# Patient Record
Sex: FEMALE | Race: WHITE | NOT HISPANIC OR LATINO | Employment: OTHER | URBAN - METROPOLITAN AREA
[De-identification: names, ages, dates, MRNs, and addresses within clinical notes are randomized per-mention and may not be internally consistent; named-entity substitution may affect disease eponyms.]

---

## 2017-02-17 ENCOUNTER — OFFICE VISIT (OUTPATIENT)
Dept: CARDIOLOGY | Facility: CLINIC | Age: 72
End: 2017-02-17

## 2017-02-17 VITALS
HEART RATE: 69 BPM | HEIGHT: 63 IN | BODY MASS INDEX: 31.29 KG/M2 | DIASTOLIC BLOOD PRESSURE: 66 MMHG | WEIGHT: 176.6 LBS | SYSTOLIC BLOOD PRESSURE: 124 MMHG

## 2017-02-17 DIAGNOSIS — E78.5 DYSLIPIDEMIA: ICD-10-CM

## 2017-02-17 DIAGNOSIS — E11.8 TYPE 2 DIABETES MELLITUS WITH COMPLICATION, WITHOUT LONG-TERM CURRENT USE OF INSULIN (HCC): ICD-10-CM

## 2017-02-17 DIAGNOSIS — I10 ESSENTIAL HYPERTENSION: Primary | ICD-10-CM

## 2017-02-17 DIAGNOSIS — I11.9 HYPERTENSIVE HEART DISEASE WITHOUT HEART FAILURE: ICD-10-CM

## 2017-02-17 DIAGNOSIS — E66.9 OBESITY (BMI 30.0-34.9): ICD-10-CM

## 2017-02-17 PROCEDURE — 99213 OFFICE O/P EST LOW 20 MIN: CPT | Performed by: INTERNAL MEDICINE

## 2017-02-17 RX ORDER — LEVOTHYROXINE SODIUM 0.03 MG/1
25 TABLET ORAL DAILY
Qty: 90 TABLET | Refills: 1 | Status: SHIPPED | OUTPATIENT
Start: 2017-02-17 | End: 2018-04-27 | Stop reason: SDUPTHER

## 2017-02-17 RX ORDER — HYDROCHLOROTHIAZIDE 12.5 MG/1
25 TABLET ORAL DAILY
Qty: 180 TABLET | Refills: 1 | Status: SHIPPED | OUTPATIENT
Start: 2017-02-17 | End: 2017-08-28 | Stop reason: SDUPTHER

## 2017-02-17 RX ORDER — AMLODIPINE BESYLATE 5 MG/1
5 TABLET ORAL DAILY
Qty: 90 TABLET | Refills: 1 | Status: SHIPPED | OUTPATIENT
Start: 2017-02-17 | End: 2017-11-30

## 2017-02-17 RX ORDER — RANOLAZINE 500 MG/1
500 TABLET, EXTENDED RELEASE ORAL DAILY
Qty: 30 TABLET | Refills: 1 | Status: SHIPPED | OUTPATIENT
Start: 2017-02-17 | End: 2017-04-26 | Stop reason: SDUPTHER

## 2017-02-17 RX ORDER — FENOFIBRATE 145 MG/1
145 TABLET, COATED ORAL DAILY
Qty: 90 TABLET | Refills: 1 | Status: SHIPPED | OUTPATIENT
Start: 2017-02-17 | End: 2017-12-08 | Stop reason: SDUPTHER

## 2017-02-17 RX ORDER — FLUTICASONE PROPIONATE 50 MCG
2 SPRAY, SUSPENSION (ML) NASAL DAILY
COMMUNITY

## 2017-02-17 RX ORDER — ROSUVASTATIN CALCIUM 10 MG/1
10 TABLET, COATED ORAL DAILY
Qty: 90 TABLET | Refills: 1 | Status: SHIPPED | OUTPATIENT
Start: 2017-02-17 | End: 2018-04-27 | Stop reason: SDUPTHER

## 2017-02-17 NOTE — PROGRESS NOTES
Subjective:     Encounter Date:02/17/2017      Patient ID: Coco Crockett is a 71 y.o.  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    PHYSICIAN/INTERNIST: Lobo Archer MD  ORTHOPEDIST: Luisito Cueva MD    Chief Complaint:   Chief Complaint   Patient presents with   • Follow-up   • Hypertension     Problem List:  1. Combined hypertensive and ischemic cardiovascular disease:  a. Acceptable GXT with abnormal baseline ST, September 1995.   b. Acceptable exercise stress test and normal quantitative SPECT Cardiolite study with low probability for significant obstructive coronary disease with estimated LVEF (0.60), June 1997.  c. NYHA class I chest pain/abnormal EKG/intermittent palpitations with GXT echo stress test suggestive of low probability for significant focal obstructive coronary disease, LVEF (0.80), 12/07/2009.   d. Recurrent atypical CCS class II chest pain syndrome with NYHA class I exertional dyspnea with fatigue with diagnostic coronary angiography demonstrating nonobstructive single-vessel LAD disease with associated 75% to 80% stenosis first diagonal branch LAD not felt to be amenable to catheter based intervention with mild plaque in the nondominant left circumflex coronary artery with normal right dominant coronary artery with preserved LVEF (0.70) with associated diastolic LV dysfunction, December 2013.   e. Residual class I angina pectoris/NYHA class I to II exertional dyspnea and fatigue.  f. Stress test, 06/30/2016:  Normal LVEF (0.63); findings consistent with a normal ECG stress test; myocardial perfusion imaging indicates a small-sized infarct located in the lateral wall with no significant ischemia noted.  g. Residual Class I symptoms  2. Chronic hypertension, probably essential.   3. Dyslipidemia.   4. Mild obesity (BMI 31).   5. Chronic  hypothyroidism/replacement therapy.   6. Osteopenia with fracture of lumbar vertebra, 2004.   7. TMJ.   8. GERD.   9. Iron deficiency anemia.   10. Depression.   11. Eczema.   12. Remote operations:   a. Tonsillectomy and adenoidectomy, 1950.   b. Appendectomy with wedge resection of both ovaries, 1970.  c. MARIZA/BSO secondary to severe endometriosis, 1985.   d. Fall on ice with subsequent ORIF of right ankle/tibia, 1995.   e. Cataracts, OU with bifocal implant, 2008.  13. Intermittent marked dizziness/vertigo with Epley maneuver.  14. Intermittent progressive headache syndrome/possible migraine equivalent.  15. Type 2 diabetes mellitus, Hemoglobin A1c 5.9%, December 2014.     Allergies   Allergen Reactions   • Bactrim [Sulfamethoxazole-Trimethoprim]    • Imdur [Isosorbide Dinitrate] Other (See Comments)     Severe headache/fatigue       • Keflex [Cephalexin] Itching and Other (See Comments)     Angioedema     • Levaquin [Levofloxacin]    • Codeine Sulfate Itching and Rash   • Demerol [Meperidine] Itching and Rash   • Morphine Sulfate Itching and Rash   • Penicillin V Potassium Itching and Rash   • Sulfa Antibiotics Rash and Other (See Comments)     severe rash/swelling         Current Outpatient Prescriptions:   •  amLODIPine (NORVASC) 5 MG tablet, Take 1 tablet by mouth Daily., Disp: 90 tablet, Rfl: 3  •  aspirin 325 MG tablet, Take 325 mg by mouth daily., Disp: , Rfl:   •  betamethasone dipropionate (DIPROLENE) 0.05 % cream, Apply  topically daily., Disp: , Rfl:   •  Cholecalciferol (VITAMIN D3 PO), Take 10,000 Units by mouth Daily. 5 days out of the week , Disp: , Rfl:   •  DULoxetine (CYMBALTA) 60 MG capsule, Take 60 mg by mouth daily., Disp: , Rfl:   •  estradiol (ESTRACE) 0.5 MG tablet, Take 0.5 mg by mouth daily., Disp: , Rfl:   •  fenofibrate (TRICOR) 145 MG tablet, Take 145 mg by mouth daily., Disp: , Rfl:   •  fluticasone (FLONASE) 50 MCG/ACT nasal spray, 2 sprays into each nostril Daily., Disp: , Rfl:   •   hydrochlorothiazide (HYDRODIURIL) 12.5 MG tablet, Take 25 mg by mouth Daily., Disp: , Rfl:   •  levothyroxine (SYNTHROID, LEVOTHROID) 25 MCG tablet, Take 25 mcg by mouth., Disp: , Rfl:   •  meloxicam (MOBIC) 7.5 MG tablet, TAKE 1 TABLET BY MOUTH EVERY DAY AS NEEDED FOR SHOULDER PAIN, Disp: 30 tablet, Rfl: 3  •  metFORMIN (GLUCOPHAGE) 500 MG tablet, Take 500 mg by mouth daily., Disp: , Rfl:   •  Multiple Vitamin (MULTI VITAMIN DAILY PO), Take  by mouth daily., Disp: , Rfl:   •  nebivolol (BYSTOLIC) 2.5 MG tablet, Take 5 mg by mouth 2 (Two) Times a Day., Disp: , Rfl:   •  nitroglycerin (NITROLINGUAL) 0.4 MG/SPRAY spray, Place 1 spray under the tongue every 5 (five) minutes as needed for chest pain., Disp: , Rfl:   •  Omega-3 Fatty Acids (FISH OIL) 1200 MG capsule capsule, Take 1,200 mg by mouth 2 (two) times a day. 2 tablets in AM 2 tablets in PM, Disp: , Rfl:   •  omeprazole (PriLOSEC) 20 MG capsule, Take 20 mg by mouth daily., Disp: , Rfl:   •  ranolazine (RANEXA) 500 MG 12 hr tablet, Take 500 mg by mouth daily., Disp: , Rfl:   •  rosuvastatin (CRESTOR) 10 MG tablet, Take 10 mg by mouth daily., Disp: , Rfl:   •  vitamin B-12 (CYANOCOBALAMIN) 100 MCG tablet, Take 50 mcg by mouth daily., Disp: , Rfl:     History of Present Illness Patient returns for scheduled 6-month followup. She says that she is not on Qsymia because she never got the prescription for it; she did see the dietician.  She is still interested in being on the Qsymia.  She reports that she does not do much traveling anymore because her grandchildren have moved back here from [place]; they now live in [place].  Her son opened a boat business, and her daughter-in-law is opening her own business as well.  She is babysitting her 3 grandchildren (5 mos., 2 year old and 5 year old) twice a week; one day she drives to [place], and the other day her daughter-in-law brings the children to her. She tells a story of the police banging on her door at 1:30  "this morning; apparently, someone had called 911, and the ping showed that it had come from her address.  They were checking to see if she was okay. She says that her dog slept right through it. Patient otherwise denies chest pain, shortness of breath, PND, edema, palpitations, syncope or presyncope at this time.  She is active on a daily basis and is asymptomatic from a cardiopulmonary perspective.  She notes that she is currently being treated for bronchitis and an upper respiratory infection; she is on her last day of antibiotics.  She did have influenza immunization in the fall.      ROS   Obtained and otherwise negative except as outlined in problem list and HPI.    Procedures       Objective:       Vitals:    02/17/17 1014 02/17/17 1016   BP: 132/81 124/66   BP Location: Right arm Right arm   Patient Position: Sitting Standing   Pulse: 68 69   Weight: 176 lb 9.6 oz (80.1 kg)    Height: 63\" (160 cm)      Body mass index is 31.28 kg/(m^2).   Last weight:  179 lbs.    Physical Exam   Constitutional: She appears well-developed and well-nourished.   HENT:   Head: Normocephalic and atraumatic.   Mouth/Throat: Oropharynx is clear and moist.   Neck: Neck supple. No JVD present. Carotid bruit is not present. No thyromegaly present.   Cardiovascular: Normal rate and regular rhythm.  Exam reveals no gallop, no S3 and no friction rub.    No murmur heard.  Pulses:       Dorsalis pedis pulses are 2+ on the right side, and 2+ on the left side.        Posterior tibial pulses are 2+ on the right side, and 2+ on the left side.   Pulmonary/Chest: Effort normal and breath sounds normal.   Abdominal: Soft. She exhibits no mass. There is no hepatosplenomegaly. There is no tenderness.   Lymphadenopathy:     She has no cervical adenopathy.   Neurological: She is alert. She has normal strength. No cranial nerve deficit or sensory deficit.   Skin: Skin is warm, dry and intact.       Lab Review: No recent laboratory studies to " review.        Assessment:   Overall continued acceptable course with no interim cardiopulmonary complaints with good functional status. We will defer additional diagnostic or therapeutic intervention from a cardiac perspective at this time.  We will call in Qsymia on Monday, 02/20/2017, and she will need to let us know what her weight is in May 2017.       Diagnosis Plan   1. Essential hypertension  amLODIPine (NORVASC) 5 MG tablet    fenofibrate (TRICOR) 145 MG tablet    hydrochlorothiazide (HYDRODIURIL) 12.5 MG tablet    levothyroxine (SYNTHROID, LEVOTHROID) 25 MCG tablet    ranolazine (RANEXA) 500 MG 12 hr tablet    rosuvastatin (CRESTOR) 10 MG tablet   2. Dyslipidemia  amLODIPine (NORVASC) 5 MG tablet    fenofibrate (TRICOR) 145 MG tablet    hydrochlorothiazide (HYDRODIURIL) 12.5 MG tablet    levothyroxine (SYNTHROID, LEVOTHROID) 25 MCG tablet    ranolazine (RANEXA) 500 MG 12 hr tablet    rosuvastatin (CRESTOR) 10 MG tablet   3. Hypertensive heart disease without heart failure  amLODIPine (NORVASC) 5 MG tablet    fenofibrate (TRICOR) 145 MG tablet    hydrochlorothiazide (HYDRODIURIL) 12.5 MG tablet    levothyroxine (SYNTHROID, LEVOTHROID) 25 MCG tablet    ranolazine (RANEXA) 500 MG 12 hr tablet    rosuvastatin (CRESTOR) 10 MG tablet   4. Obesity (BMI 30.0-34.9)     5. Type 2 diabetes mellitus with complication, without long-term current use of insulin            Plan:         1. Patient to continue current medications and close follow up with the above providers.  2. She will need to update us on her weight in 2 months, and if her weight is not reduced by at least 5%, we will discontinue the trial of Qsymia.  3. Tentative cardiology follow up in August 2017, or patient may return sooner PRN.       Transcribed by Hailey Wilson for Dr. Abel Barillas at 10:34 AM on 02/17/2017    Abel HERNANDEZ MD, [facility], personally performed the services described in this documentation as scribed by the above named  individual in my presence, and it is both accurate and complete. At 10:34 AM on 02/17/2017

## 2017-02-20 RX ORDER — MELOXICAM 7.5 MG/1
TABLET ORAL
Qty: 30 TABLET | Refills: 3 | Status: SHIPPED | OUTPATIENT
Start: 2017-02-20 | End: 2019-04-19

## 2017-04-26 DIAGNOSIS — E78.5 DYSLIPIDEMIA: ICD-10-CM

## 2017-04-26 DIAGNOSIS — I10 ESSENTIAL HYPERTENSION: ICD-10-CM

## 2017-04-26 DIAGNOSIS — I11.9 HYPERTENSIVE HEART DISEASE WITHOUT HEART FAILURE: ICD-10-CM

## 2017-04-28 RX ORDER — RANOLAZINE 500 MG/1
TABLET, FILM COATED, EXTENDED RELEASE ORAL
Qty: 30 TABLET | Refills: 1 | Status: SHIPPED | OUTPATIENT
Start: 2017-04-28 | End: 2019-04-19 | Stop reason: SDUPTHER

## 2017-05-22 RX ORDER — BENAZEPRIL HYDROCHLORIDE 10 MG/1
TABLET ORAL
Qty: 90 TABLET | Refills: 1 | OUTPATIENT
Start: 2017-05-22

## 2017-06-20 RX ORDER — MELOXICAM 7.5 MG/1
TABLET ORAL
Qty: 30 TABLET | Refills: 3 | OUTPATIENT
Start: 2017-06-20

## 2017-07-05 ENCOUNTER — TELEPHONE (OUTPATIENT)
Dept: CARDIOLOGY | Facility: CLINIC | Age: 72
End: 2017-07-05

## 2017-07-05 NOTE — TELEPHONE ENCOUNTER
"Pt called and left message stating she has had increase in anginal symptoms during a \"stressful situation.\" She has increased Ranexa back to BID dose. She has also used NTG SL spray once.   Daughter in ICU at [place]. She feels sleep deprived and worn down. Daughter is home now. Pain on Sunday, relieved with SL NTG. She gets tingling, cold sensation in her chest that radiates to both arms with numbness. The last episode she states pain radiated to left shoulder. This pain was mostly exertional in nature, walking to and from hospital while visiting her daughter.  Current heart meds confirmed with patient  Bystolic 5 mg daily  Amlodipine 5 mg daily  HCTZ 12.5 q AM, 25 q PM  Ranexa 500 mg BID  Aspirin 325 mg daily  SL NTG spray prn  Levothyroxine 25 mcg daily    Stress or LHC, workin clinic?  [facility]    Per [facility] 7/5/17  Increase Ranexa to 1000 mg BID  Workin in office 1-2 weeks  If symptoms worsen proceed to [facility] ER  No recent labs.  Pt aware. Samples of 1000 mg Ranexa placed at . [facility]  "

## 2017-07-12 ENCOUNTER — OFFICE VISIT (OUTPATIENT)
Dept: CARDIOLOGY | Facility: CLINIC | Age: 72
End: 2017-07-12

## 2017-07-12 VITALS
HEIGHT: 63 IN | WEIGHT: 178.8 LBS | SYSTOLIC BLOOD PRESSURE: 119 MMHG | HEART RATE: 69 BPM | BODY MASS INDEX: 31.68 KG/M2 | DIASTOLIC BLOOD PRESSURE: 65 MMHG

## 2017-07-12 DIAGNOSIS — I11.9 HYPERTENSIVE HEART DISEASE WITHOUT HEART FAILURE: Primary | ICD-10-CM

## 2017-07-12 DIAGNOSIS — E11.8 TYPE 2 DIABETES MELLITUS WITH COMPLICATION, WITHOUT LONG-TERM CURRENT USE OF INSULIN (HCC): ICD-10-CM

## 2017-07-12 DIAGNOSIS — I10 ESSENTIAL HYPERTENSION: ICD-10-CM

## 2017-07-12 DIAGNOSIS — R06.09 DYSPNEA ON EXERTION: ICD-10-CM

## 2017-07-12 DIAGNOSIS — E78.5 DYSLIPIDEMIA: ICD-10-CM

## 2017-07-12 PROCEDURE — 93000 ELECTROCARDIOGRAM COMPLETE: CPT | Performed by: INTERNAL MEDICINE

## 2017-07-12 PROCEDURE — 99213 OFFICE O/P EST LOW 20 MIN: CPT | Performed by: INTERNAL MEDICINE

## 2017-07-12 NOTE — PROGRESS NOTES
"    Subjective:     Encounter Date:07/12/2017      Patient ID: Coco Crockett is a 72 y.o.  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     PHYSICIAN: Lobo Archer MD  ORTHOPEDIST: Luisito Cueva MD    Chief Complaint:   Chief Complaint   Patient presents with   • Essential hypertensive   • Numbness     Pt.states that she \"had an episode 7/3/2017 of tingling across chest and it radiated down both arms.     • Chest Pain   • Fatigue     Problem List:  1. Combined hypertensive and ischemic cardiovascular disease:  a. Acceptable GXT with abnormal baseline ST, September 1995.   b. Acceptable exercise stress test and normal quantitative SPECT Cardiolite study with low probability for significant obstructive coronary disease with estimated LVEF (0.60), June 1997.  c. CCS class I chest pain/abnormal EKG/intermittent palpitations with GXT echo stress test suggestive of low probability for significant focal obstructive coronary disease, LVEF (0.80), 12/07/2009.   d. Recurrent atypical CCS class II chest pain syndrome with NYHA class I exertional dyspnea with fatigue with diagnostic coronary angiography demonstrating nonobstructive single-vessel LAD disease with associated 75% to 80% stenosis first diagonal branch LAD not felt to be amenable to catheter based intervention with mild plaque in the nondominant left circumflex coronary artery with normal right dominant coronary artery with preserved LVEF (0.70) with associated diastolic LV dysfunction, December 2013.   e. Residual class I angina pectoris/NYHA class I to II exertional dyspnea and fatigue.  f. Stress test, 06/30/2016:  Normal LVEF (0.63); findings consistent with a normal ECG stress test; myocardial perfusion imaging indicates a small-sized infarct located in the lateral wall with no significant ischemia noted.  g. CCS " class I chest discomfort/NYHA class II dyspnea with exertion/fatigue  2. Chronic hypertension, probably essential.   3. Dyslipidemia.   4. Mild obesity (BMI 31).   5. Chronic hypothyroidism/replacement therapy.   6. Osteopenia with fracture of lumbar vertebra, 2004.   7. TMJ.   8. GERD.   9. Iron deficiency anemia.   10. Depression.   11. Eczema.   12. Remote operations:   a. Tonsillectomy and adenoidectomy, 1950.   b. Appendectomy with wedge resection of both ovaries, 1970.  c. MARIZA/BSO secondary to severe endometriosis, 1985.   d. Fall on ice with subsequent ORIF of right ankle/tibia, 1995.   e. Cataracts, OU with bifocal implant, 2008.  13. Intermittent marked dizziness/vertigo with Epley maneuver.  14. Intermittent progressive headache syndrome/possible migraine equivalent.  15. Type 2 diabetes mellitus, Hemoglobin A1c 5.9%, December 2014, 5.8% July 2017.      Allergies   Allergen Reactions   • Bactrim [Sulfamethoxazole-Trimethoprim]    • Imdur [Isosorbide Dinitrate] Other (See Comments)     Severe headache/fatigue       • Keflex [Cephalexin] Itching and Other (See Comments)     Angioedema     • Levaquin [Levofloxacin]    • Codeine Sulfate Itching and Rash   • Demerol [Meperidine] Itching and Rash   • Morphine Sulfate Itching and Rash   • Penicillin V Potassium Itching and Rash   • Sulfa Antibiotics Rash and Other (See Comments)     severe rash/swelling         Current Outpatient Prescriptions:   •  amLODIPine (NORVASC) 5 MG tablet, Take 1 tablet by mouth Daily., Disp: 90 tablet, Rfl: 1  •  aspirin 325 MG tablet, Take 325 mg by mouth daily., Disp: , Rfl:   •  betamethasone dipropionate (DIPROLENE) 0.05 % cream, Apply  topically daily., Disp: , Rfl:   •  DULoxetine (CYMBALTA) 60 MG capsule, Take 60 mg by mouth daily., Disp: , Rfl:   •  estradiol (ESTRACE) 0.5 MG tablet, Take 0.5 mg by mouth daily., Disp: , Rfl:   •  fenofibrate (TRICOR) 145 MG tablet, Take 1 tablet by mouth Daily., Disp: 90 tablet, Rfl: 1  •   "fluticasone (FLONASE) 50 MCG/ACT nasal spray, 2 sprays into each nostril Daily., Disp: , Rfl:   •  hydrochlorothiazide (HYDRODIURIL) 12.5 MG tablet, Take 2 tablets by mouth Daily., Disp: 180 tablet, Rfl: 1  •  levothyroxine (SYNTHROID, LEVOTHROID) 25 MCG tablet, Take 1 tablet by mouth Daily., Disp: 90 tablet, Rfl: 1  •  meloxicam (MOBIC) 7.5 MG tablet, TAKE 1 TABLET BY MOUTH EVERY DAY AS NEEDED FOR SHOULDER PAIN, Disp: 30 tablet, Rfl: 3  •  metFORMIN (GLUCOPHAGE) 500 MG tablet, Take 500 mg by mouth daily., Disp: , Rfl:   •  Multiple Vitamin (MULTI VITAMIN DAILY PO), Take  by mouth daily., Disp: , Rfl:   •  nebivolol (BYSTOLIC) 2.5 MG tablet, Take 5 mg by mouth Daily., Disp: , Rfl:   •  nitroglycerin (NITROLINGUAL) 0.4 MG/SPRAY spray, Place 1 spray under the tongue every 5 (five) minutes as needed for chest pain., Disp: , Rfl:   •  omeprazole (PriLOSEC) 20 MG capsule, Take 20 mg by mouth daily., Disp: , Rfl:   •  RANEXA 500 MG 12 hr tablet, TAKE 1 TABLET BY MOUTH EVERY DAY (Patient taking differently: TAKE 1 TABLET BY MOUTH BID.), Disp: 30 tablet, Rfl: 1  •  rosuvastatin (CRESTOR) 10 MG tablet, Take 1 tablet by mouth Daily., Disp: 90 tablet, Rfl: 1    History of Present Illness Patient returns early for followup after a 4-1/2 month hiatus.  Patient states that she had an episode earlier in the month when her daughter was having surgery to have a stimulator put in her stomach for gastroparesis, and due to her daughter's dystonia, she had collapsed vocal cords and was placed in the intensive care unit for one week at [facility].  During this time, the patient experienced marked emotional stress and experienced some \"prickling\" in her chest and to her elbows.  She had one episode of sharp pain in her left shoulder blade and took nitroglycerin spray  once with resolution of symptoms.  She has developed shortness of breath and fatigue.  She felt that at first it might have been from the stress of having " "her daughter in the intensive care unit.  She was hoping that her fatigue would improve now that it has been a couple of weeks since this incident.  However, she is now taking a few naps during the day.  She states that she is not having difficulties with sleeping at night.  Also, she saw her primary care physician for possible TMJ/right ear pain.  Since the hospitalization, she has only had one episode of this \"prickling\" that was only in her shoulders to her elbows.  She had some hypertension while she was with her daughter in the hospital, and the highest her blood pressure got was 157/110.  She called our office, and was suggested to increase her Ranexa to 1000 mg twice a day.  She states that she cannot tolerate this, and her energy is completely gone with this dosage.  Since this time, she has reduced it back to 500 mg twice a day.  Before her daughter was in the hospital, she was only taking Ranexa 500 mg once a day.  Patient otherwise denies chest pain, shortness of breath, PND, edema, palpitations, syncope or presyncope at this time on limited activity.  She hand carries her laboratory studies with her, which are reviewed with her in the office today.      Review of Systems   Constitution: Positive for night sweats and weight gain.   HENT: Positive for ear pain (right), headaches and hearing loss.    Eyes: Positive for blurred vision ( occasionally).   Cardiovascular: Positive for chest pain and dyspnea on exertion.   Respiratory: Positive for shortness of breath.    Musculoskeletal: Positive for arthritis, back pain, muscle cramps, muscle weakness, myalgias, neck pain and stiffness.   Neurological: Positive for excessive daytime sleepiness.      Obtained and otherwise negative except as outlined in problem list and HPI.      ECG 12 Lead  Date/Time: 7/12/2017 2:26 PM  Performed by: APRYL ARRIAGA  Authorized by: APRYL ARRIAGA   Rhythm comments: Normal sinus rhythm, low voltage QRS, nonspecific ST and T " "wave abnormality, abnormal ECG, 66 bpm                 Objective:       Vitals:    07/12/17 1330 07/12/17 1332   BP: 124/71 119/65   BP Location: Left arm Left arm   Patient Position: Sitting Standing   Pulse: 67 69   Weight: 178 lb 12.8 oz (81.1 kg)    Height: 63\" (160 cm)      Body mass index is 31.67 kg/(m^2).   Last weight:  176 lbs.    Physical Exam   Constitutional: She is oriented to person, place, and time. She appears well-developed and well-nourished.   Neck: No JVD present. Carotid bruit is not present. No thyromegaly present.   Cardiovascular: Regular rhythm, S1 normal, S2 normal and normal heart sounds.  Exam reveals no gallop, no S3 and no friction rub.    No murmur heard.  Pulses:       Dorsalis pedis pulses are 2+ on the right side, and 2+ on the left side.        Posterior tibial pulses are 2+ on the right side, and 2+ on the left side.   Pulmonary/Chest: Effort normal and breath sounds normal. She has no wheezes. She has no rhonchi. She has no rales.   Abdominal: Soft. She exhibits no mass. There is no hepatosplenomegaly. There is no tenderness. There is no guarding.   Lymphadenopathy:     She has no cervical adenopathy.   Neurological: She is alert and oriented to person, place, and time.   Skin: Skin is warm, dry and intact. No rash noted.   Vitals reviewed.      Lab Review: 07/06/2017  · Hemoglobin A1c - 5.8%  · BMP - glucose 99, BUN 24, creatinine 1.29, GFR 43, sodium 139, potassium 4.2, calcium 10.2  · CBC - WBC 5.7, RBC 3.99, hemoglobin 12.1, hematocrit 36.5, platelets 184  · Troponin - <0.010  · Vitamin D - 35.6        Assessment:   Patient has had some situational stress from being in the hospital with her daughter in the intensive care unit.  Since that time several weeks ago, she has experienced fatigue and shortness of breath.  Also, she had one episode of atypical chest pain.Type 2 diabetes mellitus and hypertension are controlled.  We will order an echocardiogram, BNP, d-dimer to " assess her symptoms of fatigue/shortness of breath, and to evaluate for any cardiomyopathy, and signs of heart failure and/or clot formation.  We will obtain an EKG today to look for any changes; it was acceptable.        Diagnosis Plan   1. Hypertensive heart disease without heart failure  BNP    D-dimer, Quantitative   2. Essential hypertension     3. Type 2 diabetes mellitus with complication, without long-term current use of insulin     4. Dyslipidemia     5. Dyspnea on exertion  Adult Transthoracic Echo Complete          Plan:         1. Patient to continue current medications and close follow up with the above providers.  2. Tentative cardiology follow up in 4 months or patient may return sooner PRN.  3. EKG today; acceptable  4. Echocardiogram  5. BNP, d-dimer      Scribed for Abel Barillas MD by Lidia Malave, APRN. 7/12/2017  2:26 PM    IAbel MD, [facility], personally performed the services described in this documentation as scribed by the above named individual in my presence, and it is both accurate and complete. At 4:47 PM on 07/12/2017

## 2017-07-17 ENCOUNTER — RESULTS ENCOUNTER (OUTPATIENT)
Dept: CARDIOLOGY | Facility: CLINIC | Age: 72
End: 2017-07-17

## 2017-07-17 DIAGNOSIS — I11.9 HYPERTENSIVE HEART DISEASE WITHOUT HEART FAILURE: ICD-10-CM

## 2017-07-24 ENCOUNTER — LAB (OUTPATIENT)
Dept: LAB | Facility: HOSPITAL | Age: 72
End: 2017-07-24

## 2017-07-24 ENCOUNTER — HOSPITAL ENCOUNTER (OUTPATIENT)
Dept: CARDIOLOGY | Facility: HOSPITAL | Age: 72
Discharge: HOME | End: 2017-07-24
Admitting: NURSE PRACTITIONER

## 2017-07-24 DIAGNOSIS — I11.9 HYPERTENSIVE HEART DISEASE WITHOUT HEART FAILURE: ICD-10-CM

## 2017-07-24 LAB
BH CV ECHO MEAS - BSA(HAYCOCK): 1.9 M^2
BH CV ECHO MEAS - BSA: 1.8 M^2
BH CV ECHO MEAS - BZI_BMI: 31.5 KILOGRAMS/M^2
BH CV ECHO MEAS - BZI_METRIC_HEIGHT: 160 CM
BH CV ECHO MEAS - BZI_METRIC_WEIGHT: 80.7 KG
BH CV ECHO MEAS - CONTRAST EF (2CH): 68.8 ML/M^2
BH CV ECHO MEAS - CONTRAST EF 4CH: 60.8 ML/M^2
BH CV ECHO MEAS - EDV(CUBED): 55.3 ML
BH CV ECHO MEAS - EDV(MOD-SP2): 77 ML
BH CV ECHO MEAS - EDV(MOD-SP4): 79 ML
BH CV ECHO MEAS - EDV(TEICH): 62.3 ML
BH CV ECHO MEAS - EF(CUBED): 78.3 %
BH CV ECHO MEAS - EF(MOD-SP2): 68.8 %
BH CV ECHO MEAS - EF(MOD-SP4): 60.8 %
BH CV ECHO MEAS - EF(TEICH): 71.2 %
BH CV ECHO MEAS - ESV(CUBED): 12 ML
BH CV ECHO MEAS - ESV(MOD-SP2): 24 ML
BH CV ECHO MEAS - ESV(MOD-SP4): 31 ML
BH CV ECHO MEAS - ESV(TEICH): 17.9 ML
BH CV ECHO MEAS - FS: 39.9 %
BH CV ECHO MEAS - IVS/LVPW: 1
BH CV ECHO MEAS - IVSD: 1.3 CM
BH CV ECHO MEAS - LA DIMENSION: 3.7 CM
BH CV ECHO MEAS - LAT PEAK E' VEL: 4 CM/SEC
BH CV ECHO MEAS - LV DIASTOLIC VOL/BSA (35-75): 42.9 ML/M^2
BH CV ECHO MEAS - LV MASS(C)D: 167.6 GRAMS
BH CV ECHO MEAS - LV MASS(C)DI: 91.1 GRAMS/M^2
BH CV ECHO MEAS - LV MAX PG: 2.9 MMHG
BH CV ECHO MEAS - LV MEAN PG: 1 MMHG
BH CV ECHO MEAS - LV SYSTOLIC VOL/BSA (12-30): 16.8 ML/M^2
BH CV ECHO MEAS - LV V1 MAX: 85 CM/SEC
BH CV ECHO MEAS - LV V1 MEAN: 54.5 CM/SEC
BH CV ECHO MEAS - LV V1 VTI: 21.5 CM
BH CV ECHO MEAS - LVIDD: 3.8 CM
BH CV ECHO MEAS - LVIDS: 2.3 CM
BH CV ECHO MEAS - LVLD AP2: 6.3 CM
BH CV ECHO MEAS - LVLD AP4: 7.2 CM
BH CV ECHO MEAS - LVLS AP2: 5.5 CM
BH CV ECHO MEAS - LVLS AP4: 6.7 CM
BH CV ECHO MEAS - LVOT AREA (M): 3.1 CM^2
BH CV ECHO MEAS - LVOT AREA: 3.1 CM^2
BH CV ECHO MEAS - LVOT DIAM: 2 CM
BH CV ECHO MEAS - LVPWD: 1.3 CM
BH CV ECHO MEAS - MED PEAK E' VEL: 9 CM/SEC
BH CV ECHO MEAS - MV A MAX VEL: 105 CM/SEC
BH CV ECHO MEAS - MV DEC TIME: 0.24 SEC
BH CV ECHO MEAS - MV E MAX VEL: 75.5 CM/SEC
BH CV ECHO MEAS - MV E/A: 0.72
BH CV ECHO MEAS - PA ACC SLOPE: 591 CM/SEC^2
BH CV ECHO MEAS - PA ACC TIME: 0.11 SEC
BH CV ECHO MEAS - PA PR(ACCEL): 27.7 MMHG
BH CV ECHO MEAS - SI(CUBED): 23.5 ML/M^2
BH CV ECHO MEAS - SI(LVOT): 36.7 ML/M^2
BH CV ECHO MEAS - SI(MOD-SP2): 28.8 ML/M^2
BH CV ECHO MEAS - SI(MOD-SP4): 26.1 ML/M^2
BH CV ECHO MEAS - SI(TEICH): 24.1 ML/M^2
BH CV ECHO MEAS - SV(CUBED): 43.3 ML
BH CV ECHO MEAS - SV(LVOT): 67.5 ML
BH CV ECHO MEAS - SV(MOD-SP2): 53 ML
BH CV ECHO MEAS - SV(MOD-SP4): 48 ML
BH CV ECHO MEAS - SV(TEICH): 44.4 ML
BH CV ECHO MEAS - TAPSE (>1.6): 2.4 CM2
BH CV VAS BP LEFT ARM: NORMAL MMHG
BH CV XLRA - RV BASE: 3.6 CM
BH CV XLRA - RV LENGTH: 5.7 CM
BH CV XLRA - RV MID: 3.1 CM
BH CV XLRA - TDI S': 12 CM/SEC
BNP SERPL-MCNC: 27 PG/ML (ref 0–100)
D DIMER PPP FEU-MCNC: 0.25 MG/L (FEU) (ref 0–0.5)
LEFT ATRIUM VOLUME INDEX: 23 ML/M2
LV EF 2D ECHO EST: 70 %

## 2017-07-24 PROCEDURE — 83880 ASSAY OF NATRIURETIC PEPTIDE: CPT | Performed by: NURSE PRACTITIONER

## 2017-07-24 PROCEDURE — 93306 TTE W/DOPPLER COMPLETE: CPT

## 2017-07-24 PROCEDURE — 93306 TTE W/DOPPLER COMPLETE: CPT | Performed by: INTERNAL MEDICINE

## 2017-07-24 PROCEDURE — 85379 FIBRIN DEGRADATION QUANT: CPT | Performed by: NURSE PRACTITIONER

## 2017-07-24 PROCEDURE — 36415 COLL VENOUS BLD VENIPUNCTURE: CPT

## 2017-08-28 DIAGNOSIS — E78.5 DYSLIPIDEMIA: ICD-10-CM

## 2017-08-28 DIAGNOSIS — I11.9 HYPERTENSIVE HEART DISEASE WITHOUT HEART FAILURE: ICD-10-CM

## 2017-08-28 DIAGNOSIS — I10 ESSENTIAL HYPERTENSION: ICD-10-CM

## 2017-08-28 RX ORDER — HYDROCHLOROTHIAZIDE 12.5 MG/1
CAPSULE, GELATIN COATED ORAL
Qty: 180 CAPSULE | Refills: 1 | Status: SHIPPED | OUTPATIENT
Start: 2017-08-28 | End: 2018-09-20 | Stop reason: SDUPTHER

## 2017-11-30 ENCOUNTER — OFFICE VISIT (OUTPATIENT)
Dept: CARDIOLOGY | Facility: CLINIC | Age: 72
End: 2017-11-30

## 2017-11-30 VITALS
HEART RATE: 67 BPM | WEIGHT: 176 LBS | SYSTOLIC BLOOD PRESSURE: 150 MMHG | DIASTOLIC BLOOD PRESSURE: 78 MMHG | HEIGHT: 63 IN | BODY MASS INDEX: 31.18 KG/M2

## 2017-11-30 DIAGNOSIS — I11.9 HYPERTENSIVE HEART DISEASE WITHOUT HEART FAILURE: Primary | ICD-10-CM

## 2017-11-30 DIAGNOSIS — I10 ESSENTIAL HYPERTENSION: ICD-10-CM

## 2017-11-30 DIAGNOSIS — E11.8 TYPE 2 DIABETES MELLITUS WITH COMPLICATION, WITHOUT LONG-TERM CURRENT USE OF INSULIN (HCC): ICD-10-CM

## 2017-11-30 DIAGNOSIS — E78.5 DYSLIPIDEMIA: ICD-10-CM

## 2017-11-30 PROCEDURE — 99214 OFFICE O/P EST MOD 30 MIN: CPT | Performed by: INTERNAL MEDICINE

## 2017-11-30 RX ORDER — CHLORAL HYDRATE 500 MG
1000 CAPSULE ORAL
COMMUNITY
End: 2020-04-13

## 2017-11-30 RX ORDER — NEBIVOLOL 5 MG/1
5 TABLET ORAL DAILY
Qty: 30 TABLET | Refills: 11 | Status: SHIPPED | OUTPATIENT
Start: 2017-11-30 | End: 2018-06-25 | Stop reason: SDUPTHER

## 2017-11-30 RX ORDER — ACETAMINOPHEN 500 MG
500 TABLET ORAL 2 TIMES DAILY PRN
COMMUNITY

## 2017-11-30 NOTE — PROGRESS NOTES
Subjective:     Encounter Date:11/30/2017    Patient ID: Coco Crockett is a 72 y.o.  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      PHYSICIAN: Lobo Archer MD  ORTHOPEDIST: Luisito Cueva MD    Chief Complaint:   Chief Complaint   Patient presents with   • Hypertension     Problem List:  1. Combined hypertensive and ischemic cardiovascular disease:  a. Acceptable GXT with abnormal baseline ST, September 1995.   b. Acceptable exercise stress test and normal quantitative SPECT Cardiolite study with low probability for significant obstructive coronary disease with estimated LVEF (0.60), June 1997.  c. CCS class I chest pain/abnormal EKG/intermittent palpitations with GXT echo stress test suggestive of low probability for significant focal obstructive coronary disease, LVEF (0.80), 12/07/2009.   d. Recurrent atypical CCS class II chest pain syndrome with NYHA class I exertional dyspnea with fatigue with diagnostic coronary angiography demonstrating nonobstructive single-vessel LAD disease with associated 75% to 80% stenosis first diagonal branch LAD not felt to be amenable to catheter based intervention with mild plaque in the nondominant left circumflex coronary artery with normal right dominant coronary artery with preserved LVEF (0.70) with associated diastolic LV dysfunction, December 2013.   e. Residual class I angina pectoris/NYHA class I to II exertional dyspnea and fatigue.  f. Stress test, 06/30/2016:  Normal LVEF (0.63); findings consistent with a normal ECG stress test; myocardial perfusion imaging indicates a small-sized infarct located in the lateral wall with no significant ischemia noted.  g. CCS class I chest discomfort/NYHA class II dyspnea with exertion/fatigue with acceptable EKG and acceptable echocardiogram demonstrating normal LVEF (0.70)  without PE/pulmonary arterial hypertension and mild aortic valve sclerosis with mild LVH, July 2017.  2. Chronic hypertension, probably essential.   3. Dyslipidemia.   4. Mild obesity (BMI 31.2).   5. Chronic hypothyroidism/replacement therapy.   6. Osteopenia with fracture of lumbar vertebra, 2004.   7. TMJ.   8. GERD.   9. Iron deficiency anemia.   10. Depression.   11. Eczema.   12. Positional vertigo - data deficit.  13. Remote operations:   a. Tonsillectomy and adenoidectomy, 1950.   b. Appendectomy with wedge resection of both ovaries, 1970.  c. MARIZA/BSO secondary to severe endometriosis, 1985.   d. Fall on ice with subsequent ORIF of right ankle/tibia, 1995.   e. Cataracts, OU with bifocal implant, 2008.  14. Intermittent marked dizziness/vertigo with Epley maneuver.  15. Intermittent progressive headache syndrome/possible migraine equivalent.  16. Type 2 diabetes mellitus, Hemoglobin A1c 5.9%, December 2014, 5.8% July 2017.      Allergies   Allergen Reactions   • Bactrim [Sulfamethoxazole-Trimethoprim]    • Imdur [Isosorbide Dinitrate] Other (See Comments)     Severe headache/fatigue       • Keflex [Cephalexin] Itching and Other (See Comments)     Angioedema     • Levaquin [Levofloxacin]    • Codeine Sulfate Itching and Rash   • Demerol [Meperidine] Itching and Rash   • Morphine Sulfate Itching and Rash   • Penicillin V Potassium Itching and Rash   • Sulfa Antibiotics Rash and Other (See Comments)     severe rash/swelling         Current Outpatient Prescriptions:   •  acetaminophen (TYLENOL) 500 MG tablet, Take 500 mg by mouth 2 (Two) Times a Day As Needed for Mild Pain ., Disp: , Rfl:   •  aspirin 325 MG tablet, Take 325 mg by mouth Every Night., Disp: , Rfl:   •  betamethasone dipropionate (DIPROLENE) 0.05 % cream, Apply  topically daily., Disp: , Rfl:   •  Cyanocobalamin (VITAMIN B 12 PO), Take  by mouth Daily., Disp: , Rfl:   •  DULoxetine (CYMBALTA) 60 MG capsule, Take 60 mg by mouth daily., Disp: , Rfl:  "  •  estrogens, conjugated, (PREMARIN) 0.3 MG tablet, Take 0.3 mg by mouth Daily. Take daily for 21 days then do not take for 7 days., Disp: , Rfl:   •  fenofibrate (TRICOR) 145 MG tablet, Take 1 tablet by mouth Daily., Disp: 90 tablet, Rfl: 1  •  fluticasone (FLONASE) 50 MCG/ACT nasal spray, 2 sprays into each nostril Daily., Disp: , Rfl:   •  hydrochlorothiazide (MICROZIDE) 12.5 MG capsule, TAKE 2 CAPSULES BY MOUTH EVERY DAY (Patient taking differently: TAKE 1 CAPSULES BY MOUTH EVERY DAY), Disp: 180 capsule, Rfl: 1  •  levothyroxine (SYNTHROID, LEVOTHROID) 25 MCG tablet, Take 1 tablet by mouth Daily., Disp: 90 tablet, Rfl: 1  •  meloxicam (MOBIC) 7.5 MG tablet, TAKE 1 TABLET BY MOUTH EVERY DAY AS NEEDED FOR SHOULDER PAIN, Disp: 30 tablet, Rfl: 3  •  metFORMIN (GLUCOPHAGE) 500 MG tablet, Take 500 mg by mouth daily., Disp: , Rfl:   •  Multiple Vitamin (MULTI VITAMIN DAILY PO), Take  by mouth daily., Disp: , Rfl:   •  nebivolol (BYSTOLIC) 2.5 MG tablet, Take 2.5 mg by mouth Daily., Disp: , Rfl:   •  nitroglycerin (NITROLINGUAL) 0.4 MG/SPRAY spray, Place 1 spray under the tongue every 5 (five) minutes as needed for chest pain., Disp: , Rfl:   •  Omega-3 Fatty Acids (FISH OIL) 1000 MG capsule capsule, Take 1,000 mg by mouth Daily With Breakfast., Disp: , Rfl:   •  omeprazole (PriLOSEC) 20 MG capsule, Take 20 mg by mouth daily., Disp: , Rfl:   •  RANEXA 500 MG 12 hr tablet, TAKE 1 TABLET BY MOUTH EVERY DAY (Patient taking differently: TAKE 1 TABLET BY MOUTH daily), Disp: 30 tablet, Rfl: 1  •  rosuvastatin (CRESTOR) 10 MG tablet, Take 1 tablet by mouth Daily., Disp: 90 tablet, Rfl: 1    HISTORY OF PRESENT ILLNESS: Patient returns for scheduled 4-month followup. She states that she is doing better and has started back working with her  to get back in shape.  She is working in the pool right now because \"I'm so out of shape.\"  She is also doing water aerobics 3 times a week and has set up her treadmill but has not " "started back to using it.  She has been monitoring her blood pressure at home on occasion, and it is usually around 126/76.  She has been taking both her Bystolic and her Microzide at night; she is afraid that she will not make it to the bathroom if she takes the Microzide in the morning.  She is encouraged to continue taking the Bystolic at night but to take her diuretic in the morning so that she does not have to get up during the night to urinate.  She has had her influenza immunization and her pneumonia shot but has not gotten her shingles shot; she is encouraged to wait until after the first of the year to get it since the new shingles vaccination will be coming out.  She is otherwise able to be active on a daily basis without any symptoms from a cardiopulmonary perspective.  She notes that she spent Thanksgiving with her son and daughter-in-law in [place], and they ate with their neighbors who are from [place]; she enjoyed the different foods very much.  Patient otherwise denies chest pain, shortness of breath, PND, edema, palpitations, syncope or presyncope at this time.          Review of Systems   Constitution: Positive for malaise/fatigue.   HENT: Positive for hearing loss.         Headaches (sinus)   Eyes: Positive for blurred vision (at times).   Respiratory: Positive for cough.    Endocrine: Positive for heat intolerance.   Hematologic/Lymphatic: Bruises/bleeds easily.   Skin: Positive for dry skin.   Musculoskeletal: Positive for arthritis, back pain and myalgias (at times).   Allergic/Immunologic:        Food allergies      Obtained and otherwise negative except as outlined in problem list and HPI.    Procedures       Objective:       Vitals:    11/30/17 1034 11/30/17 1036 11/30/17 1044   BP: 151/68 157/76 150/78   BP Location: Right arm Right arm Right arm   Patient Position: Standing Sitting Sitting   Pulse: 70 67    Weight: 176 lb (79.8 kg)     Height: 63\" (160 cm)       Body mass index is 31.18 " kg/(m^2).   Last weight:  178 lbs.    Physical Exam   Constitutional: She is oriented to person, place, and time. She appears well-developed and well-nourished.   Neck: No JVD present. Carotid bruit is not present. No thyromegaly present.   Cardiovascular: Regular rhythm, S1 normal and S2 normal.  Exam reveals no gallop, no S3 and no friction rub.    Murmur heard.   Medium-pitched harsh early systolic murmur is present with a grade of 2/6  at the upper right sternal border  Pulses:       Dorsalis pedis pulses are 2+ on the right side, and 2+ on the left side.        Posterior tibial pulses are 2+ on the right side, and 2+ on the left side.   Pulmonary/Chest: Effort normal and breath sounds normal. She has no wheezes. She has no rhonchi. She has no rales.   Abdominal: Soft. She exhibits no mass. There is no hepatosplenomegaly. There is no tenderness. There is no guarding.   Bowel sounds audible x4   Musculoskeletal: Normal range of motion. She exhibits no edema.   Lymphadenopathy:     She has no cervical adenopathy.   Neurological: She is alert and oriented to person, place, and time.   Skin: Skin is warm, dry and intact. No rash noted.   Vitals reviewed.        Lab Review: 07/24/2017  · D-dimer - 0.25      Lab Results   Component Value Date    BNP 27.0 07/24/2017           Assessment:   Overall continued acceptable course with no interim cardiopulmonary complaints with acceptable functional status. We will defer additional diagnostic or therapeutic intervention from a cardiac perspective at this time other than to adjust her antihypertensive medication.  I am pleased that she has initiated rehabilitation and is using a  to mobilize and become more comfortable with her activity.  Would defer MPS/LHC at this time.  Hopefully, her laboratory studies obtained with Dr. Archer can be shared with us.       Diagnosis Plan   1. Hypertensive heart disease without heart failure  No recurrent angina pectoris or CHF.      2. Essential hypertension  Will increase Bystolic to 5 mg daily; she is to monitor her blood pressure and may need to increase HCTZ to 25 mg daily   3. Type 2 diabetes mellitus with complication, without long-term current use of insulin  No data to review   4. Dyslipidemia  No data to review          Plan:         1. Patient to continue current medications and close follow up with the above providers other than to increase her Bystolic to 5 mg daily.  2. Tentative cardiology follow up in May 2018, or patient may return sooner PRN.       Transcribed by Hailey Wilson for Dr. Abel Barillas at 10:38 AM on 11/30/2017    I, Abel Barillas MD, [facility], personally performed the services described in this documentation as scribed by the above named individual in my presence, and it is both accurate and complete. At 12:00 PM on 11/30/2017

## 2017-12-08 DIAGNOSIS — I10 ESSENTIAL HYPERTENSION: ICD-10-CM

## 2017-12-08 DIAGNOSIS — I11.9 HYPERTENSIVE HEART DISEASE WITHOUT HEART FAILURE: ICD-10-CM

## 2017-12-08 DIAGNOSIS — E78.5 DYSLIPIDEMIA: ICD-10-CM

## 2017-12-08 RX ORDER — FENOFIBRATE 145 MG/1
TABLET, COATED ORAL
Qty: 90 TABLET | Refills: 1 | Status: SHIPPED | OUTPATIENT
Start: 2017-12-08 | End: 2018-06-20 | Stop reason: SDUPTHER

## 2018-04-27 DIAGNOSIS — I11.9 HYPERTENSIVE HEART DISEASE WITHOUT HEART FAILURE: ICD-10-CM

## 2018-04-27 DIAGNOSIS — I10 ESSENTIAL HYPERTENSION: ICD-10-CM

## 2018-04-27 DIAGNOSIS — E78.5 DYSLIPIDEMIA: ICD-10-CM

## 2018-04-27 RX ORDER — LEVOTHYROXINE SODIUM 0.03 MG/1
25 TABLET ORAL DAILY
Qty: 90 TABLET | Refills: 1 | Status: SHIPPED | OUTPATIENT
Start: 2018-04-27 | End: 2018-10-25 | Stop reason: SDUPTHER

## 2018-04-27 RX ORDER — ROSUVASTATIN CALCIUM 10 MG/1
10 TABLET, COATED ORAL DAILY
Qty: 90 TABLET | Refills: 1 | Status: SHIPPED | OUTPATIENT
Start: 2018-04-27 | End: 2018-10-25 | Stop reason: SDUPTHER

## 2018-06-01 ENCOUNTER — LAB (OUTPATIENT)
Dept: LAB | Facility: HOSPITAL | Age: 73
End: 2018-06-01

## 2018-06-01 ENCOUNTER — OFFICE VISIT (OUTPATIENT)
Dept: CARDIOLOGY | Facility: CLINIC | Age: 73
End: 2018-06-01

## 2018-06-01 VITALS
WEIGHT: 179 LBS | BODY MASS INDEX: 31.71 KG/M2 | HEIGHT: 63 IN | HEART RATE: 71 BPM | DIASTOLIC BLOOD PRESSURE: 80 MMHG | SYSTOLIC BLOOD PRESSURE: 150 MMHG

## 2018-06-01 DIAGNOSIS — I11.9 HYPERTENSIVE HEART DISEASE WITHOUT HEART FAILURE: ICD-10-CM

## 2018-06-01 DIAGNOSIS — R06.09 DYSPNEA ON EXERTION: ICD-10-CM

## 2018-06-01 DIAGNOSIS — E78.5 DYSLIPIDEMIA: ICD-10-CM

## 2018-06-01 DIAGNOSIS — R53.83 FATIGUE, UNSPECIFIED TYPE: ICD-10-CM

## 2018-06-01 DIAGNOSIS — I10 ESSENTIAL HYPERTENSION: ICD-10-CM

## 2018-06-01 DIAGNOSIS — M79.605 LEG PAIN, BILATERAL: ICD-10-CM

## 2018-06-01 DIAGNOSIS — E11.8 TYPE 2 DIABETES MELLITUS WITH COMPLICATION, WITHOUT LONG-TERM CURRENT USE OF INSULIN (HCC): ICD-10-CM

## 2018-06-01 DIAGNOSIS — M79.604 LEG PAIN, BILATERAL: ICD-10-CM

## 2018-06-01 DIAGNOSIS — I11.9 HYPERTENSIVE HEART DISEASE WITHOUT HEART FAILURE: Primary | ICD-10-CM

## 2018-06-01 LAB
25(OH)D3 SERPL-MCNC: 16.2 NG/ML
BNP SERPL-MCNC: 26 PG/ML (ref 0–100)
CK SERPL-CCNC: 40 U/L (ref 26–174)
D DIMER PPP FEU-MCNC: 0.23 MG/L (FEU) (ref 0–0.5)
ERYTHROCYTE [SEDIMENTATION RATE] IN BLOOD: 7 MM/HR (ref 0–30)
FERRITIN SERPL-MCNC: 17 NG/ML (ref 10–291)
FOLATE SERPL-MCNC: >24 NG/ML (ref 3.2–20)
IRON 24H UR-MRATE: 108 MCG/DL (ref 50–175)
IRON SATN MFR SERPL: 26 % (ref 15–50)
TIBC SERPL-MCNC: 419 MCG/DL (ref 250–450)
VIT B12 BLD-MCNC: 573 PG/ML (ref 211–911)

## 2018-06-01 PROCEDURE — 82550 ASSAY OF CK (CPK): CPT

## 2018-06-01 PROCEDURE — 83880 ASSAY OF NATRIURETIC PEPTIDE: CPT

## 2018-06-01 PROCEDURE — 83550 IRON BINDING TEST: CPT

## 2018-06-01 PROCEDURE — 85379 FIBRIN DEGRADATION QUANT: CPT

## 2018-06-01 PROCEDURE — 85652 RBC SED RATE AUTOMATED: CPT

## 2018-06-01 PROCEDURE — 82607 VITAMIN B-12: CPT

## 2018-06-01 PROCEDURE — 83540 ASSAY OF IRON: CPT

## 2018-06-01 PROCEDURE — 82746 ASSAY OF FOLIC ACID SERUM: CPT

## 2018-06-01 PROCEDURE — 99214 OFFICE O/P EST MOD 30 MIN: CPT | Performed by: INTERNAL MEDICINE

## 2018-06-01 PROCEDURE — 36415 COLL VENOUS BLD VENIPUNCTURE: CPT

## 2018-06-01 PROCEDURE — 82306 VITAMIN D 25 HYDROXY: CPT

## 2018-06-01 PROCEDURE — 82728 ASSAY OF FERRITIN: CPT

## 2018-06-01 NOTE — PROGRESS NOTES
Subjective:     Encounter Date:06/01/2018    Patient ID: Coco Crockett is a 72 y.o.  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      PHYSICIAN: Lobo Archer MD  ORTHOPEDIST: Luisito Cueva MD  GASTROENTEROLOGIST:  Maryam Sutton MD    Chief Complaint:   Chief Complaint   Patient presents with   • Hypertension   • Shortness of Breath     on exertion   • Dizziness     Problem List:  1. Combined hypertensive and ischemic cardiovascular disease:  a. Acceptable GXT with abnormal baseline ST, September 1995.   b. Acceptable exercise stress test and normal quantitative SPECT Cardiolite study with low probability for significant obstructive coronary disease with estimated LVEF (0.60), June 1997.  c. CCS class I chest pain/abnormal EKG/intermittent palpitations with GXT echo stress test suggestive of low probability for significant focal obstructive coronary disease, LVEF (0.80), 12/07/2009.   d. Recurrent atypical CCS class II chest pain syndrome with NYHA class I exertional dyspnea with fatigue with diagnostic coronary angiography demonstrating nonobstructive single-vessel LAD disease with associated 75% to 80% stenosis first diagonal branch LAD not felt to be amenable to catheter based intervention with mild plaque in the nondominant left circumflex coronary artery with normal right dominant coronary artery with preserved LVEF (0.70) with associated diastolic LV dysfunction, December 2013.   e. Residual class I angina pectoris/NYHA class I to II exertional dyspnea and fatigue.  f. Stress test, 06/30/2016:  Normal LVEF (0.63); findings consistent with a normal ECG stress test; myocardial perfusion imaging indicates a small-sized infarct located in the lateral wall with no significant ischemia noted.  g. CCS class I chest discomfort/NYHA class II dyspnea with  exertion/fatigue with acceptable EKG and acceptable echocardiogram demonstrating normal LVEF (0.70) without PE/pulmonary arterial hypertension and mild aortic valve sclerosis with mild LVH, July 2017.  2. Chronic hypertension, probably essential.   3. Dyslipidemia.   4. Mild obesity (BMI 31.7).   5. Chronic hypothyroidism/replacement therapy.   6. Osteopenia with fracture of lumbar vertebra, 2004.   7. TMJ.   8. GERD.   9. Iron deficiency anemia.   10. Depression.   11. Eczema.   12. Positional vertigo - data deficit.  13. Remote operations:   a. Tonsillectomy and adenoidectomy, 1950.   b. Appendectomy with wedge resection of both ovaries, 1970.  c. MARIZA/BSO secondary to severe endometriosis, 1985.   d. Fall on ice with subsequent ORIF of right ankle/tibia, 1995.   e. Cataracts, OU with bifocal implant, 2008.  14. Intermittent marked dizziness/vertigo with Epley maneuver.  15. Intermittent progressive headache syndrome/possible migraine equivalent.  16. Type 2 diabetes mellitus, Hemoglobin A1c 5.9%, December 2014, 5.8% July 2017.  17. Recent progressive disabling fatigue with arthralgias and myalgias, June 2018  18. Chronic daytime hypersomnolence and fatigue with apparent acceptable polysomnogram - data deficit, [facility]       Allergies   Allergen Reactions   • Bactrim [Sulfamethoxazole-Trimethoprim]    • Imdur [Isosorbide Dinitrate] Other (See Comments)     Severe headache/fatigue       • Keflex [Cephalexin] Itching and Other (See Comments)     Angioedema     • Levaquin [Levofloxacin]    • Codeine Sulfate Itching and Rash   • Demerol [Meperidine] Itching and Rash   • Morphine Sulfate Itching and Rash   • Penicillin V Potassium Itching and Rash   • Sulfa Antibiotics Rash and Other (See Comments)     severe rash/swelling         Current Outpatient Prescriptions:   •  acetaminophen (TYLENOL) 500 MG tablet, Take 500 mg by mouth 2 (Two) Times a Day As Needed for Mild Pain ., Disp: , Rfl:   •  aspirin 325 MG tablet, Take  "325 mg by mouth Every Night., Disp: , Rfl:   •  betamethasone dipropionate (DIPROLENE) 0.05 % cream, Apply  topically daily., Disp: , Rfl:   •  DULoxetine (CYMBALTA) 60 MG capsule, Take 60 mg by mouth daily., Disp: , Rfl:   •  estrogens, conjugated, (PREMARIN) 0.3 MG tablet, Take 0.3 mg by mouth Daily. Take daily for 21 days then do not take for 7 days., Disp: , Rfl:   •  fenofibrate (TRICOR) 145 MG tablet, TAKE 1 TABLET BY MOUTH DAILY., Disp: 90 tablet, Rfl: 1  •  fluticasone (FLONASE) 50 MCG/ACT nasal spray, 2 sprays into each nostril Daily., Disp: , Rfl:   •  hydrochlorothiazide (MICROZIDE) 12.5 MG capsule, TAKE 2 CAPSULES BY MOUTH EVERY DAY, Disp: 180 capsule, Rfl: 1  •  levothyroxine (SYNTHROID, LEVOTHROID) 25 MCG tablet, TAKE 1 TABLET BY MOUTH DAILY., Disp: 90 tablet, Rfl: 1  •  meloxicam (MOBIC) 7.5 MG tablet, TAKE 1 TABLET BY MOUTH EVERY DAY AS NEEDED FOR SHOULDER PAIN, Disp: 30 tablet, Rfl: 3  •  metFORMIN (GLUCOPHAGE) 500 MG tablet, Take 500 mg by mouth daily., Disp: , Rfl:   •  Multiple Vitamin (MULTI VITAMIN DAILY PO), Take  by mouth daily., Disp: , Rfl:   •  nebivolol (BYSTOLIC) 5 MG tablet, Take 1 tablet by mouth Daily., Disp: 30 tablet, Rfl: 11  •  nitroglycerin (NITROLINGUAL) 0.4 MG/SPRAY spray, Place 1 spray under the tongue every 5 (five) minutes as needed for chest pain., Disp: , Rfl:   •  Omega-3 Fatty Acids (FISH OIL) 1000 MG capsule capsule, Take 1,000 mg by mouth Daily With Breakfast., Disp: , Rfl:   •  omeprazole (PriLOSEC) 20 MG capsule, Take 20 mg by mouth daily., Disp: , Rfl:   •  RANEXA 500 MG 12 hr tablet, TAKE 1 TABLET BY MOUTH EVERY DAY (Patient taking differently: TAKE 1 TABLET BY MOUTH daily), Disp: 30 tablet, Rfl: 1  •  rosuvastatin (CRESTOR) 10 MG tablet, TAKE 1 TABLET BY MOUTH DAILY., Disp: 90 tablet, Rfl: 1    HISTORY OF PRESENT ILLNESS: Patient returns for scheduled 6-month followup. She says that she \"could be better.\"  She has severe fatigue, and the last time her blood work " "was drawn, her carbon monoxide level was high.  She says they repeated her blood work, and the numbers were the same.  She is not sure what other tests were done.  She notes that she sleeps well but does not always awaken feeling refreshed.  She takes naps during the day.  She has had a polysomnogram in [place], and \"everything was fine\"; data deficit.  She checks her blood pressure at home, and when she got up this morning, it was 92/67, so she did not take her blood pressure medication.  Her medications are reviewed with her, and she indicates she has changed the way she takes her  Bystolic; she indicates she is taking it twice a day.  She says it is unusual for her blood pressure to be that low in the morning.  She says her normal blood pressure is 126/72.  She is only taking her Ranexa once a day, even though it is supposed to be taken every 12 hours.  She has not been walking because her legs hurt, but she has been doing water aerobics 4 days a week with her .  She has also been having some problems with her stomach, and she thinks she has diverticulosis.  She is overdue by 1 year for a colonoscopy and plans to schedule that with Dr. Maryam Sutton soon.  Patient otherwise denies prolonged chest pain, severe shortness of breath, PND, edema, palpitations, syncope or presyncope at this time.      Review of Systems   Constitution: Positive for malaise/fatigue.   Respiratory: Positive for shortness of breath (at times).    Endocrine: Positive for heat intolerance.   Hematologic/Lymphatic: Bruises/bleeds easily.   Skin: Positive for dry skin.   Musculoskeletal: Positive for arthritis, back pain and myalgias (legs).   Gastrointestinal: Positive for abdominal pain and diarrhea.   Neurological: Positive for excessive daytime sleepiness, dizziness (at times) and headaches.   Allergic/Immunologic:        Food allergies      Obtained and otherwise negative except as outlined in problem list and " "HPI.    Procedures       Objective:       Vitals:    06/01/18 1537 06/01/18 1542 06/01/18 1557   BP: 157/79 148/64 150/80   BP Location: Left arm Left arm Left arm   Patient Position: Sitting Standing Sitting   Pulse: 68 71    Weight: 81.2 kg (179 lb) 81.2 kg (179 lb)    Height: 160 cm (63\") 160 cm (63\")      Body mass index is 31.71 kg/m².   Last weight:  176 lbs.    Physical Exam   Constitutional: She is oriented to person, place, and time. She appears well-developed and well-nourished.   Neck: No JVD present. Carotid bruit is not present. No thyromegaly present.   Cardiovascular: Regular rhythm, S1 normal and S2 normal.  Exam reveals distant heart sounds. Exam reveals no gallop, no S3 and no friction rub.    No murmur heard.  Pulses:       Dorsalis pedis pulses are 2+ on the right side, and 2+ on the left side.        Posterior tibial pulses are 2+ on the right side, and 2+ on the left side.   Pulmonary/Chest: Effort normal. She has decreased breath sounds. She has no wheezes. She has no rhonchi. She has no rales.   Abdominal: Soft. She exhibits no mass. There is no hepatosplenomegaly. There is no tenderness. There is no guarding.   Bowel sounds audible x4   Musculoskeletal: Normal range of motion. She exhibits no edema.   Lymphadenopathy:     She has no cervical adenopathy.   Neurological: She is alert and oriented to person, place, and time.   Skin: Skin is warm, dry and intact. No rash noted.   Vitals reviewed.        Lab Review: No recent laboratory studies available for review      Lab Results   Component Value Date    BNP 27.0 07/24/2017           Assessment:   Overall continued acceptable course with no interim cardiopulmonary complaints with fair functional status. We will defer additional diagnostic or therapeutic intervention from a cardiac perspective at this time other than to perform screening studies for progressive chronic fatigue syndrome and to have formal monitoring of her blood pressure, as " well as exclude abnormal MAR.  The patient will ask Dr. Archer's office to forward us her most recent laboratory results early next week.  At this time, I do not feel she is having progressive angina pectoris or CHF symptoms.  I am uncertain concerning the etiology of her labile blood pressure readings today.  She does not appear to be toxic or in any ill health at this time.       Diagnosis Plan   1. Hypertensive heart disease without heart failure  BNP    D-dimer, Quantitative    Sedimentation Rate    Vitamin D 25 Hydroxy    CK    Ferritin    Iron Profile    Vitamin B12 and Folate    Doppler Ankle Brachial Index Multi Level CAR    24 Hour Blood Pressure Monitor   2. Essential hypertension  BNP    D-dimer, Quantitative    Sedimentation Rate    Vitamin D 25 Hydroxy    CK    Ferritin    Iron Profile    Vitamin B12 and Folate    Doppler Ankle Brachial Index Multi Level CAR    24 Hour Blood Pressure Monitor   3. Dyspnea on exertion  BNP    D-dimer, Quantitative    Sedimentation Rate    Vitamin D 25 Hydroxy    CK    Ferritin    Iron Profile    Vitamin B12 and Folate    Doppler Ankle Brachial Index Multi Level CAR    24 Hour Blood Pressure Monitor   4. Leg pain, bilateral  BNP    D-dimer, Quantitative    Sedimentation Rate    Vitamin D 25 Hydroxy    CK    Ferritin    Iron Profile    Vitamin B12 and Folate    Doppler Ankle Brachial Index Multi Level CAR    24 Hour Blood Pressure Monitor   5. Hypertensive heart disease without heart failure  BNP    D-dimer, Quantitative    Sedimentation Rate    Vitamin D 25 Hydroxy    CK    Ferritin    Iron Profile    Vitamin B12 and Folate    Doppler Ankle Brachial Index Multi Level CAR    24 Hour Blood Pressure Monitor   6. Type 2 diabetes mellitus with complication, without long-term current use of insulin  No data to review   7. Dyslipidemia  No data to review   8. Fatigue, unspecified type  See plan as outlined below          Plan:         1. Patient to continue current medications  and close follow up with the above providers.  2. The following laboratory studies will be drawn today:   A. ESR   B. BNP   C. D-dimer   D. Vitamin D level   E. CPK level   F. Vitamin B12 level   G. Serum iron/iron binding capacity   H. Serum ferritin level   I. Folate level  3. 24-hour ambulatory blood pressure monitor ordered with same-day MAR; in the interim, the patient will monitor her blood pressure 1-2 times daily and notify us if systolic blood pressure is consistently at or above 150 torr or below 105 torr.  4. Tentative cardiology follow up in October 2018, or patient may return sooner PRN.    Transcribed by Hailey Wilson for Dr. Abel Barillas at 3:54 PM on 06/01/2018      I, Abel Barillas MD, [facility], personally performed the services described in this documentation as scribed by the above named individual in my presence, and it is both accurate and complete. At 4:01 PM on 06/01/2018

## 2018-06-06 RX ORDER — FERROUS SULFATE 325(65) MG
325 TABLET ORAL
Qty: 30 TABLET | Refills: 1 | Status: SHIPPED | OUTPATIENT
Start: 2018-06-06 | End: 2018-09-27 | Stop reason: SDUPTHER

## 2018-06-06 RX ORDER — CHOLECALCIFEROL (VITAMIN D3) 1250 MCG
50000 CAPSULE ORAL
Qty: 10 CAPSULE | Refills: 0 | Status: SHIPPED | OUTPATIENT
Start: 2018-06-06 | End: 2018-08-18 | Stop reason: SDUPTHER

## 2018-06-20 DIAGNOSIS — E78.5 DYSLIPIDEMIA: ICD-10-CM

## 2018-06-20 DIAGNOSIS — I11.9 HYPERTENSIVE HEART DISEASE WITHOUT HEART FAILURE: ICD-10-CM

## 2018-06-20 DIAGNOSIS — I10 ESSENTIAL HYPERTENSION: ICD-10-CM

## 2018-06-20 RX ORDER — FENOFIBRATE 145 MG/1
TABLET, COATED ORAL
Qty: 90 TABLET | Refills: 1 | Status: SHIPPED | OUTPATIENT
Start: 2018-06-20 | End: 2018-12-13 | Stop reason: SDUPTHER

## 2018-06-25 RX ORDER — NEBIVOLOL 5 MG/1
5 TABLET ORAL DAILY
Qty: 90 TABLET | Refills: 3 | Status: SHIPPED | OUTPATIENT
Start: 2018-06-25 | End: 2018-06-26 | Stop reason: SDUPTHER

## 2018-06-26 RX ORDER — NEBIVOLOL 5 MG/1
10 TABLET ORAL DAILY
Qty: 180 TABLET | Refills: 3 | Status: SHIPPED | OUTPATIENT
Start: 2018-06-26 | End: 2019-04-19 | Stop reason: SDUPTHER

## 2018-06-29 ENCOUNTER — HOSPITAL ENCOUNTER (OUTPATIENT)
Dept: CARDIOLOGY | Facility: HOSPITAL | Age: 73
Discharge: HOME | End: 2018-06-29
Attending: INTERNAL MEDICINE

## 2018-06-29 ENCOUNTER — CLINICAL SUPPORT (OUTPATIENT)
Dept: CARDIOLOGY | Facility: HOSPITAL | Age: 73
End: 2018-06-29

## 2018-06-29 ENCOUNTER — HOSPITAL ENCOUNTER (OUTPATIENT)
Dept: CARDIOLOGY | Facility: HOSPITAL | Age: 73
Discharge: HOME | End: 2018-06-29
Attending: INTERNAL MEDICINE | Admitting: INTERNAL MEDICINE

## 2018-06-29 DIAGNOSIS — R06.09 DYSPNEA ON EXERTION: ICD-10-CM

## 2018-06-29 DIAGNOSIS — M79.605 LEG PAIN, BILATERAL: ICD-10-CM

## 2018-06-29 DIAGNOSIS — I10 ESSENTIAL HYPERTENSION: ICD-10-CM

## 2018-06-29 DIAGNOSIS — M79.604 LEG PAIN, BILATERAL: ICD-10-CM

## 2018-06-29 DIAGNOSIS — I11.9 HYPERTENSIVE HEART DISEASE WITHOUT HEART FAILURE: ICD-10-CM

## 2018-06-29 LAB
BH CV LOWER ARTERIAL LEFT ABI RATIO: 1.14
BH CV LOWER ARTERIAL LEFT DORSALIS PEDIS SYS MAX: 156 MMHG
BH CV LOWER ARTERIAL LEFT POST EX ABI RATIO: 1.18
BH CV LOWER ARTERIAL LEFT POST TIBIAL SYS MAX: 168 MMHG
BH CV LOWER ARTERIAL RIGHT ABI RATIO: 1.23
BH CV LOWER ARTERIAL RIGHT DORSALIS PEDIS SYS MAX: 154 MMHG
BH CV LOWER ARTERIAL RIGHT POST EX ABI RATIO: 1.27
BH CV LOWER ARTERIAL RIGHT POST TIBIAL SYS MAX: 182 MMHG
UPPER ARTERIAL LEFT ARM BRACHIAL SYS MAX: 48 MMHG
UPPER ARTERIAL RIGHT ARM BRACHIAL SYS MAX: 147 MMHG

## 2018-06-29 PROCEDURE — 93923 UPR/LXTR ART STDY 3+ LVLS: CPT | Performed by: INTERNAL MEDICINE

## 2018-06-29 PROCEDURE — 93786 AMBL BP MNTR W/SW REC ONLY: CPT

## 2018-06-29 PROCEDURE — 93923 UPR/LXTR ART STDY 3+ LVLS: CPT

## 2018-06-29 NOTE — PROGRESS NOTES
Reason for Visit to [facility]: 24hr. Ambulatory Blood Pressure Monitor.    Ms. Crockett saw Dr. Barillas on 6/1/18  for Hypertension.  Dr. Barillas ordered several tests, including a 24hr ambulatory blood pressure monitor, which she comes in for today.      The monitor was placed on her left arm & she was instructed in its use and had no questions.      She was provided with written instructions to refer to as well.  She will wear the monitor until tomorrow around 9.45am.      She will bring it back to our office tomorrow or mail it to us for download.      Results will be forwarded to Dr. Barillas for analysis and treatment adjustment if indicated.    Romaine Alvares MA 06/29/18 9:51 AM

## 2018-07-05 ENCOUNTER — DOCUMENTATION (OUTPATIENT)
Dept: CARDIOLOGY | Facility: HOSPITAL | Age: 73
End: 2018-07-05

## 2018-07-05 NOTE — PROGRESS NOTES
[]A 24 Hr. Ambulatory Blood Pressure Monitor was worn from 6/29/18 to 6/30/18. Results received and faxed to Dr. Barillas for review and management. Copy of results also sent to Medical Records.      Patient accidentally turned off monitor early(@ 3.00 am). Patient coming back on 7/9/18 to reapply monitor.

## 2018-07-10 ENCOUNTER — DOCUMENTATION (OUTPATIENT)
Dept: CARDIOLOGY | Facility: HOSPITAL | Age: 73
End: 2018-07-10

## 2018-07-10 NOTE — PROGRESS NOTES
Reason for Visit to [facility]: 24hr. Ambulatory Blood Pressure Monitor.    Ms. Crockett saw Dr. Barillas on 6/1/18  for Hypertension.  Dr. Barillas ordered several tests, including a 24hr ambulatory blood pressure monitor, which she comes in for today.      The monitor was placed on her left arm & she was instructed in its use and had no questions.      She was provided with written instructions to refer to as well.  She will wear the monitor until tomorrow around 4 pm .      She will bring it back to our office tomorrow or mail it to us for download.      Results will be forwarded to Dr. Barillas for analysis and treatment adjustment if indicated.    Romaine Alvares MA 07/10/18 4:09 PM

## 2018-07-12 ENCOUNTER — DOCUMENTATION (OUTPATIENT)
Dept: CARDIOLOGY | Facility: HOSPITAL | Age: 73
End: 2018-07-12

## 2018-07-12 NOTE — PROGRESS NOTES
[]A 24 Hr. Ambulatory Blood Pressure Monitor was worn from 7/10/18 to 7/11/18.  Results received and faxed to Dr. Barillas for review and management. Copy of results also sent to Medical Records.

## 2018-07-24 RX ORDER — AMLODIPINE BESYLATE 5 MG/1
5 TABLET ORAL DAILY
Qty: 90 TABLET | Refills: 3 | Status: ON HOLD | OUTPATIENT
Start: 2018-07-24 | End: 2023-02-22 | Stop reason: SDUPTHER

## 2018-08-03 ENCOUNTER — TELEPHONE (OUTPATIENT)
Dept: CARDIOLOGY | Facility: CLINIC | Age: 73
End: 2018-08-03

## 2018-08-03 NOTE — TELEPHONE ENCOUNTER
Patient called with questions regarding her heart rate and amlodipine 5mg. Tried calling her back and LVM.

## 2018-08-06 NOTE — TELEPHONE ENCOUNTER
Spoke with patient and went over 24 hour BP monitor with her and medications and taking her BP of the mornings to make sure adding Amlodipine is not lowering her BP too much.  She verbalized understanding.

## 2018-08-07 RX ORDER — FERROUS SULFATE 325(65) MG
TABLET ORAL
Qty: 30 TABLET | Refills: 1 | Status: SHIPPED | OUTPATIENT
Start: 2018-08-07 | End: 2018-10-10 | Stop reason: ALTCHOICE

## 2018-08-20 RX ORDER — METHOCARBAMOL 750 MG/1
TABLET ORAL
Qty: 10 CAPSULE | Refills: 0 | Status: SHIPPED | OUTPATIENT
Start: 2018-08-20 | End: 2018-11-13 | Stop reason: SDUPTHER

## 2018-08-28 ENCOUNTER — TELEPHONE (OUTPATIENT)
Dept: CARDIOLOGY | Facility: CLINIC | Age: 73
End: 2018-08-28

## 2018-08-30 NOTE — TELEPHONE ENCOUNTER
Per [facility] should be able to continue ASA.  If endoscopist says no hold for 5 days.  Patient verbalizes understanding.

## 2018-09-20 DIAGNOSIS — I11.9 HYPERTENSIVE HEART DISEASE WITHOUT HEART FAILURE: ICD-10-CM

## 2018-09-20 DIAGNOSIS — E78.5 DYSLIPIDEMIA: ICD-10-CM

## 2018-09-20 DIAGNOSIS — I10 ESSENTIAL HYPERTENSION: ICD-10-CM

## 2018-09-24 RX ORDER — HYDROCHLOROTHIAZIDE 12.5 MG/1
CAPSULE, GELATIN COATED ORAL
Qty: 180 CAPSULE | Refills: 1 | Status: SHIPPED | OUTPATIENT
Start: 2018-09-24 | End: 2018-12-19 | Stop reason: SDUPTHER

## 2018-09-27 RX ORDER — FERROUS SULFATE 325(65) MG
325 TABLET ORAL
Qty: 30 TABLET | Refills: 6 | Status: SHIPPED | OUTPATIENT
Start: 2018-09-27 | End: 2018-10-10 | Stop reason: ALTCHOICE

## 2018-10-10 ENCOUNTER — OFFICE VISIT (OUTPATIENT)
Dept: CARDIOLOGY | Facility: CLINIC | Age: 73
End: 2018-10-10

## 2018-10-10 VITALS
HEIGHT: 64 IN | DIASTOLIC BLOOD PRESSURE: 60 MMHG | BODY MASS INDEX: 29.37 KG/M2 | SYSTOLIC BLOOD PRESSURE: 134 MMHG | HEART RATE: 71 BPM | WEIGHT: 172 LBS

## 2018-10-10 DIAGNOSIS — I11.9 HYPERTENSIVE HEART DISEASE WITHOUT HEART FAILURE: Primary | ICD-10-CM

## 2018-10-10 DIAGNOSIS — E78.5 DYSLIPIDEMIA: ICD-10-CM

## 2018-10-10 DIAGNOSIS — I10 ESSENTIAL HYPERTENSION: ICD-10-CM

## 2018-10-10 PROCEDURE — 99214 OFFICE O/P EST MOD 30 MIN: CPT | Performed by: INTERNAL MEDICINE

## 2018-10-10 NOTE — PROGRESS NOTES
Subjective:     Encounter Date:10/10/2018    Patient ID: Coco Crockett is a 73 y.o.  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      PHYSICIAN: Lobo Archer MD  ORTHOPEDIST: Luisito Cueva MD  GASTROENTEROLOGIST:  Maryam Sutton MD    Chief Complaint:   Chief Complaint   Patient presents with   • Hypertensive heart disease without heart failure     Problem List:  1. Combined hypertensive and ischemic cardiovascular disease:  a. Acceptable GXT with abnormal baseline ST, September 1995.   b. Acceptable exercise stress test and normal quantitative SPECT Cardiolite study with low probability for significant obstructive coronary disease with estimated LVEF (0.60), June 1997.  c. CCS class I chest pain/abnormal EKG/intermittent palpitations with GXT echo stress test suggestive of low probability for significant focal obstructive coronary disease, LVEF (0.80), 12/07/2009.   d. Recurrent atypical CCS class II chest pain syndrome with NYHA class I exertional dyspnea with fatigue with diagnostic coronary angiography demonstrating nonobstructive single-vessel LAD disease with associated 75% to 80% stenosis first diagonal branch LAD not felt to be amenable to catheter based intervention with mild plaque in the nondominant left circumflex coronary artery with normal right dominant coronary artery with preserved LVEF (0.70) with associated diastolic LV dysfunction, December 2013.   e. Residual class I angina pectoris/NYHA class I to II exertional dyspnea and fatigue.  f. Stress test, 06/30/2016:  Normal LVEF (0.63); findings consistent with a normal ECG stress test; myocardial perfusion imaging indicates a small-sized infarct located in the lateral wall with no significant ischemia noted.  g. CCS class I chest discomfort/NYHA class II dyspnea with exertion/fatigue with  acceptable EKG and acceptable echocardiogram demonstrating normal LVEF (0.70) without PE/pulmonary arterial hypertension and mild aortic valve sclerosis with mild LVH, July 2017.  h. 24-hour ambulatory blood pressure monitor 7/10/18: Average blood pressure 136/69, 135/68 during the day and 141/71 at night.  Recommendations to continue current medications.  i. Residual class I symptoms  2. Chronic hypertension, probably essential.   3. Dyslipidemia.   4. Mild obesity (BMI 30).   5. Chronic hypothyroidism/replacement therapy.   6. Osteopenia with fracture of lumbar vertebra, 2004.   7. TMJ.   8. GERD.   9. Iron deficiency anemia.   10. Depression.   11. Eczema.   12. Positional vertigo - data deficit.  13. Remote operations:   a. Tonsillectomy and adenoidectomy, 1950.   b. Appendectomy with wedge resection of both ovaries, 1970.  c. MARIZA/BSO secondary to severe endometriosis, 1985.   d. Fall on ice with subsequent ORIF of right ankle/tibia, 1995.   e. Cataracts, OU with bifocal implant, 2008.  14. Intermittent marked dizziness/vertigo with Epley maneuver.  15. Intermittent progressive headache syndrome/possible migraine equivalent.  16. Type 2 diabetes mellitus, Hemoglobin A1c 5.9%, December 2014; 5.8%, July 2017.  17. Recent progressive disabling fatigue with arthralgias and myalgias, June 2018  18. Chronic daytime hypersomnolence and fatigue with apparent acceptable polysomnogram - data deficit, [facility]   19. Colonoscopy and polyp removal, summer 2018  20. Multiple falls without vertigo, dizziness, presyncope, or syncope, September 2018, with minor scrapes/bruising     Allergies   Allergen Reactions   • Bactrim [Sulfamethoxazole-Trimethoprim]    • Imdur [Isosorbide Dinitrate] Other (See Comments)     Severe headache/fatigue       • Keflex [Cephalexin] Itching and Other (See Comments)     Angioedema     • Levaquin [Levofloxacin]    • Codeine Sulfate Itching and Rash   • Demerol [Meperidine] Itching and Rash   • Morphine  Sulfate Itching and Rash   • Penicillin V Potassium Itching and Rash   • Sulfa Antibiotics Rash and Other (See Comments)     severe rash/swelling         Current Outpatient Prescriptions:   •  acetaminophen (TYLENOL) 500 MG tablet, Take 500 mg by mouth 2 (Two) Times a Day As Needed for Mild Pain ., Disp: , Rfl:   •  amLODIPine (NORVASC) 5 MG tablet, Take 1 tablet by mouth Daily., Disp: 90 tablet, Rfl: 3  •  aspirin 325 MG tablet, Take 325 mg by mouth Every Night., Disp: , Rfl:   •  betamethasone dipropionate (DIPROLENE) 0.05 % cream, Apply  topically daily., Disp: , Rfl:   •  D3-50 41209 units capsule, TAKE 1 CAPSULE BY MOUTH EVERY 7 DAYS, Disp: 10 capsule, Rfl: 0  •  DULoxetine (CYMBALTA) 60 MG capsule, Take 60 mg by mouth daily., Disp: , Rfl:   •  estrogens, conjugated, (PREMARIN) 0.3 MG tablet, Take 0.3 mg by mouth Daily. Take daily for 21 days then do not take for 7 days., Disp: , Rfl:   •  fenofibrate (TRICOR) 145 MG tablet, TAKE 1 TABLET BY MOUTH DAILY., Disp: 90 tablet, Rfl: 1  •  fluticasone (FLONASE) 50 MCG/ACT nasal spray, 2 sprays into each nostril Daily., Disp: , Rfl:   •  hydrochlorothiazide (MICROZIDE) 12.5 MG capsule, TAKE 2 CAPSULES BY MOUTH EVERY DAY, Disp: 180 capsule, Rfl: 1  •  levothyroxine (SYNTHROID, LEVOTHROID) 25 MCG tablet, TAKE 1 TABLET BY MOUTH DAILY., Disp: 90 tablet, Rfl: 1  •  meloxicam (MOBIC) 7.5 MG tablet, TAKE 1 TABLET BY MOUTH EVERY DAY AS NEEDED FOR SHOULDER PAIN, Disp: 30 tablet, Rfl: 3  •  metFORMIN (GLUCOPHAGE) 500 MG tablet, Take 500 mg by mouth daily., Disp: , Rfl:   •  Multiple Vitamin (MULTI VITAMIN DAILY PO), Take  by mouth daily., Disp: , Rfl:   •  nebivolol (BYSTOLIC) 5 MG tablet, Take 2 tablets by mouth Daily., Disp: 180 tablet, Rfl: 3  •  nitroglycerin (NITROLINGUAL) 0.4 MG/SPRAY spray, Place 1 spray under the tongue every 5 (five) minutes as needed for chest pain., Disp: , Rfl:   •  Omega-3 Fatty Acids (FISH OIL) 1000 MG capsule capsule, Take 1,000 mg by mouth Daily  "With Breakfast., Disp: , Rfl:   •  omeprazole (PriLOSEC) 20 MG capsule, Take 20 mg by mouth daily., Disp: , Rfl:   •  RANEXA 500 MG 12 hr tablet, TAKE 1 TABLET BY MOUTH EVERY DAY (Patient taking differently: TAKE 1 TABLET BY MOUTH daily), Disp: 30 tablet, Rfl: 1  •  rosuvastatin (CRESTOR) 10 MG tablet, TAKE 1 TABLET BY MOUTH DAILY., Disp: 90 tablet, Rfl: 1    HISTORY OF PRESENT ILLNESS:  Patient presents for scheduled 4-month followup.  She denies any chest pain, shortness of breath, palpitations, presyncope, or syncope.  She has noticed in the morning that she is \"fuzzy headed.\"  A couple of times, she has checked her blood pressure, and it has been around 112 systolic.  She wore an ambulatory blood pressure monitor in July 2018, and at that time, she had high blood pressure.  She has been doing Weight Watchers and states that she has lost 10 pounds since she started this.  She has been having multiple falls lately, unrelated to any dizziness or vertigo-like symptoms.  She states that she has tripped over curbs and toys and has multiple bruises and scrapes.  With one incident, when she tripped over some toys at her granddaughter's house, she had a large bruise on her left flank.  More recently, a week ago, she tripped over a curb and scraped up her knees and the palms of her hands.  She is now going to physical therapy to help increase core strength. She had a recent colonoscopy with multiple polyp removal and a normal mammogram.  Patient otherwise denies chest pain, shortness of breath, PND, edema, palpitations, syncope or presyncope at this time.        Review of Systems   Constitution: Positive for diaphoresis and malaise/fatigue.   Endocrine: Positive for heat intolerance.   Hematologic/Lymphatic: Bruises/bleeds easily.   Skin: Positive for dry skin and rash.   Musculoskeletal: Positive for arthritis, back pain and falls.   Gastrointestinal: Positive for diarrhea.   Neurological: Positive for excessive daytime " "sleepiness, dizziness, headaches and loss of balance.   Allergic/Immunologic: Positive for environmental allergies.      Obtained and otherwise negative except as outlined in problem list and HPI.    Procedures       Objective:       Vitals:    10/10/18 1122 10/10/18 1125   BP: 133/75 134/60   BP Location: Left arm Left arm   Patient Position: Sitting Standing   Pulse: 68 71   Weight: 78 kg (172 lb)    Height: 161.3 cm (63.5\")    Recheck blood pressure left arm sitting was 124/60  Body mass index is 29.99 kg/m².  Last weight June 2018 was 179 pounds    Physical Exam   Constitutional: She is oriented to person, place, and time. She appears well-developed and well-nourished.   Neck: No JVD present. Carotid bruit is not present. No thyromegaly present.   Cardiovascular: Regular rhythm, S1 normal and S2 normal.  Exam reveals distant heart sounds. Exam reveals no gallop, no S3 and no friction rub.    No murmur heard.  Pulses:       Dorsalis pedis pulses are 2+ on the right side, and 2+ on the left side.        Posterior tibial pulses are 2+ on the right side, and 2+ on the left side.   Pulmonary/Chest: Effort normal. She has decreased breath sounds. She has no wheezes. She has no rhonchi. She has no rales.   Abdominal: Soft. She exhibits no mass. There is no hepatosplenomegaly. There is no tenderness. There is no guarding.   Bowel sounds audible x4   Musculoskeletal: Normal range of motion. She exhibits no edema.   Lymphadenopathy:     She has no cervical adenopathy.   Neurological: She is alert and oriented to person, place, and time.   Skin: Skin is warm, dry and intact. No rash noted.        Multiple scrapes/bruising from recent fall   Vitals reviewed.        Lab Review: None to review      Lab Results   Component Value Date    BNP 26.0 06/01/2018               Assessment:   Overall continued acceptable course with no interim cardiopulmonary complaints with acceptable functional status. We will defer additional " diagnostic or therapeutic intervention from a cardiac perspective at this time.  We encouraged the patient to continue monitoring her blood pressure.  We are pleased with her intentional weight loss using Weight Watchers.  We concur with physical therapy for her to increase her core strength.     Diagnosis Plan   1. Hypertensive heart disease without heart failure  Stable; No recurrent angina pectoris or CHF; continue current treatment     2. Essential hypertension  Controlled, monitor blood pressure at home   3. Dyslipidemia  No new labs to review          Plan:         1. Patient to continue current medications and close follow up with the above providers.  2. Tentative cardiology follow up in April 2019, or patient may return sooner PRN.  3. Patient is to continue monitoring her blood pressure 1-2 times daily and update us in the next few weeks concerning her blood pressure readings.    Scribed for Abel Barillas MD by KECIA Dee. 10/10/2018  1:44 PM    IAbel MD, [facility], personally performed the services described in this documentation as scribed by the above named individual in my presence, and it is both accurate and complete. At 1:22 PM on 10/10/2018

## 2018-10-25 DIAGNOSIS — E78.5 DYSLIPIDEMIA: ICD-10-CM

## 2018-10-25 DIAGNOSIS — I11.9 HYPERTENSIVE HEART DISEASE WITHOUT HEART FAILURE: ICD-10-CM

## 2018-10-25 DIAGNOSIS — I10 ESSENTIAL HYPERTENSION: ICD-10-CM

## 2018-10-26 RX ORDER — ROSUVASTATIN CALCIUM 10 MG/1
10 TABLET, COATED ORAL DAILY
Qty: 90 TABLET | Refills: 1 | Status: SHIPPED | OUTPATIENT
Start: 2018-10-26 | End: 2019-04-17 | Stop reason: SDUPTHER

## 2018-10-26 RX ORDER — LEVOTHYROXINE SODIUM 0.03 MG/1
25 TABLET ORAL DAILY
Qty: 90 TABLET | Refills: 1 | Status: SHIPPED | OUTPATIENT
Start: 2018-10-26 | End: 2019-04-17 | Stop reason: SDUPTHER

## 2018-11-14 RX ORDER — METHOCARBAMOL 750 MG/1
TABLET ORAL
Qty: 10 CAPSULE | Refills: 0 | Status: SHIPPED | OUTPATIENT
Start: 2018-11-14 | End: 2019-01-24 | Stop reason: SDUPTHER

## 2018-12-13 DIAGNOSIS — I10 ESSENTIAL HYPERTENSION: ICD-10-CM

## 2018-12-13 DIAGNOSIS — E78.5 DYSLIPIDEMIA: ICD-10-CM

## 2018-12-13 DIAGNOSIS — I11.9 HYPERTENSIVE HEART DISEASE WITHOUT HEART FAILURE: ICD-10-CM

## 2018-12-13 RX ORDER — FENOFIBRATE 145 MG/1
TABLET, COATED ORAL
Qty: 90 TABLET | Refills: 1 | Status: SHIPPED | OUTPATIENT
Start: 2018-12-13 | End: 2019-03-12 | Stop reason: SDUPTHER

## 2018-12-19 DIAGNOSIS — I10 ESSENTIAL HYPERTENSION: ICD-10-CM

## 2018-12-19 DIAGNOSIS — I11.9 HYPERTENSIVE HEART DISEASE WITHOUT HEART FAILURE: ICD-10-CM

## 2018-12-19 DIAGNOSIS — E78.5 DYSLIPIDEMIA: ICD-10-CM

## 2018-12-19 RX ORDER — HYDROCHLOROTHIAZIDE 12.5 MG/1
25 CAPSULE, GELATIN COATED ORAL DAILY
Qty: 180 CAPSULE | Refills: 1 | Status: SHIPPED | OUTPATIENT
Start: 2018-12-19 | End: 2019-09-30 | Stop reason: SDUPTHER

## 2019-01-24 RX ORDER — METHOCARBAMOL 750 MG/1
TABLET ORAL
Qty: 10 CAPSULE | Refills: 0 | Status: SHIPPED | OUTPATIENT
Start: 2019-01-24 | End: 2019-04-19

## 2019-03-12 DIAGNOSIS — E78.5 DYSLIPIDEMIA: ICD-10-CM

## 2019-03-12 DIAGNOSIS — I10 ESSENTIAL HYPERTENSION: ICD-10-CM

## 2019-03-12 DIAGNOSIS — I11.9 HYPERTENSIVE HEART DISEASE WITHOUT HEART FAILURE: ICD-10-CM

## 2019-03-12 RX ORDER — FENOFIBRATE 145 MG/1
145 TABLET, COATED ORAL DAILY
Qty: 90 TABLET | Refills: 3 | Status: SHIPPED | OUTPATIENT
Start: 2019-03-12 | End: 2019-04-19 | Stop reason: ALTCHOICE

## 2019-04-17 DIAGNOSIS — E78.5 DYSLIPIDEMIA: ICD-10-CM

## 2019-04-17 DIAGNOSIS — I10 ESSENTIAL HYPERTENSION: ICD-10-CM

## 2019-04-17 DIAGNOSIS — I11.9 HYPERTENSIVE HEART DISEASE WITHOUT HEART FAILURE: ICD-10-CM

## 2019-04-17 RX ORDER — LEVOTHYROXINE SODIUM 0.03 MG/1
25 TABLET ORAL DAILY
Qty: 90 TABLET | Refills: 1 | Status: SHIPPED | OUTPATIENT
Start: 2019-04-17 | End: 2019-10-24 | Stop reason: SDUPTHER

## 2019-04-17 RX ORDER — ROSUVASTATIN CALCIUM 10 MG/1
10 TABLET, COATED ORAL DAILY
Qty: 90 TABLET | Refills: 1 | Status: SHIPPED | OUTPATIENT
Start: 2019-04-17 | End: 2019-04-19 | Stop reason: ALTCHOICE

## 2019-04-19 ENCOUNTER — OFFICE VISIT (OUTPATIENT)
Dept: CARDIOLOGY | Facility: CLINIC | Age: 74
End: 2019-04-19

## 2019-04-19 VITALS
DIASTOLIC BLOOD PRESSURE: 83 MMHG | WEIGHT: 174 LBS | HEART RATE: 84 BPM | OXYGEN SATURATION: 97 % | HEIGHT: 64 IN | SYSTOLIC BLOOD PRESSURE: 173 MMHG | BODY MASS INDEX: 29.71 KG/M2

## 2019-04-19 DIAGNOSIS — I11.9 HYPERTENSIVE HEART DISEASE WITHOUT HEART FAILURE: Primary | ICD-10-CM

## 2019-04-19 DIAGNOSIS — I10 ESSENTIAL HYPERTENSION: ICD-10-CM

## 2019-04-19 DIAGNOSIS — E78.5 DYSLIPIDEMIA: ICD-10-CM

## 2019-04-19 DIAGNOSIS — I25.9 ISCHEMIC HEART DISEASE: ICD-10-CM

## 2019-04-19 PROCEDURE — 99214 OFFICE O/P EST MOD 30 MIN: CPT | Performed by: INTERNAL MEDICINE

## 2019-04-19 RX ORDER — ICOSAPENT ETHYL 1000 MG/1
2 CAPSULE ORAL 2 TIMES DAILY WITH MEALS
Qty: 120 CAPSULE | Refills: 11 | Status: SHIPPED | OUTPATIENT
Start: 2019-04-19 | End: 2022-05-02 | Stop reason: SDUPTHER

## 2019-04-19 RX ORDER — TRAMADOL HYDROCHLORIDE 50 MG/1
50 TABLET ORAL AS NEEDED
Refills: 1 | COMMUNITY
Start: 2019-03-21 | End: 2022-01-08 | Stop reason: SDUPTHER

## 2019-04-19 RX ORDER — NEBIVOLOL 5 MG/1
10 TABLET ORAL DAILY
Qty: 180 TABLET | Refills: 3 | Status: SHIPPED | OUTPATIENT
Start: 2019-04-19 | End: 2019-08-01 | Stop reason: SDUPTHER

## 2019-04-19 RX ORDER — RANOLAZINE 500 MG/1
500 TABLET, EXTENDED RELEASE ORAL DAILY
Qty: 30 TABLET | Refills: 1 | Status: SHIPPED | OUTPATIENT
Start: 2019-04-19 | End: 2019-05-23 | Stop reason: SDUPTHER

## 2019-04-19 RX ORDER — FENOFIBRIC ACID 45 MG/1
45 CAPSULE, DELAYED RELEASE ORAL DAILY
Qty: 30 CAPSULE | Refills: 11 | Status: SHIPPED | OUTPATIENT
Start: 2019-04-19 | End: 2019-10-10 | Stop reason: SDUPTHER

## 2019-04-19 NOTE — PROGRESS NOTES
Subjective:     Encounter Date:04/19/2019    Patient ID: Coco Crockett is a 73 y.o.  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      PHYSICIAN: Lobo Archer MD  ORTHOPEDIST: Luisito Cueva MD  GASTROENTEROLOGIST:  Maryam Sutton MD    Chief Complaint:   Chief Complaint   Patient presents with   • Hypertensive cardiovascular disease     Problem List:  1. Combined hypertensive and ischemic cardiovascular disease:  a. Acceptable GXT with abnormal baseline ST, September 1995.   b. Acceptable exercise stress test and normal quantitative SPECT Cardiolite study with low probability for significant obstructive coronary disease with estimated LVEF (0.60), June 1997.  c. CCS class I chest pain/abnormal EKG/intermittent palpitations with GXT echo stress test suggestive of low probability for significant focal obstructive coronary disease, LVEF (0.80), 12/07/2009.   d. Recurrent atypical CCS class II chest pain syndrome with NYHA class I exertional dyspnea with fatigue with diagnostic coronary angiography demonstrating nonobstructive single-vessel LAD disease with associated 75% to 80% stenosis first diagonal branch LAD not felt to be amenable to catheter based intervention with mild plaque in the nondominant left circumflex coronary artery with normal right dominant coronary artery with preserved LVEF (0.70) with associated diastolic LV dysfunction, December 2013.   e. Residual class I angina pectoris/NYHA class I to II exertional dyspnea and fatigue.  f. Stress test, 06/30/2016:  Normal LVEF (0.63); findings consistent with a normal ECG stress test; myocardial perfusion imaging indicates a small-sized infarct located in the lateral wall with no significant ischemia noted.  g. CCS class I chest discomfort/NYHA class II dyspnea with exertion/fatigue with acceptable EKG  and acceptable echocardiogram demonstrating normal LVEF (0.70) without PE/pulmonary arterial hypertension and mild aortic valve sclerosis with mild LVH, July 2017.  h. 24-hour ambulatory blood pressure monitor 7/10/18: Average blood pressure 136/69, 135/68 during the day and 141/71 at night.  Recommendations to continue current medications.  i. CCS class 0 chest discomfort/NYHA class III fatigue symptoms April 2019  2. Chronic hypertension, probably essential.   3. Dyslipidemia.   4. Mild obesity (BMI 30).   5. Chronic hypothyroidism/replacement therapy.   6. Osteopenia with fracture of lumbar vertebra, 2004.   7. TMJ.   8. GERD.   9. Iron deficiency anemia.   10. Depression.   11. Eczema.   12. Positional vertigo - data deficit.  13. Remote operations:   a. Tonsillectomy and adenoidectomy, 1950.   b. Appendectomy with wedge resection of both ovaries, 1970.  c. MARIZA/BSO secondary to severe endometriosis, 1985.   d. Fall on ice with subsequent ORIF of right ankle/tibia, 1995.   e. Cataracts, OU with bifocal implant, 2008.  14. Intermittent marked dizziness/vertigo with Epley maneuver.  15. Intermittent progressive headache syndrome/possible migraine equivalent.  16. Type 2 diabetes mellitus, Hemoglobin A1c 5.9%, December 2014; 5.8%, July 2017.  17. Remote progressive disabling fatigue with arthralgias and myalgias, June 2018  18. Chronic daytime hypersomnolence and fatigue with apparent acceptable polysomnogram - data deficit, [facility]   19. Colonoscopy and polyp removal, summer 2018  20. Multiple falls without vertigo, dizziness, presyncope, or syncope, September 2018, with minor scrapes/bruising      Allergies   Allergen Reactions   • Bactrim [Sulfamethoxazole-Trimethoprim]    • Imdur [Isosorbide Dinitrate] Other (See Comments)     Severe headache/fatigue       • Keflex [Cephalexin] Itching and Other (See Comments)     Angioedema     • Levaquin [Levofloxacin]    • Codeine Sulfate Itching and Rash   • Demerol  [Meperidine] Itching and Rash   • Morphine Sulfate Itching and Rash   • Penicillin V Potassium Itching and Rash   • Sulfa Antibiotics Rash and Other (See Comments)     severe rash/swelling         Current Outpatient Medications:   •  acetaminophen (TYLENOL) 500 MG tablet, Take 500 mg by mouth 2 (Two) Times a Day As Needed for Mild Pain ., Disp: , Rfl:   •  amLODIPine (NORVASC) 5 MG tablet, Take 1 tablet by mouth Daily., Disp: 90 tablet, Rfl: 3  •  aspirin 325 MG tablet, Take 325 mg by mouth Every Night., Disp: , Rfl:   •  betamethasone dipropionate (DIPROLENE) 0.05 % cream, Apply  topically daily., Disp: , Rfl:   •  Cholecalciferol (VITAMIN D3 PO), Take 4,000 Units by mouth 2 (Two) Times a Day., Disp: , Rfl:   •  DULoxetine (CYMBALTA) 60 MG capsule, Take 60 mg by mouth daily., Disp: , Rfl:   •  estrogens, conjugated, (PREMARIN) 0.3 MG tablet, Take 0.3 mg by mouth Daily. Take daily for 21 days then do not take for 7 days., Disp: , Rfl:   •  fenofibrate (TRICOR) 145 MG tablet, Take 1 tablet by mouth Daily., Disp: 90 tablet, Rfl: 3  •  fluticasone (FLONASE) 50 MCG/ACT nasal spray, 2 sprays into each nostril Daily., Disp: , Rfl:   •  hydrochlorothiazide (MICROZIDE) 12.5 MG capsule, Take 2 capsules by mouth Daily., Disp: 180 capsule, Rfl: 1  •  levothyroxine (SYNTHROID, LEVOTHROID) 25 MCG tablet, TAKE 1 TABLET BY MOUTH DAILY., Disp: 90 tablet, Rfl: 1  •  metFORMIN (GLUCOPHAGE) 500 MG tablet, Take 500 mg by mouth daily., Disp: , Rfl:   •  Multiple Vitamin (MULTI VITAMIN DAILY PO), Take  by mouth daily., Disp: , Rfl:   •  nitroglycerin (NITROLINGUAL) 0.4 MG/SPRAY spray, Place 1 spray under the tongue every 5 (five) minutes as needed for chest pain., Disp: , Rfl:   •  Omega-3 Fatty Acids (FISH OIL) 1000 MG capsule capsule, Take 1,000 mg by mouth Daily With Breakfast., Disp: , Rfl:   •  omeprazole (PriLOSEC) 20 MG capsule, Take 20 mg by mouth daily., Disp: , Rfl:   •  RANEXA 500 MG 12 hr tablet, TAKE 1 TABLET BY MOUTH EVERY  "DAY (Patient taking differently: TAKE 1 TABLET BY MOUTH daily), Disp: 30 tablet, Rfl: 1  •  rosuvastatin (CRESTOR) 10 MG tablet, TAKE 1 TABLET BY MOUTH DAILY., Disp: 90 tablet, Rfl: 1  •  traMADol (ULTRAM) 50 MG tablet, Take 50 mg by mouth As Needed., Disp: , Rfl: 1  •  nebivolol (BYSTOLIC) 5 MG tablet, Take 2 tablets by mouth Daily., Disp: 180 tablet, Rfl: 3    HISTORY OF PRESENT ILLNESS: Patient returns for scheduled 6-month followup.  She denies any chest pain, increased shortness of breath, edema, palpitations, presyncope, or syncope.  She has complaints of extreme fatigue over the last month.  She also feels that her lower extremities, because of the statin medication, have hindered her from being able to do much activity.  She is currently in physical therapy hoping that this will help.  She has back pain as well.  She has had several friends that have medical problems about which she is anxious and worried.  She just took an 11-day trip to [place] with her son and 3 grandchildren which \"wore me out.\"  She brought her laboratory testing with her today for us to review (see below).  Patient otherwise denies chest pain, severe shortness of breath, PND, edema, palpitations, syncope or presyncope at this time on limited activity.      Review of Systems   Constitution: Positive for weakness, malaise/fatigue and weight gain.   HENT: Positive for hearing loss.    Eyes: Positive for blurred vision.   Endocrine: Positive for heat intolerance.   Hematologic/Lymphatic: Bruises/bleeds easily.   Skin: Positive for dry skin.   Musculoskeletal: Positive for arthritis, back pain, falls, muscle cramps, muscle weakness, myalgias, neck pain and stiffness.   Gastrointestinal: Positive for flatus.   Neurological: Positive for dizziness and headaches.   Allergic/Immunologic: Positive for environmental allergies.      Obtained and otherwise negative except as outlined in problem list and HPI.    Procedures       Objective:     " "  Vitals:    04/19/19 1500 04/19/19 1507   BP: 173/85 173/83   BP Location: Left arm Left arm   Patient Position: Sitting Standing   Pulse: 80 84   SpO2: 97%    Weight: 78.9 kg (174 lb)    Height: 162.6 cm (64\")    Recheck blood pressure right arm sitting was 134/78  Body mass index is 29.87 kg/m².   Last weight:  172 lbs.    Physical Exam   Constitutional: She is oriented to person, place, and time. She appears well-developed and well-nourished.   Neck: No JVD present. Carotid bruit is not present. No thyromegaly present.   Cardiovascular: Regular rhythm, S1 normal and S2 normal. Exam reveals distant heart sounds. Exam reveals no gallop, no S3 and no friction rub.   No murmur heard.  Pulses:       Dorsalis pedis pulses are 2+ on the right side, and 2+ on the left side.        Posterior tibial pulses are 2+ on the right side, and 2+ on the left side.   Pulmonary/Chest: Effort normal. She has decreased breath sounds. She has no wheezes. She has no rhonchi. She has no rales.   Abdominal: Soft. She exhibits no mass. There is no hepatosplenomegaly. There is no tenderness. There is no guarding.   Bowel sounds audible x4   Musculoskeletal: Normal range of motion. She exhibits no edema.   Lymphadenopathy:     She has no cervical adenopathy.   Neurological: She is alert and oriented to person, place, and time.   Skin: Skin is warm, dry and intact. No rash noted.   Vitals reviewed.        Lab Review:  2/26/2019: (reviewed with patient in office today)  · CMP: Glucose 103, BUN 24, creatinine 0.82, GFR greater than 60, sodium 140, potassium 4.2, chloride 103, carbon dioxide 28.6, calcium 10, protein 7.5, albumin 4.4, bilirubin 0.4, AST 27, ALT 24 alkaline phosphatase 32  · Uric acid - 5.7  · Lipid panel: Cholesterol 238, triglycerides 530, HDL 39, LDL unable to be calculated  · TSH - 2.16  · CBC: WBC 3.88, RBC 4.44, hemoglobin 13.2, hematocrit 39.5, MCV 89, MCH 29.7, MCHC 33.4, RDW 13.1, platelets 192, MPV 9.7  · Hemoglobin " A1c - 5.7%  · Carboxyhemoglobin - 2.1%      Lab Results   Component Value Date    BNP 26.0 06/01/2018           Assessment:   Overall continued acceptable course with no interim cardiopulmonary complaints with fair functional status with limited activity.  We will defer additional diagnostic or therapeutic intervention from a cardiac perspective at this time other than to adjust her medication.  She has an abnormal lipid panel and intolerance to statin medications.  We will discontinue her omega-3, TriCor, and Crestor and initiate fenofibric acid 45 mg daily, Vascepa 2 g twice daily, and Praluent 75 mg biweekly.  She has had increased fatigue over the past months, and we will order an IV Lexiscan Cardiolite stress test to be performed in September 2019, the same day as her next appointment.       Diagnosis Plan   1. Hypertensive heart disease without heart failure  IV Lexiscan Cardiolite stress test, September 2019   2. Essential hypertension   Stable, continue current cardiac medications   3. Dyslipidemia   Discontinue omega-3, TriCor, Crestor, and begin fenofibric acid, Praluent, and Vascepa   4.      Ischemic heart disease          Continue Ranexa and attempt to improve control of severe dyslipidemia       Plan:         1. Patient to continue current medications and close follow up with the above providers with the following changes:   A. Discontinue Crestor, omega-3 fish oil, and TriCor   B. Initiate fenofibric acid 45 mg daily   C. Initiate Vascepa 2 g twice daily   D. Initiate Praluent 75 mg biweekly  2. Tentative cardiology follow up in September 2019 with IV Lexiscan Cardiolite stress test, or patient may return sooner PRN.    Scribed for Abel Barillas MD by Lidia Malave, KECIA. 4/19/2019  4:13 PM    IAbel MD, [facility], personally performed the services described in this documentation as scribed by the above named individual in my presence, and it is both accurate and complete. At 4:02 PM on  04/19/2019

## 2019-05-21 ENCOUNTER — TELEPHONE (OUTPATIENT)
Dept: CARDIOLOGY | Facility: CLINIC | Age: 74
End: 2019-05-21

## 2019-05-23 DIAGNOSIS — I10 ESSENTIAL HYPERTENSION: ICD-10-CM

## 2019-05-23 DIAGNOSIS — I11.9 HYPERTENSIVE HEART DISEASE WITHOUT HEART FAILURE: ICD-10-CM

## 2019-05-23 DIAGNOSIS — E78.5 DYSLIPIDEMIA: ICD-10-CM

## 2019-05-23 RX ORDER — RANOLAZINE 500 MG/1
TABLET, EXTENDED RELEASE ORAL
Qty: 90 TABLET | Refills: 3 | Status: SHIPPED | OUTPATIENT
Start: 2019-05-23 | End: 2021-04-08

## 2019-08-01 RX ORDER — NEBIVOLOL 5 MG/1
10 TABLET ORAL DAILY
Qty: 180 TABLET | Refills: 3 | Status: ON HOLD | OUTPATIENT
Start: 2019-08-01 | End: 2023-02-22 | Stop reason: SDUPTHER

## 2019-09-30 DIAGNOSIS — I11.9 HYPERTENSIVE HEART DISEASE WITHOUT HEART FAILURE: ICD-10-CM

## 2019-09-30 DIAGNOSIS — E78.5 DYSLIPIDEMIA: ICD-10-CM

## 2019-09-30 DIAGNOSIS — I10 ESSENTIAL HYPERTENSION: ICD-10-CM

## 2019-09-30 RX ORDER — HYDROCHLOROTHIAZIDE 12.5 MG/1
CAPSULE, GELATIN COATED ORAL
Qty: 180 CAPSULE | Refills: 1 | Status: SHIPPED | OUTPATIENT
Start: 2019-09-30 | End: 2020-04-13

## 2019-10-10 ENCOUNTER — TELEPHONE (OUTPATIENT)
Dept: CARDIOLOGY | Facility: CLINIC | Age: 74
End: 2019-10-10

## 2019-10-10 RX ORDER — FENOFIBRIC ACID 45 MG/1
45 CAPSULE, DELAYED RELEASE ORAL DAILY
Qty: 30 CAPSULE | Refills: 11 | Status: SHIPPED | OUTPATIENT
Start: 2019-10-10 | End: 2019-11-22 | Stop reason: DRUGHIGH

## 2019-10-10 NOTE — TELEPHONE ENCOUNTER
Patient has been very sick with pneumonia for 7 weeks. She had blood work done and stated that her triglycerides were 2200. PCP also mentions that patient may have pancreatitis but are awaiting further test results.    PCP is supposed to fax lab results.    Pt has been taking Vascepa 2g BID and Praluent every 14 days.    Pt was unaware that she was supposed to be taking fenofibric acid 45 mg daily.     Advised pt start this medication today, in combination with everything else she has been taking.    Pt states that she hasn't been eating right either, so she is going to start eating better.        Is there anything else you would like to do at this time?    Please advise.

## 2019-10-24 ENCOUNTER — TELEPHONE (OUTPATIENT)
Dept: CARDIOLOGY | Facility: CLINIC | Age: 74
End: 2019-10-24

## 2019-10-24 DIAGNOSIS — I10 ESSENTIAL HYPERTENSION: ICD-10-CM

## 2019-10-24 DIAGNOSIS — I11.9 HYPERTENSIVE HEART DISEASE WITHOUT HEART FAILURE: ICD-10-CM

## 2019-10-24 DIAGNOSIS — E78.5 DYSLIPIDEMIA: ICD-10-CM

## 2019-10-24 DIAGNOSIS — E03.9 HYPOTHYROIDISM, UNSPECIFIED TYPE: Primary | ICD-10-CM

## 2019-10-24 RX ORDER — LEVOTHYROXINE SODIUM 0.03 MG/1
25 TABLET ORAL DAILY
Qty: 90 TABLET | Refills: 0 | Status: SHIPPED | OUTPATIENT
Start: 2019-10-24

## 2019-10-24 NOTE — TELEPHONE ENCOUNTER
Continue current treatment and followup as scheduled.  Are blood pressure and weight okay?    Thanks

## 2019-10-24 NOTE — TELEPHONE ENCOUNTER
Called pt to ask about recent labs related to refill request. While talking to the pt she stated that she has been SOB with exertion such as when she is doing in laundry and going up stairs. She states she has spells of dizziness when standing and walking, and when she goes from a sitting to standing position.    Denies chest tightness and chest pain. Pt denies cough, sputum production, wheezing.    Pt was treated for pneumonia that lasted about 7 weeks, that she recovered from about two weeks ago.  Pt states that she does not know if this SOB is related to her pneumonia. Pt states that she had CXR on 10/14/19 that was clear.    Pt has appt with [facility] on 11/1/19.     Please advise.

## 2019-10-24 NOTE — TELEPHONE ENCOUNTER
BP has been running 126//76. Pt states she has gained weight and is 180.2 lbs, a 4 pound weight gain in 8 weeks.    BP is 159/82 now.    Advised pt to continue current treatment, continue to monitor BP and keep 11/1/19 appt.

## 2019-11-01 ENCOUNTER — OFFICE VISIT (OUTPATIENT)
Dept: CARDIOLOGY | Facility: CLINIC | Age: 74
End: 2019-11-01

## 2019-11-01 VITALS
BODY MASS INDEX: 32.07 KG/M2 | HEIGHT: 63 IN | HEART RATE: 75 BPM | DIASTOLIC BLOOD PRESSURE: 75 MMHG | SYSTOLIC BLOOD PRESSURE: 131 MMHG | WEIGHT: 181 LBS

## 2019-11-01 DIAGNOSIS — R06.02 SHORTNESS OF BREATH: ICD-10-CM

## 2019-11-01 DIAGNOSIS — E11.69 TYPE 2 DIABETES MELLITUS WITH OTHER SPECIFIED COMPLICATION, WITHOUT LONG-TERM CURRENT USE OF INSULIN (HCC): ICD-10-CM

## 2019-11-01 DIAGNOSIS — R06.09 DOE (DYSPNEA ON EXERTION): ICD-10-CM

## 2019-11-01 DIAGNOSIS — I25.9 ISCHEMIC HEART DISEASE: Primary | ICD-10-CM

## 2019-11-01 DIAGNOSIS — E78.5 DYSLIPIDEMIA: ICD-10-CM

## 2019-11-01 DIAGNOSIS — I10 ESSENTIAL HYPERTENSION: ICD-10-CM

## 2019-11-01 PROCEDURE — 99214 OFFICE O/P EST MOD 30 MIN: CPT | Performed by: INTERNAL MEDICINE

## 2019-11-01 RX ORDER — TORSEMIDE 5 MG/1
5 TABLET ORAL DAILY
Qty: 90 TABLET | Refills: 3 | Status: SHIPPED | OUTPATIENT
Start: 2019-11-01 | End: 2021-04-08

## 2019-11-01 NOTE — PROGRESS NOTES
Subjective:     Encounter Date:11/01/2019    Patient ID: Coco Crockett is a 74 y.o.  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      PHYSICIAN: Lobo Archer MD  ORTHOPEDIST: Luisito Cueva MD  GASTROENTEROLOGIST:  Maryam Sutton MD    Chief Complaint:   Chief Complaint   Patient presents with   • Hypertension     Problem List:  1. Combined hypertensive and ischemic cardiovascular disease:  a. Acceptable GXT with abnormal baseline ST, September 1995.   b. Acceptable exercise stress test and normal quantitative SPECT Cardiolite study with low probability for significant obstructive coronary disease with estimated LVEF (0.60), June 1997.  c. CCS class I chest pain/abnormal EKG/intermittent palpitations with GXT echo stress test suggestive of low probability for significant focal obstructive coronary disease, LVEF (0.80), 12/07/2009.   d. Recurrent atypical CCS class II chest pain syndrome with NYHA class I exertional dyspnea with fatigue with diagnostic coronary angiography demonstrating nonobstructive single-vessel LAD disease with associated 75% to 80% stenosis first diagonal branch LAD not felt to be amenable to catheter based intervention with mild plaque in the nondominant left circumflex coronary artery with normal right dominant coronary artery with preserved LVEF (0.70) with associated diastolic LV dysfunction, December 2013.   e. Residual class I angina pectoris/NYHA class I to II exertional dyspnea and fatigue.  f. Stress test, 06/30/2016:  Normal LVEF (0.63); findings consistent with a normal ECG stress test; myocardial perfusion imaging indicates a small-sized infarct located in the lateral wall with no significant ischemia noted.  g. CCS class I chest discomfort/NYHA class II dyspnea with exertion/fatigue with acceptable EKG and acceptable  echocardiogram demonstrating normal LVEF (0.70) without PE/pulmonary arterial hypertension and mild aortic valve sclerosis with mild LVH, July 2017.  h. 24-hour ambulatory blood pressure monitor 7/10/18: Average blood pressure 136/69, 135/68 during the day and 141/71 at night.  Recommendations to continue current medications.  i. CCS class 0 chest discomfort/NYHA class III fatigue symptoms April 2019, October 2019  2. Chronic hypertension, probably essential.   3. Dyslipidemia.   4. Mild obesity: BMI 32.1  5. Chronic hypothyroidism/replacement therapy.   6. Osteopenia with fracture of lumbar vertebra, 2004.   7. TMJ.   8. GERD.   9. Iron deficiency anemia.   10. Depression.   11. Eczema.   12. Positional vertigo - data deficit.  13. Remote operations:   a. Tonsillectomy and adenoidectomy, 1950.   b. Appendectomy with wedge resection of both ovaries, 1970.  c. MARIZA/BSO secondary to severe endometriosis, 1985.   d. Fall on ice with subsequent ORIF of right ankle/tibia, 1995.   e. Cataracts, OU with bifocal implant, 2008.  14. Intermittent marked dizziness/vertigo with Epley maneuver.  15. Intermittent progressive headache syndrome/possible migraine equivalent.  16. Type 2 diabetes mellitus, Hemoglobin A1c 5.9%, December 2014; 5.8%, July 2017.  17. Remote progressive disabling fatigue with arthralgias and myalgias, June 2018  18. Chronic daytime hypersomnolence and fatigue with apparent acceptable polysomnogram - data deficit, [facility]   19. Colonoscopy and polyp removal, summer 2018  20. Multiple falls without vertigo, dizziness, presyncope, or syncope, September 2018, with minor scrapes/bruising  21. Pneumonia, August 2019    Allergies   Allergen Reactions   • Bactrim [Sulfamethoxazole-Trimethoprim]    • Imdur [Isosorbide Dinitrate] Other (See Comments)     Severe headache/fatigue       • Keflex [Cephalexin] Itching and Other (See Comments)     Angioedema     • Levaquin [Levofloxacin]    • Codeine Sulfate Itching and  Rash   • Demerol [Meperidine] Itching and Rash   • Morphine Sulfate Itching and Rash   • Penicillin V Potassium Itching and Rash   • Sulfa Antibiotics Rash and Other (See Comments)     severe rash/swelling         Current Outpatient Medications:   •  acetaminophen (TYLENOL) 500 MG tablet, Take 500 mg by mouth 2 (Two) Times a Day As Needed for Mild Pain ., Disp: , Rfl:   •  Alirocumab 75 MG/ML solution pen-injector, Inject 75 pens under the skin into the appropriate area as directed Every 14 (Fourteen) Days., Disp: 2 pen, Rfl: 11  •  amLODIPine (NORVASC) 5 MG tablet, Take 1 tablet by mouth Daily., Disp: 90 tablet, Rfl: 3  •  aspirin 325 MG tablet, Take 325 mg by mouth Every Night., Disp: , Rfl:   •  betamethasone dipropionate (DIPROLENE) 0.05 % cream, Apply  topically daily., Disp: , Rfl:   •  Cholecalciferol (VITAMIN D3 PO), Take 4,000 Units by mouth 2 (Two) Times a Day., Disp: , Rfl:   •  Choline Fenofibrate (FENOFIBRIC ACID) 45 MG capsule delayed-release, Take 45 mg by mouth Daily., Disp: 30 capsule, Rfl: 11  •  DULoxetine (CYMBALTA) 60 MG capsule, Take 60 mg by mouth daily., Disp: , Rfl:   •  estrogens, conjugated, (PREMARIN) 0.3 MG tablet, Take 0.3 mg by mouth Daily. Take daily for 21 days then do not take for 7 days., Disp: , Rfl:   •  fluticasone (FLONASE) 50 MCG/ACT nasal spray, 2 sprays into each nostril Daily., Disp: , Rfl:   •  hydroCHLOROthiazide (MICROZIDE) 12.5 MG capsule, TAKE 2 CAPSULES BY MOUTH EVERY DAY, Disp: 180 capsule, Rfl: 1  •  icosapent ethyl (VASCEPA) 1 g capsule capsule, Take 2 g by mouth 2 (Two) Times a Day With Meals., Disp: 120 capsule, Rfl: 11  •  levothyroxine (SYNTHROID, LEVOTHROID) 25 MCG tablet, TAKE 1 TABLET BY MOUTH DAILY., Disp: 90 tablet, Rfl: 0  •  metFORMIN (GLUCOPHAGE) 500 MG tablet, Take 500 mg by mouth daily., Disp: , Rfl:   •  Multiple Vitamin (MULTI VITAMIN DAILY PO), Take  by mouth daily., Disp: , Rfl:   •  nebivolol (BYSTOLIC) 5 MG tablet, Take 2 tablets by mouth  Daily., Disp: 180 tablet, Rfl: 3  •  nitroglycerin (NITROLINGUAL) 0.4 MG/SPRAY spray, Place 1 spray under the tongue every 5 (five) minutes as needed for chest pain., Disp: , Rfl:   •  Omega-3 Fatty Acids (FISH OIL) 1000 MG capsule capsule, Take 1,000 mg by mouth Daily With Breakfast., Disp: , Rfl:   •  omeprazole (PriLOSEC) 20 MG capsule, Take 20 mg by mouth daily., Disp: , Rfl:   •  ranolazine (RANEXA) 500 MG 12 hr tablet, TAKE 1 TABLET BY MOUTH EVERY DAY, Disp: 90 tablet, Rfl: 3  •  traMADol (ULTRAM) 50 MG tablet, Take 50 mg by mouth As Needed., Disp: , Rfl: 1    HISTORY OF PRESENT ILLNESS: Patient returns for followup after a 6-1/2 month hiatus.  Patient states that she has not done well since August 2019.  She went to the doctor 6 times before they finally diagnosed her with pneumonia, and she states that she has felt fatigued since that time.  She coughed a lot and still has rib pain from where she was coughing so much.  She feels that her breathing is slowly getting better, but she is still very winded with activities.  She denies any lower extremity edema and had one episode where she was coughing so hard that she vomited frothy sputum.  She had a chest x-ray around 10/18/2019 which was clear of any pneumonia or heart failure.  She denies any angina, presyncope, or syncope.  She had some dizziness and lightheadedness because she was placed on a couple of different antibiotics for her pneumonia, and she had severe diarrhea and became very dehydrated.  This has now resolved.  She has some anxiety and depression because she was going with her best friend and several other friends to a play last weekend, and her best friend and another friend were run over.  Her best friend is currently in neurosurgery ICU at the [facility], and she is scared that she may not make it out of her coma.  The other friend has lower extremity injuries.  The patient has a lot of lumbar back pain and has been going to  "therapy for this and has been using kinesiology tape and doing different exercises that help.  She is carrying a lot of tension in her neck lately and feels quite overwhelmed.  Her Imdur was discontinued by Dr. Archer because of a \"groggy feeling\" in the patient's head.  Patient otherwise denies prolonged chest pain, severe shortness of breath, PND, edema, palpitations, syncope or presyncope at this time.        Review of Systems   Constitution: Positive for weakness and malaise/fatigue.   Eyes: Positive for blurred vision.   Cardiovascular: Positive for dyspnea on exertion.   Respiratory: Positive for shortness of breath.    Endocrine: Positive for heat intolerance.   Hematologic/Lymphatic: Bruises/bleeds easily.   Skin: Positive for dry skin and itching.   Musculoskeletal: Positive for arthritis, back pain, muscle cramps, muscle weakness, myalgias, neck pain and stiffness.   Gastrointestinal: Positive for change in bowel habit and diarrhea.   Genitourinary: Positive for frequency.   Neurological: Positive for dizziness, headaches and loss of balance.   Allergic/Immunologic: Positive for environmental allergies.      All other systems reviewed and otherwise negative.    Procedures       Objective:       Vitals:    11/01/19 1512 11/01/19 1514   BP: 136/78 131/75   BP Location: Left arm Left arm   Patient Position: Sitting Standing   Pulse: 78 75   Weight: 82.1 kg (181 lb)    Height: 160 cm (63\")      Body mass index is 32.06 kg/m².   Last weight:  174 lbs.    Physical Exam   Constitutional: She is oriented to person, place, and time. She appears well-developed and well-nourished.   Neck: No JVD present. Carotid bruit is not present. No thyromegaly present.   Cardiovascular: Regular rhythm, S1 normal and S2 normal. Exam reveals distant heart sounds. Exam reveals no gallop, no S3 and no friction rub.   No murmur heard.  Pulses:       Dorsalis pedis pulses are 2+ on the right side, and 2+ on the left side.        " Posterior tibial pulses are 2+ on the right side, and 2+ on the left side.   Pulmonary/Chest: Effort normal. She has decreased breath sounds. She has no wheezes. She has no rhonchi. She has no rales.   Abdominal: Soft. She exhibits no mass. There is no hepatosplenomegaly. There is no tenderness. There is no guarding.   Bowel sounds audible x4   Musculoskeletal: Normal range of motion. She exhibits no edema.   Lymphadenopathy:     She has no cervical adenopathy.   Neurological: She is alert and oriented to person, place, and time.   Skin: Skin is warm, dry and intact. No rash noted.   Psychiatric: She exhibits a depressed mood.   Tearful in office   Vitals reviewed.        Lab Review:   10/09/2019 (reviewed with patient by letter):  · CMP - reduced serum potassium of 3.3, otherwise normal electrolytes, serum creatinine 0.7, BUN 16, glucose 122, normal amylase and lipase  · CBC - hematocrit 37%, hemoglobin 12.5, WBC 6320, normal indices, platelet count, and differential  · No FLP or magnesium value obtained    Lab Results   Component Value Date    BNP 26.0 06/01/2018           Assessment:   Overall continued acceptable course with no new interim cardiopulmonary complaints with fair functional status on limited activity.  The patient continues to have some shortness of breath on exertion so we will order an echo GXT with continuous oximetry and provide her with torsemide 5 mg daily.  We will also order a CBC, BMP, proBNP, d-dimer, hemoglobin A1c, FLP, and magnesium level when she comes in to rule out anemia, heart failure, or clot formation.  She does not have a recent lipid panel, so we will order an FLP and hemoglobin A1c.       Diagnosis Plan   1. Ischemic heart disease   Echo GXT with continuous oximetry, torsemide 5 mg daily, CBC, BMP, proBNP d-dimer, magnesium   2. Essential hypertension   Controlled, add torsemide 5 mg daily   3. Dyslipidemia   FLP   4. Type 2 diabetes mellitus with other specified complication,  without long-term current use of insulin (CMS/[facility])   Hemoglobin A1c   5. MALIK: Echo GXT with continuous oximetry, torsemide 5 mg daily, CBC, proBNP, magnesium, d-dimer       Plan:         1. Patient to continue current medications and close follow up with the above providers other than to initiate torsemide 5 mg daily.  2. Tentative cardiology follow up in February 2020, or patient may return sooner PRN.  3. CBC, BMP, proBNP, D-dimer, magnesium, hemoglobin A1c, FLP ordered  4. Echo GXT with continuous oximetry      Scribed for Abel Barillas MD by Lidia Malave, APRN. 11/1/2019  3:54 PM    I, Abel Barillas MD, [facility], personally performed the services described in this documentation as scribed by the above named individual in my presence, and it is both accurate and complete. At 3:57 PM on 11/01/2019

## 2019-11-22 ENCOUNTER — LAB (OUTPATIENT)
Dept: LAB | Facility: HOSPITAL | Age: 74
End: 2019-11-22

## 2019-11-22 ENCOUNTER — HOSPITAL ENCOUNTER (OUTPATIENT)
Dept: CARDIOLOGY | Facility: HOSPITAL | Age: 74
Discharge: HOME | End: 2019-11-22
Admitting: INTERNAL MEDICINE

## 2019-11-22 DIAGNOSIS — I25.9 ISCHEMIC HEART DISEASE: ICD-10-CM

## 2019-11-22 DIAGNOSIS — I10 ESSENTIAL HYPERTENSION: ICD-10-CM

## 2019-11-22 DIAGNOSIS — E78.5 DYSLIPIDEMIA: ICD-10-CM

## 2019-11-22 DIAGNOSIS — R06.02 SHORTNESS OF BREATH: ICD-10-CM

## 2019-11-22 DIAGNOSIS — E11.69 TYPE 2 DIABETES MELLITUS WITH OTHER SPECIFIED COMPLICATION, WITHOUT LONG-TERM CURRENT USE OF INSULIN (HCC): ICD-10-CM

## 2019-11-22 DIAGNOSIS — E03.9 HYPOTHYROIDISM, UNSPECIFIED TYPE: ICD-10-CM

## 2019-11-22 LAB
ANION GAP SERPL CALCULATED.3IONS-SCNC: 15.8 MMOL/L (ref 5–15)
ARTICHOKE IGE QN: 24 MG/DL (ref 0–100)
BUN BLD-MCNC: 26 MG/DL (ref 8–23)
BUN/CREAT SERPL: 30.2 (ref 7–25)
CALCIUM SPEC-SCNC: 9.8 MG/DL (ref 8.6–10.5)
CHLORIDE SERPL-SCNC: 92 MMOL/L (ref 98–107)
CHOLEST SERPL-MCNC: 623 MG/DL (ref 0–200)
CO2 SERPL-SCNC: 26.2 MMOL/L (ref 22–29)
CREAT BLD-MCNC: 0.86 MG/DL (ref 0.57–1)
DEPRECATED RDW RBC AUTO: 47.5 FL (ref 37–54)
ERYTHROCYTE [DISTWIDTH] IN BLOOD BY AUTOMATED COUNT: 16 % (ref 12.3–15.4)
GFR SERPL CREATININE-BSD FRML MDRD: 64 ML/MIN/1.73
GLUCOSE BLD-MCNC: 108 MG/DL (ref 65–99)
HBA1C MFR BLD: 5.62 % (ref 4.8–5.6)
HCT VFR BLD AUTO: 28.7 % (ref 34–46.6)
HDLC SERPL-MCNC: ABNORMAL MG/DL
HGB BLD-MCNC: 10 G/DL (ref 12–15.9)
LDLC SERPL CALC-MCNC: ABNORMAL MG/DL
LDLC/HDLC SERPL: ABNORMAL {RATIO}
MAGNESIUM SERPL-MCNC: 1.5 MG/DL (ref 1.6–2.4)
MCH RBC QN AUTO: 28.6 PG (ref 26.6–33)
MCHC RBC AUTO-ENTMCNC: 34.8 G/DL (ref 31.5–35.7)
MCV RBC AUTO: 82 FL (ref 79–97)
NT-PROBNP SERPL-MCNC: 78.8 PG/ML (ref 5–1800)
PLATELET # BLD AUTO: 227 10*3/MM3 (ref 140–450)
PMV BLD AUTO: 11.3 FL (ref 6–12)
POTASSIUM BLD-SCNC: 4 MMOL/L (ref 3.5–5.2)
RBC # BLD AUTO: 3.5 10*6/MM3 (ref 3.77–5.28)
SODIUM BLD-SCNC: 134 MMOL/L (ref 136–145)
TRIGL SERPL-MCNC: >4425 MG/DL (ref 0–150)
TSH SERPL DL<=0.05 MIU/L-ACNC: 4.35 UIU/ML (ref 0.27–4.2)
VLDLC SERPL-MCNC: ABNORMAL MG/DL
WBC NRBC COR # BLD: 4.54 10*3/MM3 (ref 3.4–10.8)

## 2019-11-22 PROCEDURE — 83735 ASSAY OF MAGNESIUM: CPT

## 2019-11-22 PROCEDURE — 80061 LIPID PANEL: CPT

## 2019-11-22 PROCEDURE — 83880 ASSAY OF NATRIURETIC PEPTIDE: CPT

## 2019-11-22 PROCEDURE — 83036 HEMOGLOBIN GLYCOSYLATED A1C: CPT

## 2019-11-22 PROCEDURE — 85027 COMPLETE CBC AUTOMATED: CPT

## 2019-11-22 PROCEDURE — 80048 BASIC METABOLIC PNL TOTAL CA: CPT

## 2019-11-22 PROCEDURE — 36415 COLL VENOUS BLD VENIPUNCTURE: CPT

## 2019-11-22 PROCEDURE — 83721 ASSAY OF BLOOD LIPOPROTEIN: CPT

## 2019-11-22 PROCEDURE — 84443 ASSAY THYROID STIM HORMONE: CPT

## 2019-11-22 PROCEDURE — 85379 FIBRIN DEGRADATION QUANT: CPT

## 2019-11-22 RX ORDER — FENOFIBRIC ACID 135 MG/1
135 CAPSULE, DELAYED RELEASE ORAL DAILY
Qty: 30 CAPSULE | Refills: 11 | Status: SHIPPED | OUTPATIENT
Start: 2019-11-22 | End: 2020-12-06

## 2019-11-22 NOTE — PROGRESS NOTES
Orders per KTS- pt needs to follow-up with PCP regarding anemia. Pt needs trilipics 145 mg daily.    Called pt and gave medication changes.    Pt verbalizes understanding and agreeable to plan.

## 2019-11-22 NOTE — NURSING NOTE
0804 RN paged Dr. Abel Barillas.  0807 Dr. Abel Barillas rtnd page. Dr Barillas states to have patient reschedule when she is feeling better.

## 2019-11-27 LAB — REF LAB TEST RESULTS: NORMAL

## 2020-02-17 ENCOUNTER — TELEPHONE (OUTPATIENT)
Dept: CARDIOLOGY | Facility: CLINIC | Age: 75
End: 2020-02-17

## 2020-02-17 DIAGNOSIS — E78.5 DYSLIPIDEMIA: Primary | ICD-10-CM

## 2020-02-17 NOTE — TELEPHONE ENCOUNTER
Pt called back.  Advised pt that she needed to have lipid panel drawn due to med change in 11/2019.     Pt requested order be sent to Dr. Lobo Archer's office.    Advised pt to have lipid panel drawn before she at one day.    Pt verbalizes understanding and agreeable to plan.    Order faxed to [phone].

## 2020-02-17 NOTE — TELEPHONE ENCOUNTER
Chandler pt due to needed a FLP. Pt was started on Trilipix 135 mg daily on 11/22/19.    LVM for pt to call me back.

## 2020-03-13 ENCOUNTER — TELEPHONE (OUTPATIENT)
Dept: CARDIOLOGY | Facility: CLINIC | Age: 75
End: 2020-03-13

## 2020-03-13 NOTE — TELEPHONE ENCOUNTER
Pt called and stated that she needed Praluent samples due to Praluent PASS program needing her to reapply to have medication sent to her.     Two Praluent 75 mg/ml pen samples labelled (in fridge) for patient pick-up.    Called pt, no answer. LVM stating samples were available for her to . Advised pt to bring cooler and ice pack, as they needed to stay refrigerated.

## 2020-04-01 ENCOUNTER — TELEPHONE (OUTPATIENT)
Dept: CARDIOLOGY | Facility: CLINIC | Age: 75
End: 2020-04-01

## 2020-04-01 NOTE — TELEPHONE ENCOUNTER
Called pt and gave KTS recommendations above.     Pt verbalizes understanding and agreeable to plan.

## 2020-04-01 NOTE — TELEPHONE ENCOUNTER
Lipid panel received from Dr. Archer's office.    Pt is currently taking:   fenofibric acid 135 mg daily  Praluent 75 mg/ml every 14 days  Vascepa 2g BID    Pt states she has been taking these medications since prescribed with no interruption.    Dr. Archer has not changed any medications.    Labs given to KTS for review.

## 2020-04-11 DIAGNOSIS — E78.5 DYSLIPIDEMIA: ICD-10-CM

## 2020-04-11 DIAGNOSIS — I10 ESSENTIAL HYPERTENSION: ICD-10-CM

## 2020-04-11 DIAGNOSIS — I11.9 HYPERTENSIVE HEART DISEASE WITHOUT HEART FAILURE: ICD-10-CM

## 2020-04-13 ENCOUNTER — OFFICE VISIT (OUTPATIENT)
Dept: NEUROSURGERY | Facility: CLINIC | Age: 75
End: 2020-04-13

## 2020-04-13 VITALS — HEIGHT: 63 IN | BODY MASS INDEX: 32.07 KG/M2 | WEIGHT: 181 LBS

## 2020-04-13 DIAGNOSIS — M48.061 SPINAL STENOSIS OF LUMBAR REGION WITHOUT NEUROGENIC CLAUDICATION: Primary | ICD-10-CM

## 2020-04-13 PROCEDURE — 99203 OFFICE O/P NEW LOW 30 MIN: CPT | Performed by: NEUROLOGICAL SURGERY

## 2020-04-13 RX ORDER — HYDROCHLOROTHIAZIDE 12.5 MG/1
CAPSULE, GELATIN COATED ORAL
Qty: 180 CAPSULE | Refills: 1 | Status: SHIPPED | OUTPATIENT
Start: 2020-04-13 | End: 2020-10-12

## 2020-04-13 NOTE — PROGRESS NOTES
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  Approximately 20 years ago she had herniated intervertebral discs treated conservatively.  She has had a \"foot drop\" since that time.  More recently, however, she has had symptoms consistent with neurogenic claudication.  When she stands and or walks she has pain in her back which radiates to her legs.  She notes that when she sits down her pain goes away almost immediately.  She has no bowel or bladder dysfunction.  She has been improving with physical therapy however because of the sequestration that has been discontinued.  She finds that Advil is quite helpful although it is associated with marked elevation of her blood pressure.  She has not been on NSAIDs in the past because of that.  A lumbar MRI was performed and she is referred for neurosurgical consultation.    Past Medical History:   Diagnosis Date   • Chest pain    • Depression    • Diabetes mellitus (CMS/HCC)    • Dyslipidemia    • Eczema    • GERD (gastroesophageal reflux disease)    • Hyperlipidemia    • Hypertension     Chronic hypertension, probably essential.    • Hypertensive cardiovascular disease    • Hypothyroidism     Chronic hypothyroidism/replacement therapy.   • Iron deficiency anemia    • Migraine     Intermittent progressive headache syndrome/possible migraine equivalent.   • Obesity (BMI 30.0-34.9)     Mild obesity (BMI 31).    • Osteopenia     Osteopenia with fracture of lumbar vertebra, 2004.    • TMJ (temporomandibular joint disorder)    • Type 2 diabetes mellitus (CMS/HCC)  "    Type 2 diabetes mellitus, Hemoglobin A1c 5.9%, December 2014.   • Vertigo     Intermittent marked dizziness/vertigo with Epley maneuver.       Past Surgical History:   Procedure Laterality Date   • ANKLE OPEN REDUCTION INTERNAL FIXATION     • ANKLE SURGERY  1995    Fall on ice with subsequent ORIF of right ankle/tibia, 1995.    • APPENDECTOMY  1970    Appendectomy with wedge resection of both ovaries, 1970.   • TIBIAL PLATEAU OPEN REDUCTION INTERNAL FIXATION     • TONSILLECTOMY AND ADENOIDECTOMY     • TONSILLECTOMY AND ADENOIDECTOMY     • TOTAL LAPAROSCOPIC HYSTERECTOMY SALPINGO OOPHORECTOMY  1985    MARIZA/BSO secondary to severe endometriosis, 1985.        Family History   Problem Relation Age of Onset   • Heart disease Father        Social History     Socioeconomic History   • Marital status:      Spouse name: Not on file   • Number of children: Not on file   • Years of education: Not on file   • Highest education level: Not on file   Tobacco Use   • Smoking status: Never Smoker   • Smokeless tobacco: Never Used   Substance and Sexual Activity   • Alcohol use: Yes     Comment: occasional   • Drug use: No   • Sexual activity: Defer       Allergies   Allergen Reactions   • Bactrim [Sulfamethoxazole-Trimethoprim]    • Imdur [Isosorbide Dinitrate] Other (See Comments)     Severe headache/fatigue       • Keflex [Cephalexin] Itching and Other (See Comments)     Angioedema     • Levaquin [Levofloxacin]    • Codeine Sulfate Itching and Rash   • Demerol [Meperidine] Itching and Rash   • Morphine Sulfate Itching and Rash   • Penicillin V Potassium Itching and Rash   • Sulfa Antibiotics Rash and Other (See Comments)     severe rash/swelling         Current Outpatient Medications:   •  acetaminophen (TYLENOL) 500 MG tablet, Take 500 mg by mouth 2 (Two) Times a Day As Needed for Mild Pain ., Disp: , Rfl:   •  Alirocumab 75 MG/ML solution pen-injector, Inject 75 pens under the skin into the appropriate area as directed  Every 14 (Fourteen) Days., Disp: 2 pen, Rfl: 11  •  amLODIPine (NORVASC) 5 MG tablet, Take 1 tablet by mouth Daily., Disp: 90 tablet, Rfl: 3  •  aspirin 325 MG tablet, Take 325 mg by mouth Every Night., Disp: , Rfl:   •  betamethasone dipropionate (DIPROLENE) 0.05 % cream, Apply  topically daily., Disp: , Rfl:   •  Cholecalciferol (VITAMIN D3 PO), Take 4,000 Units by mouth 2 (Two) Times a Day., Disp: , Rfl:   •  DULoxetine (CYMBALTA) 60 MG capsule, Take 60 mg by mouth daily., Disp: , Rfl:   •  estrogens, conjugated, (PREMARIN) 0.3 MG tablet, Take 0.3 mg by mouth Daily. Take daily for 21 days then do not take for 7 days., Disp: , Rfl:   •  fenofibric acid (TRILIPIX) 135 MG capsule delayed-release delayed release capsule, Take 1 capsule by mouth Daily., Disp: 30 capsule, Rfl: 11  •  fluticasone (FLONASE) 50 MCG/ACT nasal spray, 2 sprays into each nostril Daily., Disp: , Rfl:   •  hydroCHLOROthiazide (MICROZIDE) 12.5 MG capsule, TAKE 2 CAPSULES BY MOUTH EVERY DAY, Disp: 180 capsule, Rfl: 1  •  icosapent ethyl (VASCEPA) 1 g capsule capsule, Take 2 g by mouth 2 (Two) Times a Day With Meals., Disp: 120 capsule, Rfl: 11  •  levothyroxine (SYNTHROID, LEVOTHROID) 25 MCG tablet, TAKE 1 TABLET BY MOUTH DAILY., Disp: 90 tablet, Rfl: 0  •  metFORMIN (GLUCOPHAGE) 500 MG tablet, Take 500 mg by mouth daily., Disp: , Rfl:   •  Multiple Vitamin (MULTI VITAMIN DAILY PO), Take  by mouth daily., Disp: , Rfl:   •  nebivolol (BYSTOLIC) 5 MG tablet, Take 2 tablets by mouth Daily., Disp: 180 tablet, Rfl: 3  •  nitroglycerin (NITROLINGUAL) 0.4 MG/SPRAY spray, Place 1 spray under the tongue every 5 (five) minutes as needed for chest pain., Disp: , Rfl:   •  Omega-3 Fatty Acids (FISH OIL) 1000 MG capsule capsule, Take 1,000 mg by mouth Daily With Breakfast., Disp: , Rfl:   •  omeprazole (PriLOSEC) 20 MG capsule, Take 20 mg by mouth daily., Disp: , Rfl:   •  ranolazine (RANEXA) 500 MG 12 hr tablet, TAKE 1 TABLET BY MOUTH EVERY DAY, Disp: 90  tablet, Rfl: 3  •  torsemide (DEMADEX) 5 MG tablet, Take 1 tablet by mouth Daily., Disp: 90 tablet, Rfl: 03  •  traMADol (ULTRAM) 50 MG tablet, Take 50 mg by mouth As Needed., Disp: , Rfl: 1    Review of Systems   Constitutional: Negative for activity change, appetite change, chills, diaphoresis, fatigue, fever and unexpected weight change.   HENT: Negative for congestion, dental problem, drooling, ear discharge, ear pain, facial swelling, hearing loss, mouth sores, nosebleeds, postnasal drip, rhinorrhea, sinus pressure, sneezing, sore throat, tinnitus, trouble swallowing and voice change.    Eyes: Negative for photophobia, pain, discharge, redness, itching and visual disturbance.   Respiratory: Negative for apnea, cough, choking, chest tightness, shortness of breath, wheezing and stridor.    Cardiovascular: Negative for chest pain, palpitations and leg swelling.   Gastrointestinal: Negative for abdominal distention, abdominal pain, anal bleeding, blood in stool, constipation, diarrhea, nausea, rectal pain and vomiting.   Endocrine: Negative for cold intolerance, heat intolerance, polydipsia, polyphagia and polyuria.   Genitourinary: Negative for decreased urine volume, difficulty urinating, dysuria, enuresis, flank pain, frequency, genital sores, hematuria and urgency.   Musculoskeletal: Positive for back pain. Negative for arthralgias, gait problem, joint swelling, myalgias, neck pain and neck stiffness.   Skin: Negative for color change, pallor, rash and wound.   Allergic/Immunologic: Negative for environmental allergies, food allergies and immunocompromised state.   Neurological: Positive for weakness. Negative for dizziness, tremors, seizures, syncope, facial asymmetry, speech difficulty, light-headedness, numbness and headaches.   Hematological: Negative for adenopathy. Does not bruise/bleed easily.   Psychiatric/Behavioral: Negative for agitation, behavioral problems, confusion, decreased concentration,  "dysphoric mood, hallucinations, self-injury, sleep disturbance and suicidal ideas. The patient is not nervous/anxious and is not hyperactive.    All other systems reviewed and are negative.      Vitals:    04/13/20 1448   Weight: 82.1 kg (181 lb)   Height: 160 cm (63\")       Neurological Examination:    Her speech is normal with no dysarthria or a aphasia.  She has no double vision, no issues with hearing loss and no with swallowing.    She has limited range of motion of the lumbar spine including flexion, extension lateral rotation lateral bending secondary to pain.  However when she sits straight leg raising test is negative.  She has no numbness or weakness in her lower extremities other than the \"foot drop\" she has had for some time.  Her gait has always been a little unsteady secondary to the foot drop but is not consistent with ataxia.        Medical Decision Making:     Diagnostic Data Set: The lumbar MRI data says shows multilevel degenerative osteoarthritis and severe spinal stenosis at multiple levels.  This is located at L2-3, L3-4 and L4-5.  It is worse at L4-L5.      Assessment: Neurogenic claudication secondary to lumbar spinal stenosis          Recommendations: She has been doing well with physical therapy which unfortunately has been discontinued because of the quarantine.  I would certainly urge that be reinstituted as soon as possible.  I have also suggested that she see a rheumatologist.  There may well be some medication that she could take that would not elevate her blood pressure.  The fact that Advil is so helpful would suggest that NSAIDs are a therapeutic option.  Also, I have made her an appointment to see Dr. eubanks for consideration of epidural steroid injection.  She will call me subsequently.  If her symptoms persist and every else fails she would be a candidate for multilevel laminectomies.  Obviously, I would hope that could be avoided.  I will keep you informed if I can help at any " time do not hesitate to call me.        I greatly appreciate the opportunity to see and evaluate this individual.  If you have questions or concerns regarding issues that I may have overlooked please call me at any time: [phone].  Sedrick Guillen M.D.  [facility]  [address] [place] [address].  [place] [phone]

## 2020-04-22 ENCOUNTER — TELEPHONE (OUTPATIENT)
Dept: PAIN MEDICINE | Facility: CLINIC | Age: 75
End: 2020-04-22

## 2020-05-22 ENCOUNTER — TELEPHONE (OUTPATIENT)
Dept: CARDIOLOGY | Facility: CLINIC | Age: 75
End: 2020-05-22

## 2020-05-22 NOTE — TELEPHONE ENCOUNTER
Called pt to let her know she was approved from [employer], [employer] patient assistance program.    Pt wanted to let K[facility] know that she has been eating more vegetables and lean proteins, has lost some weight, and if exercising (Rock chi and Yoga) and has started apple cider vinegar.

## 2020-06-01 PROBLEM — M51.37 DEGENERATION OF LUMBAR OR LUMBOSACRAL INTERVERTEBRAL DISC: Status: ACTIVE | Noted: 2020-06-01

## 2020-06-01 PROBLEM — M48.062 LUMBAR STENOSIS WITH NEUROGENIC CLAUDICATION: Status: ACTIVE | Noted: 2020-06-01

## 2020-06-01 PROBLEM — M51.379 DEGENERATION OF LUMBAR OR LUMBOSACRAL INTERVERTEBRAL DISC: Status: ACTIVE | Noted: 2020-06-01

## 2020-06-01 PROBLEM — M47.816 SPONDYLOSIS OF LUMBAR REGION WITHOUT MYELOPATHY OR RADICULOPATHY: Status: ACTIVE | Noted: 2020-06-01

## 2020-06-01 NOTE — PROGRESS NOTES
"Chief Complaint: \"Pain in my lower back.\"       History of Present Illness:   Patient: Ms. Coco Crockett, 74 y.o. female   Referring Physician: Dr. Stephen Guillen   Reason for Referral: Consultation for chronic intractable chronic lower back pain.   Pain History:  Patient reports a 30-year history of lower back pain, which began without incident.  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  Approximately 20 years ago, she reports that she had \"lumbar disc herniations\" that were treated conservatively. She reports that she has had a foot drop since that time.  More recently, she started experiencing more difficulties with standing and ambulating consistent with neurogenic claudication.  She had been making some progress with physical therapy but unfortunately due to the pandemic she had to stop therapy. Lumbar MRI revealed multilevel degenerative osteoarthritis and severe spinal stenosis, particularly at L2-L3, L3-L4, L4-L5.  Arterial duplex Doppler of the lower extremities on 6/29/18 revealed normal ABIs. Pain has progressed in intensity over the past years.   Pain Description:  Intermittent pain, described as aching, burning, shooting and throbbing sensation.   Radiation of Pain: The pain radiates from the lower back into the gluteal region and down into the right > left lower extremities  Pain intensity today: 0/10 (sittting)  Average pain intensity last week: 5/10   Pain intensity ranges from: 0/10 to 9/10   Aggravating factors:  Pain increases immediately with standing and ambulating.  Patient describes classic neurogenic claudication   Alleviating factors: Pain decreases with sitting and lying down.     Associated Symptoms:   Patient denies numbness or weakness in the lower extremities.   Patient denies any new bladder or bowel problems, except for chronic urge incontinence.   Patient reports difficulties with her balance but denies recent falls. "   Patient reports a history of chronic migraine headaches     Review of previous therapies and additional medical records:   Coco Crockett has already failed the following measures, including:   Conservative Measures: Oral analgesics, opioids and physical therapy   Interventional Measures: Dr. Maloney 20 years ago   Surgical Measures: No history of previous lumbar spine or hip surgery   Coco Crockett underwent neurosurgical  consultation with Dr. Stephen Guillen on April 13, 2020, and was found to be a potential surgical candidate for multilevel lumbar laminectomies   Coco Crockett presents with significant comorbidities including anxiety and depression, diabetes mellitus, GERD, hyperlipidemia, hypertension, hypothyroidism, migraine, mild obesity, osteopenia     In terms of current analgesics, Coco Crockett takes: Tylenol, gabapentin.  Patient also takes duloxetine   I have reviewed Luca Report #[id number] consistent with medication reconciliation.   SOAPP: Low Risk    Global Pain Scale 06-04  2020          Pain 15          Feelings 10          Clinical outcomes 12          Activities 10          GPS Total: 47            Review of Diagnostic Studies:    MRI of the lumbar spine without contrast February 21, 2020, radiology report.  No acute fracture.  The conus is normal signal intensity and terminates normally at the T12-L1 level. There is normal signal and morphology of the nerve roots.  Disc spaces:   L1-L2: Minimal broad-based disc bulge. Facet hypertrophy and uncovertebral spurring.  Mild bilateral neuroforaminal stenosis   L2-L3: Disc space narrowing. Broad-based disc bulge.  Ligamentum flavum thickening and facet hypertrophy.  Mild right and moderate to severe left neuroforaminal stenosis.  Moderate to severe spinal stenosis   L3-L4: Broad-based disc bulge. Ligamentum flavum thickening and facet hypertrophy.  Severe right and moderate to severe left neuroforaminal stenosis.  Severe spinal canal  stenosis.   L4-L5: Disc space narrowing. Posterior broad-based disc bulge.  Bilateral far lateral components in close proximity to the exiting nerve roots, correlate for L4 radiculopathy. Ligamentum flavum thickening and facet hypertrophy. Severe bilateral foraminal stenosis with left-sided nerve root impingement. Severe spinal stenosis   L5-S1: Broad-based disc bulge.  Ligamentum flavum thickening and facet hypertrophy.  Moderate bilateral neuroforaminal stenosis.  Severe spinal canal stenosis.   Arterial duplex Doppler of the lower extremities, Abel Barillas MD on 6/29/18   Right Conclusion: The right MAR is normal. The test is negative for exercise induced claudication.   Left Conclusion: The left MAR is normal. The test is negative for exercise induced claudication.     Review of Systems   Musculoskeletal: Positive for back pain.   All other systems reviewed and are negative.        Patient Active Problem List   Diagnosis   • Mild obesity   • Hypertensive cardiovascular disease   • Hypertension   • Dyslipidemia   • Obesity (BMI 30.0-34.9)   • Hypothyroidism   • Osteopenia   • TMJ (temporomandibular joint disorder)   • GERD (gastroesophageal reflux disease)   • Iron deficiency anemia   • Depression   • Eczema   • Vertigo   • Migraine   • Type 2 diabetes mellitus (CMS/HCC)   • MALIK (dyspnea on exertion)   • Leg pain, bilateral   • Fatigue   • Ischemic heart disease   • Lumbar stenosis with neurogenic claudication   • Spondylosis of lumbar region without myelopathy or radiculopathy   • Degeneration of lumbar or lumbosacral intervertebral disc   • Greater trochanteric bursitis of both hips       Past Medical History:   Diagnosis Date   • Chest pain    • Depression    • Diabetes mellitus (CMS/HCC)    • Dyslipidemia    • Eczema    • GERD (gastroesophageal reflux disease)    • Hyperlipidemia    • Hypertension     Chronic hypertension, probably essential.    • Hypertensive cardiovascular disease    • Hypothyroidism      Chronic hypothyroidism/replacement therapy.   • Iron deficiency anemia    • Migraine     Intermittent progressive headache syndrome/possible migraine equivalent.   • Obesity (BMI 30.0-34.9)     Mild obesity (BMI 31).    • Osteopenia     Osteopenia with fracture of lumbar vertebra, 2004.    • TMJ (temporomandibular joint disorder)    • Type 2 diabetes mellitus (CMS/[facility])     Type 2 diabetes mellitus, Hemoglobin A1c 5.9%, December 2014.   • Vertigo     Intermittent marked dizziness/vertigo with Epley maneuver.         Past Surgical History:   Procedure Laterality Date   • ANKLE OPEN REDUCTION INTERNAL FIXATION     • ANKLE SURGERY  1995    Fall on ice with subsequent ORIF of right ankle/tibia, 1995.    • APPENDECTOMY  1970    Appendectomy with wedge resection of both ovaries, 1970.   • TIBIAL PLATEAU OPEN REDUCTION INTERNAL FIXATION     • TONSILLECTOMY AND ADENOIDECTOMY     • TONSILLECTOMY AND ADENOIDECTOMY     • TOTAL LAPAROSCOPIC HYSTERECTOMY SALPINGO OOPHORECTOMY  1985    MARIZA/BSO secondary to severe endometriosis, 1985.          Family History   Problem Relation Age of Onset   • Heart disease Father          Social History     Socioeconomic History   • Marital status:      Spouse name: Not on file   • Number of children: Not on file   • Years of education: Not on file   • Highest education level: Not on file   Tobacco Use   • Smoking status: Never Smoker   • Smokeless tobacco: Never Used   Substance and Sexual Activity   • Alcohol use: Yes     Comment: occasional   • Drug use: No   • Sexual activity: Defer           Current Outpatient Medications:   •  acetaminophen (TYLENOL) 500 MG tablet, Take 500 mg by mouth 2 (Two) Times a Day As Needed for Mild Pain ., Disp: , Rfl:   •  Alirocumab 75 MG/ML solution pen-injector, Inject 75 pens under the skin into the appropriate area as directed Every 14 (Fourteen) Days., Disp: 2 pen, Rfl: 11  •  amLODIPine (NORVASC) 5 MG tablet, Take 1 tablet by mouth Daily., Disp: 90  tablet, Rfl: 3  •  aspirin 325 MG tablet, Take 325 mg by mouth Every Night., Disp: , Rfl:   •  Cholecalciferol (VITAMIN D3 PO), Take 4,000 Units by mouth 2 (Two) Times a Day., Disp: , Rfl:   •  DULoxetine (CYMBALTA) 60 MG capsule, Take 60 mg by mouth daily., Disp: , Rfl:   •  estrogens, conjugated, (PREMARIN) 0.3 MG tablet, Take 0.3 mg by mouth Daily. Take daily for 21 days then do not take for 7 days., Disp: , Rfl:   •  fenofibric acid (TRILIPIX) 135 MG capsule delayed-release delayed release capsule, Take 1 capsule by mouth Daily., Disp: 30 capsule, Rfl: 11  •  fluticasone (FLONASE) 50 MCG/ACT nasal spray, 2 sprays into each nostril Daily., Disp: , Rfl:   •  hydroCHLOROthiazide (MICROZIDE) 12.5 MG capsule, TAKE 2 CAPSULES BY MOUTH EVERY DAY, Disp: 180 capsule, Rfl: 1  •  icosapent ethyl (VASCEPA) 1 g capsule capsule, Take 2 g by mouth 2 (Two) Times a Day With Meals., Disp: 120 capsule, Rfl: 11  •  levothyroxine (SYNTHROID, LEVOTHROID) 25 MCG tablet, TAKE 1 TABLET BY MOUTH DAILY., Disp: 90 tablet, Rfl: 0  •  metFORMIN (GLUCOPHAGE) 500 MG tablet, Take 500 mg by mouth daily., Disp: , Rfl:   •  Multiple Vitamin (MULTI VITAMIN DAILY PO), Take  by mouth daily., Disp: , Rfl:   •  nebivolol (BYSTOLIC) 5 MG tablet, Take 2 tablets by mouth Daily., Disp: 180 tablet, Rfl: 3  •  nitroglycerin (NITROLINGUAL) 0.4 MG/SPRAY spray, Place 1 spray under the tongue every 5 (five) minutes as needed for chest pain., Disp: , Rfl:   •  omeprazole (PriLOSEC) 20 MG capsule, Take 20 mg by mouth daily., Disp: , Rfl:   •  ranolazine (RANEXA) 500 MG 12 hr tablet, TAKE 1 TABLET BY MOUTH EVERY DAY, Disp: 90 tablet, Rfl: 3  •  torsemide (DEMADEX) 5 MG tablet, Take 1 tablet by mouth Daily., Disp: 90 tablet, Rfl: 03  •  traMADol (ULTRAM) 50 MG tablet, Take 50 mg by mouth As Needed., Disp: , Rfl: 1      Allergies   Allergen Reactions   • Bactrim [Sulfamethoxazole-Trimethoprim]    • Imdur [Isosorbide Dinitrate] Other (See Comments)     Severe  "headache/fatigue       • Keflex [Cephalexin] Itching and Other (See Comments)     Angioedema     • Levaquin [Levofloxacin]    • Codeine Sulfate Itching and Rash   • Demerol [Meperidine] Itching and Rash   • Morphine Sulfate Itching and Rash   • Penicillin V Potassium Itching and Rash   • Sulfa Antibiotics Rash and Other (See Comments)     severe rash/swelling         Ht 160 cm (63\")   Wt 77.6 kg (171 lb)   BMI 30.29 kg/m²       Physical Exam:  Constitutional: Patient is oriented to person, place, and time.   Patient appears well-developed and well-nourished.   HEENT: Head: Normocephalic and atraumatic.   Eyes: Conjunctivae and lids are normal.   Pupils: Equal, round, reactive to light.   Neck: Trachea normal. Neck supple. No JVD present.   Pulmonary Respiratory effort: No increased work of breathing or signs of respiratory distress. Auscultation of lungs: Clear to auscultation.   Cardiovascular Auscultation of heart: Normal rate and rhythm, normal S1 and S2, no murmurs.   Peripheral vascular exam: Femoral: right 2+, left 2+. Posterior tibialis: right 2+ and left 2+. Dorsalis pedis: right 2+ and left 2+. No edema.   Musculoskeletal   Gait and station: Gait evaluation demonstrated a normal gait. Patient was able to walk on her heels and toes  Lumbar spine: Passive and active range of motion are full and without pain. Extension, flexion, lateral flexion, rotation of the lumbar spine did not increase or reproduce pain. Lumbar facet joint loading maneuvers are negative.   Jaiden test, Gaenslen's test, thigh thrust test and SI compression test are negative   Palpation of the bilateral greater trochanter, reveals tenderness bilaterally.    Hip joints: The range of motion of the hip joints is limited to flexion and internal rotation bilaterally although without pain.   Neurological:   Patient is alert and oriented to person, place, and time.   Speech: speech is normal.   Cortical function: Normal mental status.   Cranial " nerves: Cranial nerves 2-12 intact.   Reflex Scores:  Right patellar: 1+  Left patellar: 1+  Right Achilles: 1+  Left Achilles: 1+  Motor strength: 5/5  Motor Tone: normal tone.   Involuntary movements: none.   Superficial/Primitive Reflexes: primitive reflexes were absent.   Right De La Paz: absent  Left De La Paz: absent  Right ankle clonus: absent  Left ankle clonus: absent   Babinsky: absent  Negative long tract signs. Straight leg raising test is negative. Femoral stretch sign is negative.   Sensation: No sensory loss. Sensory exam: intact to light touch, intact pain and temperature sensation, intact vibration sensation and normal proprioception.   Coordination: Normal finger to nose and heel to shin. Normal balance and negative Romberg's sign   Skin and subcutaneous tissue: Skin is warm and intact. No rash noted. No cyanosis.   Psychiatric: Judgment and insight: Normal. Orientation to person, place and time: Normal. Recent and remote memory: Intact. Mood and affect: Normal.     ASSESSMENT:   1. Lumbar stenosis with neurogenic claudication    2. Degeneration of lumbar or lumbosacral intervertebral disc    3. Greater trochanteric bursitis of both hips    4. Type 2 diabetes mellitus with other specified complication, without long-term current use of insulin (CMS/[facility])    5. Obesity (BMI 30.0-34.9)    6. Depression, unspecified depression type          PLAN/MEDICAL DECISION MAKING:  Patient reports a 30-year history of progressive lower back pain, which began without incident. Lumbar MRI revealed multilevel degenerative osteoarthritis and severe spinal stenosis, particularly at L2-L3, L3-L4, L4-L5.  Arterial duplex Doppler of the lower extremities on 6/29/18 revealed normal ABIs. Patient has failed to obtain pain relief with conservative measures, as referenced under HPI. I have reviewed all available patient's medical records as well as previous therapies as referenced above. I had a lengthy conversation with Ms.  Coco Crockett regarding her chronic pain condition and potential therapeutic options including risks, benefits, alternative therapies, to name a few. Therefore, I have proposed the following plan:  1. Pharmacological measures: Reviewed. Discussed. Patient takes Tylenol, gabapentin.  Patient also takes duloxetine   2. Interventional pain management measures: Patient will be scheduled for diagnostic and therapeutic bilateral L4-L5 transforaminal epidural steroid injection. We may repeat epidurals depending on patient's outcome.  Patient will follow-up with Dr. Guillen thereafter for possible multilevel laminectomies. Otherwise, she could be a potential candidate for SCS    3. Long-term rehabilitation efforts:  A. The patient does not have a history of falls. I did complete a risk assessment for falls  B. Patient will start a comprehensive physical therapy program for water therapy, therapeutic exercise, core strengthening, gait and balance training and neurodynamics   C. Start an exercise program such as yoga  D. Contrast therapy: Apply ice-packs for 15-20 minutes, followed by heating pads for 15-20 minutes to affected area   4. The patient has been instructed to contact my office with any questions or difficulties. The patient understands the plan and agrees to proceed accordingly.        Patient Care Team:  Lobo Archer MD as PCP - General (Internal Medicine)     No orders of the defined types were placed in this encounter.        Future Appointments   Date Time Provider Department Center   6/11/2020  1:00 PM Abel Barillas MD [facility] None         Justin Chi MD     EMR Dragon/Transcription disclaimer:  Much of this encounter note is an electronic transcription of spoken language to printed text. Electronic transcription of spoken language may permit erroneous, or at times, nonsensical words or phrases to be inadvertently transcribed. Although I have reviewed the note for such errors, some may  still exist.

## 2020-06-04 ENCOUNTER — OFFICE VISIT (OUTPATIENT)
Dept: PAIN MEDICINE | Facility: CLINIC | Age: 75
End: 2020-06-04

## 2020-06-04 VITALS — BODY MASS INDEX: 30.3 KG/M2 | HEIGHT: 63 IN | WEIGHT: 171 LBS

## 2020-06-04 DIAGNOSIS — M48.062 LUMBAR STENOSIS WITH NEUROGENIC CLAUDICATION: Primary | ICD-10-CM

## 2020-06-04 DIAGNOSIS — M70.62 GREATER TROCHANTERIC BURSITIS OF BOTH HIPS: ICD-10-CM

## 2020-06-04 DIAGNOSIS — E66.9 OBESITY (BMI 30.0-34.9): ICD-10-CM

## 2020-06-04 DIAGNOSIS — M48.062 LUMBAR STENOSIS WITH NEUROGENIC CLAUDICATION: ICD-10-CM

## 2020-06-04 DIAGNOSIS — F32.A DEPRESSION, UNSPECIFIED DEPRESSION TYPE: ICD-10-CM

## 2020-06-04 DIAGNOSIS — E11.69 TYPE 2 DIABETES MELLITUS WITH OTHER SPECIFIED COMPLICATION, WITHOUT LONG-TERM CURRENT USE OF INSULIN (HCC): ICD-10-CM

## 2020-06-04 DIAGNOSIS — M70.61 GREATER TROCHANTERIC BURSITIS OF BOTH HIPS: ICD-10-CM

## 2020-06-04 DIAGNOSIS — M51.37 DEGENERATION OF LUMBAR OR LUMBOSACRAL INTERVERTEBRAL DISC: ICD-10-CM

## 2020-06-04 PROCEDURE — 99204 OFFICE O/P NEW MOD 45 MIN: CPT | Performed by: ANESTHESIOLOGY

## 2020-06-17 DIAGNOSIS — M48.062 LUMBAR STENOSIS WITH NEUROGENIC CLAUDICATION: Primary | ICD-10-CM

## 2020-06-19 ENCOUNTER — APPOINTMENT (OUTPATIENT)
Dept: PREADMISSION TESTING | Facility: HOSPITAL | Age: 75
End: 2020-06-19

## 2020-06-19 PROCEDURE — U0004 COV-19 TEST NON-CDC HGH THRU: HCPCS

## 2020-06-19 PROCEDURE — C9803 HOPD COVID-19 SPEC COLLECT: HCPCS

## 2020-06-19 PROCEDURE — U0002 COVID-19 LAB TEST NON-CDC: HCPCS

## 2020-06-20 LAB
REF LAB TEST METHOD: NORMAL
SARS-COV-2 RNA RESP QL NAA+PROBE: NOT DETECTED

## 2020-06-22 ENCOUNTER — OUTSIDE FACILITY SERVICE (OUTPATIENT)
Dept: PAIN MEDICINE | Facility: CLINIC | Age: 75
End: 2020-06-22

## 2020-06-22 PROCEDURE — 99152 MOD SED SAME PHYS/QHP 5/>YRS: CPT | Performed by: ANESTHESIOLOGY

## 2020-06-22 PROCEDURE — 64483 NJX AA&/STRD TFRM EPI L/S 1: CPT | Performed by: ANESTHESIOLOGY

## 2020-06-23 ENCOUNTER — TELEPHONE (OUTPATIENT)
Dept: PAIN MEDICINE | Facility: CLINIC | Age: 75
End: 2020-06-23

## 2020-06-23 NOTE — TELEPHONE ENCOUNTER
diagnostic and therapeutic bilateral L4-L5 transforaminal epidural steroid injection 06/22/2020.    Patient reports she is doing well, she denies any pain/complications.  Patient aware of upcoming office visit.  Appointment card, PT order placed in outgoing mail.

## 2020-07-22 NOTE — PROGRESS NOTES
Subjective:     Encounter Date:07/23/2020    Patient ID: Coco Crockett is a 75 y.o.  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      PHYSICIAN: Lobo Archer MD  ORTHOPEDIST: Luisito Cueva MD  GASTROENTEROLOGIST:  Maryam Sutton MD  PAIN MANAGEMENT PHYSICIAN: Justin Chi MD  NEUROSURGEON: Stephen Guillen MD  RHEUMATOLOGIST: Janice Wilson MD    Chief Complaint:   Chief Complaint   Patient presents with   • Ischemic Heart Disease       Problem List:  1. Combined hypertensive and ischemic cardiovascular disease:  a. Acceptable GXT with abnormal baseline ST, September 1995.   b. Acceptable exercise stress test and normal quantitative SPECT Cardiolite study with low probability for significant obstructive coronary disease with estimated LVEF (0.60), June 1997.  c. CCS class I chest pain/abnormal EKG/intermittent palpitations with GXT echo stress test suggestive of low probability for significant focal obstructive coronary disease, LVEF (0.80), 12/07/2009.   d. Recurrent atypical CCS class II chest pain syndrome with NYHA class I exertional dyspnea with fatigue with diagnostic coronary angiography demonstrating nonobstructive single-vessel LAD disease with associated 75% to 80% stenosis first diagonal branch LAD not felt to be amenable to catheter based intervention with mild plaque in the nondominant left circumflex coronary artery with normal right dominant coronary artery with preserved LVEF (0.70) with associated diastolic LV dysfunction, December 2013.   e. Residual class I angina pectoris/NYHA class I to II exertional dyspnea and fatigue.  f. Stress test, 06/30/2016:  Normal LVEF (0.63); findings consistent with a normal ECG stress test; myocardial perfusion imaging indicates a small-sized infarct located in the lateral wall with no significant ischemia  noted.  g. CCS class I chest discomfort/NYHA class II dyspnea with exertion/fatigue with acceptable EKG and acceptable echocardiogram demonstrating normal LVEF (0.70) without PE/pulmonary arterial hypertension and mild aortic valve sclerosis with mild LVH, July 2017.  h. 24-hour ambulatory blood pressure monitor 7/10/18: Average blood pressure 136/69, 135/68 during the day and 141/71 at night.  Recommendations to continue current medications.  i. CCS class 0 chest discomfort/NYHA class III fatigue symptoms April 2019, October 2019  j. Residual class I symptoms, July 2020  2. Chronic hypertension, probably essential.   3. Dyslipidemia.   4. Remote Mild obesity: BMI 29.8  5. Chronic hypothyroidism/replacement therapy.   6. Osteopenia with fracture of lumbar vertebra, 2004 with subsequent development of severe back pain and sever lumbosacral osteoarthritis with epidural injections, June 2020  7. TMJ dysfunction  8. GERD.   9. Iron deficiency anemia.   10. Depression.   11. Eczema.   12. Positional vertigo - data deficit.  13. Remote operations:   a. Tonsillectomy and adenoidectomy, 1950.   b. Appendectomy with wedge resection of both ovaries, 1970.  c. MARIZA/BSO secondary to severe endometriosis, 1985.   d. Fall on ice with subsequent ORIF of right ankle/tibia, 1995.   e. Cataracts, OU with bifocal implant, 2008.  f. Lumbar stenosis with neurogenic claudication, June 2020.  14. Intermittent marked dizziness/vertigo with Epley maneuver.  15. Intermittent progressive headache syndrome/possible migraine equivalent.  16. Type 2 diabetes mellitus, Hemoglobin A1c 5.9%, December 2014; 5.8%, July 2017.  17. Remote progressive disabling fatigue with arthralgias and myalgias, June 2018  18. Chronic daytime hypersomnolence and fatigue with apparent acceptable polysomnogram - data deficit, [facility]   19. Colonoscopy and polyp removal, summer 2018  20. Multiple falls without vertigo, dizziness, presyncope, or syncope, September  2018, with minor scrapes/bruising  21. Pneumonia, August 2019    Allergies   Allergen Reactions   • Bactrim [Sulfamethoxazole-Trimethoprim]    • Imdur [Isosorbide Dinitrate] Other (See Comments)     Severe headache/fatigue       • Keflex [Cephalexin] Itching and Other (See Comments)     Angioedema     • Levaquin [Levofloxacin]    • Codeine Sulfate Itching and Rash   • Demerol [Meperidine] Itching and Rash   • Morphine Sulfate Itching and Rash   • Penicillin V Potassium Itching and Rash   • Sulfa Antibiotics Rash and Other (See Comments)     severe rash/swelling       Current Outpatient Medications:   •  acetaminophen (TYLENOL) 500 MG tablet, Take 500 mg by mouth 2 (Two) Times a Day As Needed for Mild Pain ., Disp: , Rfl:   •  Alirocumab 75 MG/ML solution pen-injector, Inject 75 pens under the skin into the appropriate area as directed Every 14 (Fourteen) Days., Disp: 2 pen, Rfl: 11  •  amLODIPine (NORVASC) 5 MG tablet, Take 1 tablet by mouth Daily., Disp: 90 tablet, Rfl: 3  •  aspirin 325 MG tablet, Take 325 mg by mouth Every Night., Disp: , Rfl:   •  Cholecalciferol (VITAMIN D3 PO), Take 4,000 Units by mouth 2 (Two) Times a Day., Disp: , Rfl:   •  DULoxetine (CYMBALTA) 60 MG capsule, Take 60 mg by mouth daily., Disp: , Rfl:   •  estrogens, conjugated, (PREMARIN) 0.3 MG tablet, Take 0.3 mg by mouth Daily. Take daily for 21 days then do not take for 7 days., Disp: , Rfl:   •  fenofibric acid (TRILIPIX) 135 MG capsule delayed-release delayed release capsule, Take 1 capsule by mouth Daily., Disp: 30 capsule, Rfl: 11  •  fluticasone (FLONASE) 50 MCG/ACT nasal spray, 2 sprays into each nostril Daily., Disp: , Rfl:   •  gabapentin (NEURONTIN) 100 MG capsule, Take 100 mg by mouth Daily., Disp: , Rfl:   •  hydroCHLOROthiazide (MICROZIDE) 12.5 MG capsule, TAKE 2 CAPSULES BY MOUTH EVERY DAY (Patient taking differently: Take 12.5 mg by mouth 2 (Two) Times a Day.), Disp: 180 capsule, Rfl: 1  •  icosapent ethyl (VASCEPA) 1 g  capsule capsule, Take 2 g by mouth 2 (Two) Times a Day With Meals., Disp: 120 capsule, Rfl: 11  •  levothyroxine (SYNTHROID, LEVOTHROID) 25 MCG tablet, TAKE 1 TABLET BY MOUTH DAILY., Disp: 90 tablet, Rfl: 0  •  metFORMIN (GLUCOPHAGE) 500 MG tablet, Take 500 mg by mouth daily., Disp: , Rfl:   •  Multiple Vitamin (MULTI VITAMIN DAILY PO), Take  by mouth daily., Disp: , Rfl:   •  nebivolol (BYSTOLIC) 5 MG tablet, Take 2 tablets by mouth Daily. (Patient taking differently: Take 5 mg by mouth Daily.), Disp: 180 tablet, Rfl: 3  •  nitroglycerin (NITROLINGUAL) 0.4 MG/SPRAY spray, Place 1 spray under the tongue every 5 (five) minutes as needed for chest pain., Disp: , Rfl:   •  omeprazole (PriLOSEC) 20 MG capsule, Take 20 mg by mouth daily., Disp: , Rfl:   •  Psyllium (Metamucil) 28.3 % powder, Take  by mouth. 1 tbsp. QD, Disp: , Rfl:   •  Turmeric 500 MG capsule, Take 1,000 mg by mouth 2 (two) times a day., Disp: , Rfl:   •  ranolazine (RANEXA) 500 MG 12 hr tablet, TAKE 1 TABLET BY MOUTH EVERY DAY, Disp: 90 tablet, Rfl: 3  •  torsemide (DEMADEX) 5 MG tablet, Take 1 tablet by mouth Daily., Disp: 90 tablet, Rfl: 03  •  traMADol (ULTRAM) 50 MG tablet, Take 50 mg by mouth As Needed., Disp: , Rfl: 1    History of Present Illness: Patient returns for follow up after a 9-month hiatus. Since last visit, patient had bilateral L4-L5 transforaminal epidural steroid injections on 06/22/2020. She reports this relieved her sciatica pain and she has been doing well overall. She says her right leg is shorter than her left which throws her back off and has to use shoe inserts to assist with this. She [place] upcoming [place] in [place] with her children and grandchildren but is concerned for COVID. She is encouraged to wear a mask and practice social distancing. She has had no interim ER visits, hospitalizations, serious illnesses, or surgeries. Patient otherwise denies chest pain, shortness of breath, PND, edema, palpitations,  "syncope, or presyncope at this time. She has an appointment with Dr. Archer in October 2020 and is encouraged to share results with us if she is able to. She has been practicing [employer] and chair yoga to help with her balance which she believes is helping. She is also concerned for her recent hair loss and if this could be related to her thyroid. She is advised to try Minoxidil 5%. She recently lost her dog 2 weeks ago, Lavonne and she is deciding if she should get another dog.       ROS   Obtained and negative except as outlined in problem list and HPI.    Procedures       Objective:       Vitals:    07/23/20 1345 07/23/20 1348   BP: 122/64 118/64   BP Location: Left arm Left arm   Patient Position: Sitting Standing   Pulse: 77    Temp: 96.9 °F (36.1 °C)    SpO2: 98%    Weight: 77.5 kg (170 lb 12.8 oz)    Height: 161.3 cm (63.5\")      Body mass index is 29.78 kg/m².  Last weight: 181 lbs    Physical Exam   Constitutional: She is oriented to person, place, and time. She appears well-developed and well-nourished.   Neck: No JVD present. Carotid bruit is not present. No thyromegaly present.   Cardiovascular: Regular rhythm, S1 normal and S2 normal. Exam reveals no gallop, no S3 and no friction rub.   Murmur heard.   Medium-pitched early systolic murmur is present with a grade of 2/6 at the lower left sternal border.  Pulses:       Carotid pulses are 1+ on the right side, and 1+ on the left side.       Radial pulses are 1+ on the right side, and 1+ on the left side.        Femoral pulses are 1+ on the right side, and 1+ on the left side.       Popliteal pulses are 1+ on the right side, and 1+ on the left side.        Dorsalis pedis pulses are 1+ on the right side, and 1+ on the left side.        Posterior tibial pulses are 1+ on the right side, and 1+ on the left side.   Pulmonary/Chest: Effort normal. She has decreased breath sounds. She has no wheezes. She has no rhonchi. She has no rales.   Abdominal: Soft. She " exhibits no mass. There is no hepatosplenomegaly. There is no tenderness. There is no guarding.   Musculoskeletal: Normal range of motion. She exhibits no edema.   Lymphadenopathy:     She has no cervical adenopathy.   Neurological: She is alert and oriented to person, place, and time.   Skin: Skin is warm, dry and intact. No rash noted.   Vitals reviewed.        Lab Review:   Reviewed with patient by letter:  Lab Results   Component Value Date    GLUCOSE 108 (H) 11/22/2019    BUN 26 (H) 11/22/2019    CREATININE 0.86 11/22/2019    EGFRIFNONA 64 11/22/2019    BCR 30.2 (H) 11/22/2019    CO2 26.2 11/22/2019    CALCIUM 9.8 11/22/2019       Lab Results   Component Value Date    WBC 4.54 11/22/2019    HGB 10.0 (L) 11/22/2019    HCT 28.7 (L) 11/22/2019    MCV 82.0 11/22/2019     11/22/2019       Lab Results   Component Value Date    HGBA1C 5.62 (H) 11/22/2019       Lab Results   Component Value Date    TSH 4.350 (H) 11/22/2019       Lab Results   Component Value Date    CHOL 623 (H) 11/22/2019     Lab Results   Component Value Date    TRIG >4,425 (H) 11/22/2019     Lab Results   Component Value Date    HDL  11/22/2019      Comment:      Unable to perform due to elevated triglyceride.      Lab Results   Component Value Date    LDL  11/22/2019      Comment:      Unable to calculate    LDL 24 11/22/2019 11/27/2019 (reviewed with patient by letter):  · D-dimer 0.3    MRI Lumbar Spine, 02/21/2020:  IMPRESSION:  Multilevel degenerative changes as enumerated above with sever spinal stenosis. Correlate for cauda equina syndrome.      Assessment:       Overall continued acceptable course with no new interim cardiopulmonary complaints with good functional status. We will defer additional diagnostic or therapeutic intervention from a cardiac perspective at this time.     Diagnosis Plan   1. Ischemic heart disease  No recurrent angina pectoris or CHF on current activity schedule; continue current treatment   2. Essential  hypertension  Acceptable control; continue current medications   3. Hypothyroidism, unspecified type   No data to review; Continue current treatment.    4. Type 2 diabetes mellitus with other specified complication, without long-term current use of insulin (CMS/[facility])  No data to review; continue current medications   5. Dyslipidemia  No data to review; Continue current treatment.           Plan:         1. Patient to continue current medications and close follow up with the above providers.  2. Tentative cardiology follow up in March 2021 or patient may return sooner PRN.      Scribed for Abel Barillas MD by Gricel Rudolph. 7/23/2020  14:13    I, Abel Barillas MD, [facility], personally performed the services described in this documentation as scribed by the above named individual in my presence, and it is both accurate and complete. At 2:14 PM on 07/23/2020

## 2020-07-23 ENCOUNTER — OFFICE VISIT (OUTPATIENT)
Dept: CARDIOLOGY | Facility: CLINIC | Age: 75
End: 2020-07-23

## 2020-07-23 VITALS
SYSTOLIC BLOOD PRESSURE: 118 MMHG | DIASTOLIC BLOOD PRESSURE: 64 MMHG | TEMPERATURE: 96.9 F | WEIGHT: 170.8 LBS | HEART RATE: 77 BPM | BODY MASS INDEX: 29.16 KG/M2 | HEIGHT: 64 IN | OXYGEN SATURATION: 98 %

## 2020-07-23 DIAGNOSIS — I10 ESSENTIAL HYPERTENSION: ICD-10-CM

## 2020-07-23 DIAGNOSIS — E78.5 DYSLIPIDEMIA: ICD-10-CM

## 2020-07-23 DIAGNOSIS — E11.69 TYPE 2 DIABETES MELLITUS WITH OTHER SPECIFIED COMPLICATION, WITHOUT LONG-TERM CURRENT USE OF INSULIN (HCC): ICD-10-CM

## 2020-07-23 DIAGNOSIS — I25.9 ISCHEMIC HEART DISEASE: Primary | ICD-10-CM

## 2020-07-23 DIAGNOSIS — E03.9 HYPOTHYROIDISM, UNSPECIFIED TYPE: ICD-10-CM

## 2020-07-23 PROCEDURE — 99214 OFFICE O/P EST MOD 30 MIN: CPT | Performed by: INTERNAL MEDICINE

## 2020-07-23 RX ORDER — VIT C/B6/B5/MAGNESIUM/HERB 173 50-5-6-5MG
1000 CAPSULE ORAL 2 TIMES DAILY
COMMUNITY
End: 2021-10-28

## 2020-07-23 RX ORDER — GABAPENTIN 100 MG/1
100 CAPSULE ORAL AS NEEDED
COMMUNITY
End: 2022-08-10

## 2020-09-16 NOTE — PROGRESS NOTES
"This consultation was provided with the use of interactive audio through telephone that permits real time communication with the patient, as patient is unable to attend an in-office appointment due to the COVID-19 crisis. Consent for assessment and treatment was provided by the patient.  You have chosen to receive care through a telephone visit today. Do you consent to use a telephone visit for your medical care today?   Yes     Chief Complaint: \"My leg pain is much better, Pain in my lower back.\"       History of Present Illness:   Patient: Ms. Coco Crockett, 75 y.o. female originally referred by Dr. Stephen Guillen in consultation for chronic intractable chronic lower back pain. Patient reports a 30-year history of lower back pain, which began without incident.  Patient was last seen on June 22, 2020, when she underwent diagnostic and therapeutic bilateral L4-L5 transforaminal epidural steroid injections, for which she reports experiencing 50% pain relief and functional improvement that is ongoing.  In addition, she reports she does not feel much radiation of pain into her legs since her injection, and her pain has mainly localized to her lower back.  She did begin physical therapy as medically advised.  She requests consultation today for postprocedure follow-up and evaluation.  Pain Description:  Intermittent pain, described as aching, burning, shooting and throbbing sensation.   Radiation of Pain: The pain radiates from the lower back into the gluteal region and down into the right > left lower extremities, she reports this radiation of pain on rare occasions.  Pain intensity today: 0/10 (sittting)  Average pain intensity last week: 5/10   Pain intensity ranges from: 0/10 to 8/10   Aggravating factors:  Pain increases immediately with standing and ambulating.  Patient describes classic neurogenic claudication.  She reports since her injection she is able to stand and walk longer.  Alleviating factors: Pain " decreases with sitting and lying down.     Associated Symptoms:   Patient denies numbness or weakness in the lower extremities.   Patient denies any new bladder or bowel problems, except for chronic urge incontinence.   Patient reports difficulties with her balance but denies recent falls.   Patient reports a history of chronic migraine headaches     Review of previous therapies and additional medical records:   Coco Crockett has already failed the following measures, including:   Conservative Measures: Oral analgesics, opioids and physical therapy   Interventional Measures: Dr. Maloney 20 years ago   06/22/2020: DxTx bilateral L4-L5 transforaminal epidural steroid injections  Surgical Measures: No history of previous lumbar spine or hip surgery   Coco Crockett underwent neurosurgical  consultation with Dr. Stephen Guillen on April 13, 2020, and was found to be a potential surgical candidate for multilevel lumbar laminectomies   Coco Crockett presents with significant comorbidities including anxiety and depression, diabetes mellitus, GERD, hyperlipidemia, hypertension, hypothyroidism, migraine, mild obesity, osteopenia     In terms of current analgesics, Coco Crockett takes: Tylenol, gabapentin.  Patient also takes duloxetine   I have reviewed Luca Report #[id number] consistent with medication reconciliation.   SOAPP: Low Risk    Global Pain Scale 06-04  2020          Pain 15          Feelings 10          Clinical outcomes 12          Activities 10          GPS Total: 47            Review of Diagnostic Studies:    MRI of the lumbar spine without contrast February 21, 2020, radiology report.  No acute fracture.  The conus is normal signal intensity and terminates normally at the T12-L1 level. There is normal signal and morphology of the nerve roots.  Disc spaces:   L1-L2: Minimal broad-based disc bulge. Facet hypertrophy and uncovertebral spurring.  Mild bilateral neuroforaminal stenosis   L2-L3: Disc  space narrowing. Broad-based disc bulge.  Ligamentum flavum thickening and facet hypertrophy.  Mild right and moderate to severe left neuroforaminal stenosis.  Moderate to severe spinal stenosis   L3-L4: Broad-based disc bulge. Ligamentum flavum thickening and facet hypertrophy.  Severe right and moderate to severe left neuroforaminal stenosis.  Severe spinal canal stenosis.   L4-L5: Disc space narrowing. Posterior broad-based disc bulge.  Bilateral far lateral components in close proximity to the exiting nerve roots, correlate for L4 radiculopathy. Ligamentum flavum thickening and facet hypertrophy. Severe bilateral foraminal stenosis with left-sided nerve root impingement. Severe spinal stenosis   L5-S1: Broad-based disc bulge.  Ligamentum flavum thickening and facet hypertrophy.  Moderate bilateral neuroforaminal stenosis.  Severe spinal canal stenosis.   Arterial duplex Doppler of the lower extremities, Abel Barillas MD on 6/29/18   Right Conclusion: The right MAR is normal. The test is negative for exercise induced claudication.   Left Conclusion: The left MAR is normal. The test is negative for exercise induced claudication.     Review of Systems   Musculoskeletal: Positive for back pain.   All other systems reviewed and are negative.        Patient Active Problem List   Diagnosis   • Mild obesity   • Hypertensive cardiovascular disease   • Hypertension   • Dyslipidemia   • Obesity (BMI 30.0-34.9)   • Hypothyroidism   • Osteopenia   • TMJ (temporomandibular joint disorder)   • GERD (gastroesophageal reflux disease)   • Iron deficiency anemia   • Depression   • Eczema   • Vertigo   • Migraine   • Type 2 diabetes mellitus (CMS/[facility])   • MALIK (dyspnea on exertion)   • Leg pain, bilateral   • Fatigue   • Ischemic heart disease   • Lumbar stenosis with neurogenic claudication   • Spondylosis of lumbar region without myelopathy or radiculopathy   • Degeneration of lumbar or lumbosacral intervertebral disc   •  Greater trochanteric bursitis of both hips       Past Medical History:   Diagnosis Date   • Chest pain    • Depression    • Diabetes mellitus (CMS/HCC)    • Dyslipidemia    • Eczema    • GERD (gastroesophageal reflux disease)    • Hyperlipidemia    • Hypertension     Chronic hypertension, probably essential.    • Hypertensive cardiovascular disease    • Hypothyroidism     Chronic hypothyroidism/replacement therapy.   • Iron deficiency anemia    • Migraine     Intermittent progressive headache syndrome/possible migraine equivalent.   • Obesity (BMI 30.0-34.9)     Mild obesity (BMI 31).    • Osteopenia     Osteopenia with fracture of lumbar vertebra, 2004.    • TMJ (temporomandibular joint disorder)    • Type 2 diabetes mellitus (CMS/HCC)     Type 2 diabetes mellitus, Hemoglobin A1c 5.9%, December 2014.   • Vertigo     Intermittent marked dizziness/vertigo with Epley maneuver.         Past Surgical History:   Procedure Laterality Date   • ANKLE OPEN REDUCTION INTERNAL FIXATION     • ANKLE SURGERY  1995    Fall on ice with subsequent ORIF of right ankle/tibia, 1995.    • APPENDECTOMY  1970    Appendectomy with wedge resection of both ovaries, 1970.   • TIBIAL PLATEAU OPEN REDUCTION INTERNAL FIXATION     • TONSILLECTOMY AND ADENOIDECTOMY     • TONSILLECTOMY AND ADENOIDECTOMY     • TOTAL LAPAROSCOPIC HYSTERECTOMY SALPINGO OOPHORECTOMY  1985    MARIZA/BSO secondary to severe endometriosis, 1985.          Family History   Problem Relation Age of Onset   • Heart disease Father          Social History     Socioeconomic History   • Marital status:      Spouse name: Not on file   • Number of children: Not on file   • Years of education: Not on file   • Highest education level: Not on file   Tobacco Use   • Smoking status: Never Smoker   • Smokeless tobacco: Never Used   Substance and Sexual Activity   • Alcohol use: Yes     Comment: occasional   • Drug use: No   • Sexual activity: Defer           Current Outpatient  Medications:   •  acetaminophen (TYLENOL) 500 MG tablet, Take 500 mg by mouth 2 (Two) Times a Day As Needed for Mild Pain ., Disp: , Rfl:   •  Alirocumab 75 MG/ML solution pen-injector, Inject 75 pens under the skin into the appropriate area as directed Every 14 (Fourteen) Days., Disp: 2 pen, Rfl: 11  •  amLODIPine (NORVASC) 5 MG tablet, Take 1 tablet by mouth Daily., Disp: 90 tablet, Rfl: 3  •  aspirin 325 MG tablet, Take 325 mg by mouth Every Night., Disp: , Rfl:   •  Cholecalciferol (VITAMIN D3 PO), Take 4,000 Units by mouth 2 (Two) Times a Day., Disp: , Rfl:   •  DULoxetine (CYMBALTA) 60 MG capsule, Take 60 mg by mouth daily., Disp: , Rfl:   •  estrogens, conjugated, (PREMARIN) 0.3 MG tablet, Take 0.3 mg by mouth Daily. Take daily for 21 days then do not take for 7 days., Disp: , Rfl:   •  fenofibric acid (TRILIPIX) 135 MG capsule delayed-release delayed release capsule, Take 1 capsule by mouth Daily., Disp: 30 capsule, Rfl: 11  •  fluticasone (FLONASE) 50 MCG/ACT nasal spray, 2 sprays into each nostril Daily., Disp: , Rfl:   •  gabapentin (NEURONTIN) 100 MG capsule, Take 100 mg by mouth Daily., Disp: , Rfl:   •  hydroCHLOROthiazide (MICROZIDE) 12.5 MG capsule, TAKE 2 CAPSULES BY MOUTH EVERY DAY (Patient taking differently: Take 12.5 mg by mouth 2 (Two) Times a Day.), Disp: 180 capsule, Rfl: 1  •  icosapent ethyl (VASCEPA) 1 g capsule capsule, Take 2 g by mouth 2 (Two) Times a Day With Meals., Disp: 120 capsule, Rfl: 11  •  levothyroxine (SYNTHROID, LEVOTHROID) 25 MCG tablet, TAKE 1 TABLET BY MOUTH DAILY., Disp: 90 tablet, Rfl: 0  •  metFORMIN (GLUCOPHAGE) 500 MG tablet, Take 500 mg by mouth daily., Disp: , Rfl:   •  Multiple Vitamin (MULTI VITAMIN DAILY PO), Take  by mouth daily., Disp: , Rfl:   •  nebivolol (BYSTOLIC) 5 MG tablet, Take 2 tablets by mouth Daily. (Patient taking differently: Take 5 mg by mouth Daily.), Disp: 180 tablet, Rfl: 3  •  nitroglycerin (NITROLINGUAL) 0.4 MG/SPRAY spray, Place 1 spray  under the tongue every 5 (five) minutes as needed for chest pain., Disp: , Rfl:   •  omeprazole (PriLOSEC) 20 MG capsule, Take 20 mg by mouth daily., Disp: , Rfl:   •  Psyllium (Metamucil) 28.3 % powder, Take  by mouth. 1 tbsp. QD, Disp: , Rfl:   •  ranolazine (RANEXA) 500 MG 12 hr tablet, TAKE 1 TABLET BY MOUTH EVERY DAY, Disp: 90 tablet, Rfl: 3  •  torsemide (DEMADEX) 5 MG tablet, Take 1 tablet by mouth Daily., Disp: 90 tablet, Rfl: 03  •  traMADol (ULTRAM) 50 MG tablet, Take 50 mg by mouth As Needed., Disp: , Rfl: 1  •  Turmeric 500 MG capsule, Take 1,000 mg by mouth 2 (two) times a day., Disp: , Rfl:       Allergies   Allergen Reactions   • Bactrim [Sulfamethoxazole-Trimethoprim]    • Imdur [Isosorbide Dinitrate] Other (See Comments)     Severe headache/fatigue       • Keflex [Cephalexin] Itching and Other (See Comments)     Angioedema     • Levaquin [Levofloxacin]    • Codeine Sulfate Itching and Rash   • Demerol [Meperidine] Itching and Rash   • Morphine Sulfate Itching and Rash   • Penicillin V Potassium Itching and Rash   • Sulfa Antibiotics Rash and Other (See Comments)     severe rash/swelling         There were no vitals taken for this visit.      Due to the constraints of the telemedicine format, no physical exam was performed     Physical Exam: (Previous PE from last office visit)  Constitutional: Patient is oriented to person, place, and time.   Patient appears well-developed and well-nourished.   HEENT: Head: Normocephalic and atraumatic.   Eyes: Conjunctivae and lids are normal.   Pupils: Equal, round, reactive to light.   Neck: Trachea normal. Neck supple. No JVD present.   Pulmonary Respiratory effort: No increased work of breathing or signs of respiratory distress. Auscultation of lungs: Clear to auscultation.   Cardiovascular Auscultation of heart: Normal rate and rhythm, normal S1 and S2, no murmurs.   Peripheral vascular exam: Femoral: right 2+, left 2+. Posterior tibialis: right 2+ and left  2+. Dorsalis pedis: right 2+ and left 2+. No edema.   Musculoskeletal   Gait and station: Gait evaluation demonstrated a normal gait. Patient was able to walk on her heels and toes  Lumbar spine: Passive and active range of motion are full and without pain. Extension, flexion, lateral flexion, rotation of the lumbar spine did not increase or reproduce pain. Lumbar facet joint loading maneuvers are negative.   Jaiden test, Gaenslen's test, thigh thrust test and SI compression test are negative   Palpation of the bilateral greater trochanter, reveals tenderness bilaterally.    Hip joints: The range of motion of the hip joints is limited to flexion and internal rotation bilaterally although without pain.   Neurological:   Patient is alert and oriented to person, place, and time.   Speech: speech is normal.   Cortical function: Normal mental status.   Cranial nerves: Cranial nerves 2-12 intact.   Reflex Scores:  Right patellar: 1+  Left patellar: 1+  Right Achilles: 1+  Left Achilles: 1+  Motor strength: 5/5  Motor Tone: normal tone.   Involuntary movements: none.   Superficial/Primitive Reflexes: primitive reflexes were absent.   Right De La Paz: absent  Left De La Paz: absent  Right ankle clonus: absent  Left ankle clonus: absent   Babinsky: absent  Negative long tract signs. Straight leg raising test is negative. Femoral stretch sign is negative.   Sensation: No sensory loss. Sensory exam: intact to light touch, intact pain and temperature sensation, intact vibration sensation and normal proprioception.   Coordination: Normal finger to nose and heel to shin. Normal balance and negative Romberg's sign   Skin and subcutaneous tissue: Skin is warm and intact. No rash noted. No cyanosis.   Psychiatric: Judgment and insight: Normal. Orientation to person, place and time: Normal. Recent and remote memory: Intact. Mood and affect: Normal.     ASSESSMENT:   1. Lumbar stenosis with neurogenic claudication    2. Degeneration of  lumbar or lumbosacral intervertebral disc    3. Greater trochanteric bursitis of both hips    4. Type 2 diabetes mellitus with other specified complication, without long-term current use of insulin (CMS/[facility])    5. Obesity (BMI 30.0-34.9)    6. Depression, unspecified depression type          PLAN/MEDICAL DECISION MAKING:  Patient reports a 30-year history of progressive lower back pain, which began without incident. Lumbar MRI revealed multilevel degenerative osteoarthritis and severe spinal stenosis, particularly at L2-L3, L3-L4, L4-L5.  Arterial duplex Doppler of the lower extremities on 6/29/18 revealed normal ABIs. Patient has failed to obtain pain relief with conservative measures, as referenced under HPI. I have reviewed all available patient's medical records as well as previous therapies as referenced above. I had a lengthy conversation with Ms. Coco Crockett regarding her chronic pain condition and potential therapeutic options including risks, benefits, alternative therapies, to name a few. Therefore, I have proposed the following plan:  1. Pharmacological measures: Reviewed. Discussed. Patient takes Tylenol, gabapentin.  Patient also takes duloxetine   2. Interventional pain management measures: Patient will be scheduled for bilateral L4-L5 transforaminal epidural steroid injection. We may repeat epidurals depending on patient's outcome.  Patient will follow-up with Dr. Guillen thereafter for possible multilevel laminectomies. Otherwise, she could be a potential candidate for SCS    3. Long-term rehabilitation efforts:  A. The patient does not have a history of falls. I did complete a risk assessment for falls  B. Patient will continue a comprehensive physical therapy program for water therapy, therapeutic exercise, core strengthening, gait and balance training and neurodynamics   C. Start an exercise program such as yoga  D. Contrast therapy: Apply ice-packs for 15-20 minutes, followed by heating pads  for 15-20 minutes to affected area   4. The patient has been instructed to contact my office with any questions or difficulties. The patient understands the plan and agrees to proceed accordingly.    Patient has been made aware that patient will continue to have access to telephone call, tele-health or e-visit, or in office visit if an emergent or urgent issue arises.     This visit was performed via Telehealth. Patient was advised to call us back or contact a primary care provider, Urgent Care or the Emergency Department if symptoms worsen or treatment provided does not resolve symptoms. Patient verbalizes understanding of medication dosage, comfort measures, instructions for treatment, and follow-up.    This visit has been scheduled as a phone visit to comply with patient safety concerns in accordance with CDC recommendations. Total time of discussion was 12 minutes.    Patient Care Team:  Lobo Archer MD as PCP - General (Internal Medicine)     No orders of the defined types were placed in this encounter.        Future Appointments   Date Time Provider Department Center   9/17/2020  2:00 PM Jeanna Harry APRN [facility]   4/22/2021 10:45 AM Abel Barillas MD [facility] None         [facility]     EMR Dragon/Transcription disclaimer:  Much of this encounter note is an electronic transcription of spoken language to printed text. Electronic transcription of spoken language may permit erroneous, or at times, nonsensical words or phrases to be inadvertently transcribed. Although I have reviewed the note for such errors, some may still exist.

## 2020-09-17 ENCOUNTER — OFFICE VISIT (OUTPATIENT)
Dept: PAIN MEDICINE | Facility: CLINIC | Age: 75
End: 2020-09-17

## 2020-09-17 DIAGNOSIS — E11.69 TYPE 2 DIABETES MELLITUS WITH OTHER SPECIFIED COMPLICATION, WITHOUT LONG-TERM CURRENT USE OF INSULIN (HCC): ICD-10-CM

## 2020-09-17 DIAGNOSIS — M70.62 GREATER TROCHANTERIC BURSITIS OF BOTH HIPS: ICD-10-CM

## 2020-09-17 DIAGNOSIS — M70.61 GREATER TROCHANTERIC BURSITIS OF BOTH HIPS: ICD-10-CM

## 2020-09-17 DIAGNOSIS — E66.9 OBESITY (BMI 30.0-34.9): ICD-10-CM

## 2020-09-17 DIAGNOSIS — F32.A DEPRESSION, UNSPECIFIED DEPRESSION TYPE: ICD-10-CM

## 2020-09-17 DIAGNOSIS — M48.062 LUMBAR STENOSIS WITH NEUROGENIC CLAUDICATION: ICD-10-CM

## 2020-09-17 DIAGNOSIS — M51.37 DEGENERATION OF LUMBAR OR LUMBOSACRAL INTERVERTEBRAL DISC: ICD-10-CM

## 2020-09-17 PROCEDURE — 99442 PR PHYS/QHP TELEPHONE EVALUATION 11-20 MIN: CPT | Performed by: NURSE PRACTITIONER

## 2020-10-05 DIAGNOSIS — M48.062 LUMBAR STENOSIS WITH NEUROGENIC CLAUDICATION: Primary | ICD-10-CM

## 2020-10-07 ENCOUNTER — OUTSIDE FACILITY SERVICE (OUTPATIENT)
Dept: PAIN MEDICINE | Facility: CLINIC | Age: 75
End: 2020-10-07

## 2020-10-07 PROCEDURE — 64483 NJX AA&/STRD TFRM EPI L/S 1: CPT | Performed by: ANESTHESIOLOGY

## 2020-10-07 PROCEDURE — 99152 MOD SED SAME PHYS/QHP 5/>YRS: CPT | Performed by: ANESTHESIOLOGY

## 2020-10-08 ENCOUNTER — TELEPHONE (OUTPATIENT)
Dept: PAIN MEDICINE | Facility: CLINIC | Age: 75
End: 2020-10-08

## 2020-10-08 NOTE — TELEPHONE ENCOUNTER
Pt has no complaints and is feeling fine and will call with any change. Pt is aware of upcoming appt.

## 2020-10-10 DIAGNOSIS — E78.5 DYSLIPIDEMIA: ICD-10-CM

## 2020-10-10 DIAGNOSIS — I10 ESSENTIAL HYPERTENSION: ICD-10-CM

## 2020-10-10 DIAGNOSIS — I11.9 HYPERTENSIVE HEART DISEASE WITHOUT HEART FAILURE: ICD-10-CM

## 2020-10-12 RX ORDER — HYDROCHLOROTHIAZIDE 12.5 MG/1
12.5 CAPSULE, GELATIN COATED ORAL 2 TIMES DAILY
Qty: 60 CAPSULE | Refills: 11 | Status: SHIPPED | OUTPATIENT
Start: 2020-10-12 | End: 2022-03-14

## 2020-11-09 NOTE — PROGRESS NOTES
"Chief Complaint: \"My leg pain is much better, I still have pain in my lower back when I am walking.\"       History of Present Illness:   Patient: Ms. Coco Crockett, 75 y.o. female originally referred by Dr. Stephen Guillen in consultation for chronic intractable lower back pain. Patient reports a 30-year history of lower back pain, which began without incident.  Patient was last seen on October 7, 2020, when she underwent bilateral L4-L5 transforaminal epidural steroid injections, from which she reports experiencing 50% pain relief and functional improvement that is ongong. Additionally on June 22, 2020, she underwent diagnostic and therapeutic bilateral L4-L5 transforaminal epidural steroid injections, from which she reported experiencing 50% pain relief and functional improvement that was ongoing at the time of her follow-up visit.  She also reported she did not feel much radiation of pain into her legs since her injection, which still continues today.  She did begin physical therapy as medically advised, and has began water aerobics .  She returns today for post procedure follow-up and evaluation.  Pain Description:  intermittent pain, described as aching, burning, shooting and throbbing sensation.   Radiation of Pain: The pain radiates from the lower back into the gluteal region and down into the right > left lower extremities, she reports this radiation of pain on rare occasions since epidural.  Pain intensity today: 0/10   Average pain intensity last week: 4/10   Pain intensity ranges from: 0/10 to 7/10   Aggravating factors:  Pain increases with standing, walking, and protracted sitting.  Patient describes classic neurogenic claudication.  She reports since her injection she is able to stand and walk longer.  Alleviating factors: Pain decreases with sitting and lying down.     Associated Symptoms:   Patient denies numbness or weakness in the lower extremities.   Patient denies any new bladder or bowel " problems, except for chronic urge incontinence.   Patient reports difficulties with her balance but denies recent falls.   Patient reports a history of chronic migraine headaches     Review of previous therapies and additional medical records:   Coco Crockett has already failed the following measures, including:   Conservative Measures: Oral analgesics, opioids and physical therapy   Interventional Measures: Dr. Maloney 20 years ago   06/22/2020: DxTx bilateral L4-L5 transforaminal epidural steroid injections  10/07/2020: bilateral L4-L5 transforaminal epidural steroid injections  Surgical Measures: No history of previous lumbar spine or hip surgery   Coco Crockett underwent neurosurgical  consultation with Dr. Stephen Guillen on April 13, 2020, and was found to be a potential surgical candidate for multilevel lumbar laminectomies   Coco Crockett presents with significant comorbidities including anxiety and depression, diabetes mellitus, GERD, hyperlipidemia, hypertension, hypothyroidism, migraine, mild obesity, osteopenia     In terms of current analgesics, Coco Crockett takes: Tylenol, gabapentin.  Patient also takes duloxetine   I have reviewed [facility] Report #[id number] consistent with medication reconciliation.   SOAPP: Low Risk    Global Pain Scale 06-04  2020 11-10  2020         Pain 15 8         Feelings 10 5         Clinical outcomes 12 1         Activities 10 5         GPS Total: 47 19           Review of Diagnostic Studies:    MRI of the lumbar spine without contrast February 21, 2020, radiology report.  No acute fracture.  The conus is normal signal intensity and terminates normally at the T12-L1 level. There is normal signal and morphology of the nerve roots.  Disc spaces:   L1-L2: Minimal broad-based disc bulge. Facet hypertrophy and uncovertebral spurring.  Mild bilateral neuroforaminal stenosis   L2-L3: Disc space narrowing. Broad-based disc bulge.  Ligamentum flavum thickening and facet  hypertrophy.  Mild right and moderate to severe left neuroforaminal stenosis.  Moderate to severe spinal stenosis   L3-L4: Broad-based disc bulge. Ligamentum flavum thickening and facet hypertrophy.  Severe right and moderate to severe left neuroforaminal stenosis.  Severe spinal canal stenosis.   L4-L5: Disc space narrowing. Posterior broad-based disc bulge.  Bilateral far lateral components in close proximity to the exiting nerve roots, correlate for L4 radiculopathy. Ligamentum flavum thickening and facet hypertrophy. Severe bilateral foraminal stenosis with left-sided nerve root impingement. Severe spinal stenosis   L5-S1: Broad-based disc bulge.  Ligamentum flavum thickening and facet hypertrophy.  Moderate bilateral neuroforaminal stenosis.  Severe spinal canal stenosis.   Arterial duplex Doppler of the lower extremities, Abel Barillas MD on 6/29/18   Right Conclusion: The right MAR is normal. The test is negative for exercise induced claudication.   Left Conclusion: The left MAR is normal. The test is negative for exercise induced claudication.     Review of Systems   Constitutional: Positive for fatigue.   HENT: Positive for hearing loss and sinus pressure.    Endocrine: Positive for heat intolerance.   Genitourinary: Positive for urgency.   Musculoskeletal: Positive for back pain, myalgias and neck stiffness.   Allergic/Immunologic: Positive for environmental allergies and food allergies.   Neurological: Positive for dizziness and headaches.   Psychiatric/Behavioral:        Depression   All other systems reviewed and are negative.        Patient Active Problem List   Diagnosis   • Mild obesity   • Hypertensive cardiovascular disease   • Hypertension   • Dyslipidemia   • Obesity (BMI 30.0-34.9)   • Hypothyroidism   • Osteopenia   • TMJ (temporomandibular joint disorder)   • GERD (gastroesophageal reflux disease)   • Iron deficiency anemia   • Depression   • Eczema   • Vertigo   • Migraine   • Type 2  diabetes mellitus (CMS/HCC)   • MALIK (dyspnea on exertion)   • Leg pain, bilateral   • Fatigue   • Ischemic heart disease   • Lumbar stenosis with neurogenic claudication   • Spondylosis of lumbar region without myelopathy or radiculopathy   • Degeneration of lumbar or lumbosacral intervertebral disc   • Greater trochanteric bursitis of both hips       Past Medical History:   Diagnosis Date   • Chest pain    • Depression    • Diabetes mellitus (CMS/HCC)    • Dyslipidemia    • Eczema    • GERD (gastroesophageal reflux disease)    • Hyperlipidemia    • Hypertension     Chronic hypertension, probably essential.    • Hypertensive cardiovascular disease    • Hypothyroidism     Chronic hypothyroidism/replacement therapy.   • Iron deficiency anemia    • Migraine     Intermittent progressive headache syndrome/possible migraine equivalent.   • Obesity (BMI 30.0-34.9)     Mild obesity (BMI 31).    • Osteopenia     Osteopenia with fracture of lumbar vertebra, 2004.    • TMJ (temporomandibular joint disorder)    • Type 2 diabetes mellitus (CMS/HCC)     Type 2 diabetes mellitus, Hemoglobin A1c 5.9%, December 2014.   • Vertigo     Intermittent marked dizziness/vertigo with Epley maneuver.         Past Surgical History:   Procedure Laterality Date   • ANKLE OPEN REDUCTION INTERNAL FIXATION     • ANKLE SURGERY  1995    Fall on ice with subsequent ORIF of right ankle/tibia, 1995.    • APPENDECTOMY  1970    Appendectomy with wedge resection of both ovaries, 1970.   • TIBIAL PLATEAU OPEN REDUCTION INTERNAL FIXATION     • TONSILLECTOMY AND ADENOIDECTOMY     • TONSILLECTOMY AND ADENOIDECTOMY     • TOTAL LAPAROSCOPIC HYSTERECTOMY SALPINGO OOPHORECTOMY  1985    MARIZA/BSO secondary to severe endometriosis, 1985.          Family History   Problem Relation Age of Onset   • Heart disease Father          Social History     Socioeconomic History   • Marital status:      Spouse name: Not on file   • Number of children: Not on file   • Years  of education: Not on file   • Highest education level: Not on file   Tobacco Use   • Smoking status: Never Smoker   • Smokeless tobacco: Never Used   Substance and Sexual Activity   • Alcohol use: Yes     Comment: occasional   • Drug use: No   • Sexual activity: Defer           Current Outpatient Medications:   •  acetaminophen (TYLENOL) 500 MG tablet, Take 500 mg by mouth 2 (Two) Times a Day As Needed for Mild Pain ., Disp: , Rfl:   •  Alirocumab 75 MG/ML solution pen-injector, Inject 75 pens under the skin into the appropriate area as directed Every 14 (Fourteen) Days., Disp: 2 pen, Rfl: 11  •  amLODIPine (NORVASC) 5 MG tablet, Take 1 tablet by mouth Daily., Disp: 90 tablet, Rfl: 3  •  aspirin 325 MG tablet, Take 325 mg by mouth Every Night., Disp: , Rfl:   •  DULoxetine (CYMBALTA) 60 MG capsule, Take 60 mg by mouth daily., Disp: , Rfl:   •  estrogens, conjugated, (PREMARIN) 0.3 MG tablet, Take 0.3 mg by mouth Daily. Take daily for 21 days then do not take for 7 days., Disp: , Rfl:   •  fenofibric acid (TRILIPIX) 135 MG capsule delayed-release delayed release capsule, Take 1 capsule by mouth Daily., Disp: 30 capsule, Rfl: 11  •  fluticasone (FLONASE) 50 MCG/ACT nasal spray, 2 sprays into each nostril Daily., Disp: , Rfl:   •  gabapentin (NEURONTIN) 100 MG capsule, Take 100 mg by mouth Daily., Disp: , Rfl:   •  hydroCHLOROthiazide (MICROZIDE) 12.5 MG capsule, Take 1 capsule by mouth 2 (Two) Times a Day., Disp: 60 capsule, Rfl: 11  •  icosapent ethyl (VASCEPA) 1 g capsule capsule, Take 2 g by mouth 2 (Two) Times a Day With Meals., Disp: 120 capsule, Rfl: 11  •  levothyroxine (SYNTHROID, LEVOTHROID) 25 MCG tablet, TAKE 1 TABLET BY MOUTH DAILY., Disp: 90 tablet, Rfl: 0  •  metFORMIN (GLUCOPHAGE) 500 MG tablet, Take 500 mg by mouth daily., Disp: , Rfl:   •  Multiple Vitamin (MULTI VITAMIN DAILY PO), Take  by mouth daily., Disp: , Rfl:   •  nebivolol (BYSTOLIC) 5 MG tablet, Take 2 tablets by mouth Daily. (Patient taking  "differently: Take 5 mg by mouth Daily.), Disp: 180 tablet, Rfl: 3  •  nitroglycerin (NITROLINGUAL) 0.4 MG/SPRAY spray, Place 1 spray under the tongue every 5 (five) minutes as needed for chest pain., Disp: , Rfl:   •  omeprazole (PriLOSEC) 20 MG capsule, Take 20 mg by mouth daily., Disp: , Rfl:   •  Psyllium (Metamucil) 28.3 % powder, Take  by mouth. 1 tbsp. QD, Disp: , Rfl:   •  torsemide (DEMADEX) 5 MG tablet, Take 1 tablet by mouth Daily., Disp: 90 tablet, Rfl: 03  •  Turmeric 500 MG capsule, Take 1,000 mg by mouth 2 (two) times a day., Disp: , Rfl:   •  Cholecalciferol (VITAMIN D3 PO), Take 4,000 Units by mouth 2 (Two) Times a Day., Disp: , Rfl:   •  ranolazine (RANEXA) 500 MG 12 hr tablet, TAKE 1 TABLET BY MOUTH EVERY DAY, Disp: 90 tablet, Rfl: 3  •  traMADol (ULTRAM) 50 MG tablet, Take 50 mg by mouth As Needed., Disp: , Rfl: 1      Allergies   Allergen Reactions   • Bactrim [Sulfamethoxazole-Trimethoprim]    • Imdur [Isosorbide Dinitrate] Other (See Comments)     Severe headache/fatigue       • Keflex [Cephalexin] Itching and Other (See Comments)     Angioedema     • Levaquin [Levofloxacin]    • Codeine Sulfate Itching and Rash   • Demerol [Meperidine] Itching and Rash   • Morphine Sulfate Itching and Rash   • Penicillin V Potassium Itching and Rash   • Sulfa Antibiotics Rash and Other (See Comments)     severe rash/swelling         /80   Pulse 83   Temp 98.6 °F (37 °C)   Resp 16   Ht 161.3 cm (63.5\")   Wt 79.1 kg (174 lb 6.4 oz)   SpO2 97%   BMI 30.41 kg/m²         Physical Exam:   Constitutional: Patient is oriented to person, place, and time.   Patient appears well-developed and well-nourished.   HEENT: Head: Normocephalic and atraumatic.   Eyes: Conjunctivae and lids are normal.   Pupils: Equal, round, reactive to light.   Neck: Trachea normal. Neck supple. No JVD present.   Pulmonary Respiratory effort: No increased work of breathing or signs of respiratory distress. Auscultation of lungs: " Clear to auscultation.   Cardiovascular Auscultation of heart: Normal rate and rhythm, normal S1 and S2, no murmurs.   Peripheral vascular exam: Femoral: right 2+, left 2+. Posterior tibialis: right 2+ and left 2+. Dorsalis pedis: right 2+ and left 2+. No edema.   Musculoskeletal   Gait and station: Gait evaluation demonstrated a normal gait. Patient was able to walk on her heels and toes  Lumbar spine: Passive and active range of motion are full and without pain. Extension, flexion, lateral flexion, rotation of the lumbar spine did not increase or reproduce pain. Lumbar facet joint loading maneuvers are negative.   Jaiden test, Gaenslen's test, thigh thrust test and SI compression test are negative   Palpation of the bilateral greater trochanter, reveals tenderness bilaterally.    Hip joints: The range of motion of the hip joints is limited to flexion and internal rotation bilaterally although without pain.   Neurological:   Patient is alert and oriented to person, place, and time.   Speech: speech is normal.   Cortical function: Normal mental status.   Cranial nerves: Cranial nerves 2-12 intact.   Reflex Scores:  Right patellar: 1+  Left patellar: 1+  Right Achilles: 1+  Left Achilles: 1+  Motor strength: 5/5  Motor Tone: normal tone.   Involuntary movements: none.   Superficial/Primitive Reflexes: primitive reflexes were absent.   Right De La Paz: absent  Left De La Paz: absent  Right ankle clonus: absent  Left ankle clonus: absent   Babinsky: absent  Negative long tract signs. Straight leg raising test is negative. Femoral stretch sign is negative.   Sensation: No sensory loss. Sensory exam: intact to light touch, intact pain and temperature sensation, intact vibration sensation and normal proprioception.   Coordination: Normal finger to nose and heel to shin. Normal balance and negative Romberg's sign   Skin and subcutaneous tissue: Skin is warm and intact. No rash noted. No cyanosis.   Psychiatric: Judgment and  insight: Normal. Orientation to person, place and time: Normal. Recent and remote memory: Intact. Mood and affect: Normal.     ASSESSMENT:   1. Lumbar stenosis with neurogenic claudication    2. Degeneration of lumbar or lumbosacral intervertebral disc    3. Greater trochanteric bursitis of both hips    4. Type 2 diabetes mellitus with other specified complication, without long-term current use of insulin (CMS/[facility])    5. Obesity (BMI 30.0-34.9)    6. Depression, unspecified depression type          PLAN/MEDICAL DECISION MAKING:  Patient reports a 30-year history of progressive lower back pain, which began without incident. Lumbar MRI revealed multilevel degenerative osteoarthritis and severe spinal stenosis, particularly at L2-L3, L3-L4, L4-L5.  Arterial duplex Doppler of the lower extremities on 6/29/18 revealed normal ABIs.  Patient continues to report analgesic benefit from epidurals, although she continues to suffer predominantly with standing and walking.  I had a long conversation with Mrs. Rogers regarding further interventional measures, at this juncture I do not recommend repeating another epidural, but I did discuss with her further recommendations specifically regarding a spinal cord stimulator trial.  She has been found to be a potential surgical candidate for multilevel laminectomies by Dr. Guillen, I recommended she follow-up with him to discuss further treatment modalities, and she will keep me informed.  Patient has failed to obtain pain relief with conservative measures, as referenced under HPI. I have reviewed all available patient's medical records as well as previous therapies as referenced above. I had a lengthy conversation with Ms. Coco Crockett regarding her chronic pain condition and potential therapeutic options including risks, benefits, alternative therapies, to name a few. Therefore, I have proposed the following plan:  1. Pharmacological measures: Reviewed. Discussed. Patient takes  Tylenol, gabapentin.  Patient also takes duloxetine   2. Interventional pain management measures: None indicated at this time. Patient will follow-up with Dr. Guillen for further recommendations. Otherwise, if she is found not to be a surgical candidate she could be a potential candidate for SCS.     3. Long-term rehabilitation efforts:  A. The patient does not have a history of falls. I did complete a risk assessment for falls  B. Patient will continue a comprehensive physical therapy program for water therapy, therapeutic exercise, core strengthening, gait and balance training and neurodynamics   C. Start an exercise program such as yoga  D. Contrast therapy: Apply ice-packs for 15-20 minutes, followed by heating pads for 15-20 minutes to affected area   4. The patient has been instructed to contact my office with any questions or difficulties. The patient understands the plan and agrees to proceed accordingly.      Patient Care Team:  Lobo Archer MD as PCP - General (Internal Medicine)     No orders of the defined types were placed in this encounter.        Future Appointments   Date Time Provider Department Center   4/22/2021 10:45 AM Abel Barillas MD [facility] None         [facility]     EMR Dragon/Transcription disclaimer:  Much of this encounter note is an electronic transcription of spoken language to printed text. Electronic transcription of spoken language may permit erroneous, or at times, nonsensical words or phrases to be inadvertently transcribed. Although I have reviewed the note for such errors, some may still exist.

## 2020-11-10 ENCOUNTER — OFFICE VISIT (OUTPATIENT)
Dept: PAIN MEDICINE | Facility: CLINIC | Age: 75
End: 2020-11-10

## 2020-11-10 VITALS
HEART RATE: 83 BPM | SYSTOLIC BLOOD PRESSURE: 140 MMHG | DIASTOLIC BLOOD PRESSURE: 80 MMHG | OXYGEN SATURATION: 97 % | RESPIRATION RATE: 16 BRPM | BODY MASS INDEX: 29.78 KG/M2 | TEMPERATURE: 98.6 F | WEIGHT: 174.4 LBS | HEIGHT: 64 IN

## 2020-11-10 DIAGNOSIS — M70.61 GREATER TROCHANTERIC BURSITIS OF BOTH HIPS: ICD-10-CM

## 2020-11-10 DIAGNOSIS — E11.69 TYPE 2 DIABETES MELLITUS WITH OTHER SPECIFIED COMPLICATION, WITHOUT LONG-TERM CURRENT USE OF INSULIN (HCC): ICD-10-CM

## 2020-11-10 DIAGNOSIS — M51.37 DEGENERATION OF LUMBAR OR LUMBOSACRAL INTERVERTEBRAL DISC: ICD-10-CM

## 2020-11-10 DIAGNOSIS — F32.A DEPRESSION, UNSPECIFIED DEPRESSION TYPE: ICD-10-CM

## 2020-11-10 DIAGNOSIS — E66.9 OBESITY (BMI 30.0-34.9): ICD-10-CM

## 2020-11-10 DIAGNOSIS — M48.062 LUMBAR STENOSIS WITH NEUROGENIC CLAUDICATION: ICD-10-CM

## 2020-11-10 DIAGNOSIS — M70.62 GREATER TROCHANTERIC BURSITIS OF BOTH HIPS: ICD-10-CM

## 2020-11-10 PROCEDURE — 99213 OFFICE O/P EST LOW 20 MIN: CPT | Performed by: NURSE PRACTITIONER

## 2020-11-16 ENCOUNTER — TELEPHONE (OUTPATIENT)
Dept: NEUROSURGERY | Facility: CLINIC | Age: 75
End: 2020-11-16

## 2020-11-16 NOTE — TELEPHONE ENCOUNTER
Caller: PT    Relationship to patient: SELF    Best call back number: [phone]    Chief complaint: PAIN    Type of visit: TELEVISIT FOLLOW UP    Requested date: ASAP         Additional notes:PT WOULD LIKE A TELEPHONE APPOINTMENT W/DR DELUNA.  DR LOVING DOES NOT THINK THE EPIDURAL INJECTIONS ARE WORKING AND HE WOULD LIKE TO PLACE A STIMULATOR OR SURGERY.  SHE WANTS TO DISCUSS THIS WITH THE DR BEFORE MAKING ANY DECISIONS.    PLEASE ADVISE  THANK YOU

## 2020-12-06 RX ORDER — FENOFIBRIC ACID 135 MG/1
CAPSULE, DELAYED RELEASE ORAL
Qty: 90 CAPSULE | Refills: 3 | Status: SHIPPED | OUTPATIENT
Start: 2020-12-06 | End: 2021-09-07

## 2020-12-09 ENCOUNTER — TELEPHONE (OUTPATIENT)
Dept: NEUROSURGERY | Facility: CLINIC | Age: 75
End: 2020-12-09

## 2020-12-09 NOTE — TELEPHONE ENCOUNTER
Is Dr. Guillen doing televisits?  If not, likely can have the appointment done with Cher.  Please schedule accordingly

## 2020-12-09 NOTE — TELEPHONE ENCOUNTER
Caller: Coco Crockett    Relationship to patient: Self    Best call back number: [phone]    Chief complaint: SPINAL STENOSIS LUMB    Type of visit: FOLLOW UP    Requested date: TELEVISIT FOR NEXT AVAILABLE    If rescheduling, when is the original appointment: 12/10/20     Additional notes: PATIENT ALREADY SCHEDULED PRIOR TO CALL BUT CALLED REQUESTING TO RESCHEDULE INITIAL APPT TO A TELEVISIT IF POSSIBLE DUE TO AVOIDING RISK OF COVID. UNABLE TO ASSIST AT TIME OF CALL DUE TO IT BEING TELEVISIT RELATED.    PATIENT CAN BE CONTACTED WITH AN UPDATE

## 2020-12-10 NOTE — TELEPHONE ENCOUNTER
Per Dr. Guillen, pt may change visit to televisit.  If she does not have any new problems, then she may call Wilmer with an update rather than a scheduled visit.

## 2020-12-14 ENCOUNTER — TELEPHONE (OUTPATIENT)
Dept: NEUROSURGERY | Facility: CLINIC | Age: 75
End: 2020-12-14

## 2020-12-14 NOTE — TELEPHONE ENCOUNTER
Patient called and said she was sorry she missed your call.    Can you call her back at your convenience. Thanks.

## 2021-03-09 ENCOUNTER — TRANSCRIBE ORDERS (OUTPATIENT)
Dept: ADMINISTRATIVE | Facility: HOSPITAL | Age: 76
End: 2021-03-09

## 2021-03-09 DIAGNOSIS — Z01.818 PRE-OPERATIVE CLEARANCE: Primary | ICD-10-CM

## 2021-03-16 ENCOUNTER — TELEPHONE (OUTPATIENT)
Dept: NEUROSURGERY | Facility: CLINIC | Age: 76
End: 2021-03-16

## 2021-03-16 NOTE — TELEPHONE ENCOUNTER
Caller: Coco Crockett    Relationship: Self    Best call back number: [phone]    What orders are you requesting (i.e. lab or imaging): MRI    In what timeframe would the patient need to come in: ASAP    Where will you receive your lab/imaging services: N/A    Additional notes: PT CALLED TO SCHEDULE HER MRI AND FOLLOW UP APPT.NO ORDER IN CHART, PLEASE CALL PT AND ADVISE OF ORDER AND SCHEDULING.     THANK YOU

## 2021-03-17 NOTE — TELEPHONE ENCOUNTER
I have left a message with the patient to confirm what she needs. Review of Dr. Guillen note does not indicate a follow up MRI, but the patient will be better able to inform me what she needs.

## 2021-03-18 DIAGNOSIS — M48.061 SPINAL STENOSIS OF LUMBAR REGION WITHOUT NEUROGENIC CLAUDICATION: Primary | ICD-10-CM

## 2021-03-19 NOTE — TELEPHONE ENCOUNTER
Patient is already had an MRI of the lumbar spine from February 21 of this year.  She needs an appointment to see Dr. Guillen and make sure she brings that disc with her.

## 2021-03-23 DIAGNOSIS — M48.062 LUMBAR STENOSIS WITH NEUROGENIC CLAUDICATION: Primary | ICD-10-CM

## 2021-03-24 ENCOUNTER — TRANSCRIBE ORDERS (OUTPATIENT)
Dept: ADMINISTRATIVE | Facility: HOSPITAL | Age: 76
End: 2021-03-24

## 2021-03-24 DIAGNOSIS — Z01.818 PREOPERATIVE CLEARANCE: Primary | ICD-10-CM

## 2021-04-06 ENCOUNTER — OFFICE VISIT (OUTPATIENT)
Dept: NEUROSURGERY | Facility: CLINIC | Age: 76
End: 2021-04-06

## 2021-04-06 VITALS
DIASTOLIC BLOOD PRESSURE: 70 MMHG | HEIGHT: 63 IN | BODY MASS INDEX: 30.48 KG/M2 | TEMPERATURE: 97.1 F | WEIGHT: 172 LBS | SYSTOLIC BLOOD PRESSURE: 122 MMHG

## 2021-04-06 DIAGNOSIS — M48.062 LUMBAR STENOSIS WITH NEUROGENIC CLAUDICATION: Primary | ICD-10-CM

## 2021-04-06 PROCEDURE — 99213 OFFICE O/P EST LOW 20 MIN: CPT | Performed by: NEUROLOGICAL SURGERY

## 2021-04-06 NOTE — PROGRESS NOTES
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  She has a diagnosis of degenerative disc disease and spinal stenosis.  Her symptoms however have been primarily axial and nonradicular.  She was seen approximately 1 year ago at which time recommended a conservative approach following which, if unsuccessful, she would require surgical intervention.  I also suggested that she see a rheumatologist which has been carried out.  She has been through pain management with marginal outcome.  She has developed increasing symptoms since her last encounter.  She has pain in her back rating down both lower extremities more so the left than the right.  It is worse with standing or walking.  She is forced to flex over a shopping cart while in a grocery store in order to decrease her discomfort.  A lumbar MRI was performed and she is referred for neurosurgical consultation.           Past Medical History:   Diagnosis Date   • Chest pain    • Depression    • Diabetes mellitus (CMS/HCC)    • Dyslipidemia    • Eczema    • GERD (gastroesophageal reflux disease)    • Hyperlipidemia    • Hypertension     Chronic hypertension, probably essential.    • Hypertensive cardiovascular disease    • Hypothyroidism     Chronic hypothyroidism/replacement therapy.   • Iron deficiency anemia    • Migraine     Intermittent progressive headache syndrome/possible migraine equivalent.   • Obesity (BMI 30.0-34.9)     Mild obesity (BMI 31).    • Osteopenia     Osteopenia with fracture of lumbar vertebra, 2004.    • TMJ (temporomandibular joint disorder)    • Type 2 diabetes mellitus (CMS/HCC)     Type 2 diabetes mellitus, Hemoglobin A1c 5.9%, December 2014.   • Vertigo     Intermittent marked dizziness/vertigo with Epley maneuver.              Past Surgical History:   Procedure  Laterality Date   • ANKLE OPEN REDUCTION INTERNAL FIXATION     • ANKLE SURGERY  1995    Fall on ice with subsequent ORIF of right ankle/tibia, 1995.    • APPENDECTOMY  1970    Appendectomy with wedge resection of both ovaries, 1970.   • TIBIAL PLATEAU OPEN REDUCTION INTERNAL FIXATION     • TONSILLECTOMY AND ADENOIDECTOMY     • TONSILLECTOMY AND ADENOIDECTOMY     • TOTAL LAPAROSCOPIC HYSTERECTOMY SALPINGO OOPHORECTOMY  1985    MARIZA/BSO secondary to severe endometriosis, 1985.               Family History   Problem Relation Age of Onset   • Heart disease Father               Social History     Socioeconomic History   • Marital status:      Spouse name: Not on file   • Number of children: Not on file   • Years of education: Not on file   • Highest education level: Not on file   Tobacco Use   • Smoking status: Never Smoker   • Smokeless tobacco: Never Used   Substance and Sexual Activity   • Alcohol use: Yes     Comment: occasional   • Drug use: No   • Sexual activity: Defer              Allergies   Allergen Reactions   • Bactrim [Sulfamethoxazole-Trimethoprim]    • Imdur [Isosorbide Dinitrate] Other (See Comments)     Severe headache/fatigue       • Keflex [Cephalexin] Itching and Other (See Comments)     Angioedema     • Levaquin [Levofloxacin]    • Codeine Sulfate Itching and Rash   • Demerol [Meperidine] Itching and Rash   • Morphine Sulfate Itching and Rash   • Penicillin V Potassium Itching and Rash   • Sulfa Antibiotics Rash and Other (See Comments)     severe rash/swelling              Current Outpatient Medications:   •  acetaminophen (TYLENOL) 500 MG tablet, Take 500 mg by mouth 2 (Two) Times a Day As Needed for Mild Pain ., Disp: , Rfl:   •  Alirocumab 75 MG/ML solution pen-injector, Inject 75 pens under the skin into the appropriate area as directed Every 14 (Fourteen) Days., Disp: 2 pen, Rfl: 11  •  amLODIPine (NORVASC) 5 MG tablet, Take 1 tablet by mouth Daily., Disp: 90 tablet, Rfl: 3  •  aspirin  325 MG tablet, Take 325 mg by mouth Every Night., Disp: , Rfl:   •  Cholecalciferol (VITAMIN D3 PO), Take 4,000 Units by mouth 2 (Two) Times a Day., Disp: , Rfl:   •  DULoxetine (CYMBALTA) 60 MG capsule, Take 60 mg by mouth daily., Disp: , Rfl:   •  estrogens, conjugated, (PREMARIN) 0.3 MG tablet, Take 0.3 mg by mouth Daily. Take daily for 21 days then do not take for 7 days., Disp: , Rfl:   •  fenofibric acid (TRILIPIX) 135 MG capsule delayed-release delayed release capsule, TAKE 1 CAPSULE BY MOUTH EVERY DAY, Disp: 90 capsule, Rfl: 3  •  fluticasone (FLONASE) 50 MCG/ACT nasal spray, 2 sprays into each nostril Daily., Disp: , Rfl:   •  gabapentin (NEURONTIN) 100 MG capsule, Take 100 mg by mouth Daily., Disp: , Rfl:   •  hydroCHLOROthiazide (MICROZIDE) 12.5 MG capsule, Take 1 capsule by mouth 2 (Two) Times a Day., Disp: 60 capsule, Rfl: 11  •  icosapent ethyl (VASCEPA) 1 g capsule capsule, Take 2 g by mouth 2 (Two) Times a Day With Meals., Disp: 120 capsule, Rfl: 11  •  levothyroxine (SYNTHROID, LEVOTHROID) 25 MCG tablet, TAKE 1 TABLET BY MOUTH DAILY., Disp: 90 tablet, Rfl: 0  •  metFORMIN (GLUCOPHAGE) 500 MG tablet, Take 500 mg by mouth daily., Disp: , Rfl:   •  Multiple Vitamin (MULTI VITAMIN DAILY PO), Take  by mouth daily., Disp: , Rfl:   •  nebivolol (BYSTOLIC) 5 MG tablet, Take 2 tablets by mouth Daily. (Patient taking differently: Take 5 mg by mouth Daily.), Disp: 180 tablet, Rfl: 3  •  nitroglycerin (NITROLINGUAL) 0.4 MG/SPRAY spray, Place 1 spray under the tongue every 5 (five) minutes as needed for chest pain., Disp: , Rfl:   •  omeprazole (PriLOSEC) 20 MG capsule, Take 20 mg by mouth daily., Disp: , Rfl:   •  Psyllium (Metamucil) 28.3 % powder, Take  by mouth. 1 tbsp. QD, Disp: , Rfl:   •  ranolazine (RANEXA) 500 MG 12 hr tablet, TAKE 1 TABLET BY MOUTH EVERY DAY, Disp: 90 tablet, Rfl: 3  •  torsemide (DEMADEX) 5 MG tablet, Take 1 tablet by mouth Daily., Disp: 90 tablet, Rfl: 03  •  traMADol (ULTRAM) 50 MG  tablet, Take 50 mg by mouth As Needed., Disp: , Rfl: 1  •  Turmeric 500 MG capsule, Take 1,000 mg by mouth 2 (two) times a day., Disp: , Rfl:          Review of Systems   Constitutional: Positive for activity change and fatigue. Negative for appetite change, chills, diaphoresis, fever and unexpected weight change.   HENT: Positive for hearing loss, sinus pressure and sneezing. Negative for congestion, dental problem, drooling, ear discharge, ear pain, facial swelling, mouth sores, nosebleeds, postnasal drip, rhinorrhea, sore throat, tinnitus, trouble swallowing and voice change.    Eyes: Positive for visual disturbance. Negative for photophobia, pain, discharge, redness and itching.   Respiratory: Negative for apnea, cough, choking, chest tightness, shortness of breath, wheezing and stridor.    Cardiovascular: Negative for chest pain, palpitations and leg swelling.   Gastrointestinal: Positive for diarrhea. Negative for abdominal distention, abdominal pain, anal bleeding, blood in stool, constipation, nausea, rectal pain and vomiting.   Endocrine: Positive for cold intolerance and heat intolerance. Negative for polydipsia, polyphagia and polyuria.   Genitourinary: Negative for decreased urine volume, difficulty urinating, dysuria, enuresis, flank pain, frequency, genital sores, hematuria and urgency.   Musculoskeletal: Positive for back pain, gait problem, myalgias and neck stiffness. Negative for arthralgias, joint swelling and neck pain.   Skin: Negative for color change, pallor, rash and wound.   Allergic/Immunologic: Positive for environmental allergies and food allergies. Negative for immunocompromised state.   Neurological: Positive for weakness and headaches. Negative for dizziness, tremors, seizures, syncope, facial asymmetry, speech difficulty, light-headedness and numbness.   Hematological: Negative for adenopathy. Does not bruise/bleed easily.   Psychiatric/Behavioral: Negative for agitation, behavioral  "problems, confusion, decreased concentration, dysphoric mood, hallucinations, self-injury, sleep disturbance and suicidal ideas. The patient is not nervous/anxious and is not hyperactive.                Vitals:    04/06/21 1526   BP: 122/70   BP Location: Right arm   Patient Position: Sitting   Cuff Size: Adult   Temp: 97.1 °F (36.2 °C)   TempSrc: Infrared   Weight: 78 kg (172 lb)   Height: 160 cm (63\")               EXAMINATION: [BMI 30.4, weight 172.  She has limited range of motion of the lumbar spine including flexion extension lateral rotation lateral bending.  Right leg raising produces primarily back pain.  The deep tendon reflexes are hypoactive yet elicitable.  No Babinski, Danni or clonus.  There is no abnormality within her gait.]            MEDICAL DECISION MAKING: The lumbar MRI showed significant spinal stenosis with an acquired spondylolisthesis.           ASSESSMENT/DISPOSITION: [Given the fact that she has failed to respond to conservative-ism surgical intervention warrants discussion.  She is aware of a dorsal column stimulator as a potential intervention that may lessen her discomfort.  However she has significant degenerative disease for which surgery may provide a more definitive remedy.  The question will turn on what is the most effective, less risk and long-term benefit of each.  I have asked her to see Dr. Chi and Dr. GEORGIANA Correa.]              I APPRECIATE THE OPPORTUNITY OF THIS REFERRAL. PLEASE CALL IF ANY       QUESTIONS [phone]          "

## 2021-04-07 ENCOUNTER — TRANSCRIBE ORDERS (OUTPATIENT)
Dept: ADMINISTRATIVE | Facility: HOSPITAL | Age: 76
End: 2021-04-07

## 2021-04-07 DIAGNOSIS — Z01.818 OTHER SPECIFIED PRE-OPERATIVE EXAMINATION: Primary | ICD-10-CM

## 2021-04-08 ENCOUNTER — OFFICE VISIT (OUTPATIENT)
Dept: CARDIOLOGY | Facility: CLINIC | Age: 76
End: 2021-04-08

## 2021-04-08 VITALS
DIASTOLIC BLOOD PRESSURE: 59 MMHG | HEIGHT: 63 IN | WEIGHT: 172 LBS | HEART RATE: 73 BPM | SYSTOLIC BLOOD PRESSURE: 125 MMHG | BODY MASS INDEX: 30.48 KG/M2 | OXYGEN SATURATION: 97 %

## 2021-04-08 DIAGNOSIS — I25.9 ISCHEMIC HEART DISEASE: Primary | ICD-10-CM

## 2021-04-08 DIAGNOSIS — E11.69 TYPE 2 DIABETES MELLITUS WITH OTHER SPECIFIED COMPLICATION, WITHOUT LONG-TERM CURRENT USE OF INSULIN (HCC): ICD-10-CM

## 2021-04-08 DIAGNOSIS — E03.9 HYPOTHYROIDISM, UNSPECIFIED TYPE: ICD-10-CM

## 2021-04-08 DIAGNOSIS — E78.5 DYSLIPIDEMIA: ICD-10-CM

## 2021-04-08 DIAGNOSIS — I10 ESSENTIAL HYPERTENSION: ICD-10-CM

## 2021-04-08 PROCEDURE — 99214 OFFICE O/P EST MOD 30 MIN: CPT | Performed by: INTERNAL MEDICINE

## 2021-04-08 NOTE — PROGRESS NOTES
Subjective:     Encounter Date:04/08/2021    Patient ID: Coco Crockett is a 75 y.o.  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      PHYSICIAN: Lobo Archer MD  ORTHOPEDIST: Luisito Cueva MD  GASTROENTEROLOGIST:  Maryam Sutton MD  PAIN MANAGEMENT PHYSICIAN: Justin Chi MD  NEUROSURGEON: Stephen Guillen MD  CURRENT NEUROSURGEON: Humberto Correa MD  RHEUMATOLOGIST: Janice Wilson MD    Chief Complaint:   Chief Complaint   Patient presents with   • Ischemic heart disease   • Hypertensive heart disease without heart failure       Problem List:  1. Combined hypertensive and ischemic cardiovascular disease:  a. Acceptable GXT with abnormal baseline ST, September 1995.   b. Acceptable exercise stress test and normal quantitative SPECT Cardiolite study with low probability for significant obstructive coronary disease with estimated LVEF (0.60), June 1997.  c. CCS class I chest pain/abnormal EKG/intermittent palpitations with GXT echo stress test suggestive of low probability for significant focal obstructive coronary disease, LVEF (0.80), 12/07/2009.   d. Recurrent atypical CCS class II chest pain syndrome with NYHA class I exertional dyspnea with fatigue with diagnostic coronary angiography demonstrating nonobstructive single-vessel LAD disease with associated 75% to 80% stenosis first diagonal branch LAD not felt to be amenable to catheter based intervention with mild plaque in the nondominant left circumflex coronary artery with normal right dominant coronary artery with preserved LVEF (0.70) with associated diastolic LV dysfunction, December 2013.   e. Residual class I angina pectoris/NYHA class I to II exertional dyspnea and fatigue.  f. Stress test, 06/30/2016:  Normal LVEF (0.63); findings consistent with a normal ECG stress test; myocardial perfusion  imaging indicates a small-sized infarct located in the lateral wall with no significant ischemia noted.  g. CCS class I chest discomfort/NYHA class II dyspnea with exertion/fatigue with acceptable EKG and acceptable echocardiogram demonstrating normal LVEF (0.70) without PE/pulmonary arterial hypertension and mild aortic valve sclerosis with mild LVH, July 2017.  h. 24-hour ambulatory blood pressure monitor 7/10/18: Average blood pressure 136/69, 135/68 during the day and 141/71 at night.  Recommendations to continue current medications.  i. CCS class 0 chest discomfort/NYHA class III fatigue symptoms April 2019, October 2019  j. Residual class I symptoms, July 2020, April 2021  2. Chronic hypertension, probably essential.   3. Dyslipidemia.   4. Mild obesity: BMI 30.47  5. Chronic hypothyroidism/replacement therapy.   6. Osteopenia with fracture of lumbar vertebra, 2004 with subsequent development of severe back pain and sever lumbosacral osteoarthritis with epidural injections, June 2020  7. TMJ dysfunction  8. GERD.   9. Iron deficiency anemia.   10. Depression.   11. Eczema.   12. Positional vertigo - data deficit.  13. Remote operations:   a. Tonsillectomy and adenoidectomy, 1950.   b. Appendectomy with wedge resection of both ovaries, 1970.  c. MARIZA/BSO secondary to severe endometriosis, 1985.   d. Fall on ice with subsequent ORIF of right ankle/tibia, 1995.   e. Cataracts, OU with bifocal implant, 2008.  f. Lumbar stenosis with neurogenic claudication, June 2020.  14. Intermittent marked dizziness/vertigo with Epley maneuver.  15. Intermittent progressive headache syndrome/possible migraine equivalent.  16. Type 2 diabetes mellitus, Hemoglobin A1c 5.9%, December 2014; 5.8%, July 2017.  17. Remote progressive disabling fatigue with arthralgias and myalgias, June 2018  18. Chronic daytime hypersomnolence and fatigue with apparent acceptable polysomnogram - data deficit, [facility]   19. Colonoscopy and polyp removal,  summer 2018  20. Multiple falls without vertigo, dizziness, presyncope, or syncope, September 2018, with minor scrapes/bruising  21. Pneumonia, August 2019 22. Severe spinal stenosis noted on MRI lumbar spine, February 2021.    Allergies   Allergen Reactions   • Bactrim [Sulfamethoxazole-Trimethoprim]    • Imdur [Isosorbide Dinitrate] Other (See Comments)     Severe headache/fatigue       • Keflex [Cephalexin] Itching and Other (See Comments)     Angioedema     • Levaquin [Levofloxacin]    • Codeine Sulfate Itching and Rash   • Demerol [Meperidine] Itching and Rash   • Morphine Sulfate Itching and Rash   • Penicillin V Potassium Itching and Rash   • Sulfa Antibiotics Rash and Other (See Comments)     severe rash/swelling       Current Outpatient Medications:   •  acetaminophen (TYLENOL) 500 MG tablet, Take 500 mg by mouth 2 (Two) Times a Day As Needed for Mild Pain ., Disp: , Rfl:   •  Alirocumab 75 MG/ML solution pen-injector, Inject 75 pens under the skin into the appropriate area as directed Every 14 (Fourteen) Days., Disp: 2 pen, Rfl: 11  •  amLODIPine (NORVASC) 5 MG tablet, Take 1 tablet by mouth Daily., Disp: 90 tablet, Rfl: 3  •  aspirin 325 MG tablet, Take 325 mg by mouth Every Night., Disp: , Rfl:   •  Cholecalciferol (VITAMIN D3 PO), Take 4,000 Units by mouth 2 (Two) Times a Day., Disp: , Rfl:   •  DULoxetine (CYMBALTA) 60 MG capsule, Take 60 mg by mouth daily., Disp: , Rfl:   •  estrogens, conjugated, (PREMARIN) 0.3 MG tablet, Take 0.3 mg by mouth Daily., Disp: , Rfl:   •  fenofibric acid (TRILIPIX) 135 MG capsule delayed-release delayed release capsule, TAKE 1 CAPSULE BY MOUTH EVERY DAY, Disp: 90 capsule, Rfl: 3  •  fluticasone (FLONASE) 50 MCG/ACT nasal spray, 2 sprays into each nostril Daily., Disp: , Rfl:   •  gabapentin (NEURONTIN) 100 MG capsule, Take 100 mg by mouth As Needed., Disp: , Rfl:   •  hydroCHLOROthiazide (MICROZIDE) 12.5 MG capsule, Take 1 capsule by mouth 2 (Two) Times a Day., Disp: 60  capsule, Rfl: 11  •  icosapent ethyl (VASCEPA) 1 g capsule capsule, Take 2 g by mouth 2 (Two) Times a Day With Meals., Disp: 120 capsule, Rfl: 11  •  levothyroxine (SYNTHROID, LEVOTHROID) 25 MCG tablet, TAKE 1 TABLET BY MOUTH DAILY., Disp: 90 tablet, Rfl: 0  •  metFORMIN (GLUCOPHAGE) 500 MG tablet, Take 500 mg by mouth daily., Disp: , Rfl:   •  Multiple Vitamin (MULTI VITAMIN DAILY PO), Take  by mouth daily., Disp: , Rfl:   •  nebivolol (BYSTOLIC) 5 MG tablet, Take 2 tablets by mouth Daily. (Patient taking differently: Take 5 mg by mouth Daily.), Disp: 180 tablet, Rfl: 3  •  nitroglycerin (NITROLINGUAL) 0.4 MG/SPRAY spray, Place 1 spray under the tongue every 5 (five) minutes as needed for chest pain., Disp: , Rfl:   •  omeprazole (PriLOSEC) 20 MG capsule, Take 20 mg by mouth daily., Disp: , Rfl:   •  Psyllium (Metamucil) 28.3 % powder, Take  by mouth. 1 tbsp. QD, Disp: , Rfl:   •  traMADol (ULTRAM) 50 MG tablet, Take 50 mg by mouth As Needed., Disp: , Rfl: 1  •  Turmeric 500 MG capsule, Take 1,000 mg by mouth 2 (two) times a day., Disp: , Rfl:     History of Present Illness: Patient returns for scheduled 9-month follow up. She has been diagnosed with spinal stenosis at 3 levels and has a cyst at one location. This has been causing her significant discomfort. She has been seen by Dr. Guillen, who she states is retiring at the end of April. She will establish care with Dr. Correa and is scheduled for an appointment tomorrow. She has also had vision problems and has had cataract repair. She has had no additional  interim ER visits, hospitalizations, serious illnesses, or surgeries. Her son was recently diagnosed with prostate cancer as well as gout and hydronephrosis at age 49 years and this is causing her grief. She has received COVID immunization.         ROS   Obtained and negative except as outlined in problem list and HPI.    Procedures       Objective:       Vitals:    04/08/21 1400 04/08/21 1403   BP: 143/74  "125/59   BP Location: Right arm Right arm   Patient Position: Sitting Standing   Pulse: 70 73   SpO2: 97%    Weight: 78 kg (172 lb)    Height: 160 cm (63\")      Body mass index is 30.47 kg/m².  Last weight: 170 lbs    Vitals reviewed.   Constitutional:       Appearance: Well-developed.   Neck:      Thyroid: No thyromegaly.      Vascular: No carotid bruit or JVD.      Lymphadenopathy: No cervical adenopathy.   Pulmonary:      Effort: Pulmonary effort is normal.      Breath sounds: Decreased breath sounds present. No wheezing. No rhonchi. No rales.   Cardiovascular:      Regular rhythm.      Murmurs: There is a grade 1/6 harsh midsystolic murmur at the URSB, radiating to the neck.      No gallop. No S3 gallop.   Pulses:     Dorsalis pedis: 1+ bilaterally.     Posterior tibial: 1+ bilaterally.  Edema:     Peripheral edema absent.   Abdominal:      Palpations: Abdomen is soft. There is no abdominal mass.      Tenderness: There is no abdominal tenderness. There is no guarding.   Musculoskeletal: Normal range of motion. Skin:     General: Skin is warm and dry.      Findings: No rash.   Neurological:      Mental Status: Alert and oriented to person, place, and time.           Lab Review:    12/15/2020 (reviewed with patient by letter - Refrain from alcohol or over-the-counter drugs that could influence triglycerides):  • CBC: hematocrit 40%, hemoglobin 12.4, WBC 7900 and normal indices, platelet count and differential  • CMP: normal electrolytes with excellent kidney function, serum creatinine 0.7, BUN 19, glucose 117 and normal serum calcium and liver function tests.   • FLP: Triglycerides 2340  • A1c: 5.9%.    MRI Lumbar Spine, 2/20/2021:  Multilevel degenerative changes with severe spinal stenosis. Correlate for cauda equina syndrome.      Assessment:       Overall continued acceptable course with no new interim cardiopulmonary complaints with acceptable functional status. We will defer additional diagnostic or " therapeutic intervention from a cardiac perspective at this time. At this point, she is cleared from a cardiovascular standpoint for surgery if needed unless she will require a multi-hour surgery, at which time she should have a pre-operative pharmacologic MPS.      Diagnosis Plan   1. Ischemic heart disease  No recurrent angina pectoris or CHF on current activity. Continue current treatment.   2. Essential hypertension  Well controlled. Continue current treatment.   3. Dyslipidemia  Poor control of triglycerides. Continue Vascepa and fenofibrate.    4. Hypothyroidism, unspecified type  No new data to review. Continue current treatment.   5. Type 2 diabetes mellitus with other specified complication, without long-term current use of insulin (CMS/[facility])  A1c 5.9%. Continue current treatment and follow up with Dr. Archer for monitoring.           Plan:         1. Patient to continue current medications and close follow up with the above providers.  2. Tentative cardiology follow up in October 2021 or patient may return sooner PRN.    IRodolfo, attest that this documentation has been prepared under the direction and in the presence of Abel Barillas MD 04/08/2021    IAbel MD, [facility], personally performed the services described in this documentation as scribed by the above named individual in my presence, and it is both accurate and complete. At 14:41 EDT on 04/08/2021

## 2021-04-09 ENCOUNTER — OFFICE VISIT (OUTPATIENT)
Dept: NEUROSURGERY | Facility: CLINIC | Age: 76
End: 2021-04-09

## 2021-04-09 VITALS — HEIGHT: 63 IN | TEMPERATURE: 96.9 F | WEIGHT: 172 LBS | BODY MASS INDEX: 30.48 KG/M2

## 2021-04-09 DIAGNOSIS — M51.36 DDD (DEGENERATIVE DISC DISEASE), LUMBAR: ICD-10-CM

## 2021-04-09 DIAGNOSIS — M48.062 SPINAL STENOSIS OF LUMBAR REGION WITH NEUROGENIC CLAUDICATION: Primary | ICD-10-CM

## 2021-04-09 DIAGNOSIS — M43.16 SPONDYLOLISTHESIS OF LUMBAR REGION: ICD-10-CM

## 2021-04-09 PROCEDURE — 99213 OFFICE O/P EST LOW 20 MIN: CPT | Performed by: NEUROLOGICAL SURGERY

## 2021-04-09 RX ORDER — DEXAMETHASONE 4 MG/1
TABLET ORAL
Qty: 4 TABLET | Refills: 0 | Status: SHIPPED | OUTPATIENT
Start: 2021-04-09 | End: 2021-10-28

## 2021-04-09 NOTE — PROGRESS NOTES
Patient: Coco Crockett  : 1945    Primary Care Provider: Lobo Archer MD    Requesting Provider: As above        History    Chief Complaint: Low back and right leg pain with walking and standing intolerance.    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  For the most part it is into the side of her right calf.  This occurs with walking and standing.  She gets complete relief by sitting or lying down.  She is also better flexing forward at the waist.  She has been managed nonoperatively by my colleague Dr. Guillen.  She has done conventional physical therapy and aquatic therapy.  She has also had injections which helped transiently.  She does have cardiac issues and is followed by Dr. Barillas.  According the patient he felt that she could tolerate a surgical intervention.    Review of Systems   Constitutional: Positive for activity change and fatigue. Negative for appetite change, chills, diaphoresis, fever and unexpected weight change.   HENT: Positive for hearing loss, sinus pressure and sneezing. Negative for congestion, dental problem, drooling, ear discharge, ear pain, facial swelling, mouth sores, nosebleeds, postnasal drip, rhinorrhea, sore throat, tinnitus, trouble swallowing and voice change.    Eyes: Positive for visual disturbance. Negative for photophobia, pain, discharge, redness and itching.   Respiratory: Negative for apnea, cough, choking, chest tightness, shortness of breath, wheezing and stridor.    Cardiovascular: Negative for chest pain, palpitations and leg swelling.   Gastrointestinal: Positive for diarrhea. Negative for abdominal distention, abdominal pain, anal bleeding, blood in stool, constipation, nausea, rectal pain and vomiting.   Endocrine: Positive for cold intolerance and heat intolerance. Negative for polydipsia, polyphagia and polyuria.   Genitourinary: Negative for decreased urine  "volume, difficulty urinating, dyspareunia, dysuria, enuresis, flank pain, frequency, genital sores, hematuria, menstrual problem, pelvic pain, urgency, vaginal bleeding, vaginal discharge and vaginal pain.   Musculoskeletal: Positive for back pain, gait problem, myalgias and neck stiffness. Negative for arthralgias, joint swelling and neck pain.   Skin: Negative for color change, pallor, rash and wound.   Allergic/Immunologic: Positive for environmental allergies and food allergies. Negative for immunocompromised state.   Neurological: Positive for weakness and headaches. Negative for dizziness, tremors, seizures, syncope, facial asymmetry, speech difficulty, light-headedness and numbness.   Hematological: Negative for adenopathy. Does not bruise/bleed easily.   Psychiatric/Behavioral: Negative for agitation, behavioral problems, confusion, decreased concentration, dysphoric mood, hallucinations, self-injury, sleep disturbance and suicidal ideas. The patient is not nervous/anxious and is not hyperactive.        The patient's past medical history, past surgical history, family history, and social history have been reviewed at length in the electronic medical record.    Physical Exam:   Temp 96.9 °F (36.1 °C)   Ht 160 cm (63\")   Wt 78 kg (172 lb)   BMI 30.47 kg/m²   MUSCULOSKELETAL:  Straight leg raising is negative.  Jaiden's Sign is negative.  ROM in back is normal.  Tenderness in the back to palpation is not observed.  NEUROLOGICAL:  Strength is intact in the lower extremities to direct testing.  Muscle tone is normal throughout.  Station and gait are normal.  Sensation is intact to light touch testing throughout.  Deep tendon reflexes are difficult to elicit throughout.  Coordination is intact.      Medical Decision Making    Data Review:   Lumbar MRI study dated 4/6/2021 demonstrates degenerative disc disease and multilevel spondylosis.  High-grade stenosis is noted at L3-4 and L4-5.  There is a low-grade " listhesis of L4 on L5.  There is disc protrusion or synovial cyst in the recess on the left at L4-5.  There is moderate stenosis at L2-3 and L5-S1.    Diagnosis:   1.  Lumbar stenosis with neurogenic claudication.  2.  L4-5 spondylolisthesis.    Treatment Options:   I am going to set up a lumbar CT myelogram for surgical planning purposes.  Apparently Dr. Chi has also discussed spinal cord stimulator placement with her as well.  Her son is ill and she is going to be helping to care for him.  She may or may not want to proceed with surgery in the near term.       Diagnosis Plan   1. Spinal stenosis of lumbar region with neurogenic claudication  IR Myelogram Lumbar Spine   2. Spondylolisthesis of lumbar region     3. DDD (degenerative disc disease), lumbar         Scribed for Humberto Correa MD by Lisa Baez ABDIRAHMAN 4/9/2021 10:54 EDT      I, Dr. Correa, personally performed the services described in the documentation, as scribed in my presence, and it is both accurate and complete.

## 2021-05-03 ENCOUNTER — APPOINTMENT (OUTPATIENT)
Dept: GENERAL RADIOLOGY | Facility: HOSPITAL | Age: 76
End: 2021-05-03

## 2021-06-04 ENCOUNTER — TELEPHONE (OUTPATIENT)
Dept: NEUROSURGERY | Facility: CLINIC | Age: 76
End: 2021-06-04

## 2021-06-04 NOTE — TELEPHONE ENCOUNTER
Provider:  Sunny  Caller: phoebe  Time of call:   11:11  Phone # [phone]  Surgery:  Na  Surgery Date:  Na  Last visit:   4-9-21  Next visit: brenton BERMUDEZ:         Reason for call:  Pt called and wanted to get her myelogram rescheduled. She had to cancel because of illness. Thank you.

## 2021-06-28 ENCOUNTER — TELEPHONE (OUTPATIENT)
Dept: INFUSION THERAPY | Facility: HOSPITAL | Age: 76
End: 2021-06-28

## 2021-06-28 NOTE — TELEPHONE ENCOUNTER
Left home phone message with arrival time tomorrow of 6am,  May bring one family member or  to facility. Left call back number [phone] with questions

## 2021-06-29 ENCOUNTER — HOSPITAL ENCOUNTER (OUTPATIENT)
Dept: CT IMAGING | Facility: HOSPITAL | Age: 76
Discharge: HOME | End: 2021-06-29

## 2021-06-29 ENCOUNTER — HOSPITAL ENCOUNTER (OUTPATIENT)
Dept: GENERAL RADIOLOGY | Facility: HOSPITAL | Age: 76
Discharge: HOME | End: 2021-06-29

## 2021-06-29 VITALS
TEMPERATURE: 97.3 F | BODY MASS INDEX: 30.65 KG/M2 | SYSTOLIC BLOOD PRESSURE: 137 MMHG | HEART RATE: 83 BPM | DIASTOLIC BLOOD PRESSURE: 70 MMHG | HEIGHT: 63 IN | OXYGEN SATURATION: 99 % | RESPIRATION RATE: 18 BRPM | WEIGHT: 173 LBS

## 2021-06-29 DIAGNOSIS — M48.062 SPINAL STENOSIS OF LUMBAR REGION WITH NEUROGENIC CLAUDICATION: ICD-10-CM

## 2021-06-29 DIAGNOSIS — M48.062 NEUROGENIC CLAUDICATION DUE TO LUMBAR SPINAL STENOSIS: ICD-10-CM

## 2021-06-29 PROCEDURE — 0 IOPAMIDOL 41 % SOLUTION: Performed by: NEUROLOGICAL SURGERY

## 2021-06-29 PROCEDURE — 62304 MYELOGRAPHY LUMBAR INJECTION: CPT

## 2021-06-29 PROCEDURE — 62284 INJECTION FOR MYELOGRAM: CPT | Performed by: NEUROLOGICAL SURGERY

## 2021-06-29 PROCEDURE — 72240 MYELOGRAPHY NECK SPINE: CPT

## 2021-06-29 PROCEDURE — 72120 X-RAY BEND ONLY L-S SPINE: CPT

## 2021-06-29 PROCEDURE — 72132 CT LUMBAR SPINE W/DYE: CPT

## 2021-06-29 RX ORDER — ONDANSETRON 4 MG/1
4 TABLET, FILM COATED ORAL ONCE AS NEEDED
Status: DISCONTINUED | OUTPATIENT
Start: 2021-06-29 | End: 2021-06-30 | Stop reason: HOSPADM

## 2021-06-29 RX ORDER — LIDOCAINE HYDROCHLORIDE 10 MG/ML
5 INJECTION, SOLUTION EPIDURAL; INFILTRATION; INTRACAUDAL; PERINEURAL ONCE
Status: COMPLETED | OUTPATIENT
Start: 2021-06-29 | End: 2021-06-29

## 2021-06-29 RX ORDER — PROMETHAZINE HYDROCHLORIDE 25 MG/1
25 TABLET ORAL ONCE AS NEEDED
Status: DISCONTINUED | OUTPATIENT
Start: 2021-06-29 | End: 2021-06-30 | Stop reason: HOSPADM

## 2021-06-29 RX ADMIN — LIDOCAINE HYDROCHLORIDE 5 ML: 10 INJECTION, SOLUTION EPIDURAL; INFILTRATION; INTRACAUDAL; PERINEURAL at 07:36

## 2021-06-29 RX ADMIN — IOPAMIDOL 20 ML: 408 INJECTION, SOLUTION INTRATHECAL at 07:38

## 2021-06-29 NOTE — POST-PROCEDURE NOTE
MYELOGRAM PROCEDURE NOTE  Neurosurgery    Patient: Coco Crockett  : 1945      PreOp Diagnosis: Lumbar stenosis with neurogenic claudication    PostOp Diagnosis: Same    Procedure: Lumbar myelogram    Surgeon: Sunny    Anesthesia: 1% lidocaine    Technique:   Spinal needle: 22 gauge   Contrast: Isovue 200 (20ml)   Injection site: L2    EBL: Trace    Specimens removed: None    Complication: None        Humberto Correa MD  21  07:37 EDT

## 2021-06-30 ENCOUNTER — TELEPHONE (OUTPATIENT)
Dept: INFUSION THERAPY | Facility: HOSPITAL | Age: 76
End: 2021-06-30

## 2021-06-30 ENCOUNTER — OFFICE VISIT (OUTPATIENT)
Dept: NEUROSURGERY | Facility: CLINIC | Age: 76
End: 2021-06-30

## 2021-06-30 VITALS — HEIGHT: 63 IN | WEIGHT: 172.2 LBS | BODY MASS INDEX: 30.51 KG/M2 | TEMPERATURE: 97.3 F

## 2021-06-30 DIAGNOSIS — M43.16 SPONDYLOLISTHESIS OF LUMBAR REGION: ICD-10-CM

## 2021-06-30 DIAGNOSIS — M48.062 SPINAL STENOSIS OF LUMBAR REGION WITH NEUROGENIC CLAUDICATION: Primary | ICD-10-CM

## 2021-06-30 PROCEDURE — 99213 OFFICE O/P EST LOW 20 MIN: CPT | Performed by: NEUROLOGICAL SURGERY

## 2021-06-30 NOTE — PROGRESS NOTES
Patient: Coco Crockett  : 1945    Primary Care Provider: Lobo Archer MD    Requesting Provider: As above        History    Chief Complaint: Low back and right leg pain with walking and standing intolerance.    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  For the most part it is into the side of her right calf.  This occurs with walking and standing.  She gets complete relief by sitting or lying down.  She is also better flexing forward at the waist.  She has been managed nonoperatively by my colleague Dr. Guillen.  She has done conventional physical therapy and aquatic therapy.  She has also had injections which helped transiently.  She does have cardiac issues and is followed by Dr. Barillas.  According the patient he felt that she could tolerate a surgical intervention.  Yesterday she underwent lumbar CT myelogram.    Review of Systems   Constitutional: Positive for fatigue. Negative for activity change, appetite change, chills, diaphoresis, fever and unexpected weight change.   HENT: Positive for postnasal drip. Negative for congestion, dental problem, drooling, ear discharge, ear pain, facial swelling, hearing loss, mouth sores, nosebleeds, rhinorrhea, sinus pressure, sinus pain, sneezing, sore throat, tinnitus, trouble swallowing and voice change.    Eyes: Positive for visual disturbance. Negative for photophobia, pain, discharge, redness and itching.   Respiratory: Positive for shortness of breath. Negative for apnea, cough, choking, chest tightness, wheezing and stridor.    Cardiovascular: Negative for chest pain, palpitations and leg swelling.   Gastrointestinal: Negative for abdominal distention, abdominal pain, anal bleeding, blood in stool, constipation, diarrhea, nausea, rectal pain and vomiting.   Endocrine: Positive for heat intolerance. Negative for cold intolerance, polydipsia, polyphagia and  "polyuria.   Genitourinary: Positive for frequency. Negative for decreased urine volume, difficulty urinating, dysuria, enuresis, flank pain, genital sores, hematuria and urgency.   Musculoskeletal: Positive for arthralgias and back pain. Negative for gait problem, joint swelling, myalgias, neck pain and neck stiffness.   Skin: Negative for color change, pallor, rash and wound.   Allergic/Immunologic: Positive for environmental allergies and food allergies. Negative for immunocompromised state.   Neurological: Positive for weakness. Negative for dizziness, tremors, seizures, syncope, facial asymmetry, speech difficulty, light-headedness, numbness and headaches.   Hematological: Negative for adenopathy. Bruises/bleeds easily.   Psychiatric/Behavioral: Positive for dysphoric mood. Negative for agitation, behavioral problems, confusion, decreased concentration, hallucinations, self-injury, sleep disturbance and suicidal ideas. The patient is not nervous/anxious and is not hyperactive.    All other systems reviewed and are negative.      The patient's past medical history, past surgical history, family history, and social history have been reviewed at length in the electronic medical record.    Physical Exam:   Temp 97.3 °F (36.3 °C)   Ht 160 cm (62.99\")   Wt 78.1 kg (172 lb 3.2 oz)   BMI 30.51 kg/m²   MUSCULOSKELETAL:  Straight leg raising is negative.  Jaiden's Sign is negative.  Tenderness in the back to palpation is not observed.  NEUROLOGICAL:  Strength is intact in the lower extremities to direct testing.  Muscle tone is normal throughout.  Station and gait are normal.  Sensation is intact to light touch testing throughout.    Medical Decision Making    Data Review:   (All imaging studies were personally reviewed unless stated otherwise)  CT myelogram demonstrates fairly normal alignment on the recumbent images.  There is a little bit of offset noted at L4-5 and L5-S1 when upright and on the flexion-extension " views.  Generous disc protrusion is noted at L2-3 more leftward.  There is generous stenosis at L2-3, L3-4, and L4-5 where there is a complete block.  There is mild to moderate stenosis at L5-S1.    Diagnosis:   1.  Lumbar stenosis with neurogenic claudication.  2.  L3-4 and L4-5 spondylolisthesis with mild instability.  3.  Mechanical low back pain.    Treatment Options:   Definitive treatment would entail decompressive laminectomies at L2-3, L3-4, and L4-5.  Discectomy at L2-3 might be necessary.  This would improve her symptoms.  It might not eradicate them however.  In addition to the normal risk of surgery there would be some risk of her developing progressive instability and requiring additional surgical intervention.  I discussed what the surgery would entail as well as the potential risks, complications, and limitations.  She feels that she would like to lose some weight and get stronger before considering any surgery.  At this point the ball is in her court.  If she would like to undergo surgery then she will follow-up and we will discuss this prior.       Diagnosis Plan   1. Spinal stenosis of lumbar region with neurogenic claudication  Ambulatory Referral to Physical Therapy Evaluate and treat; Stretching, ROM, Strengthening   2. Spondylolisthesis of lumbar region         Scribed for Humberto Correa MD by Lisa Baez, [facility] 6/30/2021 16:22 EDT      I, Dr. Correa, personally performed the services described in the documentation, as scribed in my presence, and it is both accurate and complete.

## 2021-08-02 ENCOUNTER — TELEPHONE (OUTPATIENT)
Dept: CARDIOLOGY | Facility: CLINIC | Age: 76
End: 2021-08-02

## 2021-08-02 NOTE — TELEPHONE ENCOUNTER
"Lidia with [employer] pt assistance program called for verification that PA for [employer] exists. No documentation in [employer].   Attempted PA in [facility] and received following response -  \"An active PA is already on file with expiration date of 04/26/2022.\"     Faxed above info to [phone]    "

## 2021-09-07 RX ORDER — FENOFIBRIC ACID 135 MG/1
CAPSULE, DELAYED RELEASE ORAL
Qty: 90 CAPSULE | Refills: 3 | Status: SHIPPED | OUTPATIENT
Start: 2021-09-07 | End: 2022-05-02 | Stop reason: SDUPTHER

## 2021-10-27 NOTE — PROGRESS NOTES
Subjective:     Encounter Date:10/28/2021    Patient ID: Coco Crockett is a 76 y.o.  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      PHYSICIAN: Lobo Archer MD  ORTHOPEDIST: Luisito Ceuva MD  GASTROENTEROLOGIST:  Maryam Sutton MD  PAIN MANAGEMENT PHYSICIAN: Justin Chi MD  NEUROSURGEON: Stephen Guillen MD  CURRENT NEUROSURGEON: Humberto Correa MD  RHEUMATOLOGIST: Janice Wilson MD    Chief Complaint:   Chief Complaint   Patient presents with   • Ischemic heart disease       Problem List:  1. Combined hypertensive and ischemic cardiovascular disease:  a. Acceptable GXT with abnormal baseline ST, September 1995.   b. Acceptable exercise stress test and normal quantitative SPECT Cardiolite study with low probability for significant obstructive coronary disease with estimated LVEF (0.60), June 1997.  c. CCS class I chest pain/abnormal EKG/intermittent palpitations with GXT echo stress test suggestive of low probability for significant focal obstructive coronary disease, LVEF (0.80), 12/07/2009.   d. Recurrent atypical CCS class II chest pain syndrome with NYHA class I exertional dyspnea with fatigue with diagnostic coronary angiography demonstrating nonobstructive single-vessel LAD disease with associated 75% to 80% stenosis first diagonal branch LAD not felt to be amenable to catheter based intervention with mild plaque in the nondominant left circumflex coronary artery with normal right dominant coronary artery with preserved LVEF (0.70) with associated diastolic LV dysfunction, December 2013.   e. Residual class I angina pectoris/NYHA class I to II exertional dyspnea and fatigue.  f. Stress test, 06/30/2016:  Normal LVEF (0.63); findings consistent with a normal ECG stress test; myocardial perfusion imaging indicates a small-sized infarct located in  the lateral wall with no significant ischemia noted.  g. CCS class I chest discomfort/NYHA class II dyspnea with exertion/fatigue with acceptable EKG and acceptable echocardiogram demonstrating normal LVEF (0.70) without PE/pulmonary arterial hypertension and mild aortic valve sclerosis with mild LVH, July 2017.  h. 24-hour ambulatory blood pressure monitor 7/10/18: Average blood pressure 136/69, 135/68 during the day and 141/71 at night.  Recommendations to continue current medications.  i. CCS class 0 chest discomfort/NYHA class III fatigue symptoms April 2019, October 2019  j. Residual class I symptoms, July 2020, April 2021, October 2021  2. Chronic hypertension, probably essential.   3. Dyslipidemia.   4. Mild obesity: BMI 30.47  5. Chronic hypothyroidism/replacement therapy.   6. Osteopenia with fracture of lumbar vertebra, 2004 with subsequent development of severe back pain and sever lumbosacral osteoarthritis with epidural injections, June 2020  7. TMJ dysfunction  8. GERD.   9. Iron deficiency anemia.   10. Depression.   11. Eczema.   12. Positional vertigo - data deficit.  13. Remote operations:   a. Tonsillectomy and adenoidectomy, 1950.   b. Appendectomy with wedge resection of both ovaries, 1970.  c. MARIZA/BSO secondary to severe endometriosis, 1985.   d. Fall on ice with subsequent ORIF of right ankle/tibia, 1995.   e. Cataracts, OU with bifocal implant, 2008.  f. Lumbar stenosis with neurogenic claudication, June 2020.  14. Intermittent marked dizziness/vertigo with Epley maneuver.  15. Intermittent progressive headache syndrome/possible migraine equivalent.  16. Type 2 diabetes mellitus, Hemoglobin A1c 5.9%, December 2014; 5.8%, July 2017.  17. Remote progressive disabling fatigue with arthralgias and myalgias, June 2018  18. Chronic daytime hypersomnolence and fatigue with apparent acceptable polysomnogram - data deficit, [facility]   19. Colonoscopy and polyp removal, summer 2018  20. Multiple falls  without vertigo, dizziness, presyncope, or syncope, September 2018, with minor scrapes/bruising  21. Pneumonia, August 2019  22. Severe spinal stenosis noted on MRI lumbar spine, February 2021.  23. Pneumonia, summer 2021  24. Recent severe anemia (hemoglobin 8) with iron replacement and colonoscopy, December 2021; data deficit, summer 2021.    Allergies   Allergen Reactions   • Bactrim [Sulfamethoxazole-Trimethoprim]    • Contrast Dye Itching   • Imdur [Isosorbide Dinitrate] Other (See Comments)     Severe headache/fatigue       • Keflex [Cephalexin] Itching and Other (See Comments)     Angioedema     • Levaquin [Levofloxacin]    • Trimethoprim Unknown - Low Severity   • Codeine Sulfate Itching and Rash   • Demerol [Meperidine] Itching and Rash   • Morphine Sulfate Itching and Rash   • Penicillin V Potassium Itching and Rash   • Sulfa Antibiotics Rash and Other (See Comments)     severe rash/swelling       Current Outpatient Medications:   •  acetaminophen (TYLENOL) 500 MG tablet, Take 500 mg by mouth 2 (Two) Times a Day As Needed for Mild Pain ., Disp: , Rfl:   •  Alirocumab 75 MG/ML solution pen-injector, Inject 75 pens under the skin into the appropriate area as directed Every 14 (Fourteen) Days., Disp: 2 pen, Rfl: 11  •  amLODIPine (NORVASC) 5 MG tablet, Take 1 tablet by mouth Daily., Disp: 90 tablet, Rfl: 3  •  aspirin 325 MG tablet, Take 325 mg by mouth Every Night., Disp: , Rfl:   •  Breo Ellipta 200-25 MCG/INH inhaler, Daily., Disp: , Rfl:   •  Cholecalciferol (VITAMIN D3 PO), Take 4,000 Units by mouth 2 (Two) Times a Day., Disp: , Rfl:   •  DULoxetine (CYMBALTA) 60 MG capsule, Take 60 mg by mouth daily., Disp: , Rfl:   •  estrogens, conjugated, (PREMARIN) 0.3 MG tablet, Take 0.3 mg by mouth Daily., Disp: , Rfl:   •  fenofibric acid (TRILIPIX) 135 MG capsule delayed-release delayed release capsule, TAKE 1 CAPSULE BY MOUTH EVERY DAY, Disp: 90 capsule, Rfl: 3  •  fluticasone (FLONASE) 50 MCG/ACT nasal spray,  2 sprays into each nostril Daily., Disp: , Rfl:   •  gabapentin (NEURONTIN) 100 MG capsule, Take 100 mg by mouth As Needed., Disp: , Rfl:   •  hydroCHLOROthiazide (MICROZIDE) 12.5 MG capsule, Take 1 capsule by mouth 2 (Two) Times a Day., Disp: 60 capsule, Rfl: 11  •  icosapent ethyl (VASCEPA) 1 g capsule capsule, Take 2 g by mouth 2 (Two) Times a Day With Meals., Disp: 120 capsule, Rfl: 11  •  iron polysaccharides (NIFEREX) 150 MG capsule, 2 (Two) Times a Day., Disp: , Rfl:   •  levothyroxine (SYNTHROID, LEVOTHROID) 25 MCG tablet, TAKE 1 TABLET BY MOUTH DAILY., Disp: 90 tablet, Rfl: 0  •  metFORMIN (GLUCOPHAGE) 500 MG tablet, Take 500 mg by mouth daily., Disp: , Rfl:   •  Multiple Vitamin (MULTI VITAMIN DAILY PO), Take  by mouth daily., Disp: , Rfl:   •  nebivolol (BYSTOLIC) 5 MG tablet, Take 2 tablets by mouth Daily. (Patient taking differently: Take 5 mg by mouth Daily.), Disp: 180 tablet, Rfl: 3  •  nitroglycerin (NITROLINGUAL) 0.4 MG/SPRAY spray, Place 1 spray under the tongue every 5 (five) minutes as needed for chest pain., Disp: , Rfl:   •  omeprazole (PriLOSEC) 20 MG capsule, Take 20 mg by mouth daily., Disp: , Rfl:   •  traMADol (ULTRAM) 50 MG tablet, Take 50 mg by mouth As Needed., Disp: , Rfl: 1    History of Present Illness: Patient returns for scheduled 6-month follow up. She reports throughout the summer she has been having respiratory issues and recently had left upper lobe pneumonia which has resolved after 7 weeks. She was given a prescription for antibiotics but did not require hospitalization. She reports her cough was very persistent and she had significant weakness. She was followed by Dr. Archer for these problems. She has been undergoing physical therapy twice weekly. Patient denies chest pain, shortness of breath, palpitations, edema, dizziness, and syncope. She has not been able to undergo back surgery with Dr. Correa due to her pulmonary problems. She has had no additional interim ER  "visits, hospitalizations, serious illnesses, or surgeries. She notes her hemoglobin was as low as 8 on her interim laboratory studies and was most recently 10. She has been taking iron supplementation and has had diarrhea with this. She has increased the amount of red meat she has been eating. She still get short of breath and weak with exertion. She has received COVID immunization as well as influenza immunization. She received her COVID booster today.        ROS   Obtained and negative except as outlined in problem list and HPI.    Procedures       Objective:       Vitals:    10/28/21 1433 10/28/21 1434   BP: 131/64 149/66   BP Location: Left arm Left arm   Patient Position: Sitting Standing   Pulse: 81 79   Weight: 76.4 kg (168 lb 6.4 oz)    Height: 160 cm (63\")      Body mass index is 29.83 kg/m².  Last weight: 172 lbs    Vitals reviewed.   Constitutional:       Appearance: Well-developed.   Neck:      Thyroid: No thyromegaly.      Vascular: No carotid bruit or JVD.      Lymphadenopathy: No cervical adenopathy.   Pulmonary:      Effort: Pulmonary effort is normal.      Breath sounds: Decreased breath sounds present. No wheezing. No rhonchi. No rales.   Cardiovascular:      Regular rhythm.      Murmurs: There is a grade 1/6 mid frequency early systolic murmur at the LLSB.      No gallop. No S3 gallop.   Pulses:     Dorsalis pedis: 2+ bilaterally.     Posterior tibial: 2+ bilaterally.  Edema:     Peripheral edema absent.   Abdominal:      Palpations: Abdomen is soft. There is no abdominal mass.      Tenderness: There is no abdominal tenderness.   Musculoskeletal: Normal range of motion. Skin:     General: Skin is warm and dry.      Findings: No rash.   Neurological:      Mental Status: Alert and oriented to person, place, and time.           Lab Review:     No recent laboratory studies available for review today.    XR Lumbar Spine, 6/29/2021:  1. Relatively advanced degenerative disc disease at L2-3, L3-4 and " L4-5, with associated canal stenosis and nerve root sleeve effacement at all three levels.  2. No evidence of acute or healing trauma or significant focal subluxation.  3. Suggestion of mild instability at L4-5 from flexion to extension. Minimal if any instability at L5-S1.    CT Lumbar Spine, 6/29/2021:  1. Severe spinal stenosis at L2-3  and L4-5 with complete effacement of intrathecal contrast.  2. Marked L3-4 canal stenosis and moderate to marked L5-S1 canal stenosis due to disc protrusion and posterior element hypertrophy.  3. No evidence of acute or healing trauma or significant focal subluxation.        Assessment:       Overall continued acceptable course with no new interim cardiopulmonary complaints with limited functional status. We will defer additional diagnostic or therapeutic intervention from a cardiac perspective at this time.     Diagnosis Plan   1. Ischemic heart disease  No recurrent angina pectoris or CHF on current activity schedule; continue current treatment   2. Essential hypertension  Well controlled. Continue current treatment.   3. Dyslipidemia  No data to review. Continue fenofibrate.   4. Hypothyroidism, unspecified type  No data to review.   5. Type 2 diabetes mellitus with other specified complication, without long-term current use of insulin (HCC)  No data to review. Continue current treatment.          Plan:         1. Patient to continue current medications and close follow up with the above providers.  2. Tentative cardiology follow up in April 2022 or patient may return sooner PRN.    I, Rodolfo Patterson, attest that this documentation has been prepared under the direction and in the presence of Abel Barillas MD 10/28/2021    IAbel MD, [facility], personally performed the services described in this documentation as scribed by the above named individual in my presence, and it is both accurate and complete. At 15:01 EDT on 10/28/2021

## 2021-10-28 ENCOUNTER — OFFICE VISIT (OUTPATIENT)
Dept: CARDIOLOGY | Facility: CLINIC | Age: 76
End: 2021-10-28

## 2021-10-28 VITALS
DIASTOLIC BLOOD PRESSURE: 66 MMHG | WEIGHT: 168.4 LBS | HEART RATE: 79 BPM | SYSTOLIC BLOOD PRESSURE: 149 MMHG | BODY MASS INDEX: 29.84 KG/M2 | HEIGHT: 63 IN

## 2021-10-28 DIAGNOSIS — I25.9 ISCHEMIC HEART DISEASE: Primary | ICD-10-CM

## 2021-10-28 DIAGNOSIS — I10 ESSENTIAL HYPERTENSION: ICD-10-CM

## 2021-10-28 DIAGNOSIS — E11.69 TYPE 2 DIABETES MELLITUS WITH OTHER SPECIFIED COMPLICATION, WITHOUT LONG-TERM CURRENT USE OF INSULIN (HCC): ICD-10-CM

## 2021-10-28 DIAGNOSIS — E78.5 DYSLIPIDEMIA: ICD-10-CM

## 2021-10-28 DIAGNOSIS — E03.9 HYPOTHYROIDISM, UNSPECIFIED TYPE: ICD-10-CM

## 2021-10-28 PROCEDURE — 99214 OFFICE O/P EST MOD 30 MIN: CPT | Performed by: INTERNAL MEDICINE

## 2021-10-28 RX ORDER — IRON POLYSACCHARIDE COMPLEX 150 MG
150 CAPSULE ORAL 2 TIMES DAILY
COMMUNITY
Start: 2021-09-24 | End: 2023-02-22 | Stop reason: HOSPADM

## 2021-11-12 ENCOUNTER — TELEPHONE (OUTPATIENT)
Dept: CARDIOLOGY | Facility: CLINIC | Age: 76
End: 2021-11-12

## 2021-11-12 NOTE — TELEPHONE ENCOUNTER
Noted; would continue current medication template remain as active as possible.  Unfortunately, she cannot take statin drug therapy at this time.    Thanks!

## 2021-11-12 NOTE — TELEPHONE ENCOUNTER
Patient called in response to lab results letter from Dr. Barillas regarding abnormal lipid panel.    Patient is currently taking Vascepa 2g BID, fenofibric acid 135 mg daily and Praluent 75 mg every 14 days. Pt is compliant with all.     Please advise.

## 2022-01-07 ENCOUNTER — TELEPHONE (OUTPATIENT)
Dept: NEUROSURGERY | Facility: CLINIC | Age: 77
End: 2022-01-07

## 2022-01-07 RX ORDER — TIZANIDINE 2 MG/1
2 TABLET ORAL 2 TIMES DAILY PRN
Qty: 30 TABLET | Refills: 0 | Status: SHIPPED | OUTPATIENT
Start: 2022-01-07 | End: 2022-01-08 | Stop reason: SDUPTHER

## 2022-01-07 NOTE — TELEPHONE ENCOUNTER
Patient called. I moved her appointment up with Dr. Correa to Sat. 1/8/22 at 10:40am. She stated she would talk with him at the visit about medication.

## 2022-01-07 NOTE — TELEPHONE ENCOUNTER
Provider:  NURA  Caller: PATIENT  Time of call:  3:36PM  Phone #: [phone]   Surgery:  NO  Surgery Date:    Last visit:  6/30/21   Next visit: 1/18/22     AIDAN:          Reason for call:   PATIENT LVM THAT SHE IS HAVING BACK SPASMS WHENEVER SHE TRYS TO SIT DOWN ON SOMETHING LOW AND HAS TROUBLE GETTING BACK UP.  WOULD IT BE POSSIBLE TO CALL IN SOMETHING FOR THE SPASMS UNTIL HER APPT ON 1/18?    PATIENT IS STAYING WITH HER SON IN [place] SO IF ANYTHING IS PRESCRIBED, PLEASE SEND TO:    [facility]  [address] [place] [address] [phone]

## 2022-01-07 NOTE — TELEPHONE ENCOUNTER
Patient called back, let her know about her medication at the pharmacy, her son went and got her a lift for the toilet to help. She will see Dr. Correa tomorrow.

## 2022-01-08 ENCOUNTER — OFFICE VISIT (OUTPATIENT)
Dept: NEUROSURGERY | Facility: CLINIC | Age: 77
End: 2022-01-08

## 2022-01-08 VITALS — RESPIRATION RATE: 15 BRPM | HEIGHT: 63 IN | TEMPERATURE: 97.5 F | BODY MASS INDEX: 29.98 KG/M2 | WEIGHT: 169.2 LBS

## 2022-01-08 DIAGNOSIS — M43.16 SPONDYLOLISTHESIS OF LUMBAR REGION: ICD-10-CM

## 2022-01-08 DIAGNOSIS — M54.10 RADICULAR PAIN OF RIGHT LOWER EXTREMITY: Primary | ICD-10-CM

## 2022-01-08 DIAGNOSIS — M48.062 SPINAL STENOSIS OF LUMBAR REGION WITH NEUROGENIC CLAUDICATION: ICD-10-CM

## 2022-01-08 DIAGNOSIS — M54.9 MECHANICAL BACK PAIN: ICD-10-CM

## 2022-01-08 PROCEDURE — 99214 OFFICE O/P EST MOD 30 MIN: CPT | Performed by: NEUROLOGICAL SURGERY

## 2022-01-08 RX ORDER — TIZANIDINE 2 MG/1
2 TABLET ORAL 2 TIMES DAILY PRN
Qty: 30 TABLET | Refills: 1 | Status: SHIPPED | OUTPATIENT
Start: 2022-01-08 | End: 2022-01-14

## 2022-01-08 RX ORDER — METHYLPREDNISOLONE 4 MG/1
TABLET ORAL
Qty: 1 EACH | Refills: 0 | Status: ON HOLD | OUTPATIENT
Start: 2022-01-08 | End: 2022-05-09

## 2022-01-08 RX ORDER — TRAMADOL HYDROCHLORIDE 50 MG/1
50 TABLET ORAL EVERY 6 HOURS PRN
Qty: 50 TABLET | Refills: 0 | Status: SHIPPED | OUTPATIENT
Start: 2022-01-08 | End: 2022-08-10

## 2022-01-08 NOTE — PROGRESS NOTES
Patient: Coco Crcokett  : 1945    Primary Care Provider: Lobo Archer MD    Requesting Provider: As above        History    Chief Complaint: Right hip and thigh pain.    History of Present Illness: Ms. 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  I last saw her in  of last year.  She has extensive degenerative change in her back with high-grade stenosis and some mild degree of instability.  She has elected to get by with her symptoms.  We had discussed multilevel laminectomy.  She continues to have her baseline claudication symptoms but she has been getting by.  3 days ago when getting up from a sitting position she developed severe pain in her right hip region.  This extends into her thigh to about the knee, but once again she has some baseline leg symptoms.  Symptoms are worse with twisting or trying to squat down.  She has taken some Zanaflex and tramadol which are helpful.    Review of Systems   Constitutional: Negative for activity change, appetite change, chills, diaphoresis, fatigue, fever and unexpected weight change.   HENT: Negative for congestion, dental problem, drooling, ear discharge, ear pain, facial swelling, hearing loss, mouth sores, nosebleeds, postnasal drip, rhinorrhea, sinus pressure, sneezing, sore throat, tinnitus, trouble swallowing and voice change.    Eyes: Negative for photophobia, pain, discharge, redness, itching and visual disturbance.   Respiratory: Negative for apnea, cough, choking, chest tightness, shortness of breath, wheezing and stridor.    Cardiovascular: Negative for chest pain, palpitations and leg swelling.   Gastrointestinal: Negative for abdominal distention, abdominal pain, anal bleeding, blood in stool, constipation, diarrhea, nausea, rectal pain and vomiting.   Endocrine: Negative for cold intolerance, heat intolerance, polydipsia, polyphagia and polyuria.   Genitourinary: Negative for decreased  "urine volume, difficulty urinating, dysuria, enuresis, flank pain, frequency, genital sores, hematuria and urgency.   Musculoskeletal: Positive for arthralgias and back pain. Negative for gait problem, joint swelling, myalgias, neck pain and neck stiffness.   Skin: Negative for color change, pallor, rash and wound.   Allergic/Immunologic: Negative for environmental allergies, food allergies and immunocompromised state.   Neurological: Negative for dizziness, tremors, seizures, syncope, facial asymmetry, speech difficulty, weakness, light-headedness, numbness and headaches.   Hematological: Negative for adenopathy. Does not bruise/bleed easily.   Psychiatric/Behavioral: Negative for agitation, behavioral problems, confusion, decreased concentration, dysphoric mood, hallucinations, self-injury, sleep disturbance and suicidal ideas. The patient is not nervous/anxious and is not hyperactive.    All other systems reviewed and are negative.      The patient's past medical history, past surgical history, family history, and social history have been reviewed at length in the electronic medical record.    Physical Exam:   Temp 97.5 °F (36.4 °C) (Infrared)   Resp 15   Ht 160 cm (63\")   Wt 76.7 kg (169 lb 3.2 oz)   BMI 29.97 kg/m²   MUSCULOSKELETAL:  Straight leg raising is negative.  Jaiden's Sign is negative.  ROM in the low back is in all directions.  Tenderness in the back to palpation is present in her right hip region.  NEUROLOGICAL:  Strength is intact in the lower extremities to direct testing.  Muscle tone is normal throughout.  Station and gait are tilted to the left.  Sensation is intact to light touch testing throughout.  Deep tendon reflexes are trace in her lower extremities.  Coordination is intact.      Medical Decision Making    Data Review:   (All imaging studies were personally reviewed unless stated otherwise)  Her most recent MRI of April 2021 once again demonstrates left L2-3 disc herniation with " generous grade stenosis at multiple levels from L2-3 down to L4-5.  There is a complete block at the L4-5 level when upright.    CT myelogram from 6/29/2021 is also reviewed again.  There is a low-grade offset of L4 on L5 that does not change much when upright.  There is a grade 1 listhesis of L5 on S1 that is present when upright and not present when recumbent.  Facet complexes are arthropathic but not splayed.    Diagnosis:   1.  Lumbar/right hip strain.  2.  Lumbar stenosis with neurogenic claudication.  3.  L4-5 and L5-S1 spondylolisthesis with mild instability.  4.  Mechanical low back pain.    Treatment Options:   I have renewed her Zanaflex and tramadol.  I have prescribed a Medrol Dosepak as well.  She has a physical therapy appointment scheduled for next week.  She will follow-up in our clinic in about 3 weeks.  If after a couple weeks she is not feeling better then she will contact my office and we will make arrangements for a new lumbar MRI prior to her next visit.       Diagnosis Plan   1. Radicular pain of right lower extremity     2. Spinal stenosis of lumbar region with neurogenic claudication     3. Spondylolisthesis of lumbar region     4. Mechanical back pain         Scribed for Humberto Correa MD by Millie Wheatley CMA on 1/8/2022 11:19 EST       I, Dr. Correa, personally performed the services described in the documentation, as scribed in my presence, and it is both accurate and complete.

## 2022-01-14 RX ORDER — TIZANIDINE 2 MG/1
TABLET ORAL
Qty: 30 TABLET | Refills: 1 | Status: SHIPPED | OUTPATIENT
Start: 2022-01-14 | End: 2022-02-07 | Stop reason: SDUPTHER

## 2022-01-14 NOTE — TELEPHONE ENCOUNTER
Provider: Sunny    Caller:  Automated refill request  Surgery: NA    Surgery Date:  NA  Last visit: Office Visit with Hubmerto Correa MD (01/08/2022)   Next visit: 02/11/2022     Reason for call:         Automated refill request for Zanaflex.    Requested Prescriptions     Pending Prescriptions Disp Refills   • tiZANidine (ZANAFLEX) 2 MG tablet [Pharmacy Med Name: TIZANIDINE HCL 2 MG TABLET] 30 tablet 1     Sig: TAKE 1 TABLET BY MOUTH 2 TIMES A DAY AS NEEDED FOR MUSCLE SPASMS.

## 2022-01-18 ENCOUNTER — TELEPHONE (OUTPATIENT)
Dept: NEUROSURGERY | Facility: CLINIC | Age: 77
End: 2022-01-18

## 2022-01-18 NOTE — TELEPHONE ENCOUNTER
I called and spoke with the patient and gave her the message from Sujatha. She has not went to PT yet, she had to cancel because of the weather. She has an appointment this Friday to go. She will call back in a week to let us know how she is doing.

## 2022-01-18 NOTE — TELEPHONE ENCOUNTER
Per Dr. Weiss last OV note he would like her try a few sessions with PT. If this is not helpful then we need to arrange for MRI prior to her f/u visit on 2/11. Please call back in 1 week with update. Continue symptomatic treatment for now, not much else to offer medication wise.

## 2022-01-18 NOTE — TELEPHONE ENCOUNTER
Provider:  Sunny  Caller: patient  Time of call:   12:47  Phone #:  [phone]  Surgery:    Surgery Date:    Last visit:   01/08/22  Next visit: 02/11/22    AIDAN:         Reason for call:     Patient called and said Tramadol has not helped her pain.  She has not started PT as of yet.  Her spasms have gotten better as she has not had one in 3 days.      Patient complains of left hip pain and left sciatica pain.  She has had trouble sleeping for the last 2 nights.      She is comfortable as long as she is sitting.  If she lays down, she cannot get comfortable. She is taking 2 x-strength Tylenol's and using a muscle relaxer.    Please advise.

## 2022-01-19 ENCOUNTER — TELEPHONE (OUTPATIENT)
Dept: NEUROSURGERY | Facility: CLINIC | Age: 77
End: 2022-01-19

## 2022-01-19 NOTE — TELEPHONE ENCOUNTER
PT CALLED TO INFORM SHE HAS STARTED PHYSICAL THERAPY WITH [facility] IN [place] FOR 1/18/22 AND 1/21/22.     THEN PT WILL GO BACK TO [place] AND START PHY THERAPY THERE.    THE THREE LITTLE TOES ARE NUMB ON LEFT FOOT AND GOES ALONG LEFT SIDE OF THE FOOT TO THE HEEL.     THE FIRST 2 TOES ARE FINE AS WELL AS ARCH OF FOOT.

## 2022-01-28 ENCOUNTER — TELEPHONE (OUTPATIENT)
Dept: NEUROSURGERY | Facility: CLINIC | Age: 77
End: 2022-01-28

## 2022-02-07 DIAGNOSIS — G90.519 COMPLEX REGIONAL PAIN SYNDROME TYPE 1 OF UPPER EXTREMITY, UNSPECIFIED LATERALITY: Primary | ICD-10-CM

## 2022-02-07 RX ORDER — TIZANIDINE 2 MG/1
2 TABLET ORAL 2 TIMES DAILY PRN
Qty: 30 TABLET | Refills: 1 | Status: SHIPPED | OUTPATIENT
Start: 2022-02-07 | End: 2022-08-10

## 2022-02-11 ENCOUNTER — OFFICE VISIT (OUTPATIENT)
Dept: NEUROSURGERY | Facility: CLINIC | Age: 77
End: 2022-02-11

## 2022-02-11 VITALS
RESPIRATION RATE: 18 BRPM | BODY MASS INDEX: 29.59 KG/M2 | HEART RATE: 89 BPM | DIASTOLIC BLOOD PRESSURE: 64 MMHG | SYSTOLIC BLOOD PRESSURE: 127 MMHG | WEIGHT: 167 LBS | HEIGHT: 63 IN

## 2022-02-11 DIAGNOSIS — M51.36 DDD (DEGENERATIVE DISC DISEASE), LUMBAR: ICD-10-CM

## 2022-02-11 DIAGNOSIS — M54.16 SPINAL STENOSIS OF LUMBAR REGION WITH RADICULOPATHY: ICD-10-CM

## 2022-02-11 DIAGNOSIS — M43.16 SPONDYLOLISTHESIS OF LUMBAR REGION: ICD-10-CM

## 2022-02-11 DIAGNOSIS — M54.9 MECHANICAL BACK PAIN: ICD-10-CM

## 2022-02-11 DIAGNOSIS — M47.816 FACET ARTHRITIS OF LUMBAR REGION: ICD-10-CM

## 2022-02-11 DIAGNOSIS — M48.061 SPINAL STENOSIS OF LUMBAR REGION WITH RADICULOPATHY: ICD-10-CM

## 2022-02-11 DIAGNOSIS — M48.062 SPINAL STENOSIS OF LUMBAR REGION WITH NEUROGENIC CLAUDICATION: Primary | ICD-10-CM

## 2022-02-11 PROCEDURE — 99214 OFFICE O/P EST MOD 30 MIN: CPT | Performed by: PHYSICIAN ASSISTANT

## 2022-02-11 RX ORDER — FAMOTIDINE 10 MG
20 TABLET ORAL
Status: CANCELLED | OUTPATIENT
Start: 2022-02-11

## 2022-02-11 RX ORDER — SODIUM CHLORIDE, SODIUM LACTATE, POTASSIUM CHLORIDE, CALCIUM CHLORIDE 600; 310; 30; 20 MG/100ML; MG/100ML; MG/100ML; MG/100ML
100 INJECTION, SOLUTION INTRAVENOUS CONTINUOUS
Status: CANCELLED | OUTPATIENT
Start: 2022-02-11

## 2022-02-11 RX ORDER — CHLORHEXIDINE GLUCONATE 4 G/100ML
SOLUTION TOPICAL
Qty: 120 ML | Refills: 0 | Status: ON HOLD | OUTPATIENT
Start: 2022-02-11 | End: 2022-05-09

## 2022-02-11 NOTE — PATIENT INSTRUCTIONS

## 2022-02-11 NOTE — PROGRESS NOTES
Patient: Coco Crockett  : 1945  GENDER: female    Primary Care Provider: Lobo Archer MD    Requesting Provider: As above      History    Chief Complaint:   1.  Right hip pain   2.  Back and left leg pain with sensory alteration  3.  Walking and standing intolerances    History of Present Illness: Ms. Crockett is a 76-year-old, retired female who is known to our practice.  She was previously followed by Dr. Guillen, and known to have extensive degenerative change with high-grade stenosis with some degree of instability throughout her lumbar spine.  Fortunately she has gotten by with conservative management and physical therapy.  More recently, symptoms increased and she is now unable to tolerate standing or walking >5-10 minutes.  Symptoms improve immediately with sitting/rest.  Symptoms begin in her low back and extend bilaterally into her hips, continuing down her left lateral leg-terminating in her lateral foot.  Symptoms in her back are constant, however her leg and hip extension increases upon standing/walking.  She denies bowel or bladder dysfunction (outside of her baseline of urinary urgency).  Historically she has taken some Zanaflex and tramadol which were helpful.  She has no other complaints at this time.    Review of Systems   Constitutional: Negative for activity change, appetite change, chills, diaphoresis, fatigue, fever and unexpected weight change.   HENT: Negative for congestion, dental problem, drooling, ear discharge, ear pain, facial swelling, hearing loss, mouth sores, nosebleeds, postnasal drip, rhinorrhea, sinus pressure, sneezing, sore throat, tinnitus, trouble swallowing and voice change.    Eyes: Negative for photophobia, pain, discharge, redness, itching and visual disturbance.   Respiratory: Negative for apnea, cough, choking, chest tightness, shortness of breath, wheezing and stridor.    Cardiovascular: Negative for chest pain, palpitations and leg swelling.    Gastrointestinal: Negative for abdominal distention, abdominal pain, anal bleeding, blood in stool, constipation, diarrhea, nausea, rectal pain and vomiting.   Endocrine: Negative for cold intolerance, heat intolerance, polydipsia, polyphagia and polyuria.   Genitourinary: Negative for decreased urine volume, difficulty urinating, dysuria, enuresis, flank pain, frequency, genital sores, hematuria and urgency.   Musculoskeletal: Positive for arthralgias and back pain. Negative for gait problem, joint swelling, myalgias, neck pain and neck stiffness.   Skin: Negative for color change, pallor, rash and wound.   Allergic/Immunologic: Negative for environmental allergies, food allergies and immunocompromised state.   Neurological: Negative for dizziness, tremors, seizures, syncope, facial asymmetry, speech difficulty, weakness, light-headedness, numbness and headaches.   Hematological: Negative for adenopathy. Does not bruise/bleed easily.   Psychiatric/Behavioral: Negative for agitation, behavioral problems, confusion, decreased concentration, dysphoric mood, hallucinations, self-injury, sleep disturbance and suicidal ideas. The patient is not nervous/anxious and is not hyperactive.    All other systems reviewed and are negative.      Past Medical History:   Diagnosis Date   • Chest pain    • Depression    • Diabetes mellitus (HCC)    • Dyslipidemia    • Eczema    • GERD (gastroesophageal reflux disease)    • Hyperlipidemia    • Hypertension     Chronic hypertension, probably essential.    • Hypertensive cardiovascular disease    • Hypothyroidism     Chronic hypothyroidism/replacement therapy.   • Iron deficiency anemia    • Migraine     Intermittent progressive headache syndrome/possible migraine equivalent.   • Obesity (BMI 30.0-34.9)     Mild obesity (BMI 31).    • Osteopenia     Osteopenia with fracture of lumbar vertebra, 2004.    • Pneumonia    • TMJ (temporomandibular joint disorder)    • Type 2 diabetes  mellitus (HCC)     Type 2 diabetes mellitus, Hemoglobin A1c 5.9%, December 2014.   • Vertigo     Intermittent marked dizziness/vertigo with Epley maneuver.     Past Surgical History:   Procedure Laterality Date   • ANKLE OPEN REDUCTION INTERNAL FIXATION     • ANKLE SURGERY  1995    Fall on ice with subsequent ORIF of right ankle/tibia, 1995.    • APPENDECTOMY  1970    Appendectomy with wedge resection of both ovaries, 1970.   • LASER ABLATION     • TIBIAL PLATEAU OPEN REDUCTION INTERNAL FIXATION     • TONSILLECTOMY AND ADENOIDECTOMY     • TONSILLECTOMY AND ADENOIDECTOMY     • TOTAL LAPAROSCOPIC HYSTERECTOMY SALPINGO OOPHORECTOMY  1985    MARIZA/BSO secondary to severe endometriosis, 1985.        Current Outpatient Medications:   •  acetaminophen (TYLENOL) 500 MG tablet, Take 500 mg by mouth 2 (Two) Times a Day As Needed for Mild Pain ., Disp: , Rfl:   •  Alirocumab 75 MG/ML solution auto-injector, Inject 75 mg under the skin into the appropriate area as directed Every 14 (Fourteen) Days., Disp: 6 pen, Rfl: 3  •  amLODIPine (NORVASC) 5 MG tablet, Take 1 tablet by mouth Daily., Disp: 90 tablet, Rfl: 3  •  aspirin 325 MG tablet, Take 325 mg by mouth Every Night., Disp: , Rfl:   •  Breo Ellipta 200-25 MCG/INH inhaler, Daily., Disp: , Rfl:   •  Cholecalciferol (VITAMIN D3 PO), Take 4,000 Units by mouth 2 (Two) Times a Day., Disp: , Rfl:   •  DULoxetine (CYMBALTA) 60 MG capsule, Take 60 mg by mouth daily., Disp: , Rfl:   •  estrogens, conjugated, (PREMARIN) 0.3 MG tablet, Take 0.3 mg by mouth Daily., Disp: , Rfl:   •  fenofibric acid (TRILIPIX) 135 MG capsule delayed-release delayed release capsule, TAKE 1 CAPSULE BY MOUTH EVERY DAY, Disp: 90 capsule, Rfl: 3  •  fluticasone (FLONASE) 50 MCG/ACT nasal spray, 2 sprays into each nostril Daily., Disp: , Rfl:   •  gabapentin (NEURONTIN) 100 MG capsule, Take 100 mg by mouth As Needed., Disp: , Rfl:   •  hydroCHLOROthiazide (MICROZIDE) 12.5 MG capsule, Take 1 capsule by mouth 2  (Two) Times a Day., Disp: 60 capsule, Rfl: 11  •  icosapent ethyl (VASCEPA) 1 g capsule capsule, Take 2 g by mouth 2 (Two) Times a Day With Meals., Disp: 120 capsule, Rfl: 11  •  iron polysaccharides (NIFEREX) 150 MG capsule, 2 (Two) Times a Day., Disp: , Rfl:   •  levothyroxine (SYNTHROID, LEVOTHROID) 25 MCG tablet, TAKE 1 TABLET BY MOUTH DAILY., Disp: 90 tablet, Rfl: 0  •  metFORMIN (GLUCOPHAGE) 500 MG tablet, Take 500 mg by mouth daily., Disp: , Rfl:   •  methylPREDNISolone (MEDROL) 4 MG dose pack, Take as directed on package instructions., Disp: 1 each, Rfl: 0  •  Multiple Vitamin (MULTI VITAMIN DAILY PO), Take  by mouth daily., Disp: , Rfl:   •  nebivolol (BYSTOLIC) 5 MG tablet, Take 2 tablets by mouth Daily. (Patient taking differently: Take 5 mg by mouth Daily.), Disp: 180 tablet, Rfl: 3  •  nitroglycerin (NITROLINGUAL) 0.4 MG/SPRAY spray, Place 1 spray under the tongue every 5 (five) minutes as needed for chest pain., Disp: , Rfl:   •  omeprazole (PriLOSEC) 20 MG capsule, Take 20 mg by mouth daily., Disp: , Rfl:   •  tiZANidine (ZANAFLEX) 2 MG tablet, Take 1 tablet by mouth 2 (Two) Times a Day As Needed for Muscle Spasms., Disp: 30 tablet, Rfl: 1  •  traMADol (ULTRAM) 50 MG tablet, Take 1 tablet by mouth Every 6 (Six) Hours As Needed for Moderate Pain ., Disp: 50 tablet, Rfl: 0  •  chlorhexidine (HIBICLENS) 4 % external liquid, Shower each day with solution for 5 days beginning 5 days before surgery., Disp: 120 mL, Rfl: 0  •  mupirocin (BACTROBAN) 2 % nasal ointment, Apply to the inside of each nostril with a cotton swab two times daily, morning and evening, for 5 days before surgery., Disp: 10 each, Rfl: 0    Allergies   Allergen Reactions   • Bactrim [Sulfamethoxazole-Trimethoprim]    • Contrast Dye Itching   • Imdur [Isosorbide Dinitrate] Other (See Comments)     Severe headache/fatigue       • Keflex [Cephalexin] Itching and Other (See Comments)     Angioedema     • Levaquin [Levofloxacin]    •  "Trimethoprim Unknown - Low Severity   • Codeine Sulfate Itching and Rash   • Demerol [Meperidine] Itching and Rash   • Morphine Sulfate Itching and Rash   • Penicillin V Potassium Itching and Rash   • Sulfa Antibiotics Rash and Other (See Comments)     severe rash/swelling       The patient's review of systems, past medical history, past surgical history, family history, and social history have been reviewed at length in the electronic medical record.    Physical Exam:   /64   Pulse 89   Resp 18   Ht 160 cm (63\")   Wt 75.8 kg (167 lb)   BMI 29.58 kg/m²   CONSTITUTIONAL:   - Patient is well-nourished  - Pleasant and appears stated age.  PSYCHIATRIC:  - Normal mood and affect  - Behavior is normal.  - Thought content is normal  HENT:   Head: Normocephalic and Atraumatic.   Eyes:     - Pupils are equal, round, and reactive to light.     - EOM are normal.   CV:   - Regular rate and rhythm on palpable radial pulse   - No murmur appreciated   PULMONARY:   - Speaking in full sentences  - No use of accessory muscles   - Breathing is non-labored   - No wheezing   SKIN:  - Clean, dry and intact   MUSCULOSKELETAL:  - Neck/Back tenderness to palpation is not observed.   - ROM in back is somewhat limited in all directions..  - Straight leg raise is negative   - Jaiden's sign is negative   NEUROLOGICAL:  - A&Ox3  - Attention span, language function, and cognition are intact.  - Strength is intact in the upper and lower extremities to direct testing.  - Muscle tone is normal throughout.  - Station and gait are normal.  - Sensation is intact to light touch testing throughout.  - Deep tendon reflexes are 1+ and symmetrical.    - De La Paz's Sign is negative bilaterally.  - No clonus is elicited.  - Coordination is intact.    Patient's Body mass index is 29.58 kg/m². indicating that she is overweight (BMI 25-29.9). Patient's (Body mass index is 29.58 kg/m².) indicates that they are overweight with health conditions that " include hypertension, coronary heart disease, diabetes mellitus, dyslipidemias and osteoarthritis . Weight is improving with lifestyle modifications. BMI is is above average; BMI management plan is completed. We discussed portion control and increasing exercise. .         Medical Decision Making    Data Review:   1. MRI Lumbar Spine (4/14/2021): Independent review of radiographic imaging demonstrates multilevel degenerative changes with rather severe stenosis from L2-3 to L5-S1.  There is a slight offset of L5 on S1 and is present when upright on CT imaging, that resolves when in the recumbent position.    2. Lumbar CT/Myelogram (6/29/2021): Demonstrates almost a complete block at the L4-5 level when upright.  There is a slight offset of L4 on L5 that does not change much between flexion/extension views.       Imaging was reviewed with Dr. Correa.    Diagnosis/Treatment Options:  1. Spinal stenosis of lumbar region with neurogenic claudication  2. Spondylolisthesis of lumbar region  3. Mechanical back pain  4. DDD (degenerative disc disease), lumbar  5. Facet arthritis of lumbar region  6. BMI 29.0-29.9,adult  7. Spinal stenosis of lumbar region with radiculopathy     - Case Request; Standing  - COVID PRE-OP / PRE-PROCEDURE SCREENING ORDER (NO ISOLATION) - Swab, Nasopharynx; Future  - CBC and Differential; Future  - Basic metabolic panel; Future  - Case Request       Follow up:  Ms. Crockett is seen today in follow-up with worsening back and left lower extremity complaints with walking/standing intolerances.  Dr. Correa and I reviewed her lumbar MRI and CT/myelogram, he has recommended multilevel lumbar decompression from L2-S1 with fusion and stabilization at L5-S1.  I explained the procedure in detail including the associated risks, complications, limitations, and alternatives to surgery.  Patient verbalized understanding and has agreed to proceed.    Elizabeth Barrett PA-C   2/11/2022   15:05 EST

## 2022-02-16 ENCOUNTER — TELEPHONE (OUTPATIENT)
Dept: CARDIOLOGY | Facility: CLINIC | Age: 77
End: 2022-02-16

## 2022-02-16 NOTE — TELEPHONE ENCOUNTER
Noted; low cardiovascular risk overall from cardiac perspective and would hold aspirin therapy for 2 to 3 days at a maximum.    Thanks!

## 2022-02-16 NOTE — TELEPHONE ENCOUNTER
Gale from Dr. Humberto Correa's office called for clearance for patient to have lumbar fusion and lumbar laminectomy. Patient takes  mg and they would like to know if it is ok to hold this medication.    Pt's LOV 10/28/21, pt reports no cardiac issues/changes. Pt had COVID, released from quarantine 12 days ago. Reports fatigue related to recovering from COVID.      What is patient's cardiac risk? Ok to hold  mg daily?    Please advise.     Per Gale, ok to leave clearance in Epic.  [phone]

## 2022-03-13 DIAGNOSIS — E78.5 DYSLIPIDEMIA: ICD-10-CM

## 2022-03-13 DIAGNOSIS — I11.9 HYPERTENSIVE HEART DISEASE WITHOUT HEART FAILURE: ICD-10-CM

## 2022-03-13 DIAGNOSIS — I10 ESSENTIAL HYPERTENSION: ICD-10-CM

## 2022-03-14 RX ORDER — HYDROCHLOROTHIAZIDE 12.5 MG/1
CAPSULE, GELATIN COATED ORAL
Qty: 180 CAPSULE | Refills: 3 | Status: ON HOLD | OUTPATIENT
Start: 2022-03-14 | End: 2023-02-22 | Stop reason: SDUPTHER

## 2022-04-13 ENCOUNTER — TELEPHONE (OUTPATIENT)
Dept: NEUROSURGERY | Facility: CLINIC | Age: 77
End: 2022-04-13

## 2022-04-13 NOTE — TELEPHONE ENCOUNTER
Provider:  Sunny  Caller: Patient  Time of call:  4:17   Phone #: [phone]   Surgery: Multilevel LUMBAR DECOMPRESSON L2-S1 with Fusion and Stabilization with PEDICLE SCREWS at L5-S1   Surgery Date:  NOT SCHEDULED.   Last visit:  Office Visit with Elizabeth Barrett PA-C (02/11/2022)   Next visit: NA    Reason for call:         Patient LVM inquiring about surgery scheduling. Patient states that she is having increasing back and leg problems. Patient wants to know if she should come in for an office visit?

## 2022-04-14 NOTE — TELEPHONE ENCOUNTER
"I called and talked to the patient. Patient stated her symptoms have gotten progressively worse, since her last appointment with Elizabeth. Patient stated the pain is worse, and her left foot is now going numb. B/B issues have increased and gotten worse. Patient is now having Urgency, and having to wear a pad. Patient stated over the last 5 months, she has had 3 episodes of urinary incontinence.     Per Elizabeth's last OV Note: \"Ms. Crockett is seen today in follow-up with worsening back and left lower extremity complaints with walking/standing intolerances.  Dr. Correa and I reviewed her lumbar MRI and CT/myelogram, he has recommended multilevel lumbar decompression from L2-S1 with fusion and stabilization at L5-S1.  I explained the procedure in detail including the associated risks, complications, limitations, and alternatives to surgery.  Patient verbalized understanding and has agreed to proceed.\"    I do not see any surgery orders that were placed. Please advise.   "

## 2022-04-14 NOTE — TELEPHONE ENCOUNTER
Elizabeth saw her on 2/11/22 and Dr. Correa also discussed the surgery with the patient at that time. H&P was done and orders placed, but she could not yet be scheduled due to the COVID surge.  Catia tried to call her a few weeks ago to get her scheduled, but hadn't heard back. I spoke with Catia and she will call her again today to get her surgery scheduled.

## 2022-04-25 ENCOUNTER — TELEPHONE (OUTPATIENT)
Dept: NEUROSURGERY | Facility: CLINIC | Age: 77
End: 2022-04-25

## 2022-04-25 NOTE — TELEPHONE ENCOUNTER
Provider:Sunny    Caller:  patient  Time of call:  12:02   Phone #:  [phone]  Surgery:  Multi level decompression L2-S1 with Fusion and stabilization with Pedicle screws at L5-S1  Surgery Date:  5-9-22  Last visit:  2-11-22   Next visit: CHANTE BERMUDEZ:         Reason for call: Patient called and had questions about her upcoming surgery.         She wants to know if she will have to go to Rehab.or home health ?  She wants to kind of have an ideal of what to expect.Please Advise. Thank you.

## 2022-04-25 NOTE — TELEPHONE ENCOUNTER
Patient is back will hurt.  This is typically modest.    Patient will be up and moving around hopefully with less leg pain whenever she walks.  Hopefully this will alleviate a little bit of the bony back pain as well.  This will be substituted with surgical back pain.    Depending on how she does and postoperative stay she may require rehabilitation but the hope that she might be able to go home without

## 2022-05-02 ENCOUNTER — TELEPHONE (OUTPATIENT)
Dept: NEUROSURGERY | Facility: CLINIC | Age: 77
End: 2022-05-02

## 2022-05-02 RX ORDER — FENOFIBRIC ACID 135 MG/1
135 CAPSULE, DELAYED RELEASE ORAL DAILY
Qty: 90 CAPSULE | Refills: 3 | Status: SHIPPED | OUTPATIENT
Start: 2022-05-02

## 2022-05-02 RX ORDER — ICOSAPENT ETHYL 1000 MG/1
2 CAPSULE ORAL 2 TIMES DAILY WITH MEALS
Qty: 120 CAPSULE | Refills: 11 | Status: SHIPPED | OUTPATIENT
Start: 2022-05-02

## 2022-05-02 NOTE — TELEPHONE ENCOUNTER
Provider:  Sunny  Caller: patient  Time of call:  12:07   Phone #: [phone]  Surgery:  Multilevel Lumbar Decompression L2-S1  Surgery Date:2-11-22    Last visit: 2-11-22    Next visit: CHANTE BERMUDEZ:         Reason for call:  Patient went to  her surgery medicine they did not have the nasal ointment. I called and spoke with Clair at the pharmacy, she said what happened was that when it was called in because she hadn't picked it up it was put back. She is going to get it ready and let the patient know. I have also left a VM letting her know to contact her pharmacy.  Thank you.

## 2022-05-06 ENCOUNTER — PRE-ADMISSION TESTING (OUTPATIENT)
Dept: PREADMISSION TESTING | Facility: HOSPITAL | Age: 77
End: 2022-05-06

## 2022-05-06 VITALS — HEIGHT: 64 IN | BODY MASS INDEX: 28.91 KG/M2 | WEIGHT: 169.31 LBS

## 2022-05-06 DIAGNOSIS — M48.061 SPINAL STENOSIS OF LUMBAR REGION WITH RADICULOPATHY: ICD-10-CM

## 2022-05-06 DIAGNOSIS — M54.16 SPINAL STENOSIS OF LUMBAR REGION WITH RADICULOPATHY: ICD-10-CM

## 2022-05-06 DIAGNOSIS — M48.062 SPINAL STENOSIS OF LUMBAR REGION WITH NEUROGENIC CLAUDICATION: ICD-10-CM

## 2022-05-06 LAB
ANION GAP SERPL CALCULATED.3IONS-SCNC: 13 MMOL/L (ref 5–15)
BASOPHILS # BLD AUTO: 0.04 10*3/MM3 (ref 0–0.2)
BASOPHILS NFR BLD AUTO: 0.7 % (ref 0–1.5)
BUN SERPL-MCNC: 25 MG/DL (ref 8–23)
BUN/CREAT SERPL: 32.1 (ref 7–25)
CALCIUM SPEC-SCNC: 10 MG/DL (ref 8.6–10.5)
CHLORIDE SERPL-SCNC: 103 MMOL/L (ref 98–107)
CO2 SERPL-SCNC: 27 MMOL/L (ref 22–29)
CREAT SERPL-MCNC: 0.78 MG/DL (ref 0.57–1)
DEPRECATED RDW RBC AUTO: 45.8 FL (ref 37–54)
EGFRCR SERPLBLD CKD-EPI 2021: 77.9 ML/MIN/1.73
EOSINOPHIL # BLD AUTO: 0.12 10*3/MM3 (ref 0–0.4)
EOSINOPHIL NFR BLD AUTO: 2 % (ref 0.3–6.2)
ERYTHROCYTE [DISTWIDTH] IN BLOOD BY AUTOMATED COUNT: 15.8 % (ref 12.3–15.4)
GLUCOSE SERPL-MCNC: 92 MG/DL (ref 65–99)
HBA1C MFR BLD: 5.6 % (ref 4.8–5.6)
HCT VFR BLD AUTO: 37.7 % (ref 34–46.6)
HGB BLD-MCNC: 11.8 G/DL (ref 12–15.9)
IMM GRANULOCYTES # BLD AUTO: 0.02 10*3/MM3 (ref 0–0.05)
IMM GRANULOCYTES NFR BLD AUTO: 0.3 % (ref 0–0.5)
LYMPHOCYTES # BLD AUTO: 2.52 10*3/MM3 (ref 0.7–3.1)
LYMPHOCYTES NFR BLD AUTO: 41.1 % (ref 19.6–45.3)
MCH RBC QN AUTO: 25.3 PG (ref 26.6–33)
MCHC RBC AUTO-ENTMCNC: 31.3 G/DL (ref 31.5–35.7)
MCV RBC AUTO: 80.7 FL (ref 79–97)
MONOCYTES # BLD AUTO: 0.49 10*3/MM3 (ref 0.1–0.9)
MONOCYTES NFR BLD AUTO: 8 % (ref 5–12)
MRSA DNA SPEC QL NAA+PROBE: POSITIVE
NEUTROPHILS NFR BLD AUTO: 2.94 10*3/MM3 (ref 1.7–7)
NEUTROPHILS NFR BLD AUTO: 47.9 % (ref 42.7–76)
NRBC BLD AUTO-RTO: 0 /100 WBC (ref 0–0.2)
PLAT MORPH BLD: NORMAL
PLATELET # BLD AUTO: 311 10*3/MM3 (ref 140–450)
PMV BLD AUTO: 9.9 FL (ref 6–12)
POTASSIUM SERPL-SCNC: 4.2 MMOL/L (ref 3.5–5.2)
QT INTERVAL: 424 MS
QTC INTERVAL: 467 MS
RBC # BLD AUTO: 4.67 10*6/MM3 (ref 3.77–5.28)
RBC MORPH BLD: NORMAL
SARS-COV-2 RNA PNL SPEC NAA+PROBE: NOT DETECTED
SODIUM SERPL-SCNC: 143 MMOL/L (ref 136–145)
WBC MORPH BLD: NORMAL
WBC NRBC COR # BLD: 6.13 10*3/MM3 (ref 3.4–10.8)

## 2022-05-06 PROCEDURE — 83036 HEMOGLOBIN GLYCOSYLATED A1C: CPT

## 2022-05-06 PROCEDURE — 93010 ELECTROCARDIOGRAM REPORT: CPT | Performed by: STUDENT IN AN ORGANIZED HEALTH CARE EDUCATION/TRAINING PROGRAM

## 2022-05-06 PROCEDURE — 85025 COMPLETE CBC W/AUTO DIFF WBC: CPT

## 2022-05-06 PROCEDURE — 93005 ELECTROCARDIOGRAM TRACING: CPT

## 2022-05-06 PROCEDURE — 80048 BASIC METABOLIC PNL TOTAL CA: CPT

## 2022-05-06 PROCEDURE — 85007 BL SMEAR W/DIFF WBC COUNT: CPT

## 2022-05-06 PROCEDURE — 36415 COLL VENOUS BLD VENIPUNCTURE: CPT

## 2022-05-06 PROCEDURE — U0004 COV-19 TEST NON-CDC HGH THRU: HCPCS

## 2022-05-06 PROCEDURE — C9803 HOPD COVID-19 SPEC COLLECT: HCPCS

## 2022-05-06 PROCEDURE — 87641 MR-STAPH DNA AMP PROBE: CPT

## 2022-05-06 RX ORDER — ASCORBIC ACID 500 MG
500 TABLET ORAL DAILY
COMMUNITY

## 2022-05-06 RX ORDER — ESTRADIOL 0.5 MG/1
0.5 TABLET ORAL DAILY
COMMUNITY
Start: 2022-03-11

## 2022-05-06 RX ORDER — CETIRIZINE HYDROCHLORIDE 10 MG/1
10 TABLET ORAL NIGHTLY
COMMUNITY

## 2022-05-06 RX ORDER — DIPHENHYDRAMINE HCL 25 MG
25 CAPSULE ORAL NIGHTLY
COMMUNITY

## 2022-05-06 NOTE — PAT
Patient viewed general PAT education video as instructed in their preoperative information received from their surgeon.  Patient stated the general PAT education video was viewed in its entirety and survey completed.  Copies of PAT general education handouts (Incentive Spirometry, Meds to Beds Program, Patient Belongings, Pre-op skin preparation instructions, Blood Glucose testing, Visitor policy, Surgery FAQ, Code H) distributed to patient if not printed. Education related to the PAT pass and skin preparation for surgery (if applicable) completed in PAT as a reinforcement to PAT education video. Patient instructed to return PAT pass provided today as well as completed skin preparation sheet (if applicable) on the day of procedure.     Additionally if patient had not viewed video yet but intended to view it at home or in our waiting area, then referred them to the handout with QR code/link provided during PAT visit.  Instructed patient to complete survey after viewing the video in its entirety.  Encouraged patient/family to read [facility] general education handouts thoroughly and notify PAT staff with any questions or concerns. Patient verbalized understanding of all information and priority content.    An arrival time for procedure was not given during PAT visit. If patient had any questions or concerns about their arrival time, they were instructed to contact their surgeon/physician.  Additionally, if the patient referred to an arrival time that was acquired from their my chart account, patient was encouraged to verify that time with their surgeon/physician.  NO arrival times given in Pre Admission Testing Department.    Patient instructed to drink 20 ounces (or until full) of Gatorade and it needs to be completed 1 hour (for Main OR patients) or 2 hours (scheduled  section patients) before given arrival time for procedure (NO RED Gatorade)    Patient verbalized understanding.    Patient to apply Chlorhexadine  wipes  to surgical area (as instructed) the night before procedure and the AM of procedure. Wipes provided.    Bactroban and Chlorhexidine Prescription prescribed by physician before PAT visit.  Verified with patient that medications were picked up from their pharmacy.  Written instructions given to patient during PAT visit.  Patient/family also instructed to complete skin prep checklist and return the checklist on the day of surgery to preoperative staff.  Patient/family verbalized understanding.    Patient denies any current skin issues.     It was noted during Pre Admission Testing that patient was wearing some form of fingernail polish (gel/regular) and/or acrylic/artificial nails.  Patient was told that polish and/or artificial nails must be removed for surgery.  If a patient had recent manicure, and would rather not remove polish or artificial nails. Then the minimum requirement is that the polish/artificial nails must be removed from the middle finger on each hand.  Patient verbalized understanding.    If patient was having surgery on an upper extremity, then the patient was instructed that fingernail polish/artificial fingernails must be removed for surgery.  NO EXCEPTIONS.  Patient verbalized understanding.    If patient was having surgery on a lower extremity, then the patient was instructed that toenail polish on both extremities must be removed for surgery.  NO EXCEPTIONS. Patient verbalized understanding.    COVID test performed in PAT.    Cardiac Clearance from Dr. Barillas from 2/16/22 in Epic and printed/placed on PAT chart.

## 2022-05-06 NOTE — PAT
Positive MRSA by PCR called to unit by lab and then Called to Tamara SANTIAGO, new orders received.

## 2022-05-08 ENCOUNTER — ANESTHESIA EVENT (OUTPATIENT)
Dept: PERIOP | Facility: HOSPITAL | Age: 77
End: 2022-05-08

## 2022-05-08 RX ORDER — SODIUM CHLORIDE 0.9 % (FLUSH) 0.9 %
10 SYRINGE (ML) INJECTION AS NEEDED
Status: CANCELLED | OUTPATIENT
Start: 2022-05-08

## 2022-05-08 RX ORDER — SODIUM CHLORIDE 0.9 % (FLUSH) 0.9 %
10 SYRINGE (ML) INJECTION EVERY 12 HOURS SCHEDULED
Status: CANCELLED | OUTPATIENT
Start: 2022-05-08

## 2022-05-09 ENCOUNTER — ANESTHESIA (OUTPATIENT)
Dept: PERIOP | Facility: HOSPITAL | Age: 77
End: 2022-05-09

## 2022-05-09 ENCOUNTER — APPOINTMENT (OUTPATIENT)
Dept: GENERAL RADIOLOGY | Facility: HOSPITAL | Age: 77
End: 2022-05-09

## 2022-05-09 ENCOUNTER — HOSPITAL ENCOUNTER (INPATIENT)
Facility: HOSPITAL | Age: 77
LOS: 4 days | Discharge: NURSING, REHAB OR LONG-TERM CARE | End: 2022-05-13
Attending: NEUROLOGICAL SURGERY | Admitting: NEUROLOGICAL SURGERY

## 2022-05-09 DIAGNOSIS — M48.062 SPINAL STENOSIS OF LUMBAR REGION WITH NEUROGENIC CLAUDICATION: ICD-10-CM

## 2022-05-09 LAB
GLUCOSE BLDC GLUCOMTR-MCNC: 110 MG/DL (ref 70–130)
GLUCOSE BLDC GLUCOMTR-MCNC: 136 MG/DL (ref 70–130)

## 2022-05-09 PROCEDURE — 22632 ARTHRD PST TQ 1NTRSPC LM EA: CPT | Performed by: PHYSICIAN ASSISTANT

## 2022-05-09 PROCEDURE — 82962 GLUCOSE BLOOD TEST: CPT

## 2022-05-09 PROCEDURE — C1713 ANCHOR/SCREW BN/BN,TIS/BN: HCPCS | Performed by: NEUROLOGICAL SURGERY

## 2022-05-09 PROCEDURE — 25010000002 VANCOMYCIN 1 G RECONSTITUTED SOLUTION: Performed by: NEUROLOGICAL SURGERY

## 2022-05-09 PROCEDURE — 63047 LAM FACETEC & FORAMOT LUMBAR: CPT | Performed by: PHYSICIAN ASSISTANT

## 2022-05-09 PROCEDURE — 22842 INSERT SPINE FIXATION DEVICE: CPT | Performed by: NEUROLOGICAL SURGERY

## 2022-05-09 PROCEDURE — 63048 LAM FACETEC &FORAMOT EA ADDL: CPT | Performed by: NEUROLOGICAL SURGERY

## 2022-05-09 PROCEDURE — 76000 FLUOROSCOPY <1 HR PHYS/QHP: CPT

## 2022-05-09 PROCEDURE — 61783 SCAN PROC SPINAL: CPT | Performed by: NEUROLOGICAL SURGERY

## 2022-05-09 PROCEDURE — 22853 INSJ BIOMECHANICAL DEVICE: CPT | Performed by: PHYSICIAN ASSISTANT

## 2022-05-09 PROCEDURE — 63052 LAM FACETC/FRMT ARTHRD LUM 1: CPT | Performed by: NEUROLOGICAL SURGERY

## 2022-05-09 PROCEDURE — 63053 LAM FACTC/FRMT ARTHRD LUM EA: CPT | Performed by: PHYSICIAN ASSISTANT

## 2022-05-09 PROCEDURE — 25010000002 VANCOMYCIN 10 G RECONSTITUTED SOLUTION: Performed by: PHYSICIAN ASSISTANT

## 2022-05-09 PROCEDURE — 22842 INSERT SPINE FIXATION DEVICE: CPT | Performed by: PHYSICIAN ASSISTANT

## 2022-05-09 PROCEDURE — 22630 ARTHRD PST TQ 1NTRSPC LUM: CPT | Performed by: NEUROLOGICAL SURGERY

## 2022-05-09 PROCEDURE — 22853 INSJ BIOMECHANICAL DEVICE: CPT | Performed by: NEUROLOGICAL SURGERY

## 2022-05-09 PROCEDURE — 63052 LAM FACETC/FRMT ARTHRD LUM 1: CPT | Performed by: PHYSICIAN ASSISTANT

## 2022-05-09 PROCEDURE — 25010000002 DEXAMETHASONE PER 1 MG: Performed by: NURSE ANESTHETIST, CERTIFIED REGISTERED

## 2022-05-09 PROCEDURE — 0SG30AJ FUSION OF LUMBOSACRAL JOINT WITH INTERBODY FUSION DEVICE, POSTERIOR APPROACH, ANTERIOR COLUMN, OPEN APPROACH: ICD-10-PCS | Performed by: NEUROLOGICAL SURGERY

## 2022-05-09 PROCEDURE — 25010000002 ONDANSETRON PER 1 MG: Performed by: NURSE ANESTHETIST, CERTIFIED REGISTERED

## 2022-05-09 PROCEDURE — 25010000002 NEOSTIGMINE 10 MG/10ML SOLUTION: Performed by: NURSE ANESTHETIST, CERTIFIED REGISTERED

## 2022-05-09 PROCEDURE — 22632 ARTHRD PST TQ 1NTRSPC LM EA: CPT | Performed by: NEUROLOGICAL SURGERY

## 2022-05-09 PROCEDURE — 63048 LAM FACETEC &FORAMOT EA ADDL: CPT | Performed by: PHYSICIAN ASSISTANT

## 2022-05-09 PROCEDURE — 25010000002 FENTANYL CITRATE (PF) 50 MCG/ML SOLUTION: Performed by: NURSE ANESTHETIST, CERTIFIED REGISTERED

## 2022-05-09 PROCEDURE — 25010000002 PROPOFOL 10 MG/ML EMULSION: Performed by: NURSE ANESTHETIST, CERTIFIED REGISTERED

## 2022-05-09 PROCEDURE — 22630 ARTHRD PST TQ 1NTRSPC LUM: CPT | Performed by: PHYSICIAN ASSISTANT

## 2022-05-09 PROCEDURE — 25010000002 FENTANYL CITRATE (PF) 50 MCG/ML SOLUTION

## 2022-05-09 PROCEDURE — 63047 LAM FACETEC & FORAMOT LUMBAR: CPT | Performed by: NEUROLOGICAL SURGERY

## 2022-05-09 PROCEDURE — 25010000002 VANCOMYCIN 10 G RECONSTITUTED SOLUTION: Performed by: NEUROLOGICAL SURGERY

## 2022-05-09 PROCEDURE — 63053 LAM FACTC/FRMT ARTHRD LUM EA: CPT | Performed by: NEUROLOGICAL SURGERY

## 2022-05-09 PROCEDURE — 0SG00AJ FUSION OF LUMBAR VERTEBRAL JOINT WITH INTERBODY FUSION DEVICE, POSTERIOR APPROACH, ANTERIOR COLUMN, OPEN APPROACH: ICD-10-PCS | Performed by: NEUROLOGICAL SURGERY

## 2022-05-09 DEVICE — HEMOST ABS SURGIFOAM SZ100 8X12 10MM: Type: IMPLANTABLE DEVICE | Site: SPINE LUMBAR | Status: FUNCTIONAL

## 2022-05-09 DEVICE — SPACER 3992209 22MM X 9MM
Type: IMPLANTABLE DEVICE | Site: SPINE LUMBAR | Status: FUNCTIONAL
Brand: CAPSTONE PTC™ SPINAL SYSTEM

## 2022-05-09 DEVICE — DBM T44145 5CC ORTHOBLEND SMALL DEFGRAFT
Type: IMPLANTABLE DEVICE | Site: SPINE LUMBAR | Status: FUNCTIONAL
Brand: GRAFTON®AND GRAFTON PLUS®DEMINERALIZED BONE MATRIX (DBM)

## 2022-05-09 DEVICE — SET SCREW 5540030 5.5 TI NS BRK OFF
Type: IMPLANTABLE DEVICE | Site: SPINE LUMBAR | Status: FUNCTIONAL
Brand: CD HORIZON® SPINAL SYSTEM

## 2022-05-09 DEVICE — FLOSEAL HEMOSTATIC MATRIX, 10ML
Type: IMPLANTABLE DEVICE | Site: SPINE LUMBAR | Status: FUNCTIONAL
Brand: FLOSEAL HEMOSTATIC MATRIX

## 2022-05-09 RX ORDER — LEVOTHYROXINE SODIUM 0.03 MG/1
25 TABLET ORAL DAILY
Status: DISCONTINUED | OUTPATIENT
Start: 2022-05-09 | End: 2022-05-13 | Stop reason: HOSPADM

## 2022-05-09 RX ORDER — PROMETHAZINE HYDROCHLORIDE 12.5 MG/1
12.5 SUPPOSITORY RECTAL EVERY 6 HOURS PRN
Status: DISCONTINUED | OUTPATIENT
Start: 2022-05-09 | End: 2022-05-13 | Stop reason: HOSPADM

## 2022-05-09 RX ORDER — GLYCOPYRROLATE 0.2 MG/ML
INJECTION INTRAMUSCULAR; INTRAVENOUS AS NEEDED
Status: DISCONTINUED | OUTPATIENT
Start: 2022-05-09 | End: 2022-05-09 | Stop reason: SURG

## 2022-05-09 RX ORDER — FENTANYL CITRATE 50 UG/ML
INJECTION, SOLUTION INTRAMUSCULAR; INTRAVENOUS
Status: COMPLETED
Start: 2022-05-09 | End: 2022-05-09

## 2022-05-09 RX ORDER — NITROGLYCERIN 400 UG/1
1 SPRAY ORAL
Status: DISCONTINUED | OUTPATIENT
Start: 2022-05-09 | End: 2022-05-13 | Stop reason: HOSPADM

## 2022-05-09 RX ORDER — SODIUM CHLORIDE 0.9 % (FLUSH) 0.9 %
10 SYRINGE (ML) INJECTION EVERY 12 HOURS SCHEDULED
Status: DISCONTINUED | OUTPATIENT
Start: 2022-05-09 | End: 2022-05-13 | Stop reason: HOSPADM

## 2022-05-09 RX ORDER — AMOXICILLIN 250 MG
2 CAPSULE ORAL NIGHTLY PRN
Status: DISCONTINUED | OUTPATIENT
Start: 2022-05-09 | End: 2022-05-13 | Stop reason: HOSPADM

## 2022-05-09 RX ORDER — BUPIVACAINE HCL/0.9 % NACL/PF 0.125 %
PLASTIC BAG, INJECTION (ML) EPIDURAL AS NEEDED
Status: DISCONTINUED | OUTPATIENT
Start: 2022-05-09 | End: 2022-05-09 | Stop reason: SURG

## 2022-05-09 RX ORDER — PROMETHAZINE HYDROCHLORIDE 25 MG/1
25 TABLET ORAL ONCE AS NEEDED
Status: DISCONTINUED | OUTPATIENT
Start: 2022-05-09 | End: 2022-05-09

## 2022-05-09 RX ORDER — NEOSTIGMINE METHYLSULFATE 1 MG/ML
INJECTION, SOLUTION INTRAVENOUS AS NEEDED
Status: DISCONTINUED | OUTPATIENT
Start: 2022-05-09 | End: 2022-05-09 | Stop reason: SURG

## 2022-05-09 RX ORDER — NEBIVOLOL 5 MG/1
5 TABLET ORAL EVERY EVENING
Status: DISCONTINUED | OUTPATIENT
Start: 2022-05-09 | End: 2022-05-13 | Stop reason: HOSPADM

## 2022-05-09 RX ORDER — MAGNESIUM HYDROXIDE 1200 MG/15ML
LIQUID ORAL AS NEEDED
Status: DISCONTINUED | OUTPATIENT
Start: 2022-05-09 | End: 2022-05-09 | Stop reason: HOSPADM

## 2022-05-09 RX ORDER — NALOXONE HCL 0.4 MG/ML
0.4 VIAL (ML) INJECTION
Status: DISCONTINUED | OUTPATIENT
Start: 2022-05-09 | End: 2022-05-13 | Stop reason: HOSPADM

## 2022-05-09 RX ORDER — ONDANSETRON 4 MG/1
4 TABLET, FILM COATED ORAL EVERY 6 HOURS PRN
Status: DISCONTINUED | OUTPATIENT
Start: 2022-05-09 | End: 2022-05-13 | Stop reason: HOSPADM

## 2022-05-09 RX ORDER — AMLODIPINE BESYLATE 5 MG/1
5 TABLET ORAL EVERY EVENING
Status: DISCONTINUED | OUTPATIENT
Start: 2022-05-09 | End: 2022-05-13 | Stop reason: HOSPADM

## 2022-05-09 RX ORDER — LIDOCAINE HYDROCHLORIDE 10 MG/ML
INJECTION, SOLUTION EPIDURAL; INFILTRATION; INTRACAUDAL; PERINEURAL AS NEEDED
Status: DISCONTINUED | OUTPATIENT
Start: 2022-05-09 | End: 2022-05-09 | Stop reason: SURG

## 2022-05-09 RX ORDER — SODIUM CHLORIDE 0.9 % (FLUSH) 0.9 %
10 SYRINGE (ML) INJECTION AS NEEDED
Status: DISCONTINUED | OUTPATIENT
Start: 2022-05-09 | End: 2022-05-13 | Stop reason: HOSPADM

## 2022-05-09 RX ORDER — DIPHENHYDRAMINE HCL 25 MG
25 CAPSULE ORAL NIGHTLY PRN
Status: DISCONTINUED | OUTPATIENT
Start: 2022-05-09 | End: 2022-05-13 | Stop reason: HOSPADM

## 2022-05-09 RX ORDER — NALOXONE HCL 0.4 MG/ML
0.4 VIAL (ML) INJECTION AS NEEDED
Status: DISCONTINUED | OUTPATIENT
Start: 2022-05-09 | End: 2022-05-09

## 2022-05-09 RX ORDER — LIDOCAINE HYDROCHLORIDE 10 MG/ML
0.5 INJECTION, SOLUTION EPIDURAL; INFILTRATION; INTRACAUDAL; PERINEURAL ONCE AS NEEDED
Status: COMPLETED | OUTPATIENT
Start: 2022-05-09 | End: 2022-05-09

## 2022-05-09 RX ORDER — ROCURONIUM BROMIDE 10 MG/ML
INJECTION, SOLUTION INTRAVENOUS AS NEEDED
Status: DISCONTINUED | OUTPATIENT
Start: 2022-05-09 | End: 2022-05-09 | Stop reason: SURG

## 2022-05-09 RX ORDER — IPRATROPIUM BROMIDE AND ALBUTEROL SULFATE 2.5; .5 MG/3ML; MG/3ML
3 SOLUTION RESPIRATORY (INHALATION) ONCE AS NEEDED
Status: DISCONTINUED | OUTPATIENT
Start: 2022-05-09 | End: 2022-05-09

## 2022-05-09 RX ORDER — FAMOTIDINE 20 MG/1
20 TABLET, FILM COATED ORAL ONCE
Status: COMPLETED | OUTPATIENT
Start: 2022-05-09 | End: 2022-05-09

## 2022-05-09 RX ORDER — SODIUM CHLORIDE, SODIUM LACTATE, POTASSIUM CHLORIDE, CALCIUM CHLORIDE 600; 310; 30; 20 MG/100ML; MG/100ML; MG/100ML; MG/100ML
9 INJECTION, SOLUTION INTRAVENOUS CONTINUOUS
Status: DISCONTINUED | OUTPATIENT
Start: 2022-05-09 | End: 2022-05-13 | Stop reason: HOSPADM

## 2022-05-09 RX ORDER — PANTOPRAZOLE SODIUM 40 MG/1
40 TABLET, DELAYED RELEASE ORAL EVERY MORNING
Status: DISCONTINUED | OUTPATIENT
Start: 2022-05-10 | End: 2022-05-13 | Stop reason: HOSPADM

## 2022-05-09 RX ORDER — EPHEDRINE SULFATE 50 MG/ML
INJECTION, SOLUTION INTRAVENOUS AS NEEDED
Status: DISCONTINUED | OUTPATIENT
Start: 2022-05-09 | End: 2022-05-09 | Stop reason: SURG

## 2022-05-09 RX ORDER — DROPERIDOL 2.5 MG/ML
0.62 INJECTION, SOLUTION INTRAMUSCULAR; INTRAVENOUS ONCE AS NEEDED
Status: DISCONTINUED | OUTPATIENT
Start: 2022-05-09 | End: 2022-05-09

## 2022-05-09 RX ORDER — FENTANYL CITRATE 50 UG/ML
50 INJECTION, SOLUTION INTRAMUSCULAR; INTRAVENOUS
Status: DISCONTINUED | OUTPATIENT
Start: 2022-05-09 | End: 2022-05-09

## 2022-05-09 RX ORDER — FENOFIBRATE 48 MG/1
48 TABLET, COATED ORAL DAILY
Refills: 3 | Status: DISCONTINUED | OUTPATIENT
Start: 2022-05-09 | End: 2022-05-13 | Stop reason: HOSPADM

## 2022-05-09 RX ORDER — ESTRADIOL 0.5 MG/1
0.5 TABLET ORAL DAILY
Status: DISCONTINUED | OUTPATIENT
Start: 2022-05-09 | End: 2022-05-13 | Stop reason: HOSPADM

## 2022-05-09 RX ORDER — SODIUM CHLORIDE 9 MG/ML
INJECTION, SOLUTION INTRAVENOUS AS NEEDED
Status: DISCONTINUED | OUTPATIENT
Start: 2022-05-09 | End: 2022-05-09 | Stop reason: HOSPADM

## 2022-05-09 RX ORDER — NICOTINE POLACRILEX 4 MG
15 LOZENGE BUCCAL
Status: DISCONTINUED | OUTPATIENT
Start: 2022-05-09 | End: 2022-05-13 | Stop reason: HOSPADM

## 2022-05-09 RX ORDER — SODIUM CHLORIDE, SODIUM LACTATE, POTASSIUM CHLORIDE, CALCIUM CHLORIDE 600; 310; 30; 20 MG/100ML; MG/100ML; MG/100ML; MG/100ML
100 INJECTION, SOLUTION INTRAVENOUS CONTINUOUS
Status: DISCONTINUED | OUTPATIENT
Start: 2022-05-09 | End: 2022-05-10

## 2022-05-09 RX ORDER — BISACODYL 10 MG
10 SUPPOSITORY, RECTAL RECTAL DAILY PRN
Status: DISCONTINUED | OUTPATIENT
Start: 2022-05-09 | End: 2022-05-13 | Stop reason: HOSPADM

## 2022-05-09 RX ORDER — FLUTICASONE PROPIONATE 50 MCG
2 SPRAY, SUSPENSION (ML) NASAL DAILY
Status: DISCONTINUED | OUTPATIENT
Start: 2022-05-09 | End: 2022-05-13 | Stop reason: HOSPADM

## 2022-05-09 RX ORDER — ONDANSETRON 2 MG/ML
4 INJECTION INTRAMUSCULAR; INTRAVENOUS ONCE AS NEEDED
Status: DISCONTINUED | OUTPATIENT
Start: 2022-05-09 | End: 2022-05-09

## 2022-05-09 RX ORDER — ASPIRIN 325 MG
325 TABLET ORAL NIGHTLY
Status: DISCONTINUED | OUTPATIENT
Start: 2022-05-09 | End: 2022-05-13 | Stop reason: HOSPADM

## 2022-05-09 RX ORDER — LABETALOL HYDROCHLORIDE 5 MG/ML
5 INJECTION, SOLUTION INTRAVENOUS
Status: DISCONTINUED | OUTPATIENT
Start: 2022-05-09 | End: 2022-05-09

## 2022-05-09 RX ORDER — BUDESONIDE AND FORMOTEROL FUMARATE DIHYDRATE 80; 4.5 UG/1; UG/1
2 AEROSOL RESPIRATORY (INHALATION)
Status: DISCONTINUED | OUTPATIENT
Start: 2022-05-09 | End: 2022-05-13 | Stop reason: HOSPADM

## 2022-05-09 RX ORDER — DEXTROSE MONOHYDRATE 25 G/50ML
25 INJECTION, SOLUTION INTRAVENOUS
Status: DISCONTINUED | OUTPATIENT
Start: 2022-05-09 | End: 2022-05-13 | Stop reason: HOSPADM

## 2022-05-09 RX ORDER — MIDAZOLAM HYDROCHLORIDE 1 MG/ML
0.5 INJECTION INTRAMUSCULAR; INTRAVENOUS
Status: DISCONTINUED | OUTPATIENT
Start: 2022-05-09 | End: 2022-05-09 | Stop reason: HOSPADM

## 2022-05-09 RX ORDER — HYDROMORPHONE HYDROCHLORIDE 1 MG/ML
0.5 INJECTION, SOLUTION INTRAMUSCULAR; INTRAVENOUS; SUBCUTANEOUS
Status: DISCONTINUED | OUTPATIENT
Start: 2022-05-09 | End: 2022-05-09

## 2022-05-09 RX ORDER — CETIRIZINE HYDROCHLORIDE 10 MG/1
5 TABLET ORAL NIGHTLY
Status: DISCONTINUED | OUTPATIENT
Start: 2022-05-09 | End: 2022-05-13 | Stop reason: HOSPADM

## 2022-05-09 RX ORDER — PROMETHAZINE HYDROCHLORIDE 25 MG/1
25 SUPPOSITORY RECTAL ONCE AS NEEDED
Status: DISCONTINUED | OUTPATIENT
Start: 2022-05-09 | End: 2022-05-09

## 2022-05-09 RX ORDER — INSULIN LISPRO 100 [IU]/ML
0-7 INJECTION, SOLUTION INTRAVENOUS; SUBCUTANEOUS
Status: DISCONTINUED | OUTPATIENT
Start: 2022-05-09 | End: 2022-05-13 | Stop reason: HOSPADM

## 2022-05-09 RX ORDER — TRAMADOL HYDROCHLORIDE 50 MG/1
50 TABLET ORAL EVERY 6 HOURS PRN
Status: DISCONTINUED | OUTPATIENT
Start: 2022-05-09 | End: 2022-05-13 | Stop reason: HOSPADM

## 2022-05-09 RX ORDER — FENTANYL CITRATE 50 UG/ML
INJECTION, SOLUTION INTRAMUSCULAR; INTRAVENOUS AS NEEDED
Status: DISCONTINUED | OUTPATIENT
Start: 2022-05-09 | End: 2022-05-09 | Stop reason: SURG

## 2022-05-09 RX ORDER — SODIUM CHLORIDE 0.9 % (FLUSH) 0.9 %
3-10 SYRINGE (ML) INJECTION AS NEEDED
Status: DISCONTINUED | OUTPATIENT
Start: 2022-05-09 | End: 2022-05-09

## 2022-05-09 RX ORDER — OXYCODONE AND ACETAMINOPHEN 7.5; 325 MG/1; MG/1
1 TABLET ORAL EVERY 4 HOURS PRN
Status: DISCONTINUED | OUTPATIENT
Start: 2022-05-09 | End: 2022-05-13 | Stop reason: HOSPADM

## 2022-05-09 RX ORDER — ACETAMINOPHEN 325 MG/1
650 TABLET ORAL 3 TIMES DAILY
Status: DISCONTINUED | OUTPATIENT
Start: 2022-05-09 | End: 2022-05-13 | Stop reason: HOSPADM

## 2022-05-09 RX ORDER — HYDROCHLOROTHIAZIDE 12.5 MG/1
12.5 CAPSULE, GELATIN COATED ORAL
Status: DISCONTINUED | OUTPATIENT
Start: 2022-05-09 | End: 2022-05-13 | Stop reason: HOSPADM

## 2022-05-09 RX ORDER — PROPOFOL 10 MG/ML
VIAL (ML) INTRAVENOUS AS NEEDED
Status: DISCONTINUED | OUTPATIENT
Start: 2022-05-09 | End: 2022-05-09 | Stop reason: SURG

## 2022-05-09 RX ORDER — DROPERIDOL 2.5 MG/ML
0.62 INJECTION, SOLUTION INTRAMUSCULAR; INTRAVENOUS AS NEEDED
Status: DISCONTINUED | OUTPATIENT
Start: 2022-05-09 | End: 2022-05-09

## 2022-05-09 RX ORDER — ONDANSETRON 2 MG/ML
INJECTION INTRAMUSCULAR; INTRAVENOUS AS NEEDED
Status: DISCONTINUED | OUTPATIENT
Start: 2022-05-09 | End: 2022-05-09 | Stop reason: SURG

## 2022-05-09 RX ORDER — VANCOMYCIN HYDROCHLORIDE 1 G/20ML
INJECTION, POWDER, LYOPHILIZED, FOR SOLUTION INTRAVENOUS AS NEEDED
Status: DISCONTINUED | OUTPATIENT
Start: 2022-05-09 | End: 2022-05-09 | Stop reason: HOSPADM

## 2022-05-09 RX ORDER — SODIUM CHLORIDE, SODIUM LACTATE, POTASSIUM CHLORIDE, CALCIUM CHLORIDE 600; 310; 30; 20 MG/100ML; MG/100ML; MG/100ML; MG/100ML
90 INJECTION, SOLUTION INTRAVENOUS CONTINUOUS
Status: DISCONTINUED | OUTPATIENT
Start: 2022-05-09 | End: 2022-05-13 | Stop reason: HOSPADM

## 2022-05-09 RX ORDER — ONDANSETRON 2 MG/ML
4 INJECTION INTRAMUSCULAR; INTRAVENOUS EVERY 6 HOURS PRN
Status: DISCONTINUED | OUTPATIENT
Start: 2022-05-09 | End: 2022-05-13 | Stop reason: HOSPADM

## 2022-05-09 RX ORDER — DEXAMETHASONE SODIUM PHOSPHATE 4 MG/ML
INJECTION, SOLUTION INTRA-ARTICULAR; INTRALESIONAL; INTRAMUSCULAR; INTRAVENOUS; SOFT TISSUE AS NEEDED
Status: DISCONTINUED | OUTPATIENT
Start: 2022-05-09 | End: 2022-05-09 | Stop reason: SURG

## 2022-05-09 RX ORDER — DIPHENHYDRAMINE HCL 25 MG
25 CAPSULE ORAL EVERY 6 HOURS PRN
Status: DISCONTINUED | OUTPATIENT
Start: 2022-05-09 | End: 2022-05-13 | Stop reason: HOSPADM

## 2022-05-09 RX ORDER — PROMETHAZINE HYDROCHLORIDE 12.5 MG/1
12.5 TABLET ORAL EVERY 6 HOURS PRN
Status: DISCONTINUED | OUTPATIENT
Start: 2022-05-09 | End: 2022-05-13 | Stop reason: HOSPADM

## 2022-05-09 RX ORDER — SODIUM CHLORIDE 0.9 % (FLUSH) 0.9 %
3 SYRINGE (ML) INJECTION EVERY 12 HOURS SCHEDULED
Status: DISCONTINUED | OUTPATIENT
Start: 2022-05-09 | End: 2022-05-09

## 2022-05-09 RX ORDER — DOCUSATE SODIUM 100 MG/1
100 CAPSULE, LIQUID FILLED ORAL 2 TIMES DAILY PRN
Status: DISCONTINUED | OUTPATIENT
Start: 2022-05-09 | End: 2022-05-13 | Stop reason: HOSPADM

## 2022-05-09 RX ORDER — TIZANIDINE 4 MG/1
2 TABLET ORAL 2 TIMES DAILY PRN
Status: DISCONTINUED | OUTPATIENT
Start: 2022-05-09 | End: 2022-05-13 | Stop reason: HOSPADM

## 2022-05-09 RX ORDER — DULOXETIN HYDROCHLORIDE 60 MG/1
60 CAPSULE, DELAYED RELEASE ORAL DAILY
Status: DISCONTINUED | OUTPATIENT
Start: 2022-05-09 | End: 2022-05-13 | Stop reason: HOSPADM

## 2022-05-09 RX ORDER — BUPIVACAINE HYDROCHLORIDE AND EPINEPHRINE 2.5; 5 MG/ML; UG/ML
INJECTION, SOLUTION EPIDURAL; INFILTRATION; INTRACAUDAL; PERINEURAL AS NEEDED
Status: DISCONTINUED | OUTPATIENT
Start: 2022-05-09 | End: 2022-05-09 | Stop reason: HOSPADM

## 2022-05-09 RX ADMIN — PROPOFOL 25 MCG/KG/MIN: 10 INJECTION, EMULSION INTRAVENOUS at 10:44

## 2022-05-09 RX ADMIN — DULOXETINE HYDROCHLORIDE 60 MG: 60 CAPSULE, DELAYED RELEASE ORAL at 17:09

## 2022-05-09 RX ADMIN — Medication 10 ML: at 17:14

## 2022-05-09 RX ADMIN — Medication 100 MCG: at 11:24

## 2022-05-09 RX ADMIN — GLYCOPYRROLATE 0.4 MG: 0.2 INJECTION INTRAMUSCULAR; INTRAVENOUS at 13:36

## 2022-05-09 RX ADMIN — FENTANYL CITRATE 100 MCG: 50 INJECTION, SOLUTION INTRAMUSCULAR; INTRAVENOUS at 10:13

## 2022-05-09 RX ADMIN — SODIUM CHLORIDE, POTASSIUM CHLORIDE, SODIUM LACTATE AND CALCIUM CHLORIDE 9 ML/HR: 600; 310; 30; 20 INJECTION, SOLUTION INTRAVENOUS at 08:48

## 2022-05-09 RX ADMIN — FENOFIBRATE 48 MG: 48 TABLET ORAL at 17:09

## 2022-05-09 RX ADMIN — Medication 1250 MG: at 08:55

## 2022-05-09 RX ADMIN — ROCURONIUM BROMIDE 10 MG: 10 INJECTION, SOLUTION INTRAVENOUS at 11:45

## 2022-05-09 RX ADMIN — LIDOCAINE HYDROCHLORIDE 0.5 ML: 10 INJECTION, SOLUTION EPIDURAL; INFILTRATION; INTRACAUDAL; PERINEURAL at 08:48

## 2022-05-09 RX ADMIN — Medication 100 MCG: at 11:37

## 2022-05-09 RX ADMIN — FENTANYL CITRATE 50 MCG: 50 INJECTION, SOLUTION INTRAMUSCULAR; INTRAVENOUS at 14:36

## 2022-05-09 RX ADMIN — OXYCODONE HYDROCHLORIDE AND ACETAMINOPHEN 1 TABLET: 7.5; 325 TABLET ORAL at 17:09

## 2022-05-09 RX ADMIN — ONDANSETRON 4 MG: 2 INJECTION INTRAMUSCULAR; INTRAVENOUS at 13:36

## 2022-05-09 RX ADMIN — FAMOTIDINE 20 MG: 20 TABLET ORAL at 08:48

## 2022-05-09 RX ADMIN — ACETAMINOPHEN 650 MG: 325 TABLET ORAL at 17:09

## 2022-05-09 RX ADMIN — EPHEDRINE SULFATE 10 MG: 50 INJECTION INTRAVENOUS at 11:09

## 2022-05-09 RX ADMIN — CETIRIZINE HYDROCHLORIDE 5 MG: 10 TABLET, FILM COATED ORAL at 20:20

## 2022-05-09 RX ADMIN — Medication 200 MCG: at 11:10

## 2022-05-09 RX ADMIN — SODIUM CHLORIDE, POTASSIUM CHLORIDE, SODIUM LACTATE AND CALCIUM CHLORIDE: 600; 310; 30; 20 INJECTION, SOLUTION INTRAVENOUS at 12:00

## 2022-05-09 RX ADMIN — NEOSTIGMINE METHYLSULFATE 3 MG: 0.5 INJECTION INTRAVENOUS at 13:36

## 2022-05-09 RX ADMIN — HYDROCHLOROTHIAZIDE 12.5 MG: 12.5 CAPSULE ORAL at 18:39

## 2022-05-09 RX ADMIN — VANCOMYCIN HYDROCHLORIDE 1250 MG: 10 INJECTION, POWDER, LYOPHILIZED, FOR SOLUTION INTRAVENOUS at 17:10

## 2022-05-09 RX ADMIN — FENTANYL CITRATE 50 MCG: 50 INJECTION, SOLUTION INTRAMUSCULAR; INTRAVENOUS at 15:33

## 2022-05-09 RX ADMIN — ROCURONIUM BROMIDE 50 MG: 10 INJECTION, SOLUTION INTRAVENOUS at 10:13

## 2022-05-09 RX ADMIN — ESTRADIOL 0.5 MG: 0.5 TABLET ORAL at 17:12

## 2022-05-09 RX ADMIN — ACETAMINOPHEN 650 MG: 325 TABLET ORAL at 20:20

## 2022-05-09 RX ADMIN — DEXAMETHASONE SODIUM PHOSPHATE 8 MG: 4 INJECTION, SOLUTION INTRA-ARTICULAR; INTRALESIONAL; INTRAMUSCULAR; INTRAVENOUS; SOFT TISSUE at 10:17

## 2022-05-09 RX ADMIN — AMLODIPINE BESYLATE 5 MG: 5 TABLET ORAL at 17:09

## 2022-05-09 RX ADMIN — PROPOFOL 150 MG: 10 INJECTION, EMULSION INTRAVENOUS at 10:13

## 2022-05-09 RX ADMIN — NEBIVOLOL 5 MG: 5 TABLET ORAL at 17:09

## 2022-05-09 RX ADMIN — FLUTICASONE PROPIONATE 2 SPRAY: 50 SPRAY, METERED NASAL at 20:21

## 2022-05-09 RX ADMIN — ASPIRIN 325 MG ORAL TABLET 325 MG: 325 PILL ORAL at 20:20

## 2022-05-09 RX ADMIN — ROCURONIUM BROMIDE 10 MG: 10 INJECTION, SOLUTION INTRAVENOUS at 12:45

## 2022-05-09 RX ADMIN — LIDOCAINE HYDROCHLORIDE 50 MG: 10 INJECTION, SOLUTION EPIDURAL; INFILTRATION; INTRACAUDAL; PERINEURAL at 10:13

## 2022-05-09 RX ADMIN — SODIUM CHLORIDE, POTASSIUM CHLORIDE, SODIUM LACTATE AND CALCIUM CHLORIDE 90 ML/HR: 600; 310; 30; 20 INJECTION, SOLUTION INTRAVENOUS at 17:15

## 2022-05-09 NOTE — ANESTHESIA PREPROCEDURE EVALUATION
Anesthesia Evaluation     Patient summary reviewed and Nursing notes reviewed   history of anesthetic complications (h/o awareness during induction of anesthesia):  NPO Solid Status: > 8 hours  NPO Liquid Status: > 2 hours           Airway   Mallampati: II  TM distance: >3 FB  Neck ROM: full  No difficulty expected  Dental - normal exam     Pulmonary - negative pulmonary ROS and normal exam     ROS comment: H/o covid  Cardiovascular - normal exam    ECG reviewed    (+) hypertension, CAD (medically managed), MALIK, hyperlipidemia,   (-) dysrhythmias, angina    ROS comment: Cardiac Clearance from Dr. Barillas from 2/16/22 in Epic and printed/placed on PAT chart low risk per Dr. Barillas report    Neuro/Psych  (+) headaches, psychiatric history Anxiety and Depression,    GI/Hepatic/Renal/Endo    (+) obesity,  GERD,  diabetes mellitus, thyroid problem hypothyroidism    Musculoskeletal     (+) back pain,       ROS comment: Spondylosis of lumbar region without myelopathy or radiculopathy  Abdominal    Substance History      OB/GYN          Other   arthritis,                      Anesthesia Plan    ASA 3     general     intravenous induction     Anesthetic plan, all risks, benefits, and alternatives have been provided, discussed and informed consent has been obtained with: patient.    Plan discussed with CRNA.        CODE STATUS:

## 2022-05-09 NOTE — OP NOTE
NEUROSURGICAL OPERATIVE NOTE        PREOPERATIVE DIAGNOSIS:    Lumbar stenosis with neurogenic claudication  Lumbar spondylolisthesis with instability      POSTOPERATIVE DIAGNOSIS:  Same      PROCEDURE:  1.  Arthrodesis interbody type L4-5 and L5-S1  2.  L4-5 and L5-S1 laminectomies with partial facetectomies and foraminotomies  3.  Bilateral L4-5 and L5-S1 discectomies with bilateral Capstone cage placement at each level  4.  L2-3 and L3-4 laminectomies with medial facetectomies and foraminotomies  5.  Segmental pedicle screw fixation L4-S1 utilizing Solera 5.5  6.  Use of [place] and local autograft  7.  Stealth frameless stereotaxy utilized in conjunction with O arm imaging      SURGEON:  Humberto Correa M.D.      ASSISTANT: Jaylin Carbajal PA-C    PAC assisted with:   Suctioning   Retraction   Tying   Suturing   Closing   Application of dressing   Skilled neurosurgery PA assistance was necessary to perform this procedure.        ANESTHESIA:  General      ESTIMATED BLOOD LOSS: 50 mL      SPECIMEN: None      DRAINS: Hemovac      COMPLICATIONS:  None      CLINICAL NOTE:  The patient is a 76-year-old woman with progressive back and lower extremity pain with walking and standing intolerance.  Studies demonstrate multilevel lumbar stenosis.  She also has a spondylolisthesis of L4 and L5 and L5 on S1 with evidence of instability.  As such, she presents at this time for multilevel decompression and numerous levels with fusion and stabilization L4-S1.  The nature of the procedure as well as the potential risks, complications, limitations, and alternatives to the procedure were discussed at length with the patient and the patient has agreed to proceed with surgery.      TECHNICAL NOTE:  The patient was brought to the operating room and while on her cart general endotracheal anesthesia was achieved. She was then turned prone onto the Aidan table and special care was ensured to protect pressure points. Her low back  was prepared and draped in the usual fashion. A localizing radiograph was obtained with a spinal needle in the lumbosacral midline utilizing the C-arm. Based on this an incision was fashioned extending from L12 to S1. Underlying tissues were divided with cautery to provide exposure to the posterior spinal elements. A reference frame was affixed to the spinous process and O-arm imaging ensued. These images were downloaded into the [employer]. Using Stealth frameless stereotaxy each of the pedicle screw hole sites at L4, L5, and S1 bilaterally were marked, drilled and tapped; 6.5 mm diameter x 40 mm length screws were used bilaterally at L4, L5, and S1. Le[place]ell rongeur was then utilized to remove the spinous process at L2, L3, L4, L5, and the upper aspect of S1. Facet complexes were subtotally resected bilaterally at L4-L5 and L5-S1. At L2-L3 and L3-L4 the facet complexes were medially resected and undercut using drill and Kerrison punches. The neural foramina were widely decompressed. Bleeding points were controlled with bone wax and Floseal. Bone from the decompression had been harvested in a Lukens trap and later used as part of the fusion substrate. The C-arm was brought into use and beginning on the right at L5-S1 the disk was incised, evacuated piecemeal with an array of punches and rongeurs. Serial impacters were impacted into the disk space. Ultimately an 8 x 22 mm Capstone cage was impacted into the disk space. This had been filled with a combination of [place] and local autograft. Attention was turned to the L4-L5 level where similarly the disk space was prepared at this level and a 9 x 22 mm Capstone cage packed with diffusion substrate was impacted into place. Working from the left at L5-S1 the disk was incised and evacuated after preparing the disk space, some of the fusion substrate was placed anteriorly in the midline and then the second 8 x 22 mm cage was impacted into place.  The procedure was  repeated on the left at L4-L5 and once again after preparing the space and putting additional material anteriorly in the midline, a 9 x 22 mm Capstone cage was placed on the left side as well. Then 55 mm Solera rods were affixed to the screw heads on each side, set screws were applied, tightened and broken off per routine. The wound was washed out with saline solution. A Hemovac drain was brought in through a separate stab incision and left in the epidural space. Vancomycin powder was sprinkled in the depths and then more superficially as the wound was closed. The paraspinous muscle and fascia were reapproximated in interrupted fashion with 0 Vicryl suture. Marcaine 0.25% was instilled in the paraspinous musculature and subcutaneous tissues. The subcutaneous tissues were closed in layers with 2-0 followed by 3-0 Vicryl suture. The skin was closed in running subcuticular fashion with 3-0 Vicryl suture. Steri-Strips and sterile dressing were applied. The patient was subsequently rolled onto her cart, extubated and taken to recovery room in satisfactory condition.             Humberto Correa M.D.

## 2022-05-09 NOTE — PLAN OF CARE
Goal Outcome Evaluation:  Plan of Care Reviewed With: patient        Progress: improving       Pt VSS. RA. PRN Percocet for C/O of pain. Up to bedside commode with 2. Voided 300ml. 200ml out of hemovac. Dressing cdi.

## 2022-05-09 NOTE — ANESTHESIA PROCEDURE NOTES
Airway  Urgency: elective    Date/Time: 5/9/2022 10:16 AM  Airway not difficult    General Information and Staff    Patient location during procedure: OR  CRNA/CAA: Mitesh Collins CRNA    Indications and Patient Condition  Indications for airway management: airway protection    Preoxygenated: yes  MILS not maintained throughout  Mask difficulty assessment: 1 - vent by mask    Final Airway Details  Final airway type: endotracheal airway      Successful airway: ETT  Cuffed: yes   Successful intubation technique: direct laryngoscopy  Endotracheal tube insertion site: oral  Blade: Francisco  Blade size: 3  ETT size (mm): 7.0  Cormack-Lehane Classification: grade IIb - view of arytenoids or posterior of glottis only  Placement verified by: chest auscultation and capnometry   Measured from: lips  ETT/EBT  to lips (cm): 20  Number of attempts at approach: 1  Assessment: lips, teeth, and gum same as pre-op and atraumatic intubation    Additional Comments  Negative epigastric sounds, Breath sound equal bilaterally with symmetric chest rise and fall

## 2022-05-09 NOTE — ANESTHESIA POSTPROCEDURE EVALUATION
Patient: Coco Crockett    Procedure Summary     Date: 05/09/22 Room / Location:  [facility] /  [facility]    Anesthesia Start: 1008 Anesthesia Stop:     Procedure: Multilevel LUMBAR DECOMPRESSON L2-S1 with Fusion and Stabilization with PEDICLE SCREWS at L4-S1 (N/A Spine Lumbar) Diagnosis:       Spinal stenosis of lumbar region with neurogenic claudication      (Spinal stenosis of lumbar region with neurogenic claudication [M48.062])    Surgeons: Humberto Correa MD Provider: Louise Ritchie MD    Anesthesia Type: general ASA Status: 3          Anesthesia Type: general    Vitals  No vitals data found for the desired time range.  RR 18  HR 73 SR  /54  T 97.6F  SpO2 95% 2L NC        Post Anesthesia Care and Evaluation    Patient location during evaluation: PACU  Patient participation: complete - patient participated  Level of consciousness: awake and alert  Pain management: adequate  Airway patency: patent  Anesthetic complications: No anesthetic complications  PONV Status: none  Cardiovascular status: hemodynamically stable and acceptable  Respiratory status: nonlabored ventilation, acceptable and nasal cannula  Hydration status: acceptable

## 2022-05-09 NOTE — H&P
Pre-Op H&P  Coco Crockett  [id number]  1945      Chief complaint: Back pain      Subjective:  Patient is a 76 y.o.female presents for scheduled surgery by Dr. Correa. She anticipates a Multilevel LUMBAR DECOMPRESSON L2-S1 with Fusion and Stabilization with PEDICLE SCREWS at L5-S1 today. Her back has been painful for about a year. The pain radiates down RLE. She has occasional bowel and bladder incontinence. Symptoms are worse with twisting or trying to squat down.  She has taken some Zanaflex and tramadol which are helpful. She had lumbar CT 6/29/21 that showed Severe spinal stenosis at L2-3  and L4-5 with complete effacement of intrathecal contrast.      Review of Systems:  Constitutional-- No fever, chills or sweats. + fatigue.  CV-- No chest pain, palpitation or syncope. +HTN,HLD, CAD  +cardiac clearance  Resp-- No cough, hemoptysis. +SOB  Skin--No rashes or lesions      Allergies:   Allergies   Allergen Reactions   • Keflex [Cephalexin] Itching, Swelling and Angioedema          • Imdur [Isosorbide Dinitrate] Headache     Severe headache/fatigue       • Bactrim [Sulfamethoxazole-Trimethoprim] Rash   • Codeine Sulfate Itching and Rash   • Contrast Dye Itching   • Demerol [Meperidine] Itching and Rash   • Levaquin [Levofloxacin] Other (See Comments)     Numbness and ruptured oneida's tendon   • Morphine Sulfate Itching and Rash   • Penicillin V Potassium Itching and Rash   • Sulfa Antibiotics Rash and Other (See Comments)     severe rash/swelling   • Trimethoprim Hives         Home Meds:  Medications Prior to Admission   Medication Sig Dispense Refill Last Dose   • acetaminophen (TYLENOL) 500 MG tablet Take 500 mg by mouth 2 (Two) Times a Day As Needed for Mild Pain .      • Alirocumab 75 MG/ML solution auto-injector Inject 75 mg under the skin into the appropriate area as directed Every 14 (Fourteen) Days. 6 pen 3    • amLODIPine (NORVASC) 5 MG tablet Take 1 tablet by mouth Daily. (Patient taking  differently: Take 5 mg by mouth Every Evening.) 90 tablet 3    • aspirin 325 MG tablet Take 325 mg by mouth Every Night.      • Breo Ellipta 200-25 MCG/INH inhaler Daily.      • cetirizine (zyrTEC) 10 MG tablet Take 10 mg by mouth Every Night.      • chlorhexidine (HIBICLENS) 4 % external liquid Shower each day with solution for 5 days beginning 5 days before surgery. 120 mL 0    • Cholecalciferol (VITAMIN D3 PO) Take 4,000 Units by mouth 2 (Two) Times a Day.      • diphenhydrAMINE (BENADRYL) 25 mg capsule Take 12.5 mg by mouth Every Night.      • DULoxetine (CYMBALTA) 60 MG capsule Take 60 mg by mouth daily.      • estradiol (ESTRACE) 0.5 MG tablet Take 0.5 mg by mouth Daily.      • estrogens, conjugated, (PREMARIN) 0.3 MG tablet Take 0.3 mg by mouth Daily.      • fenofibric acid (TRILIPIX) 135 MG capsule delayed-release delayed release capsule Take 1 capsule by mouth Daily. 90 capsule 3    • fluticasone (FLONASE) 50 MCG/ACT nasal spray 2 sprays into each nostril Daily.      • gabapentin (NEURONTIN) 100 MG capsule Take 100 mg by mouth As Needed.      • hydroCHLOROthiazide (MICROZIDE) 12.5 MG capsule TAKE 1 CAPSULE BY MOUTH TWICE A DAY (Patient taking differently: Take 12.5 mg by mouth 2 (Two) Times a Day. Patient only takes once per day per her PCP instructions due to BP falling too low (takes in evening)) 180 capsule 3    • icosapent ethyl (Vascepa) 1 g capsule capsule Take 2 g by mouth 2 (Two) Times a Day With Meals. 120 capsule 11    • iron polysaccharides (NIFEREX) 150 MG capsule Take 150 mg by mouth 2 (Two) Times a Day.      • levothyroxine (SYNTHROID, LEVOTHROID) 25 MCG tablet TAKE 1 TABLET BY MOUTH DAILY. 90 tablet 0    • metFORMIN (GLUCOPHAGE) 500 MG tablet Take 500 mg by mouth Daily. Ordered as BID, but patient only takes once daily (prescribing MD is aware, per pt)      • methylPREDNISolone (MEDROL) 4 MG dose pack Take as directed on package instructions. 1 each 0    • Multiple Vitamin (MULTI VITAMIN  DAILY PO) Take  by mouth daily.      • mupirocin (BACTROBAN) 2 % nasal ointment Apply to the inside of each nostril with a cotton swab two times daily, morning and evening, for 5 days before surgery. 10 each 0    • mupirocin (BACTROBAN) 2 % ointment       • nebivolol (BYSTOLIC) 5 MG tablet Take 2 tablets by mouth Daily. (Patient taking differently: Take 5 mg by mouth Every Evening.) 180 tablet 3    • nitroglycerin (NITROLINGUAL) 0.4 MG/SPRAY spray Place 1 spray under the tongue every 5 (five) minutes as needed for chest pain.      • omeprazole (PriLOSEC) 20 MG capsule Take 20 mg by mouth daily.      • tiZANidine (ZANAFLEX) 2 MG tablet Take 1 tablet by mouth 2 (Two) Times a Day As Needed for Muscle Spasms. 30 tablet 1    • traMADol (ULTRAM) 50 MG tablet Take 1 tablet by mouth Every 6 (Six) Hours As Needed for Moderate Pain . (Patient taking differently: Take 50 mg by mouth 2 (Two) Times a Day As Needed for Moderate Pain .) 50 tablet 0    • vitamin C (ASCORBIC ACID) 500 MG tablet Take 500 mg by mouth Daily.            PMH:   Past Medical History:   Diagnosis Date   • Anemia    • Anxiety    • Chest pain    • Coronary artery disease    • Depression    • Diabetes mellitus (HCC)    • Dyslipidemia    • Eczema    • Fear of awareness under general anesthesia    • Fear of general anesthetic     has hx of awareness during anesthesia induction, felt like she was dying   • GERD (gastroesophageal reflux disease)    • History of COVID-19 02/01/2022   • Hyperlipidemia    • Hypertension     Chronic hypertension, probably essential.    • Hypertensive cardiovascular disease    • Hypothyroidism     Chronic hypothyroidism/replacement therapy.   • IBS (irritable bowel syndrome)    • Iron deficiency anemia    • Migraine     Intermittent progressive headache syndrome/possible migraine equivalent.   • Obesity (BMI 30.0-34.9)     Mild obesity (BMI 31).    • Osteopenia     Osteopenia with fracture of lumbar vertebra, 2004.    • Pneumonia    •  "TMJ (temporomandibular joint disorder)    • Type 2 diabetes mellitus (HCC)     Type 2 diabetes mellitus, Hemoglobin A1c 5.9%, December 2014.   • Urgency incontinence    • Urinary, incontinence, stress female    • Vertigo     Intermittent marked dizziness/vertigo with Epley maneuver.     PSH:    Past Surgical History:   Procedure Laterality Date   • ANKLE OPEN REDUCTION INTERNAL FIXATION Right 1995    Fall on ice   • APPENDECTOMY  1970    Appendectomy with wedge resection of both ovaries, 1970.   • CATARACT EXTRACTION WITH INTRAOCULAR LENS IMPLANT     • COLONOSCOPY     • LASIK Bilateral    • TIBIAL PLATEAU OPEN REDUCTION INTERNAL FIXATION Right    • TONSILLECTOMY AND ADENOIDECTOMY     • TOTAL LAPAROSCOPIC HYSTERECTOMY SALPINGO OOPHORECTOMY  1985    MARIZA/BSO secondary to severe endometriosis, 1985.        Immunization History:  Influenza: 2021  Pneumococcal: UTD  Tetanus: UTD  Covid x3: 2021    Social History:   Tobacco:   Social History     Tobacco Use   Smoking Status Never Smoker   Smokeless Tobacco Never Used      Alcohol:     Social History     Substance and Sexual Activity   Alcohol Use Yes    Comment: occasional (1 glass of wine per month at most)         Physical Exam:BP (!) 186/85 (BP Location: Right arm, Patient Position: Lying)   Pulse 73   Temp 97 °F (36.1 °C) (Tympanic)   Resp 16   Ht 161.3 cm (63.5\")   Wt 76.7 kg (169 lb)   SpO2 98%   BMI 29.47 kg/m²       General Appearance:    Alert, cooperative, no distress, appears stated age   Head:    Normocephalic, without obvious abnormality, atraumatic   Lungs:     Clear to auscultation bilaterally, respirations unlabored    Heart:   Regular rate and rhythm, S1 and S2 normal    Abdomen:    Soft without tenderness   Extremities:   Extremities normal, atraumatic, no cyanosis or edema   Skin:   Skin color, texture, turgor normal, no rashes or lesions   Neurologic:   Grossly intact     Results Review:     LABS:  Lab Results   Component Value Date    WBC 6.13 " 05/06/2022    HGB 11.8 (L) 05/06/2022    HCT 37.7 05/06/2022    MCV 80.7 05/06/2022     05/06/2022    NEUTROABS 2.94 05/06/2022    GLUCOSE 92 05/06/2022    BUN 25 (H) 05/06/2022    CREATININE 0.78 05/06/2022    EGFRIFNONA 64 11/22/2019     05/06/2022    K 4.2 05/06/2022     05/06/2022    CO2 27.0 05/06/2022    MG 1.5 (L) 11/22/2019    CALCIUM 10.0 05/06/2022       RADIOLOGY:  6/29/21 CT lumbar:  FINDINGS: Patient history indicates lower back pain radiating to right  hip and leg.     As on the prior 04/06/2021 exam, there is degenerative disc disease with  prominent disc protrusions at L2-3, L3-4 and L4-5, with resulting spinal  stenosis in conjunction with facet DJD, appearing to be fairly  high-grade at L2-3 and L4-5 in particular. No compression deformity or  significant abnormality of alignment is seen. Tip of the conus  medullaris lies at L1.     Axial images show no evidence of significant canal or foraminal stenosis  at T12-L1.     At L1-2, canal appears lower limits of normal diameter due to  broad-based disc protrusion. Foramina appear normal.     At L2-3, there is severe canal stenosis due to broad-based disc  protrusion and posterior element hypertrophy with complete effacement of  the contrast column on axial image 49 series 15. Foramina do not appear  to be significantly narrowed.     At L3-4, there is marked canal stenosis due to extensive posterior  element hypertrophy and broad-based disc protrusion, which is somewhat  eccentric to the right. Foramina appear lower limits of normal.     At L4-5, there is again severe canal stenosis, with complete effacement  of the contrast column on axial image 82. Foramina however appear  well-maintained.     At L5-S1, there is moderate to marked canal stenosis due to broad-based  disc protrusion and extensive posterior element hypertrophy. Foramina  once again appear well-maintained.     Review of the axial images shows no evidence of pathologic  paraspinous  mass or acute inflammatory focus.     IMPRESSION:  1. Severe spinal stenosis at L2-3  and L4-5 with complete effacement of  intrathecal contrast.  2. Marked L3-4 canal stenosis and moderate to marked L5-S1 canal  stenosis due to disc protrusion and posterior element hypertrophy.  3. No evidence of acute or healing trauma or significant focal  subluxation.    I reviewed the patient's new clinical results.    Cancer Staging (if applicable)  Cancer Patient: __ yes __no __unknown; If yes, clinical stage T:__ N:__M:__, stage group or __N/A      Impression: Lumbar stenosis with neurogenic claudication      Plan: Multilevel LUMBAR DECOMPRESSON L2-S1 with Fusion and Stabilization with PEDICLE SCREWS at L5-S1      Yolie Hoskins, KECIA   5/9/2022   08:12 EDT

## 2022-05-10 LAB
GLUCOSE BLDC GLUCOMTR-MCNC: 115 MG/DL (ref 70–130)
GLUCOSE BLDC GLUCOMTR-MCNC: 122 MG/DL (ref 70–130)
GLUCOSE BLDC GLUCOMTR-MCNC: 145 MG/DL (ref 70–130)
HCT VFR BLD AUTO: 28.1 % (ref 34–46.6)
HGB BLD-MCNC: 8.8 G/DL (ref 12–15.9)

## 2022-05-10 PROCEDURE — 94761 N-INVAS EAR/PLS OXIMETRY MLT: CPT

## 2022-05-10 PROCEDURE — 82962 GLUCOSE BLOOD TEST: CPT

## 2022-05-10 PROCEDURE — 94640 AIRWAY INHALATION TREATMENT: CPT

## 2022-05-10 PROCEDURE — 97161 PT EVAL LOW COMPLEX 20 MIN: CPT

## 2022-05-10 PROCEDURE — 85018 HEMOGLOBIN: CPT | Performed by: NEUROLOGICAL SURGERY

## 2022-05-10 PROCEDURE — 85014 HEMATOCRIT: CPT | Performed by: NEUROLOGICAL SURGERY

## 2022-05-10 PROCEDURE — 99024 POSTOP FOLLOW-UP VISIT: CPT | Performed by: NEUROLOGICAL SURGERY

## 2022-05-10 PROCEDURE — 94664 DEMO&/EVAL PT USE INHALER: CPT

## 2022-05-10 PROCEDURE — 97535 SELF CARE MNGMENT TRAINING: CPT

## 2022-05-10 PROCEDURE — 97166 OT EVAL MOD COMPLEX 45 MIN: CPT

## 2022-05-10 PROCEDURE — 97110 THERAPEUTIC EXERCISES: CPT

## 2022-05-10 RX ADMIN — Medication 10 ML: at 08:24

## 2022-05-10 RX ADMIN — HYDROCHLOROTHIAZIDE 12.5 MG: 12.5 CAPSULE ORAL at 17:24

## 2022-05-10 RX ADMIN — ESTRADIOL 0.5 MG: 0.5 TABLET ORAL at 08:19

## 2022-05-10 RX ADMIN — FLUTICASONE PROPIONATE 2 SPRAY: 50 SPRAY, METERED NASAL at 08:23

## 2022-05-10 RX ADMIN — ACETAMINOPHEN 650 MG: 325 TABLET ORAL at 08:18

## 2022-05-10 RX ADMIN — Medication 10 ML: at 20:36

## 2022-05-10 RX ADMIN — BUDESONIDE AND FORMOTEROL FUMARATE DIHYDRATE 2 PUFF: 80; 4.5 AEROSOL RESPIRATORY (INHALATION) at 12:13

## 2022-05-10 RX ADMIN — CETIRIZINE HYDROCHLORIDE 5 MG: 10 TABLET, FILM COATED ORAL at 20:35

## 2022-05-10 RX ADMIN — FENOFIBRATE 48 MG: 48 TABLET ORAL at 08:19

## 2022-05-10 RX ADMIN — TRAMADOL HYDROCHLORIDE 50 MG: 50 TABLET, COATED ORAL at 14:12

## 2022-05-10 RX ADMIN — LEVOTHYROXINE SODIUM 25 MCG: 25 TABLET ORAL at 08:18

## 2022-05-10 RX ADMIN — PANTOPRAZOLE SODIUM 40 MG: 40 TABLET, DELAYED RELEASE ORAL at 05:28

## 2022-05-10 RX ADMIN — TRAMADOL HYDROCHLORIDE 50 MG: 50 TABLET, COATED ORAL at 08:35

## 2022-05-10 RX ADMIN — DULOXETINE HYDROCHLORIDE 60 MG: 60 CAPSULE, DELAYED RELEASE ORAL at 08:19

## 2022-05-10 RX ADMIN — ASPIRIN 325 MG ORAL TABLET 325 MG: 325 PILL ORAL at 20:35

## 2022-05-10 RX ADMIN — ACETAMINOPHEN 650 MG: 325 TABLET ORAL at 16:49

## 2022-05-10 RX ADMIN — AMLODIPINE BESYLATE 5 MG: 5 TABLET ORAL at 16:49

## 2022-05-10 RX ADMIN — HYDROCHLOROTHIAZIDE 12.5 MG: 12.5 CAPSULE ORAL at 08:18

## 2022-05-10 RX ADMIN — NEBIVOLOL 5 MG: 5 TABLET ORAL at 16:49

## 2022-05-10 RX ADMIN — ESTROGENS, CONJUGATED 0.3 MG: 0.3 TABLET, FILM COATED ORAL at 08:19

## 2022-05-10 RX ADMIN — OXYCODONE HYDROCHLORIDE AND ACETAMINOPHEN 1 TABLET: 7.5; 325 TABLET ORAL at 03:20

## 2022-05-10 RX ADMIN — TRAMADOL HYDROCHLORIDE 50 MG: 50 TABLET, COATED ORAL at 20:36

## 2022-05-10 NOTE — THERAPY EVALUATION
Patient Name: Coco Crockett  : 1945    MRN: [id number]                              Today's Date: 5/10/2022       Admit Date: 2022    Visit Dx:     ICD-10-CM ICD-9-CM   1. Spinal stenosis of lumbar region with neurogenic claudication  M48.062 724.03     Patient Active Problem List   Diagnosis   • Mild obesity   • Hypertensive cardiovascular disease   • Hypertension   • Dyslipidemia   • Obesity (BMI 30.0-34.9)   • Hypothyroidism   • Osteopenia   • TMJ (temporomandibular joint disorder)   • GERD (gastroesophageal reflux disease)   • Iron deficiency anemia   • Depression   • Eczema   • Vertigo   • Migraine   • Type 2 diabetes mellitus (HCC)   • MALIK (dyspnea on exertion)   • Leg pain, bilateral   • Fatigue   • Ischemic heart disease   • Lumbar stenosis with neurogenic claudication   • Spondylosis of lumbar region without myelopathy or radiculopathy   • Degeneration of lumbar or lumbosacral intervertebral disc   • Greater trochanteric bursitis of both hips   • Spinal stenosis of lumbar region with neurogenic claudication     Past Medical History:   Diagnosis Date   • Anemia    • Anxiety    • Chest pain    • Coronary artery disease    • Depression    • Diabetes mellitus (HCC)    • Dyslipidemia    • Eczema    • Fear of awareness under general anesthesia    • Fear of general anesthetic     has hx of awareness during anesthesia induction, felt like she was dying   • GERD (gastroesophageal reflux disease)    • History of COVID-19 2022   • Hyperlipidemia    • Hypertension     Chronic hypertension, probably essential.    • Hypertensive cardiovascular disease    • Hypothyroidism     Chronic hypothyroidism/replacement therapy.   • IBS (irritable bowel syndrome)    • Iron deficiency anemia    • Migraine     Intermittent progressive headache syndrome/possible migraine equivalent.   • Obesity (BMI 30.0-34.9)     Mild obesity (BMI 31).    • Osteopenia     Osteopenia with fracture of lumbar vertebra, .    •  Pneumonia    • TMJ (temporomandibular joint disorder)    • Type 2 diabetes mellitus (HCC)     Type 2 diabetes mellitus, Hemoglobin A1c 5.9%, December 2014.   • Urgency incontinence    • Urinary, incontinence, stress female    • Vertigo     Intermittent marked dizziness/vertigo with Epley maneuver.     Past Surgical History:   Procedure Laterality Date   • ANKLE OPEN REDUCTION INTERNAL FIXATION Right 1995    Fall on ice   • APPENDECTOMY  1970    Appendectomy with wedge resection of both ovaries, 1970.   • CATARACT EXTRACTION WITH INTRAOCULAR LENS IMPLANT     • COLONOSCOPY     • LASIK Bilateral    • LUMBAR LAMINECTOMY WITH FUSION N/A 5/9/2022    Procedure: LUMBAR DECOMPRESSON L2-S1 with Fusion and Stabilization with PEDICLE SCREWS at L4-S1;  Surgeon: Humberto Correa MD;  Location: [facility];  Service: Neurosurgery;  Laterality: N/A;   • TIBIAL PLATEAU OPEN REDUCTION INTERNAL FIXATION Right    • TONSILLECTOMY AND ADENOIDECTOMY     • TOTAL LAPAROSCOPIC HYSTERECTOMY SALPINGO OOPHORECTOMY  1985    MARIZA/BSO secondary to severe endometriosis, 1985.       General Information     [facility] Name 05/10/22 0927          Physical Therapy Time and Intention    Document Type evaluation  -     Mode of Treatment physical therapy  -     Row Name 05/10/22 0927          General Information    Patient Profile Reviewed yes  -     Prior Level of Function independent:;all household mobility;community mobility;gait;transfer;bed mobility;ADL's;driving  -     Existing Precautions/Restrictions spinal;other (see comments)  hemovac  -     Barriers to Rehab impaired sensation  -     Row Name 05/10/22 0927          Living Environment    People in Home child(tre), adult  -     Row Name 05/10/22 0927          Home Main Entrance    Number of Stairs, Main Entrance none  -     Row Name 05/10/22 0927          Stairs Within Home, Primary    Number of Stairs, Within Home, Primary none  -     Row Name 05/10/22 0927          Cognition     Orientation Status (Cognition) oriented x 4  -     Row Name 05/10/22 0927          Safety Issues, Functional Mobility    Safety Issues Affecting Function (Mobility) safety precaution awareness;safety precautions follow-through/compliance;sequencing abilities  -     Impairments Affecting Function (Mobility) balance;endurance/activity tolerance;pain;strength  -           User Key  (r) = Recorded By, (t) = Taken By, (c) = Cosigned By    Initials Name Provider Type     Stephon De Jesus, PT Physical Therapist               Mobility     Row Name 05/10/22 0931          Bed Mobility    Bed Mobility rolling left;sidelying-sit  -     Rolling Left Union (Bed Mobility) minimum assist (75% patient effort);verbal cues;nonverbal cues (demo/gesture)  -     Sidelying-Sit [place] (Bed Mobility) moderate assist (50% patient effort);verbal cues;nonverbal cues (demo/gesture)  -     Assistive Device (Bed Mobility) bed rails  -     Comment, (Bed Mobility) Reviewed spinal precautions and log rolling to maintain, pt required verbal and tactile cues to initiate rolling and assist at trunk to obtain upright sitting EOB  -[facility] Name 05/10/22 0931          Transfers    Comment, (Transfers) VCs for safe hand placement with use of RW, pt having right LE pain had to sit down once standing, unable to ambulate, performed 2 STS and transfer bed > chair  -[facility] Name 05/10/22 0931          Bed-Chair Transfer    Bed-Chair Union (Transfers) contact guard;verbal cues  -     Assistive Device (Bed-Chair Transfers) walker, front-wheeled  -[facility] Name 05/10/22 0931          Sit-Stand Transfer    Sit-Stand Union (Transfers) contact guard;verbal cues  -     Assistive Device (Sit-Stand Transfers) walker, front-wheeled  -[facility] Name 05/10/22 0931          Gait/Stairs (Locomotion)    Union Level (Gait) unable to assess  -     Ambulated day of surgery or within 4 hours of PACU discharge no,  other medical factors prevent ambulation  -           User Key  (r) = Recorded By, (t) = Taken By, (c) = Cosigned By    Initials Name Provider Type     Stephon De Jesus, PT Physical Therapist               Obj/Interventions     Row Name 05/10/22 0933          Range of Motion Comprehensive    General Range of Motion bilateral lower extremity ROM WFL  -     Row Name 05/10/22 0933          Strength Comprehensive (MMT)    General Manual Muscle Testing (MMT) Assessment lower extremity strength deficits identified  -     Comment, General Manual Muscle Testing (MMT) Assessment BLE grossly 4/5  -     Row Name 05/10/22 0933          Motor Skills    Therapeutic Exercise other (see comments)  ankle pumps x 20, LAQ x 10  -     Row Name 05/10/22 0933          Balance    Balance Assessment sitting static balance;sitting dynamic balance;sit to stand dynamic balance;standing static balance;standing dynamic balance  -     Static Sitting Balance supervision  -     Dynamic Sitting Balance supervision  -     Position, Sitting Balance unsupported;sitting edge of bed  -     Sit to Stand Dynamic Balance contact guard  -     Static Standing Balance contact guard  -     Dynamic Standing Balance contact guard  -     Position/Device Used, Standing Balance supported;walker, rolling  -     Balance Interventions sitting;standing;sit to stand;supported;static;dynamic;minimal challenge  -     Row Name 05/10/22 0933          Sensory Assessment (Somatosensory)    Sensory Assessment (Somatosensory) left LE  -     Left LE Sensory Assessment impaired  -           User Key  (r) = Recorded By, (t) = Taken By, (c) = Cosigned By    Initials Name Provider Type     Stephon De Jesus, PT Physical Therapist               Goals/Plan     Row Name 05/10/22 0939          Bed Mobility Goal 1 (PT)    Activity/Assistive Device (Bed Mobility Goal 1, PT) bed mobility activities, all  -     [place] Level/Cues Needed (Bed Mobility  Goal 1, PT) independent  -JH     Time Frame (Bed Mobility Goal 1, PT) short term goal (STG);3 days  -     Progress/Outcomes (Bed Mobility Goal 1, PT) goal [facility] Name 05/10/22 0939          Transfer Goal 1 (PT)    Activity/Assistive Device (Transfer Goal 1, PT) sit-to-stand/stand-to-sit;walker, rolling  -     [place] Level/Cues Needed (Transfer Goal 1, PT) independent  -     Time Frame (Transfer Goal 1, PT) long term goal (LTG);1 week  -     Progress/Outcome (Transfer Goal 1, PT) goal ongoing  [facility] Name 05/10/22 0939          Gait Training Goal 1 (PT)    Activity/Assistive Device (Gait Training Goal 1, PT) gait (walking locomotion);walker, rolling;increase endurance/gait distance  -     [place] Level (Gait Training Goal 1, PT) independent  -     Distance (Gait Training Goal 1, PT) 150 feet  -     Time Frame (Gait Training Goal 1, PT) long term goal (LTG);1 week  -     Progress/Outcome (Gait Training Goal 1, PT) goal [facility] Name 05/10/22 0939          Patient Education Goal (PT)    Activity (Patient Education Goal, PT) HEP/POC/spinal precautions  -     [place]/Cues/Accuracy (Memory Goal 2, PT) demonstrates adequately;independent;verbalizes understanding  -     Time Frame (Patient Education Goal, PT) long term goal (LTG);1 week  -     Progress/Outcome (Patient Education Goal, PT) goal [facility] Name 05/10/22 0939          Therapy Assessment/Plan (PT)    Planned Therapy Interventions (PT) balance training;bed mobility training;gait training;home exercise program;patient/family education;stretching;strengthening;stair training;ROM (range of motion);transfer training  -           User Key  (r) = Recorded By, (t) = Taken By, (c) = Cosigned By    Initials Name Provider Type    Stephon Nova, PT Physical Therapist               Clinical Impression     Row Name 05/10/22 0934          Pain    Pretreatment Pain Rating 3/10  -      Posttreatment Pain Rating 6/10  [facility]     Pain Location - Side/Orientation Right  -     Pain Location lower  -     Pain Location - extremity  -     Pre/Posttreatment Pain Comment premedicated but had not kicked in yet  -     Pain Intervention(s) Repositioned;Cold applied;Ambulation/increased activity  -     Row Name 05/10/22 0934          Plan of Care Review    Plan of Care Reviewed With patient  -     Progress no change  PT eval  -     Outcome Evaluation PT evaluation complete. Patient presents with declined functional mobility warranting IPPT to promote return to previous level of function and facilitate independence with mobility. Pt required min/mod A for bed mobility and CGA with RW for bed > chair transfer. Pt unable to ambulate with reports of RLE pain following knee extension (LAQ 10 reps) sitting EOB. Reviewed spinal precautions with pt. Recommend d/c IRF.  -[facility] Name 05/10/22 0934          Therapy Assessment/Plan (PT)    Patient/Family Therapy Goals Statement (PT) return to previous level  -     Rehab Potential (PT) good, to achieve stated therapy goals  [facility]     Criteria for Skilled Interventions Met (PT) yes;meets criteria;skilled treatment is necessary  [facility]     Therapy Frequency (PT) daily  [facility]     Predicted Duration of Therapy Intervention (PT) 1 week  -     Row Name 05/10/22 0934          Vital Signs    O2 Delivery Pre Treatment room air  -     O2 Delivery Intra Treatment room air  -     O2 Delivery Post Treatment room air  -     Pre Patient Position Supine  -     Intra Patient Position Standing  -     Post Patient Position Sitting  -     Row Name 05/10/22 0934          Positioning and Restraints    Pre-Treatment Position in bed  -     Post Treatment Position chair  -     In Chair notified nsg;reclined;encouraged to call for assist;call light within reach;exit alarm on;legs elevated;waffle cushion  [facility]           User Key  (r) = Recorded By, (t) = Taken By, (c) =  Cosigned By    Initials Name Provider Type    Stephon Nova, PT Physical Therapist               Outcome Measures     Row Name 05/10/22 0938          How much help from another person do you currently need...    Turning from your back to your side while in flat bed without using bedrails? 3  -JH     Moving from lying on back to sitting on the side of a flat bed without bedrails? 3  -JH     Moving to and from a bed to a chair (including a wheelchair)? 3  -JH     Standing up from a chair using your arms (e.g., wheelchair, bedside chair)? 3  -JH     Climbing 3-5 steps with a railing? 1  -JH     To walk in hospital room? 2  -     AM-PAC 6 Clicks Score (PT) 15  -     Highest level of mobility 4 --> Transferred to chair/commode  -     Row Name 05/10/22 0938          Functional Assessment    Outcome Measure Options AM-PAC 6 Clicks Basic Mobility (PT)  -           User Key  (r) = Recorded By, (t) = Taken By, (c) = Cosigned By    Initials Name Provider Type    Stephon Nova PT Physical Therapist                             Physical Therapy Education                 Title: PT OT SLP Therapies (Done)     Topic: Physical Therapy (Done)     Point: Mobility training (Done)     Learning Progress Summary           Patient Acceptance, E,TB, VU by  at 5/10/2022 0939    Comment: Education to patient on PT services, POC, d/c recommendations, safety with mobility, and spinal precautions                   Point: Home exercise program (Done)     Learning Progress Summary           Patient Acceptance, E,TB, VU by  at 5/10/2022 0939    Comment: Education to patient on PT services, POC, d/c recommendations, safety with mobility, and spinal precautions                   Point: Body mechanics (Done)     Learning Progress Summary           Patient Acceptance, E,TB, VU by  at 5/10/2022 0939    Comment: Education to patient on PT services, POC, d/c recommendations, safety with mobility, and spinal precautions                    Point: Precautions (Done)     Learning Progress Summary           Patient Acceptance, E,TB, VU by  at 5/10/2022 0939    Comment: Education to patient on PT services, POC, d/c recommendations, safety with mobility, and spinal precautions                               User Key     Initials Effective Dates Name Provider Type Discipline     09/22/20 -  Stephon De Jesus, PT Physical Therapist PT              PT Recommendation and Plan  Planned Therapy Interventions (PT): balance training, bed mobility training, gait training, home exercise program, patient/family education, stretching, strengthening, stair training, ROM (range of motion), transfer training  Plan of Care Reviewed With: patient  Progress: no change (PT eval)  Outcome Evaluation: PT evaluation complete. Patient presents with declined functional mobility warranting IPPT to promote return to previous level of function and facilitate independence with mobility. Pt required min/mod A for bed mobility and CGA with RW for bed > chair transfer. Pt unable to ambulate with reports of RLE pain following knee extension (LAQ 10 reps) sitting EOB. Reviewed spinal precautions with pt. Recommend d/c IRF.     Time Calculation:    PT Charges     Row Name 05/10/22 0941             Time Calculation    Start Time 0820  -      PT Received On 05/10/22  -      PT Goal Re-Cert Due Date 05/20/22  -              Time Calculation- PT    Total Timed Code Minutes- PT 10 minute(s)  -              Timed Charges    55126 - PT Therapeutic Exercise Minutes 6  -      14299 - PT Therapeutic Activity Minutes 4  -              Untimed Charges    PT Eval/Re-eval Minutes 52  -              Total Minutes    Timed Charges Total Minutes 10  -      Untimed Charges Total Minutes 52  -       Total Minutes 62  -            User Key  (r) = Recorded By, (t) = Taken By, (c) = Cosigned By    Initials Name Provider Type     Stephon De Jesus, PT Physical Therapist              Therapy  Charges for Today     Code Description Service Date Service Provider Modifiers Qty    42902319918 HC PT THER PROC EA 15 MIN 5/10/2022 Stephon De Jesus, PT GP 1    [id number] HC PT EVAL LOW COMPLEXITY 4 5/10/2022 Stephon De Jesus, PT GP 1          PT G-Codes  Outcome Measure Options: AM-PAC 6 Clicks Basic Mobility (PT)  AM-PAC 6 Clicks Score (PT): 15    Stephon De Jesus, PT  5/10/2022

## 2022-05-10 NOTE — THERAPY EVALUATION
Patient Name: Coco Crockett  : 1945    MRN: [id number]                              Today's Date: 5/10/2022       Admit Date: 2022    Visit Dx:     ICD-10-CM ICD-9-CM   1. Spinal stenosis of lumbar region with neurogenic claudication  M48.062 724.03     Patient Active Problem List   Diagnosis   • Mild obesity   • Hypertensive cardiovascular disease   • Hypertension   • Dyslipidemia   • Obesity (BMI 30.0-34.9)   • Hypothyroidism   • Osteopenia   • TMJ (temporomandibular joint disorder)   • GERD (gastroesophageal reflux disease)   • Iron deficiency anemia   • Depression   • Eczema   • Vertigo   • Migraine   • Type 2 diabetes mellitus (HCC)   • MALIK (dyspnea on exertion)   • Leg pain, bilateral   • Fatigue   • Ischemic heart disease   • Lumbar stenosis with neurogenic claudication   • Spondylosis of lumbar region without myelopathy or radiculopathy   • Degeneration of lumbar or lumbosacral intervertebral disc   • Greater trochanteric bursitis of both hips   • Spinal stenosis of lumbar region with neurogenic claudication     Past Medical History:   Diagnosis Date   • Anemia    • Anxiety    • Chest pain    • Coronary artery disease    • Depression    • Diabetes mellitus (HCC)    • Dyslipidemia    • Eczema    • Fear of awareness under general anesthesia    • Fear of general anesthetic     has hx of awareness during anesthesia induction, felt like she was dying   • GERD (gastroesophageal reflux disease)    • History of COVID-19 2022   • Hyperlipidemia    • Hypertension     Chronic hypertension, probably essential.    • Hypertensive cardiovascular disease    • Hypothyroidism     Chronic hypothyroidism/replacement therapy.   • IBS (irritable bowel syndrome)    • Iron deficiency anemia    • Migraine     Intermittent progressive headache syndrome/possible migraine equivalent.   • Obesity (BMI 30.0-34.9)     Mild obesity (BMI 31).    • Osteopenia     Osteopenia with fracture of lumbar vertebra, .    •  Pneumonia    • TMJ (temporomandibular joint disorder)    • Type 2 diabetes mellitus (HCC)     Type 2 diabetes mellitus, Hemoglobin A1c 5.9%, December 2014.   • Urgency incontinence    • Urinary, incontinence, stress female    • Vertigo     Intermittent marked dizziness/vertigo with Epley maneuver.     Past Surgical History:   Procedure Laterality Date   • ANKLE OPEN REDUCTION INTERNAL FIXATION Right 1995    Fall on ice   • APPENDECTOMY  1970    Appendectomy with wedge resection of both ovaries, 1970.   • CATARACT EXTRACTION WITH INTRAOCULAR LENS IMPLANT     • COLONOSCOPY     • LASIK Bilateral    • LUMBAR LAMINECTOMY WITH FUSION N/A 5/9/2022    Procedure: LUMBAR DECOMPRESSON L2-S1 with Fusion and Stabilization with PEDICLE SCREWS at L4-S1;  Surgeon: Humberto Correa MD;  Location: [facility];  Service: Neurosurgery;  Laterality: N/A;   • TIBIAL PLATEAU OPEN REDUCTION INTERNAL FIXATION Right    • TONSILLECTOMY AND ADENOIDECTOMY     • TOTAL LAPAROSCOPIC HYSTERECTOMY SALPINGO OOPHORECTOMY  1985    MARIZA/BSO secondary to severe endometriosis, 1985.       General Information     Row Name 05/10/22 1032          OT Time and Intention    Document Type evaluation  -HK     Mode of Treatment occupational therapy  -HK     Row Name 05/10/22 1032          General Information    Patient Profile Reviewed yes  -HK     Prior Level of Function min assist:;all household mobility;community mobility;gait;transfer;bed mobility;ADL's  -HK     Existing Precautions/Restrictions spinal;other (see comments)  hemovac  -     Barriers to Rehab impaired sensation  -HK     Row Name 05/10/22 1032          Living Environment    People in Home child(tre), adult  -HK     Row Name 05/10/22 1032          Home Main Entrance    Number of Stairs, Main Entrance none  -HK     Stair Railings, Main Entrance none  -HK     Row Name 05/10/22 1032          Stairs Within Home, Primary    Number of Stairs, Within Home, Primary none  -HK     Stair Railings, Within  Home, Primary none  -     Stairs Comment, Within Home, Primary Pt has walk in shower and shower seat if needed  -     Row Name 05/10/22 1032          Cognition    Orientation Status (Cognition) oriented x 4  -     Row Name 05/10/22 1032          Safety Issues, Functional Mobility    Safety Issues Affecting Function (Mobility) safety precautions follow-through/compliance;safety precaution awareness;insight into deficits/self-awareness;awareness of need for assistance;sequencing abilities  -     Impairments Affecting Function (Mobility) balance;endurance/activity tolerance;pain;strength;coordination;motor planning;motor control;sensation/sensory awareness  -     Comment, Safety Issues/Impairments (Mobility) Decreased coordination in BLE noted  -           User Key  (r) = Recorded By, (t) = Taken By, (c) = Cosigned By    Initials Name Provider Type     Tatum Melendez, OT Occupational Therapist                 Mobility/ADL's     Row Name 05/10/22 1037          Bed Mobility    Bed Mobility sit-sidelying  -     Sit-Sidelying [place] (Bed Mobility) minimum assist (75% patient effort);1 person assist;verbal cues  -     Bed Mobility, Safety Issues impaired trunk control for bed mobility;decreased use of legs for bridging/pushing;decreased use of arms for pushing/pulling;cognitive deficits limit understanding  -     Assistive Device (Bed Mobility) bed rails  -     Comment, (Bed Mobility) Pt educated on log roll technique for completion of all bed mobility. Pt returned to supine with Deonte from OT.  -     Row Name 05/10/22 1037          Transfers    Transfers sit-stand transfer  -     Sit-Stand Jewell (Transfers) contact guard;verbal cues  -     Row Name 05/10/22 1037          Sit-Stand Transfer    Assistive Device (Sit-Stand Transfers) walker, front-wheeled  -     Row Name 05/10/22 1037          Functional Mobility    Functional Mobility- Ind. Level contact guard assist;verbal cues  required  -     Functional Mobility- Device walker, front-wheeled  -     Functional Mobility-Distance (Feet) 15  -     Functional Mobility- Safety Issues step length decreased;weight-shifting ability decreased  -     Functional Mobility- Comment Pt ambulated from chair around bed to sit EOB.  -     Row Name 05/10/22 1037          Activities of Daily Living    BADL Assessment/Intervention lower body dressing;bathing;toileting  -     Row Name 05/10/22 1037          Lower Body Dressing Assessment/Training    [place] Level (Lower Body Dressing) doff;don;socks;maximum assist (25% patient effort)  -     Position (Lower Body Dressing) unsupported sitting  -     Comment, (Lower Body Dressing) OT issued sock aid, shoe horn, and LH sponge. Pt unable to use sock aid on R foot due to hardware sticking out of ankle causing rubbing and pain. Pt educated on use of all other AE for use of LBD.   -     Row Name 05/10/22 1037          Bathing Assessment/Intervention    Comment, (Bathing) OT issued LH sponge and educated pt on use for completion of all LBB.  -     Row Name 05/10/22 1037          Toileting Assessment/Training    Comment, (Toileting) Pt educated on completion of elana care while maintaining spinal precautions.  -           User Key  (r) = Recorded By, (t) = Taken By, (c) = Cosigned By    Initials Name Provider Type     Tatum Melendez, OT Occupational Therapist               Obj/Interventions     Row Name 05/10/22 1044          Sensory Assessment (Somatosensory)    Sensory Assessment (Somatosensory) other (see comments);right UE  -HK     Left LE Sensory Assessment general sensation;impaired  -     Sensory Assessment Pt reports decreased sensation in R hand following surgery. Decreased sensation in R LE at baseline.  -     Row Name 05/10/22 1044          Range of Motion Comprehensive    General Range of Motion no range of motion deficits identified  -     Comment, General Range of Motion B  UE WFL for eval  -HK     [facility] Name 05/10/22 1044          Strength Comprehensive (MMT)    General Manual Muscle Testing (MMT) Assessment no strength deficits identified  -HK     Comment, General Manual Muscle Testing (MMT) Assessment B UE WFL for eval  -HK     [facility] Name 05/10/22 1044          Motor Skills    Motor Skills coordination  -     Coordination bilateral;lower extremity;upper extremity;minimal impairment  -HK     Row Name 05/10/22 1044          Balance    Balance Assessment sitting static balance;sitting dynamic balance;standing static balance;standing dynamic balance  -     Static Sitting Balance supervision  -HK     Dynamic Sitting Balance supervision  -HK     Position, Sitting Balance unsupported;sitting in chair  -HK     Static Standing Balance contact guard;verbal cues  -     Dynamic Standing Balance minimal assist;verbal cues  -HK     Position/Device Used, Standing Balance supported;walker, rolling  -HK     Balance Interventions sitting;standing;occupation based/functional task  -           User Key  (r) = Recorded By, (t) = Taken By, (c) = Cosigned By    Initials Name Provider Type     Tatum Melendez, OT Occupational Therapist               Goals/Plan     Row Name 05/10/22 1104          Bed Mobility Goal 1 (OT)    Activity/Assistive Device (Bed Mobility Goal 1, OT) rolling to left;rolling to right;sidelying to sit/sit to sidelying  -HK     [place] Level/Cues Needed (Bed Mobility Goal 1, OT) contact guard required;verbal cues required  -HK     Time Frame (Bed Mobility Goal 1, OT) by discharge;long term goal (LTG)  -HK     Progress/Outcomes (Bed Mobility Goal 1, OT) goal ongoing  -HK     Row Name 05/10/22 1104          Transfer Goal 1 (OT)    Activity/Assistive Device (Transfer Goal 1, OT) sit-to-stand/stand-to-sit;toilet  -HK     [place] Level/Cues Needed (Transfer Goal 1, OT) contact guard required;verbal cues required  -HK     Time Frame (Transfer Goal 1, OT) by discharge;long term  goal (LTG)  -HK     Progress/Outcome (Transfer Goal 1, OT) goal ongoing  -     Row Name 05/10/22 1104          Dressing Goal 1 (OT)    Activity/Device (Dressing Goal 1, OT) lower body dressing  -HK     [place]/Cues Needed (Dressing Goal 1, OT) moderate assist (50-74% patient effort);verbal cues required  -HK     Time Frame (Dressing Goal 1, OT) by discharge;long term goal (LTG)  -HK     Progress/Outcome (Dressing Goal 1, OT) goal ongoing  -     Row Name 05/10/22 1104          Toileting Goal 1 (OT)    Activity/Device (Toileting Goal 1, OT) adjust/manage clothing;perform perineal hygiene  -HK     [place] Level/Cues Needed (Toileting Goal 1, OT) minimum assist (75% or more patient effort);verbal cues required  -HK     Time Frame (Toileting Goal 1, OT) by discharge;long term goal (LTG)  -HK     Progress/Outcome (Toileting Goal 1, OT) goal ongoing  -[facility] Name 05/10/22 1104          Grooming Goal 1 (OT)    Activity/Device (Grooming Goal 1, OT) hair care;oral care;wash face, hands  -HK     [place] (Grooming Goal 1, OT) moderate assist (50-74% patient effort);verbal cues required  -HK     Time Frame (Grooming Goal 1, OT) by discharge;long term goal (LTG)  -HK     Progress/Outcome (Grooming Goal 1, OT) goal ongoing  [facility]     Row Name 05/10/22 1104          Therapy Assessment/Plan (OT)    Planned Therapy Interventions (OT) adaptive equipment training;BADL retraining;functional balance retraining;occupation/activity based interventions;ROM/therapeutic exercise;transfer/mobility retraining  -HK           User Key  (r) = Recorded By, (t) = Taken By, (c) = Cosigned By    Initials Name Provider Type    HK Tatum Melendez, OT Occupational Therapist               Clinical Impression     Row Name 05/10/22 1047          Pain Assessment    Pretreatment Pain Rating 5/10  -HK     Posttreatment Pain Rating 5/10  -HK     Pain Location - Side/Orientation Bilateral  -HK     Pain Location incisional  -HK     Pain  Location - back  -HK     Pain Intervention(s) Ambulation/increased activity;Repositioned  -HK     Row Name 05/10/22 1047          Plan of Care Review    Plan of Care Reviewed With patient  -HK     Progress improving  -HK     Outcome Evaluation OT eval complete. Pt is pleasent and cooperative. Transfers with CGA and ambulates with CGA and RW. Pt with decreased R hip pain this session. OT issued AE and intiated education on use for completion of all LBD. Pt unable to use sock aid due to hardware protruding on R ankle and attempts at use caused pain to ankle. At this time recommend d/c to IPR when medically ready.  -     Row Name 05/10/22 1047          Therapy Assessment/Plan (OT)    Patient/Family Therapy Goal Statement (OT) Pt would like to improve and return home.  -HK     Rehab Potential (OT) good, to achieve stated therapy goals  -HK     Criteria for Skilled Therapeutic Interventions Met (OT) yes;skilled treatment is necessary  -HK     Therapy Frequency (OT) daily  -     Row Name 05/10/22 1047          Therapy Plan Review/Discharge Plan (OT)    Anticipated Discharge Disposition (OT) inpatient rehabilitation facility  -     Row Name 05/10/22 1047          Vital Signs    Pre Systolic BP Rehab --  RN cleared for tx; VSS  -HK     O2 Delivery Pre Treatment room air  -HK     O2 Delivery Intra Treatment room air  -HK     O2 Delivery Post Treatment room air  -HK     Pre Patient Position Sitting  -HK     Intra Patient Position Standing  -HK     Post Patient Position Side Lying  -HK     Row Name 05/10/22 1047          Positioning and Restraints    Pre-Treatment Position sitting in chair/recliner  -HK     Post Treatment Position bed  -HK     In Bed notified nsg;side lying left;call light within reach;exit alarm on;encouraged to call for assist  -HK           User Key  (r) = Recorded By, (t) = Taken By, (c) = Cosigned By    Initials Name Provider Type    Tatum Hernandez, OT Occupational Therapist               Outcome  Measures     Row Name 05/10/22 1106          How much help from another is currently needed...    Putting on and taking off regular lower body clothing? 2  -HK     Bathing (including washing, rinsing, and drying) 2  -HK     Toileting (which includes using toilet bed pan or urinal) 3  -HK     Putting on and taking off regular upper body clothing 3  -HK     Taking care of personal grooming (such as brushing teeth) 3  -HK     Eating meals 3  -HK     AM-PAC 6 Clicks Score (OT) 16  -HK     Row Name 05/10/22 0938          How much help from another person do you currently need...    Turning from your back to your side while in flat bed without using bedrails? 3  -JH     Moving from lying on back to sitting on the side of a flat bed without bedrails? 3  -JH     Moving to and from a bed to a chair (including a wheelchair)? 3  -JH     Standing up from a chair using your arms (e.g., wheelchair, bedside chair)? 3  -JH     Climbing 3-5 steps with a railing? 1  -JH     To walk in hospital room? 2  -JH     AM-PAC 6 Clicks Score (PT) 15  -JH     Highest level of mobility 4 --> Transferred to chair/commode  -     Row Name 05/10/22 1106 05/10/22 0938       Functional Assessment    Outcome Measure Options AM-PAC 6 Clicks Daily Activity (OT)  - AM-PAC 6 Clicks Basic Mobility (PT)  -          User Key  (r) = Recorded By, (t) = Taken By, (c) = Cosigned By    Initials Name Provider Type     Tatum Melendez, OT Occupational Therapist     Stephon De Jesus, MATT Physical Therapist                Occupational Therapy Education                 Title: PT OT SLP Therapies (In Progress)     Topic: Occupational Therapy (In Progress)     Point: ADL training (Done)     Description:   Instruct learner(s) on proper safety adaptation and remediation techniques during self care or transfers.   Instruct in proper use of assistive devices.              Learning Progress Summary           Patient Acceptance, E,TB,D, VU,NR by  at 5/10/2022 1107                    Point: Home exercise program (Not Started)     Description:   Instruct learner(s) on appropriate technique for monitoring, assisting and/or progressing therapeutic exercises/activities.              Learner Progress:  Not documented in this visit.          Point: Precautions (Done)     Description:   Instruct learner(s) on prescribed precautions during self-care and functional transfers.              Learning Progress Summary           Patient Acceptance, E,TB,D, VU,NR by  at 5/10/2022 1107                   Point: Body mechanics (Done)     Description:   Instruct learner(s) on proper positioning and spine alignment during self-care, functional mobility activities and/or exercises.              Learning Progress Summary           Patient Acceptance, E,TB,D, VU,NR by  at 5/10/2022 1107                               User Key     Initials Effective Dates Name Provider Type Discipline     06/16/21 -  Tatum Melendez, OT Occupational Therapist OT              OT Recommendation and Plan  Planned Therapy Interventions (OT): adaptive equipment training, BADL retraining, functional balance retraining, occupation/activity based interventions, ROM/therapeutic exercise, transfer/mobility retraining  Therapy Frequency (OT): daily  Plan of Care Review  Plan of Care Reviewed With: patient  Progress: improving  Outcome Evaluation: OT eval complete. Pt is pleasent and cooperative. Transfers with CGA and ambulates with CGA and RW. Pt with decreased R hip pain this session. OT issued AE and intiated education on use for completion of all LBD. Pt unable to use sock aid due to hardware protruding on R ankle and attempts at use caused pain to ankle. At this time recommend d/c to IPR when medically ready.     Time Calculation:    Time Calculation- OT     Row Name 05/10/22 1004             Time Calculation- OT    OT Start Time 1004  -      OT Received On 05/10/22  -      OT Goal Re-Cert Due Date 05/20/22  -               Timed Charges    10488 - OT Self Care/Mgmt Minutes 25  -HK              Untimed Charges    OT Eval/Re-eval Minutes 30  -HK              Total Minutes    Timed Charges Total Minutes 25  -HK      Untimed Charges Total Minutes 30  -HK       Total Minutes 55  -HK            User Key  (r) = Recorded By, (t) = Taken By, (c) = Cosigned By    Initials Name Provider Type     Tatum Melendez, OT Occupational Therapist              Therapy Charges for Today     Code Description Service Date Service Provider Modifiers Qty    91422546925 HC OT SELF CARE/MGMT/TRAIN EA 15 MIN 5/10/2022 Tatum Melendez, OT GO 2    93345904692 HC OT EVAL MOD COMPLEXITY 2 5/10/2022 Tatum Melendez, OT GO 1               Tatum Melendez OT  5/10/2022

## 2022-05-10 NOTE — PROGRESS NOTES
"NEUROSURGERY PROGRESS NOTE     LOS: 1 day   Patient Care Team:  Lobo Archer MD as PCP - General (Internal Medicine)  Lobo Archer MD as Referring Physician (Internal Medicine)  Abel Barillas MD as Consulting Physician (Cardiology)    Chief Complaint: Low back and lower extremity pain with walking and standing intolerance.    POD#: 1 Day Post-Op  Procedures:  L2-3, L3-4, L4-5, and L5-S1 laminectomies.  L4-S1 PLIF.    Interval History:   Patient Complaints: Incisional pain and stiffness.  She has some numbness in the right index finger and thumb.  Patient Denies: Lower extremity weakness or numbness.    Vital Signs: Blood pressure 125/60, pulse 84, temperature 98.5 °F (36.9 °C), temperature source Oral, resp. rate 16, height 161.3 cm (63.5\"), weight 76.7 kg (169 lb), SpO2 95 %.  Intake/Output:     Intake/Output Summary (Last 24 hours) at 5/10/2022 0551  Last data filed at 5/10/2022 0336  Gross per 24 hour   Intake 1250 ml   Output 2190 ml   Net -940 ml     Drain output: 200/320 mL.    Physical Exam:  The patient is awake and alert.  She moves about slowly in bed.  Dry dressing is in place on her incision.  Motor function is intact in her lower extremities.     Data Review:    H&H pending    Assessment/Plan:  1.  Lumbar spondylolisthesis with stenosis and instability status post multilevel decompression with fusion stabilization L4-S1.  2.  Diabetes mellitus: Continue home medications and sliding scale as needed.  3.  History of coronary artery disease.  4.  Disposition: Mobilize patient.  Patient may require formal rehabilitation.      Humberto Correa MD  05/10/22  05:51 EDT    "

## 2022-05-10 NOTE — PLAN OF CARE
Goal Outcome Evaluation:                 Problem: Adult Inpatient Plan of Care  Goal: Absence of Hospital-Acquired Illness or Injury  Intervention: Identify and Manage Fall Risk  Recent Flowsheet Documentation  Taken 5/10/2022 0000 by Yefri Dong RN  Safety Promotion/Fall Prevention:   activity supervised   safety round/check completed   toileting scheduled  Taken 5/9/2022 2200 by Yefri Dong RN  Safety Promotion/Fall Prevention:   activity supervised   safety round/check completed   toileting scheduled  Taken 5/9/2022 2000 by Yefri Dong RN  Safety Promotion/Fall Prevention:   activity supervised   safety round/check completed   toileting scheduled  Intervention: Prevent Skin Injury  Recent Flowsheet Documentation  Taken 5/10/2022 0000 by Yefri Dong RN  Body Position:   right   side-lying  Skin Protection: adhesive use limited  Taken 5/9/2022 2200 by Yefri Dong RN  Body Position: supine  Skin Protection: adhesive use limited  Taken 5/9/2022 2000 by Yefri Dong RN  Body Position: supine  Skin Protection: adhesive use limited  Intervention: Prevent and Manage VTE (Venous Thromboembolism) Risk  Recent Flowsheet Documentation  Taken 5/10/2022 0000 by Yefri Dong RN  VTE Prevention/Management:   bilateral   sequential compression devices on  Taken 5/9/2022 2200 by Yefri Dong RN  VTE Prevention/Management:   bilateral   sequential compression devices on  Intervention: Prevent Infection  Recent Flowsheet Documentation  Taken 5/10/2022 0000 by Yefri Dong RN  Infection Prevention: environmental surveillance performed  Taken 5/9/2022 2200 by Yefri Dong RN  Infection Prevention: environmental surveillance performed  Taken 5/9/2022 2000 by Yefri Dong RN  Infection Prevention: environmental surveillance performed  Goal: Optimal Comfort and Wellbeing  Intervention: Monitor Pain and Promote Comfort  Recent Flowsheet Documentation  Taken 5/10/2022 0000 by Yefri Dong  BRIAN RN  Pain Management Interventions: quiet environment facilitated  Taken 5/9/2022 2200 by Yefri Dong RN  Pain Management Interventions: quiet environment facilitated  Taken 5/9/2022 2000 by Yefri Dong RN  Pain Management Interventions: quiet environment facilitated  Intervention: Provide Person-Centered Care  Recent Flowsheet Documentation  Taken 5/10/2022 0000 by Yefri Dong RN  Trust Relationship/Rapport: care explained  Taken 5/9/2022 2200 by Yefri Dong RN  Trust Relationship/Rapport: care explained  Taken 5/9/2022 2000 by Yefri Dong RN  Trust Relationship/Rapport: care explained     Problem: Skin Injury Risk Increased  Goal: Skin Health and Integrity  Intervention: Optimize Skin Protection  Recent Flowsheet Documentation  Taken 5/10/2022 0000 by Yefri Dong RN  Pressure Reduction Techniques: frequent weight shift encouraged  Head of Bed (HOB) Positioning: HOB at 30-45 degrees  Pressure Reduction Devices: positioning supports utilized  Skin Protection: adhesive use limited  Taken 5/9/2022 2200 by Yefri Dong RN  Pressure Reduction Techniques: frequent weight shift encouraged  Head of Bed (HOB) Positioning: HOB at 30-45 degrees  Pressure Reduction Devices: positioning supports utilized  Skin Protection: adhesive use limited  Taken 5/9/2022 2000 by Yefri Dong RN  Pressure Reduction Techniques: frequent weight shift encouraged  Head of Bed (HOB) Positioning: HOB at 30-45 degrees  Pressure Reduction Devices: positioning supports utilized  Skin Protection: adhesive use limited     Problem: Fall Injury Risk  Goal: Absence of Fall and Fall-Related Injury  Intervention: Identify and Manage Contributors  Recent Flowsheet Documentation  Taken 5/10/2022 0000 by Yefri Dong RN  Medication Review/Management: medications reviewed  Taken 5/9/2022 2200 by Yefri Dong RN  Medication Review/Management: medications reviewed  Taken 5/9/2022 2000 by Yefri Dong  RN  Medication Review/Management: medications reviewed  Intervention: Promote Injury-Free Environment  Recent Flowsheet Documentation  Taken 5/10/2022 0000 by Yefri Dong RN  Safety Promotion/Fall Prevention:   activity supervised   safety round/check completed   toileting scheduled  Taken 5/9/2022 2200 by Yefri Dong, RN  Safety Promotion/Fall Prevention:   activity supervised   safety round/check completed   toileting scheduled  Taken 5/9/2022 2000 by Yefri Dong RN  Safety Promotion/Fall Prevention:   activity supervised   safety round/check completed   toileting scheduled     VSS, voids well, rested throughout the night, pain managed with PRN medications, will continue to monitor for changes.

## 2022-05-10 NOTE — CASE MANAGEMENT/SOCIAL WORK
Discharge Planning Assessment  [facility]     Patient Name: Coco Crockett  MRN: [id number]  Today's Date: 5/10/2022    Admit Date: 5/9/2022     Discharge Needs Assessment     Row Name 05/10/22 1634       Living Environment    People in Home child(tre), adult    Name(s) of People in Home Jaylin Crockett [phone]    Current Living Arrangements home    Primary Care Provided by self    Provides Primary Care For no one    Family Caregiver if Needed child(tre), adult    Family Caregiver Names Jaylin ( daughter)    Quality of Family Relationships helpful;supportive    Able to Return to Prior Arrangements --  TBD    Living Arrangement Comments to be determined, may need inpt rehab       Resource/Environmental Concerns    Resource/Environmental Concerns none       Transition Planning    Patient/Family Anticipates Transition to home with family;inpatient rehabilitation facility    Patient/Family Anticipated Services at Transition rehabilitation services    Transportation Anticipated family or friend will provide       Discharge Needs Assessment    Readmission Within the Last 30 Days no previous admission in last 30 days    Equipment Currently Used at Home none    Concerns to be Addressed basic needs;discharge planning    Anticipated Changes Related to Illness inability to care for self    Equipment Needed After Discharge walker, rolling    Outpatient/Agency/Support Group Needs skilled nursing facility;inpatient rehabilitation facility    Current Discharge Risk physical impairment               Discharge Plan     Row Name 05/10/22 1636       Plan    Plan Home vs inpt rehab    Plan Comments chart reviewed. I met with Ms Crockett to initiate d/c planning. She lives with her daughter in [place]. Prior to admit she was  independent with her own ADLs, driving,etc. PT/OT selin noted. She would like to go home with Home health if feasible. We discussed option of inpt rehab ( skilled level of rehab which is what her insurance  will cover) and options of facilities. She wants to see how she does with PT in am and then decide. Case mgt will f/u in am              Continued Care and Services - Admitted Since 5/9/2022    Coordination has not been started for this encounter.       Expected Discharge Date and Time     Expected Discharge Date Expected Discharge Time    May 13, 2022          Demographic Summary     Row Name 05/10/22 1632       General Information    Admission Type inpatient    Arrived From home    Referral Source physician    Reason for Consult discharge planning    Preferred Language English    General Information Comments PCP- Lobo Archer       Contact Information    Permission Granted to Share Info With                Functional Status     Row Name 05/10/22 1633       Functional Status    Usual Activity Tolerance good    Current Activity Tolerance --  see therapy evals       Functional Status, IADL    Medications independent    Meal Preparation independent    Housekeeping independent    Laundry independent    Shopping independent       Mental Status    General Appearance WDL WDL       Mental Status Summary    Recent Changes in Mental Status/Cognitive Functioning no changes       Employment/    Employment Status retired    Employment/ Comments insurance- [employer] replacement               Psychosocial    No documentation.                Abuse/Neglect    No documentation.                Legal    No documentation.                Substance Abuse    No documentation.                Patient Forms    No documentation.                   Sonja C Kellerman, RN

## 2022-05-10 NOTE — PLAN OF CARE
Goal Outcome Evaluation:  Plan of Care Reviewed With: patient        Progress: improving  Outcome Evaluation: OT eval complete. Pt is pleasent and cooperative. Transfers with CGA and ambulates with CGA and RW. Pt with decreased R hip pain this session. OT issued AE and intiated education on use for completion of all LBD. Pt unable to use sock aid due to hardware protruding on R ankle and attempts at use caused pain to ankle. At this time recommend d/c to [facility] when medically ready.

## 2022-05-10 NOTE — PLAN OF CARE
Goal Outcome Evaluation:  Plan of Care Reviewed With: patient        Progress: improving     Pt VSS. RA. A&O x 4. Ambulated to bathroom throughout the shift, voiding spontaneously without difficulty. Up with 1 assist. Hemovac output of 300ml this shift. Pain controlled with PRN ultram.

## 2022-05-10 NOTE — PLAN OF CARE
Goal Outcome Evaluation:  Plan of Care Reviewed With: patient        Progress: no change (PT eval)  Outcome Evaluation: PT evaluation complete. Patient presents with declined functional mobility warranting IPPT to promote return to previous level of function and facilitate independence with mobility. Pt required min/mod A for bed mobility and CGA with RW for bed > chair transfer. Pt unable to ambulate with reports of RLE pain following knee extension (LAQ 10 reps) sitting EOB. Reviewed spinal precautions with pt. Recommend d/c IRF.

## 2022-05-11 LAB
GLUCOSE BLDC GLUCOMTR-MCNC: 122 MG/DL (ref 70–130)
GLUCOSE BLDC GLUCOMTR-MCNC: 124 MG/DL (ref 70–130)
GLUCOSE BLDC GLUCOMTR-MCNC: 131 MG/DL (ref 70–130)

## 2022-05-11 PROCEDURE — 25010000002 HEPARIN (PORCINE) PER 1000 UNITS: Performed by: NEUROLOGICAL SURGERY

## 2022-05-11 PROCEDURE — 82962 GLUCOSE BLOOD TEST: CPT

## 2022-05-11 PROCEDURE — 97535 SELF CARE MNGMENT TRAINING: CPT

## 2022-05-11 PROCEDURE — 99024 POSTOP FOLLOW-UP VISIT: CPT | Performed by: NEUROLOGICAL SURGERY

## 2022-05-11 PROCEDURE — 94799 UNLISTED PULMONARY SVC/PX: CPT

## 2022-05-11 PROCEDURE — 97530 THERAPEUTIC ACTIVITIES: CPT

## 2022-05-11 PROCEDURE — 97116 GAIT TRAINING THERAPY: CPT

## 2022-05-11 PROCEDURE — 94664 DEMO&/EVAL PT USE INHALER: CPT

## 2022-05-11 RX ORDER — HEPARIN SODIUM 5000 [USP'U]/ML
5000 INJECTION, SOLUTION INTRAVENOUS; SUBCUTANEOUS EVERY 8 HOURS SCHEDULED
Status: DISCONTINUED | OUTPATIENT
Start: 2022-05-11 | End: 2022-05-13 | Stop reason: HOSPADM

## 2022-05-11 RX ADMIN — OXYCODONE HYDROCHLORIDE AND ACETAMINOPHEN 1 TABLET: 7.5; 325 TABLET ORAL at 14:49

## 2022-05-11 RX ADMIN — BUDESONIDE AND FORMOTEROL FUMARATE DIHYDRATE 2 PUFF: 80; 4.5 AEROSOL RESPIRATORY (INHALATION) at 08:18

## 2022-05-11 RX ADMIN — OXYCODONE HYDROCHLORIDE AND ACETAMINOPHEN 1 TABLET: 7.5; 325 TABLET ORAL at 20:35

## 2022-05-11 RX ADMIN — Medication 10 ML: at 08:26

## 2022-05-11 RX ADMIN — TRAMADOL HYDROCHLORIDE 50 MG: 50 TABLET, COATED ORAL at 10:00

## 2022-05-11 RX ADMIN — ASPIRIN 325 MG ORAL TABLET 325 MG: 325 PILL ORAL at 20:34

## 2022-05-11 RX ADMIN — TRAMADOL HYDROCHLORIDE 50 MG: 50 TABLET, COATED ORAL at 03:54

## 2022-05-11 RX ADMIN — HEPARIN SODIUM 5000 UNITS: 5000 INJECTION, SOLUTION INTRAVENOUS; SUBCUTANEOUS at 14:45

## 2022-05-11 RX ADMIN — DULOXETINE HYDROCHLORIDE 60 MG: 60 CAPSULE, DELAYED RELEASE ORAL at 08:25

## 2022-05-11 RX ADMIN — HEPARIN SODIUM 5000 UNITS: 5000 INJECTION, SOLUTION INTRAVENOUS; SUBCUTANEOUS at 20:34

## 2022-05-11 RX ADMIN — CETIRIZINE HYDROCHLORIDE 5 MG: 10 TABLET, FILM COATED ORAL at 20:34

## 2022-05-11 RX ADMIN — LEVOTHYROXINE SODIUM 25 MCG: 25 TABLET ORAL at 08:25

## 2022-05-11 RX ADMIN — HYDROCHLOROTHIAZIDE 12.5 MG: 12.5 CAPSULE ORAL at 08:25

## 2022-05-11 RX ADMIN — Medication 10 ML: at 20:36

## 2022-05-11 RX ADMIN — FLUTICASONE PROPIONATE 2 SPRAY: 50 SPRAY, METERED NASAL at 10:01

## 2022-05-11 RX ADMIN — ESTRADIOL 0.5 MG: 0.5 TABLET ORAL at 10:00

## 2022-05-11 RX ADMIN — FENOFIBRATE 48 MG: 48 TABLET ORAL at 10:00

## 2022-05-11 RX ADMIN — PANTOPRAZOLE SODIUM 40 MG: 40 TABLET, DELAYED RELEASE ORAL at 03:54

## 2022-05-11 RX ADMIN — ACETAMINOPHEN 650 MG: 325 TABLET ORAL at 08:25

## 2022-05-11 RX ADMIN — HEPARIN SODIUM 5000 UNITS: 5000 INJECTION, SOLUTION INTRAVENOUS; SUBCUTANEOUS at 08:26

## 2022-05-11 NOTE — PROGRESS NOTES
"NEUROSURGERY PROGRESS NOTE     LOS: 2 days   Patient Care Team:  Lobo Archer MD as PCP - General (Internal Medicine)  Lobo Archer MD as Referring Physician (Internal Medicine)  Abel Barillas MD as Consulting Physician (Cardiology)    Chief Complaint: Low back and lower extremity pain with walking and standing intolerance.    POD#: 2 Days Post-Op  Procedures:  L2-3, L3-4, L4-5, and L5-S1 laminectomies.  L4-S1 PLIF.    Interval History:   The patient is voiding independently and has ambulated short distances.  Patient Complaints: Incisional pain with any movement.  Patient Denies: Headache or voiding difficulty.    Vital Signs: Blood pressure 113/60, pulse 77, temperature 98.3 °F (36.8 °C), temperature source Oral, resp. rate 16, height 161.3 cm (63.5\"), weight 76.7 kg (169 lb), SpO2 94 %.  Intake/Output:     Intake/Output Summary (Last 24 hours) at 5/11/2022 0614  Last data filed at 5/11/2022 0350  Gross per 24 hour   Intake 600 ml   Output 2400 ml   Net -1800 ml     Drain output: 300/200 mL.    Physical Exam:  The patient awakens easily.  She moves about in bed uncomfortably.  Dry dressing is in place on her incision.     Data Review:    Results from last 7 days   Lab Units 05/10/22  1024 05/06/22  1228   WBC 10*3/mm3  --  6.13   HEMOGLOBIN g/dL 8.8* 11.8*   HEMATOCRIT % 28.1* 37.7   PLATELETS 10*3/mm3  --  311         Assessment/Plan:  1.  Lumbar spondylolisthesis with stenosis and instability status post multilevel decompression with fusion stabilization L4-S1.  2.  Diabetes mellitus: Continue home medications and sliding scale as needed.  3.  History of coronary artery disease.  4.  Disposition: Mobilize patient.  Patient may require formal rehabilitation.      Humberto Correa MD  05/11/22  06:14 EDT    "

## 2022-05-11 NOTE — PLAN OF CARE
Goal Outcome Evaluation:  Plan of Care Reviewed With: patient        Progress: improving  Outcome Evaluation: Alert, oriented x 4, and pleasant. VSS. Ambulates with assist x 1, walker and gait belt. Voiding well per bedside commode. Sat up in the chair for a few hours today. PO medication controlling pain. Will continue to monitor.

## 2022-05-11 NOTE — CASE MANAGEMENT/SOCIAL WORK
Continued Stay Note  [facility]     Patient Name: Coco Crockett  MRN: [id number]  Today's Date: 5/11/2022    Admit Date: 5/9/2022     Discharge Plan     Row Name 05/11/22 1708       Plan    Plan SNF    Plan Comments case mgt f/u PT/OT key noted. she nad her daughter would like to pursue short ter inpt rehab at the transitional care unit at [facility] in [place]( swing bed). I spoke with Kyree at [phone] and faxed referral to [phone]. Await call back, her insurance will require pre authorization               Discharge Codes    No documentation.               Expected Discharge Date and Time     Expected Discharge Date Expected Discharge Time    May 13, 2022             Sonja C Kellerman, RN

## 2022-05-11 NOTE — THERAPY TREATMENT NOTE
Patient Name: Coco Crockett  : 1945    MRN: [id number]                              Today's Date: 2022       Admit Date: 2022    Visit Dx:     ICD-10-CM ICD-9-CM   1. Spinal stenosis of lumbar region with neurogenic claudication  M48.062 724.03     Patient Active Problem List   Diagnosis   • Mild obesity   • Hypertensive cardiovascular disease   • Hypertension   • Dyslipidemia   • Obesity (BMI 30.0-34.9)   • Hypothyroidism   • Osteopenia   • TMJ (temporomandibular joint disorder)   • GERD (gastroesophageal reflux disease)   • Iron deficiency anemia   • Depression   • Eczema   • Vertigo   • Migraine   • Type 2 diabetes mellitus (HCC)   • MALIK (dyspnea on exertion)   • Leg pain, bilateral   • Fatigue   • Ischemic heart disease   • Lumbar stenosis with neurogenic claudication   • Spondylosis of lumbar region without myelopathy or radiculopathy   • Degeneration of lumbar or lumbosacral intervertebral disc   • Greater trochanteric bursitis of both hips   • Spinal stenosis of lumbar region with neurogenic claudication     Past Medical History:   Diagnosis Date   • Anemia    • Anxiety    • Chest pain    • Coronary artery disease    • Depression    • Diabetes mellitus (HCC)    • Dyslipidemia    • Eczema    • Fear of awareness under general anesthesia    • Fear of general anesthetic     has hx of awareness during anesthesia induction, felt like she was dying   • GERD (gastroesophageal reflux disease)    • History of COVID-19 2022   • Hyperlipidemia    • Hypertension     Chronic hypertension, probably essential.    • Hypertensive cardiovascular disease    • Hypothyroidism     Chronic hypothyroidism/replacement therapy.   • IBS (irritable bowel syndrome)    • Iron deficiency anemia    • Migraine     Intermittent progressive headache syndrome/possible migraine equivalent.   • Obesity (BMI 30.0-34.9)     Mild obesity (BMI 31).    • Osteopenia     Osteopenia with fracture of lumbar vertebra, .    •  Pneumonia    • TMJ (temporomandibular joint disorder)    • Type 2 diabetes mellitus (HCC)     Type 2 diabetes mellitus, Hemoglobin A1c 5.9%, December 2014.   • Urgency incontinence    • Urinary, incontinence, stress female    • Vertigo     Intermittent marked dizziness/vertigo with Epley maneuver.     Past Surgical History:   Procedure Laterality Date   • ANKLE OPEN REDUCTION INTERNAL FIXATION Right 1995    Fall on ice   • APPENDECTOMY  1970    Appendectomy with wedge resection of both ovaries, 1970.   • CATARACT EXTRACTION WITH INTRAOCULAR LENS IMPLANT     • COLONOSCOPY     • LASIK Bilateral    • LUMBAR LAMINECTOMY WITH FUSION N/A 5/9/2022    Procedure: LUMBAR DECOMPRESSON L2-S1 with Fusion and Stabilization with PEDICLE SCREWS at L4-S1;  Surgeon: Humberto Correa MD;  Location: [facility];  Service: Neurosurgery;  Laterality: N/A;   • TIBIAL PLATEAU OPEN REDUCTION INTERNAL FIXATION Right    • TONSILLECTOMY AND ADENOIDECTOMY     • TOTAL LAPAROSCOPIC HYSTERECTOMY SALPINGO OOPHORECTOMY  1985    MARIZA/BSO secondary to severe endometriosis, 1985.       General Information     Row Name 05/11/22 0925          Physical Therapy Time and Intention    Document Type therapy note (daily note)  -     Mode of Treatment physical therapy  -     Row Name 05/11/22 0925          General Information    Existing Precautions/Restrictions spinal;other (see comments)  hemovac  -ACLVIN     Row Name 05/11/22 0925          Cognition    Orientation Status (Cognition) oriented x 4  -CALVIN     Row Name 05/11/22 0925          Safety Issues, Functional Mobility    Safety Issues Affecting Function (Mobility) safety precaution awareness;safety precautions follow-through/compliance;awareness of need for assistance;insight into deficits/self-awareness;sequencing abilities  -     Impairments Affecting Function (Mobility) balance;endurance/activity tolerance;pain;strength;coordination;motor planning;motor control;sensation/sensory awareness  -CALVIN            User Key  (r) = Recorded By, (t) = Taken By, (c) = Cosigned By    Initials Name Provider Type    Luis A Henry, MATT Physical Therapist               Mobility     Row Name 05/11/22 0925          Bed Mobility    Bed Mobility scooting/bridging;supine-sit;sit-supine  -CALVIN     Scooting/Bridging [place] (Bed Mobility) verbal cues;moderate assist (50% patient effort)  -CALVIN     Supine-Sit [place] (Bed Mobility) verbal cues;moderate assist (50% patient effort)  -CALVIN     Sit-Supine [place] (Bed Mobility) verbal cues;moderate assist (50% patient effort)  -     Assistive Device (Bed Mobility) bed rails  -CALVIN     Comment, (Bed Mobility) Mod A for log roll technique for all bed mobility  -     Row Name 05/11/22 0925          Sit-Stand Transfer    Sit-Stand [place] (Transfers) verbal cues;minimum assist (75% patient effort)  -     Assistive Device (Sit-Stand Transfers) walker, front-wheeled  -CALVIN     Comment, (Sit-Stand Transfer) Verbal cues for safe hand placement during standing/sitting  -     Row Name 05/11/22 0925          Gait/Stairs (Locomotion)    [place] Level (Gait) verbal cues;contact guard  -     Assistive Device (Gait) walker, front-wheeled  -CALVIN     Distance in Feet (Gait) 40  -CALVIN     Deviations/Abnormal Patterns (Gait) bilateral deviations;sridevi decreased;gait speed decreased;stride length decreased  -     Bilateral Gait Deviations forward flexed posture  -     [place] Level (Stairs) not tested  -CALVIN     Comment, (Gait/Stairs) Pt ambulated with step through pattern and decreased speed. Verbal cues for maintaining upright posture, body within walker, increase step length, and WB through LEs. Gait limited by fatigue.  -           User Key  (r) = Recorded By, (t) = Taken By, (c) = Cosigned By    Initials Name Provider Type    Luis A Henry PT Physical Therapist               Obj/Interventions     Row Name 05/11/22 0925          Motor Skills    Therapeutic Exercise hip;knee;other  (see comments);ankle  ab sets  -     Row Name 05/11/22 0925          Hip (Therapeutic Exercise)    Hip (Therapeutic Exercise) isometric exercises  -     Hip Isometrics (Therapeutic Exercise) gluteal sets;10 repetitions  -[facility] Name 05/11/22 0925          Knee (Therapeutic Exercise)    Knee (Therapeutic Exercise) isometric exercises  -     Knee Isometrics (Therapeutic Exercise) quad sets;10 repetitions  -[facility] Name 05/11/22 0925          Ankle (Therapeutic Exercise)    Ankle (Therapeutic Exercise) AROM (active range of motion)  -     Ankle AROM (Therapeutic Exercise) bilateral;dorsiflexion;plantarflexion;10 repetitions  -[facility] Name 05/11/22 0925          Balance    Balance Assessment standing static balance;standing dynamic balance  -     Static Standing Balance verbal cues;minimal assist  -     Dynamic Standing Balance verbal cues;minimal assist  -           User Key  (r) = Recorded By, (t) = Taken By, (c) = Cosigned By    Initials Name Provider Type    CALVIN Luis A Baez, PT Physical Therapist               Goals/Plan    No documentation.                Clinical Impression     [facility] Name 05/11/22 0925          Pain    Pretreatment Pain Rating 5/10  -     Posttreatment Pain Rating 6/10  -     Pain Location incisional  -     Pain Location - back  -     Pain Intervention(s) Cold applied;Repositioned;Ambulation/increased activity  -     Row Name 05/11/22 0925          Therapy Assessment/Plan (PT)    Rehab Potential (PT) good, to achieve stated therapy goals  -     Criteria for Skilled Interventions Met (PT) yes;meets criteria;skilled treatment is necessary  -     Therapy Frequency (PT) daily  -     Row Name 05/11/22 0925          Positioning and Restraints    Pre-Treatment Position in bed  -     Post Treatment Position bed  -     In Bed notified nsg;side lying left;call light within reach;encouraged to call for assist;exit alarm on;with nsg;with family/caregiver  -            User Key  (r) = Recorded By, (t) = Taken By, (c) = Cosigned By    Initials Name Provider Type    Luis A Henry PT Physical Therapist               Outcome Measures     Row Name 05/11/22 0925          How much help from another person do you currently need...    Turning from your back to your side while in flat bed without using bedrails? 3  -CALVIN     Moving from lying on back to sitting on the side of a flat bed without bedrails? 3  -CALVIN     Moving to and from a bed to a chair (including a wheelchair)? 3  -CALVIN     Standing up from a chair using your arms (e.g., wheelchair, bedside chair)? 3  -CALVIN     Climbing 3-5 steps with a railing? 1  -CALVIN     To walk in hospital room? 2  -CALVIN     AM-PAC 6 Clicks Score (PT) 15  -CALVIN     Highest level of mobility 4 --> Transferred to chair/commode  -CALVIN     Row Name 05/11/22 0925          Functional Assessment    Outcome Measure Options AM-PAC 6 Clicks Basic Mobility (PT)  -           User Key  (r) = Recorded By, (t) = Taken By, (c) = Cosigned By    Initials Name Provider Type    Luis A Henry PT Physical Therapist                             Physical Therapy Education                 Title: PT OT SLP Therapies (In Progress)     Topic: Physical Therapy (Done)     Point: Mobility training (Done)     Learning Progress Summary           Patient Acceptance, E,D, VU by CALVIN at 5/11/2022 0925    Comment: Educated on safe sequencing with bed mobility, ambulatory transfers, and gait. Reviewed HEP and spinal precautions.    Acceptance, E,TB, VU by  at 5/10/2022 0939    Comment: Education to patient on PT services, POC, d/c recommendations, safety with mobility, and spinal precautions                   Point: Home exercise program (Done)     Learning Progress Summary           Patient Acceptance, E,D, VU by CALVIN at 5/11/2022 0925    Comment: Educated on safe sequencing with bed mobility, ambulatory transfers, and gait. Reviewed HEP and spinal precautions.    Acceptance, E,TB, VU by  at 5/10/2022  0939    Comment: Education to patient on PT services, POC, d/c recommendations, safety with mobility, and spinal precautions                   Point: Body mechanics (Done)     Learning Progress Summary           Patient Acceptance, E,D, VU by  at 5/11/2022 0925    Comment: Educated on safe sequencing with bed mobility, ambulatory transfers, and gait. Reviewed HEP and spinal precautions.    Acceptance, E,TB, VU by  at 5/10/2022 0939    Comment: Education to patient on PT services, POC, d/c recommendations, safety with mobility, and spinal precautions                   Point: Precautions (Done)     Learning Progress Summary           Patient Acceptance, E,D, VU by  at 5/11/2022 0925    Comment: Educated on safe sequencing with bed mobility, ambulatory transfers, and gait. Reviewed HEP and spinal precautions.    Acceptance, E,TB, VU by  at 5/10/2022 0939    Comment: Education to patient on PT services, POC, d/c recommendations, safety with mobility, and spinal precautions                               User Key     Initials Effective Dates Name Provider Type Discipline     06/16/21 -  Luis A Baez, PT Physical Therapist PT     09/22/20 -  Stephon De Jesus, PT Physical Therapist PT              PT Recommendation and Plan     Plan of Care Reviewed With: patient  Progress: improving  Outcome Evaluation: Pt increased ambulation distance to 40 feet using RW and min A for balance and AD management. Gait limited by fatigue and pain. Bed mobility performed with mod A and STS with min A. Reviewed HEP, logroll technique, and spinal precautions. Continue recommend IPR when appropriate.     Time Calculation:    PT Charges     Row Name 05/11/22 0925             Time Calculation    Start Time 0925  -      PT Received On 05/11/22  -      PT Goal Re-Cert Due Date 05/20/22  -              Time Calculation- PT    Total Timed Code Minutes- PT 25 minute(s)  -              Timed Charges    21970 - PT Therapeutic Exercise  Minutes 3  -CALVIN      80147 - Gait Training Minutes  9  -CALVIN      25889 - PT Therapeutic Activity Minutes 10  -CALVIN              Total Minutes    Timed Charges Total Minutes 22  -CALVIN       Total Minutes 22  -CALVIN            User Key  (r) = Recorded By, (t) = Taken By, (c) = Cosigned By    Initials Name Provider Type    Luis A Henry PT Physical Therapist              Therapy Charges for Today     Code Description Service Date Service Provider Modifiers Qty    74844023539 HC PT THERAPEUTIC ACT EA 15 MIN 5/11/2022 Luis A Baze, PT GP 1    90626141927 HC GAIT TRAINING EA 15 MIN 5/11/2022 Luis A Baez, PT GP 1          PT G-Codes  Outcome Measure Options: AM-PAC 6 Clicks Basic Mobility (PT)  AM-PAC 6 Clicks Score (PT): 15  AM-PAC 6 Clicks Score (OT): 16    Luis A Baez PT  5/11/2022

## 2022-05-11 NOTE — PLAN OF CARE
Problem: Adult Inpatient Plan of Care  Goal: Plan of Care Review  Flowsheets (Taken 5/11/2022 8765)  Progress: improving  Plan of Care Reviewed With: patient  Outcome Evaluation: Pt increased ambulation distance to 40 feet using RW and min A for balance and AD management. Gait limited by fatigue and pain. Bed mobility performed with mod A and STS with min A. Reviewed HEP, logroll technique, and spinal precautions. Continue recommend IPR when appropriate.   Goal Outcome Evaluation:  Plan of Care Reviewed With: patient        Progress: improving  Outcome Evaluation: Pt increased ambulation distance to 40 feet using RW and min A for balance and AD management. Gait limited by fatigue and pain. Bed mobility performed with mod A and STS with min A. Reviewed HEP, logroll technique, and spinal precautions. Continue recommend IPR when appropriate.

## 2022-05-11 NOTE — THERAPY TREATMENT NOTE
Patient Name: Coco Crockett  : 1945    MRN: [id number]                              Today's Date: 2022       Admit Date: 2022    Visit Dx:     ICD-10-CM ICD-9-CM   1. Spinal stenosis of lumbar region with neurogenic claudication  M48.062 724.03     Patient Active Problem List   Diagnosis   • Mild obesity   • Hypertensive cardiovascular disease   • Hypertension   • Dyslipidemia   • Obesity (BMI 30.0-34.9)   • Hypothyroidism   • Osteopenia   • TMJ (temporomandibular joint disorder)   • GERD (gastroesophageal reflux disease)   • Iron deficiency anemia   • Depression   • Eczema   • Vertigo   • Migraine   • Type 2 diabetes mellitus (HCC)   • MALIK (dyspnea on exertion)   • Leg pain, bilateral   • Fatigue   • Ischemic heart disease   • Lumbar stenosis with neurogenic claudication   • Spondylosis of lumbar region without myelopathy or radiculopathy   • Degeneration of lumbar or lumbosacral intervertebral disc   • Greater trochanteric bursitis of both hips   • Spinal stenosis of lumbar region with neurogenic claudication     Past Medical History:   Diagnosis Date   • Anemia    • Anxiety    • Chest pain    • Coronary artery disease    • Depression    • Diabetes mellitus (HCC)    • Dyslipidemia    • Eczema    • Fear of awareness under general anesthesia    • Fear of general anesthetic     has hx of awareness during anesthesia induction, felt like she was dying   • GERD (gastroesophageal reflux disease)    • History of COVID-19 2022   • Hyperlipidemia    • Hypertension     Chronic hypertension, probably essential.    • Hypertensive cardiovascular disease    • Hypothyroidism     Chronic hypothyroidism/replacement therapy.   • IBS (irritable bowel syndrome)    • Iron deficiency anemia    • Migraine     Intermittent progressive headache syndrome/possible migraine equivalent.   • Obesity (BMI 30.0-34.9)     Mild obesity (BMI 31).    • Osteopenia     Osteopenia with fracture of lumbar vertebra, .    •  Pneumonia    • TMJ (temporomandibular joint disorder)    • Type 2 diabetes mellitus (HCC)     Type 2 diabetes mellitus, Hemoglobin A1c 5.9%, December 2014.   • Urgency incontinence    • Urinary, incontinence, stress female    • Vertigo     Intermittent marked dizziness/vertigo with Epley maneuver.     Past Surgical History:   Procedure Laterality Date   • ANKLE OPEN REDUCTION INTERNAL FIXATION Right 1995    Fall on ice   • APPENDECTOMY  1970    Appendectomy with wedge resection of both ovaries, 1970.   • CATARACT EXTRACTION WITH INTRAOCULAR LENS IMPLANT     • COLONOSCOPY     • LASIK Bilateral    • LUMBAR LAMINECTOMY WITH FUSION N/A 5/9/2022    Procedure: LUMBAR DECOMPRESSON L2-S1 with Fusion and Stabilization with PEDICLE SCREWS at L4-S1;  Surgeon: Humberto Correa MD;  [facility];  Service: Neurosurgery;  Laterality: N/A;   • TIBIAL PLATEAU OPEN REDUCTION INTERNAL FIXATION Right    • TONSILLECTOMY AND ADENOIDECTOMY     • TOTAL LAPAROSCOPIC HYSTERECTOMY SALPINGO OOPHORECTOMY  1985    MARIZA/BSO secondary to severe endometriosis, 1985.       General Information     Row Name 05/11/22 1314          OT Time and Intention    Document Type therapy note (daily note)  -AN     Mode of Treatment occupational therapy  -AN     Row Name 05/11/22 1314          General Information    Patient Profile Reviewed yes  -AN     Existing Precautions/Restrictions spinal;other (see comments)  hemovac  -AN     Barriers to Rehab medically complex  -AN     Row Name 05/11/22 1314          Cognition    Orientation Status (Cognition) oriented x 4  -AN     Row Name 05/11/22 1314          Safety Issues, Functional Mobility    Safety Issues Affecting Function (Mobility) safety precaution awareness;safety precautions follow-through/compliance;sequencing abilities  -AN     Impairments Affecting Function (Mobility) balance;endurance/activity tolerance;pain;strength;coordination;motor planning;motor control;sensation/sensory awareness  -AN            User Key  (r) = Recorded By, (t) = Taken By, (c) = Cosigned By    Initials Name Provider Type    Katt Mac OT Occupational Therapist                 Mobility/ADL's     Row Name 05/11/22 1315          Bed Mobility    Bed Mobility supine-sit  -AN     Supine-Sit [place] (Bed Mobility) verbal cues;moderate assist (50% patient effort);nonverbal cues (demo/gesture);1 person assist  -AN     Bed Mobility, Safety Issues impaired trunk control for bed mobility;decreased use of legs for bridging/pushing;decreased use of arms for pushing/pulling;cognitive deficits limit understanding  -AN     Comment, (Bed Mobility) cues for log rolling technique  -AN     Row Name 05/11/22 1315          Transfers    Transfers sit-stand transfer;stand-sit transfer;toilet transfer  -AN     Comment, (Transfers) cues for hand placement and transfer technique using RW  -AN     Sit-Stand [place] (Transfers) verbal cues;minimum assist (75% patient effort);1 person assist;nonverbal cues (demo/gesture)  -AN     Stand-Sit [place] (Transfers) verbal cues;nonverbal cues (demo/gesture);minimum assist (75% patient effort);1 person assist  -AN     [place] Level (Toilet Transfer) verbal cues;nonverbal cues (demo/gesture);minimum assist (75% patient effort);1 person assist  -AN     Assistive Device (Toilet Transfer) commode;walker, front-wheeled  -AN     Row Name 05/11/22 1315          Sit-Stand Transfer    Assistive Device (Sit-Stand Transfers) walker, front-wheeled  -AN     Row Name 05/11/22 1315          Toilet Transfer    Type (Toilet Transfer) sit-stand;stand-sit  -AN     Row Name 05/11/22 1315          Functional Mobility    Functional Mobility- Ind. Level contact guard assist;verbal cues required  -AN     Functional Mobility- Device walker, front-wheeled  -AN     Functional Mobility-Distance (Feet) --  <household distance  -AN     Functional Mobility- Safety Issues step length decreased  -AN     Functional Mobility-  Comment ambulates at a slow pace  -AN     Row Name 05/11/22 1315          Activities of Daily Living    BADL Assessment/Intervention lower body dressing;grooming;toileting  -AN     Row Name 05/11/22 1315          Lower Body Dressing Assessment/Training    [place] Level (Lower Body Dressing) don;shoes/slippers;minimum assist (75% patient effort)  -AN     Position (Lower Body Dressing) edge of bed sitting  -AN     Row Name 05/11/22 1315          Toileting Assessment/Training    [place] Level (Toileting) adjust/manage clothing;perform perineal hygiene;minimum assist (75% patient effort);verbal cues  -AN     Assistive Devices (Toileting) commode  -AN     Position (Toileting) supported sitting  -AN     Comment, (Toileting) educated on maintaining spinal precautions when reaching for toilet paper and when performing elana care  -AN     Row Name 05/11/22 1315          Grooming Assessment/Training    [place] Level (Grooming) oral care regimen;set up  -AN     Position (Grooming) supported sitting  -AN     Comment, (Grooming) pt unable to complete in standing due to RLE spasm and pain after ambulation and toileting; pt performed sitting in chair  -AN           User Key  (r) = Recorded By, (t) = Taken By, (c) = Cosigned By    Initials Name Provider Type    Katt Mac OT Occupational Therapist               Obj/Interventions     Row Name 05/11/22 1320          Balance    Balance Assessment sitting static balance;sitting dynamic balance;sit to stand dynamic balance;standing static balance;standing dynamic balance  -AN     Static Sitting Balance supervision  -AN     Dynamic Sitting Balance supervision  -AN     Position, Sitting Balance sitting edge of bed  -AN     Sit to Stand Dynamic Balance verbal cues;minimal assist;1-person assist  -AN     Static Standing Balance verbal cues;non-verbal cues (demo/gesture);minimal assist;1-person assist  -AN     Dynamic Standing Balance verbal cues;non-verbal cues  (demo/gesture);minimal assist;1-person assist  -AN     Position/Device Used, Standing Balance walker, rolling  -AN     Balance Interventions standing;sit to stand;supported;static;dynamic;minimal challenge;occupation based/functional task;narrowed base of support  -AN           User Key  (r) = Recorded By, (t) = Taken By, (c) = Cosigned By    Initials Name Provider Type    AN Katt Velez OT Occupational Therapist               Goals/Plan    No documentation.                Clinical Impression     Row Name 05/11/22 1320          Pain Assessment    Additional Documentation Pain Scale: FACES Pre/Post-Treatment (Group)  -AN     Row Name 05/11/22 1320          Pain Scale: FACES Pre/Post-Treatment    Pain: FACES Scale, Pretreatment 4-->hurts little more  -AN     Posttreatment Pain Rating 6-->hurts even more  -AN     Pain Location - Side/Orientation Bilateral  -AN     Pain Location generalized  -AN     Pain Location - back  -AN     Pre/Posttreatment Pain Comment tolerated  -AN     Row Name 05/11/22 1320          Plan of Care Review    Plan of Care Reviewed With patient  -AN     Progress improving  -AN     Outcome Evaluation Spinal precautions, log rolling technique, and maintaining precautions with ADLs and mobility reviewed. Pt performed bed mobility with Mod A, STS using RW with Min A, and ambulated from bed to bathroom using the RW with Min A. Min A for toileting. Grooming performed in sitting vs standing d/t increased back and RLE pain after ambulating. OT continues to rec IPR at [facility].  -AN     Row Name 05/11/22 1320          Therapy Plan Review/Discharge Plan (OT)    Anticipated Discharge Disposition (OT) inpatient rehabilitation facility  -AN     [facility] Name 05/11/22 1320          Vital Signs    Pre Systolic BP Rehab --  VSS  -AN     O2 Delivery Pre Treatment room air  -AN     O2 Delivery Intra Treatment room air  -AN     O2 Delivery Post Treatment room air  -AN     Pre Patient Position Supine  -AN     Intra  Patient Position Standing  -AN     Post Patient Position Sitting  -AN     Row Name 05/11/22 1320          Positioning and Restraints    Pre-Treatment Position in bed  -AN     Post Treatment Position chair  -AN     In Chair notified nsg;reclined;call light within reach;encouraged to call for assist;exit alarm on;waffle cushion;legs elevated  -AN           User Key  (r) = Recorded By, (t) = Taken By, (c) = Cosigned By    Initials Name Provider Type    Katt Mac, OT Occupational Therapist               Outcome Measures     Row Name 05/11/22 1324          How much help from another is currently needed...    Putting on and taking off regular lower body clothing? 2  -AN     Bathing (including washing, rinsing, and drying) 2  -AN     Toileting (which includes using toilet bed pan or urinal) 3  -AN     Putting on and taking off regular upper body clothing 3  -AN     Taking care of personal grooming (such as brushing teeth) 3  -AN     Eating meals 3  -AN     AM-[facility] 6 Clicks Score (OT) 16  -AN     Row Name 05/11/22 0925          How much help from another person do you currently need...    Turning from your back to your side while in flat bed without using bedrails? 3  -CALVIN     Moving from lying on back to sitting on the side of a flat bed without bedrails? 3  -CALVIN     Moving to and from a bed to a chair (including a wheelchair)? 3  -CALVIN     Standing up from a chair using your arms (e.g., wheelchair, bedside chair)? 3  -CALVIN     Climbing 3-5 steps with a railing? 1  -CALVIN     To walk in hospital room? 2  -CALVIN     AM-[facility] 6 Clicks Score (PT) 15  -CALVIN     Highest level of mobility 4 --> Transferred to chair/commode  -CALVIN     Row Name 05/11/22 1324 05/11/22 0925       Functional Assessment    Outcome Measure Options AM-PAC 6 Clicks Daily Activity (OT)  -AN AM-[facility] 6 Clicks Basic Mobility (PT)  -CALVIN          User Key  (r) = Recorded By, (t) = Taken By, (c) = Cosigned By    Initials Name Provider Type    Luis A Henry, PT Physical  Therapist    Katt Mac OT Occupational Therapist                Occupational Therapy Education                 Title: PT OT SLP Therapies (In Progress)     Topic: Occupational Therapy (In Progress)     Point: ADL training (Done)     Description:   Instruct learner(s) on proper safety adaptation and remediation techniques during self care or transfers.   Instruct in proper use of assistive devices.              Learning Progress Summary           Patient Acceptance, E, VU by  at 5/11/2022 1324    Acceptance, E,TB,D, VU,NR by  at 5/10/2022 1107                   Point: Home exercise program (Not Started)     Description:   Instruct learner(s) on appropriate technique for monitoring, assisting and/or progressing therapeutic exercises/activities.              Learner Progress:  Not documented in this visit.          Point: Precautions (Done)     Description:   Instruct learner(s) on prescribed precautions during self-care and functional transfers.              Learning Progress Summary           Patient Acceptance, E, VU by  at 5/11/2022 1324    Acceptance, E,TB,D, VU,NR by  at 5/10/2022 1107                   Point: Body mechanics (Done)     Description:   Instruct learner(s) on proper positioning and spine alignment during self-care, functional mobility activities and/or exercises.              Learning Progress Summary           Patient Acceptance, E, VU by  at 5/11/2022 1324    Acceptance, E,TB,D, VU,NR by  at 5/10/2022 1107                               User Key     Initials Effective Dates Name Provider Type Discipline     06/16/21 -  Tatum Melendez OT Occupational Therapist OT    JORDAN 09/21/21 -  Katt Velez OT Occupational Therapist OT              OT Recommendation and Plan     Plan of Care Review  Plan of Care Reviewed With: patient  Progress: improving  Outcome Evaluation: Spinal precautions, log rolling technique, and maintaining precautions with ADLs and mobility reviewed. Pt  performed bed mobility with Mod A, STS using RW with Min A, and ambulated from bed to bathroom using the RW with Min A. Min A for toileting. Grooming performed in sitting vs standing d/t increased back and RLE pain after ambulating. OT continues to rec IPR at [place].     Time Calculation:    Time Calculation- OT     Row Name 05/11/22 1324 05/11/22 0925          Time Calculation- OT    OT Start Time 1118  -AN --     OT Received On 05/11/22  -AN --     OT Goal Re-Cert Due Date 05/20/22  -AN --            Timed Charges    66579 - Gait Training Minutes  -- 9  -CALVIN     86399 - OT Therapeutic Activity Minutes 5  -AN --     64961 - OT Self Care/Mgmt Minutes 25  -AN --            Total Minutes    Timed Charges Total Minutes 30  -AN 9  -CALVIN      Total Minutes 30  -AN 9  -CALVIN           User Key  (r) = Recorded By, (t) = Taken By, (c) = Cosigned By    Initials Name Provider Type    Luis A Henry, PT Physical Therapist    AN Katt Velez OT Occupational Therapist              Therapy Charges for Today     Code Description Service Date Service Provider Modifiers Qty    [id number] HC OT SELF CARE/MGMT/TRAIN EA 15 MIN 5/11/2022 Katt Velez OT GO 2               Katt Velez OT  5/11/2022

## 2022-05-11 NOTE — PLAN OF CARE
Problem: Adult Inpatient Plan of Care  Goal: Plan of Care Review  Outcome: Ongoing, Progressing  Flowsheets (Taken 5/11/2022 0320)  Progress: improving  Plan of Care Reviewed With: patient     Problem: Adult Inpatient Plan of Care  Goal: Plan of Care Review  Outcome: Ongoing, Progressing  Flowsheets (Taken 5/11/2022 0320)  Progress: improving  Plan of Care Reviewed With: patient  Goal: Absence of Hospital-Acquired Illness or Injury  Intervention: Identify and Manage Fall Risk  Recent Flowsheet Documentation  Taken 5/11/2022 0205 by Anjali Ray RN  Safety Promotion/Fall Prevention:   activity supervised   assistive device/personal items within reach   clutter free environment maintained   elopement precautions   fall prevention program maintained   gait belt   lighting adjusted   mobility aid in reach   muscle strengthening facilitated   nonskid shoes/slippers when out of bed   room organization consistent   safety round/check completed   toileting scheduled  Taken 5/11/2022 0015 by Anjali Ray RN  Safety Promotion/Fall Prevention:   activity supervised   assistive device/personal items within reach   clutter free environment maintained   elopement precautions   fall prevention program maintained   gait belt   lighting adjusted   mobility aid in reach   muscle strengthening facilitated   nonskid shoes/slippers when out of bed   room organization consistent   safety round/check completed   toileting scheduled  Taken 5/10/2022 2210 by Anjali Ray RN  Safety Promotion/Fall Prevention:   activity supervised   assistive device/personal items within reach   elopement precautions   clutter free environment maintained   fall prevention program maintained   gait belt   lighting adjusted   mobility aid in reach   muscle strengthening facilitated   nonskid shoes/slippers when out of bed   room organization consistent   safety round/check completed   toileting scheduled  Taken 5/10/2022 2035 by Anjali Ray  RN  Safety Promotion/Fall Prevention:   activity supervised   assistive device/personal items within reach   clutter free environment maintained   elopement precautions   fall prevention program maintained   gait belt   lighting adjusted   mobility aid in reach   muscle strengthening facilitated   nonskid shoes/slippers when out of bed   room organization consistent   safety round/check completed   toileting scheduled  Intervention: Prevent Skin Injury  Recent Flowsheet Documentation  Taken 5/11/2022 0205 by Anjali Ray RN  Skin Protection:   adhesive use limited   incontinence pads utilized   tubing/devices free from skin contact  Taken 5/11/2022 0015 by Anjali Ray RN  Skin Protection:   adhesive use limited   incontinence pads utilized   tubing/devices free from skin contact  Taken 5/10/2022 2210 by Anjali Ray RN  Skin Protection:   adhesive use limited   incontinence pads utilized   tubing/devices free from skin contact  Taken 5/10/2022 2035 by Anjali Ray RN  Skin Protection:   adhesive use limited   incontinence pads utilized   tubing/devices free from skin contact  Intervention: Prevent and Manage VTE (Venous Thromboembolism) Risk  Recent Flowsheet Documentation  Taken 5/11/2022 0015 by Anjali Ray RN  VTE Prevention/Management:   bilateral   sequential compression devices on  Taken 5/10/2022 2210 by Anjali Ray RN  VTE Prevention/Management:   bilateral   sequential compression devices on  Taken 5/10/2022 2035 by Anjali Ray RN  VTE Prevention/Management:   bilateral   sequential compression devices on  Goal: Optimal Comfort and Wellbeing  Intervention: Monitor Pain and Promote Comfort  Recent Flowsheet Documentation  Taken 5/10/2022 2035 by Anjali Ray RN  Pain Management Interventions:   see MAR   pillow support provided   position adjusted  Intervention: Provide Person-Centered Care  Recent Flowsheet Documentation  Taken 5/10/2022 2035 by Anjali Ray RN  Trust  Relationship/Rapport:   care explained   choices provided   emotional support provided   empathic listening provided   questions answered   reassurance provided   thoughts/feelings acknowledged   questions encouraged   Goal Outcome Evaluation:  Plan of Care Reviewed With: patient        Progress: improving    Pt A&OX4. Rested well this shift.  Pain well controlled with prn pain meds.  Pt placed on o2 at liters per nc due to room air saturations dropping to 87-88%.  Ambulating in room with assist.  Voiding well.  Lumbar dressing intact with hemovac drain.

## 2022-05-11 NOTE — PLAN OF CARE
Goal Outcome Evaluation:  Plan of Care Reviewed With: patient        Progress: improving  Outcome Evaluation: Spinal precautions, log rolling technique, and maintaining precautions with ADLs and mobility reviewed. Pt performed bed mobility with Mod A, STS using RW with Min A, and ambulated from bed to bathroom using the RW with Min A. Min A for toileting. Grooming performed in sitting vs standing d/t increased back and RLE pain after ambulating. OT continues to rec IPR at [place].

## 2022-05-12 LAB
GLUCOSE BLDC GLUCOMTR-MCNC: 111 MG/DL (ref 70–130)
GLUCOSE BLDC GLUCOMTR-MCNC: 115 MG/DL (ref 70–130)
GLUCOSE BLDC GLUCOMTR-MCNC: 121 MG/DL (ref 70–130)

## 2022-05-12 PROCEDURE — 94799 UNLISTED PULMONARY SVC/PX: CPT

## 2022-05-12 PROCEDURE — 82962 GLUCOSE BLOOD TEST: CPT

## 2022-05-12 PROCEDURE — 25010000002 HEPARIN (PORCINE) PER 1000 UNITS: Performed by: NEUROLOGICAL SURGERY

## 2022-05-12 PROCEDURE — 99024 POSTOP FOLLOW-UP VISIT: CPT | Performed by: NEUROLOGICAL SURGERY

## 2022-05-12 RX ADMIN — FENOFIBRATE 48 MG: 48 TABLET ORAL at 08:08

## 2022-05-12 RX ADMIN — OXYCODONE HYDROCHLORIDE AND ACETAMINOPHEN 1 TABLET: 7.5; 325 TABLET ORAL at 18:17

## 2022-05-12 RX ADMIN — HYDROCHLOROTHIAZIDE 12.5 MG: 12.5 CAPSULE ORAL at 08:08

## 2022-05-12 RX ADMIN — HEPARIN SODIUM 5000 UNITS: 5000 INJECTION, SOLUTION INTRAVENOUS; SUBCUTANEOUS at 15:39

## 2022-05-12 RX ADMIN — OXYCODONE HYDROCHLORIDE AND ACETAMINOPHEN 1 TABLET: 7.5; 325 TABLET ORAL at 12:16

## 2022-05-12 RX ADMIN — PANTOPRAZOLE SODIUM 40 MG: 40 TABLET, DELAYED RELEASE ORAL at 05:07

## 2022-05-12 RX ADMIN — BUDESONIDE AND FORMOTEROL FUMARATE DIHYDRATE 2 PUFF: 80; 4.5 AEROSOL RESPIRATORY (INHALATION) at 20:45

## 2022-05-12 RX ADMIN — NEBIVOLOL 5 MG: 5 TABLET ORAL at 17:55

## 2022-05-12 RX ADMIN — MAGNESIUM HYDROXIDE 10 ML: 2400 SUSPENSION ORAL at 17:55

## 2022-05-12 RX ADMIN — LEVOTHYROXINE SODIUM 25 MCG: 25 TABLET ORAL at 08:08

## 2022-05-12 RX ADMIN — ACETAMINOPHEN 650 MG: 325 TABLET ORAL at 08:08

## 2022-05-12 RX ADMIN — CETIRIZINE HYDROCHLORIDE 5 MG: 10 TABLET, FILM COATED ORAL at 21:18

## 2022-05-12 RX ADMIN — ACETAMINOPHEN 650 MG: 325 TABLET ORAL at 21:18

## 2022-05-12 RX ADMIN — TRAMADOL HYDROCHLORIDE 50 MG: 50 TABLET, COATED ORAL at 03:40

## 2022-05-12 RX ADMIN — HYDROCHLOROTHIAZIDE 12.5 MG: 12.5 CAPSULE ORAL at 17:55

## 2022-05-12 RX ADMIN — HEPARIN SODIUM 5000 UNITS: 5000 INJECTION, SOLUTION INTRAVENOUS; SUBCUTANEOUS at 21:18

## 2022-05-12 RX ADMIN — ASPIRIN 325 MG ORAL TABLET 325 MG: 325 PILL ORAL at 21:18

## 2022-05-12 RX ADMIN — ACETAMINOPHEN 650 MG: 325 TABLET ORAL at 15:39

## 2022-05-12 RX ADMIN — DOCUSATE SODIUM 100 MG: 100 CAPSULE, LIQUID FILLED ORAL at 08:08

## 2022-05-12 RX ADMIN — FLUTICASONE PROPIONATE 2 SPRAY: 50 SPRAY, METERED NASAL at 08:09

## 2022-05-12 RX ADMIN — ESTRADIOL 0.5 MG: 0.5 TABLET ORAL at 08:08

## 2022-05-12 RX ADMIN — DULOXETINE HYDROCHLORIDE 60 MG: 60 CAPSULE, DELAYED RELEASE ORAL at 08:08

## 2022-05-12 RX ADMIN — HEPARIN SODIUM 5000 UNITS: 5000 INJECTION, SOLUTION INTRAVENOUS; SUBCUTANEOUS at 05:07

## 2022-05-12 RX ADMIN — OXYCODONE HYDROCHLORIDE AND ACETAMINOPHEN 1 TABLET: 7.5; 325 TABLET ORAL at 08:08

## 2022-05-12 RX ADMIN — AMLODIPINE BESYLATE 5 MG: 5 TABLET ORAL at 17:55

## 2022-05-12 NOTE — PROGRESS NOTES
"NEUROSURGERY PROGRESS NOTE     LOS: 3 days   Patient Care Team:  Lobo Archer MD as PCP - General (Internal Medicine)  Lobo Archer MD as Referring Physician (Internal Medicine)  Abel Barillas MD as Consulting Physician (Cardiology)    Chief Complaint: Low back and lower extremity pain with walking and standing intolerance.    POD#: 3 Days Post-Op  Procedures:  L2-3, L3-4, L4-5, and L5-S1 laminectomies.  L4-S1 PLIF.    Interval History:   Patient Complaints: Left heel pain and incisional pain.  Patient Denies: Headache.    Vital Signs: Blood pressure 140/56, pulse 76, temperature 98.1 °F (36.7 °C), temperature source Oral, resp. rate 20, height 161.3 cm (63.5\"), weight 76.7 kg (169 lb), SpO2 95 %.  Intake/Output:     Intake/Output Summary (Last 24 hours) at 5/12/2022 0559  Last data filed at 5/12/2022 0344  Gross per 24 hour   Intake 480 ml   Output 2130 ml   Net -1650 ml     Drain output: 280 mL.    Physical Exam:  The patient awakens easily and is in good spirits.  Dry dressing is in place on her incision.     Assessment/Plan:  1.  Lumbar spondylolisthesis with stenosis and instability status post multilevel decompression with fusion stabilization L4-S1.  2.  Diabetes mellitus: Continue home medications and sliding scale as needed.  3.  History of coronary artery disease.  4.  Disposition: Mobilize patient.  Continue drain until diminished output.  Possible short-term rehab at [facility].  Hopefully ready for discharge later today or tomorrow depending on drain output.      Humberto Correa MD  05/12/22  05:59 EDT    "

## 2022-05-12 NOTE — PLAN OF CARE
Goal Outcome Evaluation:           Progress: improving  Outcome Evaluation: Patient moving very slow and requires some asssist to get out of bed and to scoot up in chair. She increased her ambulation to 250 feet. It was limited by c/o R hip pain. Recommend Rehab at discharge

## 2022-05-12 NOTE — THERAPY TREATMENT NOTE
Patient Name: Coco Crockett  : 1945    MRN: [id number]                              Today's Date: 2022       Admit Date: 2022    Visit Dx:     ICD-10-CM ICD-9-CM   1. Spinal stenosis of lumbar region with neurogenic claudication  M48.062 724.03     Patient Active Problem List   Diagnosis   • Mild obesity   • Hypertensive cardiovascular disease   • Hypertension   • Dyslipidemia   • Obesity (BMI 30.0-34.9)   • Hypothyroidism   • Osteopenia   • TMJ (temporomandibular joint disorder)   • GERD (gastroesophageal reflux disease)   • Iron deficiency anemia   • Depression   • Eczema   • Vertigo   • Migraine   • Type 2 diabetes mellitus (HCC)   • MALIK (dyspnea on exertion)   • Leg pain, bilateral   • Fatigue   • Ischemic heart disease   • Lumbar stenosis with neurogenic claudication   • Spondylosis of lumbar region without myelopathy or radiculopathy   • Degeneration of lumbar or lumbosacral intervertebral disc   • Greater trochanteric bursitis of both hips   • Spinal stenosis of lumbar region with neurogenic claudication     Past Medical History:   Diagnosis Date   • Anemia    • Anxiety    • Chest pain    • Coronary artery disease    • Depression    • Diabetes mellitus (HCC)    • Dyslipidemia    • Eczema    • Fear of awareness under general anesthesia    • Fear of general anesthetic     has hx of awareness during anesthesia induction, felt like she was dying   • GERD (gastroesophageal reflux disease)    • History of COVID-19 2022   • Hyperlipidemia    • Hypertension     Chronic hypertension, probably essential.    • Hypertensive cardiovascular disease    • Hypothyroidism     Chronic hypothyroidism/replacement therapy.   • IBS (irritable bowel syndrome)    • Iron deficiency anemia    • Migraine     Intermittent progressive headache syndrome/possible migraine equivalent.   • Obesity (BMI 30.0-34.9)     Mild obesity (BMI 31).    • Osteopenia     Osteopenia with fracture of lumbar vertebra, .    •  Pneumonia    • TMJ (temporomandibular joint disorder)    • Type 2 diabetes mellitus (HCC)     Type 2 diabetes mellitus, Hemoglobin A1c 5.9%, December 2014.   • Urgency incontinence    • Urinary, incontinence, stress female    • Vertigo     Intermittent marked dizziness/vertigo with Epley maneuver.     Past Surgical History:   Procedure Laterality Date   • ANKLE OPEN REDUCTION INTERNAL FIXATION Right 1995    Fall on ice   • APPENDECTOMY  1970    Appendectomy with wedge resection of both ovaries, 1970.   • CATARACT EXTRACTION WITH INTRAOCULAR LENS IMPLANT     • COLONOSCOPY     • LASIK Bilateral    • LUMBAR LAMINECTOMY WITH FUSION N/A 5/9/2022    Procedure: LUMBAR DECOMPRESSON L2-S1 with Fusion and Stabilization with PEDICLE SCREWS at L4-S1;  Surgeon: Humberto Correa MD;  Location: [facility];  Service: Neurosurgery;  Laterality: N/A;   • TIBIAL PLATEAU OPEN REDUCTION INTERNAL FIXATION Right    • TONSILLECTOMY AND ADENOIDECTOMY     • TOTAL LAPAROSCOPIC HYSTERECTOMY SALPINGO OOPHORECTOMY  1985    MARIZA/BSO secondary to severe endometriosis, 1985.       General Information     Row Name 05/12/22 1050          Physical Therapy Time and Intention    Document Type therapy note (daily note)  -SC     Mode of Treatment physical therapy  -SC     Row Name 05/12/22 1050          General Information    Patient Profile Reviewed yes  -SC     Existing Precautions/Restrictions spinal;other (see comments)  hemovac  -SC     Row Name 05/12/22 1050          Cognition    Orientation Status (Cognition) oriented x 4  -SC     Row Name 05/12/22 1059          Safety Issues, Functional Mobility    Impairments Affecting Function (Mobility) balance;endurance/activity tolerance;pain;strength;coordination;motor planning;motor control;sensation/sensory awareness  -SC     Comment, Safety Issues/Impairments (Mobility) alert, following commands  -SC           User Key  (r) = Recorded By, (t) = Taken By, (c) = Cosigned By    Initials Name Provider Type     SC Yuliet Griffiths PT Physical Therapist               Mobility     Row Name 05/12/22 1051          Bed Mobility    Bed Mobility supine-sit;scooting/bridging  -SC     Scooting/Bridging [place] (Bed Mobility) verbal cues;moderate assist (50% patient effort)  -SC     Sit-Supine Henderson (Bed Mobility) 1 person assist;verbal cues;minimum assist (75% patient effort)  -SC     Assistive Device (Bed Mobility) bed rails  -SC     Comment, (Bed Mobility) worked on log rolling Right out of bed with cues for hand placenent on railings. Reuqired assist with upper trunk. Extra time needed  -SC     Row Name 05/12/22 1051          Transfers    Comment, (Transfers) demonstrated slow careful transfers off bed and commoce. Cues for hand placement  -SC     Row Name 05/12/22 1051          Sit-Stand Transfer    Sit-Stand Henderson (Transfers) verbal cues;contact guard  -SC     Assistive Device (Sit-Stand Transfers) walker, front-wheeled  -[facility] Name 05/12/22 1051          Gait/Stairs (Locomotion)    Henderson Level (Gait) verbal cues;contact guard  -SC     Assistive Device (Gait) walker, front-wheeled  -SC     Distance in Feet (Gait) 250  -SC     Deviations/Abnormal Patterns (Gait) sridevi decreased;gait speed decreased;bilateral deviations  -SC     Bilateral Gait Deviations forward flexed posture  -SC     Comment, (Gait/Stairs) gt training focused on controling walker in hallway. Cues for upright posture- unable to sustain this. Cues for pushing less with upper extremities--unable to sustaing this. R hip pain increased at end of walk  -SC           User Key  (r) = Recorded By, (t) = Taken By, (c) = Cosigned By    Initials Name Provider Type    SC Yuliet Griffiths PT Physical Therapist               Obj/Interventions     Row Name 05/12/22 1054          Motor Skills    Therapeutic Exercise shoulder;knee;hip;ankle  -SC     Row Name 05/12/22 1054          Shoulder (Therapeutic Exercise)    Shoulder (Therapeutic  Exercise) AROM (active range of motion)  -SC     Shoulder AROM (Therapeutic Exercise) bilateral;aBduction;aDduction;10 repetitions  -SC     Row Name 05/12/22 1054          Hip (Therapeutic Exercise)    Hip (Therapeutic Exercise) AROM (active range of motion)  -SC     Hip Isometrics (Therapeutic Exercise) flexion;extension;aBduction;aDduction;10 repetitions;supine  bent knee fallouts  -[facility] 05/12/22 1054          Knee (Therapeutic Exercise)    Knee (Therapeutic Exercise) isometric exercises  -SC     Knee Isometrics (Therapeutic Exercise) gluteal sets;quad sets;10 repetitions;3 second hold  -SC     Row Name 05/12/22 1054          Ankle (Therapeutic Exercise)    Ankle AROM (Therapeutic Exercise) bilateral;dorsiflexion;plantarflexion;20 repititions  -SC     Row Name 05/12/22 1054          Balance    Balance Assessment standing dynamic balance  -SC     Dynamic Standing Balance 1-person assist  -SC     Position/Device Used, Standing Balance supported;walker, rolling  -SC           User Key  (r) = Recorded By, (t) = Taken By, (c) = Cosigned By    Initials Name Provider Type    SC Yuliet Griffiths, PT Physical Therapist               Goals/Plan    No documentation.                Clinical Impression     Row Name 05/12/22 1055          Pain    Pretreatment Pain Rating 5/10  -SC     Posttreatment Pain Rating 7/10  -SC     Pain Location - back  -SC     Pre/Posttreatment Pain Comment R hip radicular pain after walk  -SC     Row Name 05/12/22 1055          Plan of Care Review    Progress improving  -SC     Outcome Evaluation Patient moving very slow and requires some asssist to get out of bed and to scoot up in chair. She increased her ambulation to 250 feet. It was limited by c/o R hip pain. Recommend Rehab at discharge  -SC     Row Name 05/12/22 1055          Therapy Assessment/Plan (PT)    Rehab Potential (PT) good, to achieve stated therapy goals  -SC     Criteria for Skilled Interventions Met (PT) yes;meets  criteria;skilled treatment is necessary  -SC     Therapy Frequency (PT) daily  -SC     Row Name 05/12/22 1055          Positioning and Restraints    Pre-Treatment Position in bed  -SC     Post Treatment Position chair  -SC     In Chair reclined;sitting;call light within reach;encouraged to call for assist;with nsg  -SC           User Key  (r) = Recorded By, (t) = Taken By, (c) = Cosigned By    Initials Name Provider Type    SC Yuliet Griffiths PT Physical Therapist               Outcome Measures     Row Name 05/12/22 1057 05/12/22 0808       How much help from another person do you currently need...    Turning from your back to your side while in flat bed without using bedrails? 3  -SC 2  -CB    Moving from lying on back to sitting on the side of a flat bed without bedrails? 2  -SC 2  -CB    Moving to and from a bed to a chair (including a wheelchair)? 3  -SC 2  -CB    Standing up from a chair using your arms (e.g., wheelchair, bedside chair)? 3  -SC 2  -CB    Climbing 3-5 steps with a railing? 2  -SC 1  -CB    To walk in hospital room? 3  -SC 2  -CB    AM-PAC 6 Clicks Score (PT) 16  -SC 11  -CB    Highest level of mobility 5 --> Static standing  -SC 4 --> Transferred to chair/commode  -    Row Name 05/12/22 1057          Functional Assessment    Outcome Measure Options AM-PAC 6 Clicks Basic Mobility (PT)  -SC           User Key  (r) = Recorded By, (t) = Taken By, (c) = Cosigned By    Initials Name Provider Type    SC Yuliet Griffiths, PT Physical Therapist    Nneka Carrillo, RN Registered Nurse                             Physical Therapy Education                 Title: PT OT SLP Therapies (In Progress)     Topic: Physical Therapy (Done)     Point: Mobility training (Done)     Learning Progress Summary           Patient SUNDAR Jackson VU by SC at 5/12/2022 1058    Comment: reviewed HEP    SUNDAR [place] D, VU by CALVIN at 5/11/2022 0956    Comment: Educated on safe sequencing with bed mobility, ambulatory transfers,  and gait. Reviewed HEP and spinal precautions.    Acceptance, E,TB, VU by  at 5/10/2022 0939    Comment: Education to patient on PT services, POC, d/c recommendations, safety with mobility, and spinal precautions                   Point: Home exercise program (Done)     Learning Progress Summary           Patient Eager, E, VU by SC at 5/12/2022 1058    Comment: reviewed HEP    Acceptance, E,D, VU by  at 5/11/2022 0925    Comment: Educated on safe sequencing with bed mobility, ambulatory transfers, and gait. Reviewed HEP and spinal precautions.    Acceptance, E,TB, VU by  at 5/10/2022 0939    Comment: Education to patient on PT services, POC, d/c recommendations, safety with mobility, and spinal precautions                   Point: Body mechanics (Done)     Learning Progress Summary           Patient Eager, E, VU by SC at 5/12/2022 1058    Comment: reviewed HEP    Acceptance, E,D, VU by  at 5/11/2022 0925    Comment: Educated on safe sequencing with bed mobility, ambulatory transfers, and gait. Reviewed HEP and spinal precautions.    Acceptance, E,TB, VU by  at 5/10/2022 0939    Comment: Education to patient on PT services, POC, d/c recommendations, safety with mobility, and spinal precautions                   Point: Precautions (Done)     Learning Progress Summary           Patient Eager, E, VU by SC at 5/12/2022 1058    Comment: reviewed HEP    Acceptance, E,D, VU by  at 5/11/2022 0925    Comment: Educated on safe sequencing with bed mobility, ambulatory transfers, and gait. Reviewed HEP and spinal precautions.    Acceptance, E,TB, VU by  at 5/10/2022 0939    Comment: Education to patient on PT services, POC, d/c recommendations, safety with mobility, and spinal precautions                               User Key     Initials Effective Dates Name Provider Type Discipline    SC 06/16/21 -  Yuliet Griffiths, PT Physical Therapist PT     06/16/21 -  Luis A Baez PT Physical Therapist PT     09/22/20 -   Stephon De Jesus, PT Physical Therapist PT              PT Recommendation and Plan     Progress: improving  Outcome Evaluation: Patient moving very slow and requires some asssist to get out of bed and to scoot up in chair. She increased her ambulation to 250 feet. It was limited by c/o R hip pain. Recommend Rehab at discharge     Time Calculation:    PT Charges     Row Name 05/12/22 0908             Time Calculation    Start Time 0908  -SC      PT Received On 05/12/22  -SC      PT Goal Re-Cert Due Date 05/20/22  -SC              Time Calculation- PT    Total Timed Code Minutes- PT 50 minute(s)  -SC              Timed Charges    69742 - PT Therapeutic Exercise Minutes 30  -SC      38055 - Gait Training Minutes  15  -SC      88095 - PT Therapeutic Activity Minutes 5  -SC              Total Minutes    Timed Charges Total Minutes 50  -SC       Total Minutes 50  -SC            User Key  (r) = Recorded By, (t) = Taken By, (c) = Cosigned By    Initials Name Provider Type    SC Tunde, Yuliet GARY, PT Physical Therapist                  PT G-Codes  Outcome Measure Options: AM-PAC 6 Clicks Basic Mobility (PT)  AM-PAC 6 Clicks Score (PT): 16  AM-PAC 6 Clicks Score (OT): 16    Yuliet Griffiths PT  5/12/2022

## 2022-05-12 NOTE — CASE MANAGEMENT/SOCIAL WORK
Continued Stay Note  [facility]     Patient Name: Coco Crockett  MRN: [id number]  Today's Date: 5/12/2022    Admit Date: 5/9/2022     Discharge Plan     Row Name 05/12/22 1409       Plan    Plan SNF    Plan Comments Case mgt f/u. Per Kyree at [facility] in [place], they have received insuance approval for transsfer to one of their swing (skilled) beds. She is in agreement with plan. Her daughter will transport by car in am, if medically ready for transfer               Discharge Codes    No documentation.               Expected Discharge Date and Time     Expected Discharge Date Expected Discharge Time    May 13, 2022             Sonja C Kellerman, RN

## 2022-05-13 ENCOUNTER — TELEPHONE (OUTPATIENT)
Dept: NEUROSURGERY | Facility: CLINIC | Age: 77
End: 2022-05-13

## 2022-05-13 VITALS
TEMPERATURE: 98 F | SYSTOLIC BLOOD PRESSURE: 106 MMHG | BODY MASS INDEX: 28.85 KG/M2 | HEART RATE: 73 BPM | DIASTOLIC BLOOD PRESSURE: 51 MMHG | HEIGHT: 64 IN | OXYGEN SATURATION: 92 % | RESPIRATION RATE: 16 BRPM | WEIGHT: 169 LBS

## 2022-05-13 DIAGNOSIS — Z98.1 S/P LUMBAR FUSION: Primary | ICD-10-CM

## 2022-05-13 LAB
GLUCOSE BLDC GLUCOMTR-MCNC: 117 MG/DL (ref 70–130)
GLUCOSE BLDC GLUCOMTR-MCNC: 122 MG/DL (ref 70–130)

## 2022-05-13 PROCEDURE — 82962 GLUCOSE BLOOD TEST: CPT

## 2022-05-13 PROCEDURE — 97535 SELF CARE MNGMENT TRAINING: CPT

## 2022-05-13 PROCEDURE — 94761 N-INVAS EAR/PLS OXIMETRY MLT: CPT

## 2022-05-13 PROCEDURE — 25010000002 HEPARIN (PORCINE) PER 1000 UNITS: Performed by: NEUROLOGICAL SURGERY

## 2022-05-13 PROCEDURE — 99024 POSTOP FOLLOW-UP VISIT: CPT | Performed by: NEUROLOGICAL SURGERY

## 2022-05-13 PROCEDURE — 94664 DEMO&/EVAL PT USE INHALER: CPT

## 2022-05-13 PROCEDURE — 94799 UNLISTED PULMONARY SVC/PX: CPT

## 2022-05-13 RX ORDER — DOCUSATE SODIUM 250 MG
250 CAPSULE ORAL 2 TIMES DAILY PRN
Qty: 60 CAPSULE | Refills: 1 | Status: SHIPPED | OUTPATIENT
Start: 2022-05-13

## 2022-05-13 RX ORDER — OXYCODONE HYDROCHLORIDE AND ACETAMINOPHEN 5; 325 MG/1; MG/1
1 TABLET ORAL 3 TIMES DAILY PRN
Qty: 30 TABLET | Refills: 0 | Status: SHIPPED | OUTPATIENT
Start: 2022-05-13 | End: 2022-08-10

## 2022-05-13 RX ADMIN — HYDROCHLOROTHIAZIDE 12.5 MG: 12.5 CAPSULE ORAL at 08:27

## 2022-05-13 RX ADMIN — FLUTICASONE PROPIONATE 2 SPRAY: 50 SPRAY, METERED NASAL at 08:29

## 2022-05-13 RX ADMIN — DOCUSATE SODIUM 100 MG: 100 CAPSULE, LIQUID FILLED ORAL at 08:27

## 2022-05-13 RX ADMIN — BUDESONIDE AND FORMOTEROL FUMARATE DIHYDRATE 2 PUFF: 80; 4.5 AEROSOL RESPIRATORY (INHALATION) at 08:12

## 2022-05-13 RX ADMIN — ESTRADIOL 0.5 MG: 0.5 TABLET ORAL at 08:27

## 2022-05-13 RX ADMIN — OXYCODONE HYDROCHLORIDE AND ACETAMINOPHEN 1 TABLET: 7.5; 325 TABLET ORAL at 08:27

## 2022-05-13 RX ADMIN — LEVOTHYROXINE SODIUM 25 MCG: 25 TABLET ORAL at 08:27

## 2022-05-13 RX ADMIN — OXYCODONE HYDROCHLORIDE AND ACETAMINOPHEN 1 TABLET: 7.5; 325 TABLET ORAL at 03:50

## 2022-05-13 RX ADMIN — FENOFIBRATE 48 MG: 48 TABLET ORAL at 08:27

## 2022-05-13 RX ADMIN — ACETAMINOPHEN 650 MG: 325 TABLET ORAL at 08:27

## 2022-05-13 RX ADMIN — PANTOPRAZOLE SODIUM 40 MG: 40 TABLET, DELAYED RELEASE ORAL at 06:04

## 2022-05-13 RX ADMIN — DULOXETINE HYDROCHLORIDE 60 MG: 60 CAPSULE, DELAYED RELEASE ORAL at 08:27

## 2022-05-13 RX ADMIN — OXYCODONE HYDROCHLORIDE AND ACETAMINOPHEN 1 TABLET: 7.5; 325 TABLET ORAL at 13:52

## 2022-05-13 RX ADMIN — HEPARIN SODIUM 5000 UNITS: 5000 INJECTION, SOLUTION INTRAVENOUS; SUBCUTANEOUS at 06:04

## 2022-05-13 RX ADMIN — BISACODYL 10 MG: 10 SUPPOSITORY RECTAL at 10:17

## 2022-05-13 NOTE — PROGRESS NOTES
"NEUROSURGERY PROGRESS NOTE     LOS: 4 days   Patient Care Team:  Lobo Archer MD as PCP - General (Internal Medicine)  Lobo Archer MD as Referring Physician (Internal Medicine)  Abel Barillas MD as Consulting Physician (Cardiology)    Chief Complaint: Low back and lower extremity pain with walking and standing intolerance.    POD#: 4 Days Post-Op  Procedures:  L2-3, L3-4, L4-5, and L5-S1 laminectomies.  L4-S1 PLIF.    Interval History:   Patient Complaints: Incisional pain.  She has some right hip pain.  Patient Denies: Weakness or numbness.    Vital Signs: Blood pressure 140/69, pulse 74, temperature 97.8 °F (36.6 °C), temperature source Oral, resp. rate 18, height 161.3 cm (63.5\"), weight 76.7 kg (169 lb), SpO2 93 %.  Intake/Output:     Intake/Output Summary (Last 24 hours) at 5/13/2022 0557  Last data filed at 5/13/2022 0331  Gross per 24 hour   Intake 700 ml   Output 1650 ml   Net -950 ml     Drain output: 250/50 mL.    Physical Exam:  The patient awakens easily.  She is in good spirits.  Dry dressing is in place on her incision.     Assessment/Plan:  1.  Lumbar spondylolisthesis with stenosis and instability status post multilevel decompression with fusion stabilization L4-S1.  2.  Diabetes mellitus: Continue home medications and sliding scale as needed.  3.  History of coronary artery disease.  4.  Disposition: Mobilize patient.  Drain out this morning.  Patient is ready for discharge to [facility] for rehabilitation.      Humberto Correa MD  05/13/22  05:57 EDT    "

## 2022-05-13 NOTE — DISCHARGE SUMMARY
[facility]    Date of Admission: 5/9/2022  Date of Discharge:  5/13/2022    Discharge Diagnosis:   Lumbar stenosis with neurogenic claudication  Lumbar spondylolisthesis with instability    Procedures Performed  Procedure(s):  LUMBAR DECOMPRESSON L2-S1 with Fusion and Stabilization with PEDICLE SCREWS at L4-S1       History of Present Illness:  Ms. Crockett is a 76-year-old, retired female who is known to our practice.  She was previously followed by Dr. Guillen, and known to have extensive degenerative change with high-grade stenosis with some degree of instability throughout her lumbar spine.  Until last year, she had been managing with conservative management and physical therapy.  More recently, symptoms increased and she is now unable to tolerate standing or walking >5-10 minutes.  Symptoms improve immediately with sitting/rest.  Symptoms begin in her low back and extend bilaterally into her hips, continuing down her left lateral leg-terminating in her lateral foot.  Symptoms in her back are constant, however her leg and hip extension increases upon standing/walking.  She denies bowel or bladder dysfunction (outside of her baseline of urinary urgency).  Historically she has taken some Zanaflex and tramadol which were helpful.    MRI of the lumbar spine dated 4/4/2021 showed degenerative changes at multiple levels and severe stenosis from L2-3 to L5-S1 with a spondylosis at L5-S1.   lumbar CT myelogram dated 6/29/2021 showed a complete block at the L4-5 level when upright.  Mild spondylosis of L4 on L5 was noted that did not change much between flexion and extension views.   After reviewing these studies and since conservative treatment was no longer effective, a multilevel lumbar decompression from L2-S1 with fusion and stabilization of L4-5 and L5-S1 was recommended.  Patient was scheduled for surgery but this was delayed due to COVID for some  time    Hospital Course:   Patient was admitted on 5/9/2022 and underwent L2-S1 laminectomies with bilateral discectomies, capstone cage placement and fusion at L4-5 and L5-S1.  Surgery was without complication.  Hemovac drain was placed and she was discharged to PACU and then the surgical floor in good condition.  Postoperative day 1, patient complained of incisional pain but was able to get up to bedside commode with assist and was voiding spontaneously.  She is diabetic; home medications were continued with the addition of sliding scale insulin as needed dressing remained dry.  Postoperative H&H were 8.8/28.1.  Patient did note some numbness in her right index finger and thumb on postoperative day 1 as well which was monitored.  Physical therapy worked with her.  She had some right sided knee pain that made participation difficult.  Discharged to inpatient rehab facility was recommended.   Postoperative day 2, she was ambulating to the restroom with only 1 assist.  Pain controlled with as needed Ultram.  Right leg pain improving.  Dressing remained clean and dry.  She reported no positional headache.  Voiding well with no urinary retention.  She had improved mobility, but agreed with PT OT that some inpatient rehab would be beneficial.  Postoperative day 3 she continued to do well.  Pain was controlled with oral medication.  Drain continued with significant output and was kept in place.  Rehab arranged at [facility].  She complained of some right hip pain off and on during her hospitalization as well as some residual numbness in her left foot and toes  Postoperative day 4, she was doing well.  Drain output had diminished and it was able to be discontinued.  She was discharged to [facility] for inpatient rehabilitation    Condition on Discharge:  Stable    Discharge to: inpatient rehab at [facility]    PATIENT SPECIFIC EDUCATION/PLAN:  1. Follow-up with neurosurgery PA in 3  weeks, with AP/lateral xrays of the lumbar spine prior to appointment  2. No driving until follow-up.  3. Patient discharged with Aquacel dressing. May remove this and get incision itself wet in the shower starting 5/15  4. NO tub bathing or swimming until follow-up  5. Ice pack to incision(s) as needed for associated pain or swelling   6. Ambulate frequently   7. No lifting greater than 5 lbs  8. Patient may sit as tolerated.  9. Continue incentive spirometry      Discharge Medications     Discharge Medications      New Medications      Instructions Start Date   docusate sodium 250 MG capsule  Commonly known as: COLACE   250 mg, Oral, 2 Times Daily PRN, Hold for loose stools      oxyCODONE-acetaminophen 5-325 MG per tablet  Commonly known as: PERCOCET   1 tablet, Oral, 3 Times Daily PRN         Changes to Medications      Instructions Start Date   amLODIPine 5 MG tablet  Commonly known as: NORVASC  What changed: when to take this   5 mg, Oral, Daily      hydroCHLOROthiazide 12.5 MG capsule  Commonly known as: MICROZIDE  What changed: additional instructions   TAKE 1 CAPSULE BY MOUTH TWICE A DAY      nebivolol 5 MG tablet  Commonly known as: BYSTOLIC  What changed:   · how much to take  · when to take this   10 mg, Oral, Daily      traMADol 50 MG tablet  Commonly known as: ULTRAM  What changed: when to take this   50 mg, Oral, Every 6 Hours PRN         Continue These Medications      Instructions Start Date   acetaminophen 500 MG tablet  Commonly known as: TYLENOL   500 mg, Oral, 2 Times Daily PRN      Alirocumab 75 MG/ML solution auto-injector   75 mg, Subcutaneous, Every 14 Days      aspirin 325 MG tablet   325 mg, Oral, Nightly      Breo Ellipta 200-25 MCG/INH inhaler  Generic drug: Fluticasone Furoate-Vilanterol   Daily      cetirizine 10 MG tablet  Commonly known as: zyrTEC   10 mg, Oral, Nightly      diphenhydrAMINE 25 mg capsule  Commonly known as: BENADRYL   12.5 mg, Oral, Nightly      DULoxetine 60 MG  capsule  Commonly known as: CYMBALTA   60 mg, Oral, Daily      estradiol 0.5 MG tablet  Commonly known as: ESTRACE   0.5 mg, Oral, Daily      fenofibric acid 135 MG capsule delayed-release delayed release capsule  Commonly known as: TRILIPIX   135 mg, Oral, Daily      fluticasone 50 MCG/ACT nasal spray  Commonly known as: FLONASE   2 sprays, Nasal, Daily      gabapentin 100 MG capsule  Commonly known as: NEURONTIN   100 mg, Oral, As Needed      icosapent ethyl 1 g capsule capsule  Commonly known as: Vascepa   2 g, Oral, 2 Times Daily With Meals      iron polysaccharides 150 MG capsule  Commonly known as: NIFEREX   150 mg, Oral, 2 Times Daily      levothyroxine 25 MCG tablet  Commonly known as: SYNTHROID, LEVOTHROID   25 mcg, Oral, Daily      metFORMIN 500 MG tablet  Commonly known as: GLUCOPHAGE   500 mg, Oral, Daily, Ordered as BID, but patient only takes once daily (prescribing MD is aware, per pt)      multivitamin tablet tablet  Commonly known as: THERAGRAN   Oral, Daily      nitroglycerin 0.4 MG/SPRAY spray  Commonly known as: NITROLINGUAL   1 spray, Sublingual, Every 5 Minutes PRN      omeprazole 20 MG capsule  Commonly known as: priLOSEC   20 mg, Oral, Daily      tiZANidine 2 MG tablet  Commonly known as: ZANAFLEX   2 mg, Oral, 2 Times Daily PRN      vitamin C 500 MG tablet  Commonly known as: ASCORBIC ACID   500 mg, Oral, Daily      VITAMIN D3 PO   4,000 Units, Oral, 2 Times Daily             Follow-up Appointments  Future Appointments   Date Time Provider Department Center   6/10/2022  3:15 PM Abel Barillas MD [facility]   6/14/2022  1:30 PM Tamara Lazaro PA-C [facility]     Additional Instructions for the Follow-ups that You Need to Schedule     Discharge Follow-up with Specified Provider: Sunny; 3 Weeks   As directed      To: Sunny    Follow Up: 3 Weeks    Follow Up Details: Follow-up with physician's assistant in 4 weeks with AP and lateral lumbar spine x-rays               Referring  Provider  MD DERICK Jacobs [place], MD Talitha L Hunt, ROSHAN  05/13/22  10:14 EDT

## 2022-05-13 NOTE — PLAN OF CARE
Goal Outcome Evaluation:  Plan of Care Reviewed With: patient        Progress: improving  Outcome Evaluation: Pt. completed bed mobility with Min A and T/Fs with CGA. Demonstrates grooming standing at sink with SUA and toileting with SBA. Continue to recommend IPR at discharge.

## 2022-05-13 NOTE — PLAN OF CARE
Goal Outcome Evaluation:           Progress: improving  Outcome Evaluation: VSS. Alert and oriented. Pain controlled with PRNs. Dressing CDI. x1 assist to ambulate.

## 2022-05-13 NOTE — THERAPY TREATMENT NOTE
Patient Name: Coco Crockett  : 1945    MRN: [id number]                              Today's Date: 2022       Admit Date: 2022    Visit Dx:     ICD-10-CM ICD-9-CM   1. Spinal stenosis of lumbar region with neurogenic claudication  M48.062 724.03     Patient Active Problem List   Diagnosis   • Mild obesity   • Hypertensive cardiovascular disease   • Hypertension   • Dyslipidemia   • Obesity (BMI 30.0-34.9)   • Hypothyroidism   • Osteopenia   • TMJ (temporomandibular joint disorder)   • GERD (gastroesophageal reflux disease)   • Iron deficiency anemia   • Depression   • Eczema   • Vertigo   • Migraine   • Type 2 diabetes mellitus (HCC)   • MALIK (dyspnea on exertion)   • Leg pain, bilateral   • Fatigue   • Ischemic heart disease   • Lumbar stenosis with neurogenic claudication   • Spondylosis of lumbar region without myelopathy or radiculopathy   • Degeneration of lumbar or lumbosacral intervertebral disc   • Greater trochanteric bursitis of both hips   • Spinal stenosis of lumbar region with neurogenic claudication     Past Medical History:   Diagnosis Date   • Anemia    • Anxiety    • Chest pain    • Coronary artery disease    • Depression    • Diabetes mellitus (HCC)    • Dyslipidemia    • Eczema    • Fear of awareness under general anesthesia    • Fear of general anesthetic     has hx of awareness during anesthesia induction, felt like she was dying   • GERD (gastroesophageal reflux disease)    • History of COVID-19 2022   • Hyperlipidemia    • Hypertension     Chronic hypertension, probably essential.    • Hypertensive cardiovascular disease    • Hypothyroidism     Chronic hypothyroidism/replacement therapy.   • IBS (irritable bowel syndrome)    • Iron deficiency anemia    • Migraine     Intermittent progressive headache syndrome/possible migraine equivalent.   • Obesity (BMI 30.0-34.9)     Mild obesity (BMI 31).    • Osteopenia     Osteopenia with fracture of lumbar vertebra, .    •  Pneumonia    • TMJ (temporomandibular joint disorder)    • Type 2 diabetes mellitus (HCC)     Type 2 diabetes mellitus, Hemoglobin A1c 5.9%, December 2014.   • Urgency incontinence    • Urinary, incontinence, stress female    • Vertigo     Intermittent marked dizziness/vertigo with Epley maneuver.     Past Surgical History:   Procedure Laterality Date   • ANKLE OPEN REDUCTION INTERNAL FIXATION Right 1995    Fall on ice   • APPENDECTOMY  1970    Appendectomy with wedge resection of both ovaries, 1970.   • CATARACT EXTRACTION WITH INTRAOCULAR LENS IMPLANT     • COLONOSCOPY     • LASIK Bilateral    • LUMBAR LAMINECTOMY WITH FUSION N/A 5/9/2022    Procedure: LUMBAR DECOMPRESSON L2-S1 with Fusion and Stabilization with PEDICLE SCREWS at L4-S1;  Surgeon: Humberto Correa MD;  Location: [facility];  Service: Neurosurgery;  Laterality: N/A;   • TIBIAL PLATEAU OPEN REDUCTION INTERNAL FIXATION Right    • TONSILLECTOMY AND ADENOIDECTOMY     • TOTAL LAPAROSCOPIC HYSTERECTOMY SALPINGO OOPHORECTOMY  1985    MARIZA/BSO secondary to severe endometriosis, 1985.       General Information     Row Name 05/13/22 1155          OT Time and Intention    Document Type therapy note (daily note)  -     Mode of Treatment occupational therapy  -     Row Name 05/13/22 1155          General Information    Patient Profile Reviewed yes  -     Prior Level of Function min assist:;all household mobility;transfer;ADL's  -     Existing Precautions/Restrictions spinal  -     Barriers to Rehab medically complex  -LC     Row Name 05/13/22 1155          Cognition    Orientation Status (Cognition) oriented x 4  -     Row Name 05/13/22 1155          Safety Issues, Functional Mobility    Safety Issues Affecting Function (Mobility) safety precaution awareness;safety precautions follow-through/compliance  -     Impairments Affecting Function (Mobility) balance;endurance/activity tolerance;pain;postural/trunk control;strength  -           User Key   (r) = Recorded By, (t) = Taken By, (c) = Cosigned By    Initials Name Provider Type     Lisa Ramirez OT Occupational Therapist                 Mobility/ADL's     Row Name 05/13/22 1156          Bed Mobility    Bed Mobility scooting/bridging;supine-sit;sit-supine  -     Scooting/Bridging [place] (Bed Mobility) minimum assist (75% patient effort);verbal cues  -     Supine-Sit [place] (Bed Mobility) minimum assist (75% patient effort);verbal cues  -     Sit-Supine [place] (Bed Mobility) minimum assist (75% patient effort);verbal cues  -     Comment, (Bed Mobility) VC's to sequence log roll technique  -     Row Name 05/13/22 1156          Transfers    Transfers sit-stand transfer;toilet transfer  -     Comment, (Transfers) VC's for hand placement and sequencing.  -     Sit-Stand [place] (Transfers) contact guard;verbal cues  -     Stand-Sit [place] (Transfers) contact guard;verbal cues  -     [place] Level (Toilet Transfer) contact guard;verbal cues  -     Assistive Device (Toilet Transfer) commode;walker, front-wheeled;grab bars/safety frame  -     Row Name 05/13/22 1156          Sit-Stand Transfer    Assistive Device (Sit-Stand Transfers) walker, front-wheeled  -[facility] Name 05/13/22 1156          Stand-Sit Transfer    Assistive Device (Stand-Sit Transfers) walker, front-wheeled  -[facility] Name 05/13/22 1156          Toilet Transfer    Type (Toilet Transfer) sit-stand;stand-sit  -[facility] Name 05/13/22 1156          Functional Mobility    Functional Mobility- Ind. Level contact guard assist;verbal cues required  -     Functional Mobility-Distance (Feet) 30  -     Functional Mobility- Comment Ambulates with slow pace in room with no LOB. CGA for safety  -     Row Name 05/13/22 1156          Activities of Daily Living    BADL Assessment/Intervention toileting;grooming  -[facility] Name 05/13/22 1156          Toileting Assessment/Training     [place] Level (Toileting) standby assist  -     Assistive Devices (Toileting) commode;grab bar/safety frame  -     Comment, (Toileting) Completed with increased time. Pt. demonstrated adhering to spinal precautions during task.  -     Row Name 05/13/22 1156          Grooming Assessment/Training    [place] Level (Grooming) hair care, combing/brushing;oral care regimen;wash face, hands  -     Position (Grooming) supported standing  -     Comment, (Grooming) Completed grooming standing at sink with RW. Pt. educated to limit trunk flexion during oral care to facilitate adhering to spinal precautions.  -           User Key  (r) = Recorded By, (t) = Taken By, (c) = Cosigned By    Initials Name Provider Type    Lisa Chan OT Occupational Therapist               Obj/Interventions     Row Name 05/13/22 1201          Balance    Static Sitting Balance supervision  -     Dynamic Sitting Balance supervision  -     Position, Sitting Balance unsupported;sitting edge of bed  -     Static Standing Balance standby assist  -     Dynamic Standing Balance contact guard  -     Position/Device Used, Standing Balance supported;walker, front-wheeled  -     Balance Interventions sit to stand;sitting;standing;supported;occupation based/functional task;weight shifting activity  -     Comment, Balance No LOB, CGA for safety  -           User Key  (r) = Recorded By, (t) = Taken By, (c) = Cosigned By    Initials Name Provider Type    Lisa Chan OT Occupational Therapist               Goals/Plan    No documentation.                Clinical Impression     Row Name 05/13/22 1202          Pain Assessment    Pretreatment Pain Rating 4/10  -     Posttreatment Pain Rating 4/10  -     Pain Location incisional  -     Pain Location - back  -     Pain Intervention(s) Repositioned;Ambulation/increased activity  -     Additional Documentation Pain Scale: Word Pre/Post-Treatment (Group)  -      Row Name 05/13/22 1202          Plan of Care Review    Plan of Care Reviewed With patient  -LC     Progress improving  -LC     Outcome Evaluation Pt. completed bed mobility with Min A and T/Fs with CGA. Demonstrates grooming standing at sink with SUA and toileting with SBA. Continue to recommend IPR at discharge.  -LC     Row Name 05/13/22 1202          Vital Signs    Pre Systolic BP Rehab --  VSS  -LC     Pre Patient Position Supine  -LC     Intra Patient Position Standing  -LC     Post Patient Position Supine  -LC     Row Name 05/13/22 1202          Positioning and Restraints    Pre-Treatment Position in bed  -LC     Post Treatment Position bed  -LC     In Bed notified nsg;call light within reach;encouraged to call for assist;exit alarm on;side lying right;pillow between legs  -           User Key  (r) = Recorded By, (t) = Taken By, (c) = Cosigned By    Initials Name Provider Type    LC Lisa Ramirez, OT Occupational Therapist               Outcome Measures     Row Name 05/13/22 1205          How much help from another is currently needed...    Putting on and taking off regular lower body clothing? 2  -LC     Bathing (including washing, rinsing, and drying) 2  -LC     Toileting (which includes using toilet bed pan or urinal) 3  -LC     Putting on and taking off regular upper body clothing 3  -LC     Taking care of personal grooming (such as brushing teeth) 3  -LC     Eating meals 4  -LC     AM-PAC 6 Clicks Score (OT) 17  -     Row Name 05/13/22 4846          How much help from another person do you currently need...    Turning from your back to your side while in flat bed without using bedrails? 3  -CB     Moving from lying on back to sitting on the side of a flat bed without bedrails? 2  -CB     Moving to and from a bed to a chair (including a wheelchair)? 3  -CB     Standing up from a chair using your arms (e.g., wheelchair, bedside chair)? 3  -CB     Climbing 3-5 steps with a railing? 2  -CB     To walk in  hospital room? 3  -CB     AM-PAC 6 Clicks Score (PT) 16  -CB     Highest level of mobility 5 --> Static standing  -CB     Row Name 05/13/22 1205          Functional Assessment    Outcome Measure Options AM-PAC 6 Clicks Daily Activity (OT)  -YUVAL           User Key  (r) = Recorded By, (t) = Taken By, (c) = Cosigned By    Initials Name Provider Type    Nneka Carrillo, RN Registered Nurse    Lisa Chan, OT Occupational Therapist                Occupational Therapy Education                 Title: PT OT SLP Therapies (In Progress)     Topic: Occupational Therapy (In Progress)     Point: ADL training (In Progress)     Description:   Instruct learner(s) on proper safety adaptation and remediation techniques during self care or transfers.   Instruct in proper use of assistive devices.              Learning Progress Summary           Patient Acceptance, E, NR by YUVAL at 5/13/2022 0845    Acceptance, E, VU by AN at 5/11/2022 1324    Acceptance, E,TB,D, VU,NR by HK at 5/10/2022 1107                   Point: Home exercise program (Not Started)     Description:   Instruct learner(s) on appropriate technique for monitoring, assisting and/or progressing therapeutic exercises/activities.              Learner Progress:  Not documented in this visit.          Point: Precautions (In Progress)     Description:   Instruct learner(s) on prescribed precautions during self-care and functional transfers.              Learning Progress Summary           Patient Acceptance, E, NR by YUVAL at 5/13/2022 0845    Acceptance, E, VU by AN at 5/11/2022 1324    Acceptance, E,TB,D, VU,NR by HK at 5/10/2022 1107                   Point: Body mechanics (In Progress)     Description:   Instruct learner(s) on proper positioning and spine alignment during self-care, functional mobility activities and/or exercises.              Learning Progress Summary           Patient Acceptance, E, NR by YUVAL at 5/13/2022 0845    Acceptance, E, VU by AN at  5/11/2022 1324    Acceptance, E,TB,D, VU,NR by  at 5/10/2022 1107                               User Key     Initials Effective Dates Name Provider Type Discipline     06/16/21 -  Tatum Melendez OT Occupational Therapist OT    LC 06/16/21 -  Lisa Ramirez OT Occupational Therapist OT    AN 09/21/21 -  Katt Velez OT Occupational Therapist OT              OT Recommendation and Plan     Plan of Care Review  Plan of Care Reviewed With: patient  Progress: improving  Outcome Evaluation: Pt. completed bed mobility with Min A and T/Fs with CGA. Demonstrates grooming standing at sink with SUA and toileting with SBA. Continue to recommend IPR at discharge.     Time Calculation:    Time Calculation- OT     Row Name 05/13/22 1206             Time Calculation- OT    OT Start Time 0845  -      OT Received On 05/13/22  -      OT Goal Re-Cert Due Date 05/20/22  -              Timed Charges    13042 - OT Self Care/Mgmt Minutes 24  -LC              Total Minutes    Timed Charges Total Minutes 24  -LC       Total Minutes 24  -LC            User Key  (r) = Recorded By, (t) = Taken By, (c) = Cosigned By    Initials Name Provider Type    LC Lisa Ramirez, SENG Occupational Therapist              Therapy Charges for Today     Code Description Service Date Service Provider Modifiers Qty    [id number] HC OT SELF CARE/MGMT/TRAIN EA 15 MIN 5/13/2022 Lisa Ramirez OT GO 2               Lisa Ramirez OT  5/13/2022

## 2022-05-23 ENCOUNTER — TELEPHONE (OUTPATIENT)
Dept: NEUROSURGERY | Facility: CLINIC | Age: 77
End: 2022-05-23

## 2022-05-23 NOTE — TELEPHONE ENCOUNTER
Provider:  Sunny  Surgery:  LUMBAR DECOMPRESSON L2-S1 with Fusion and Stabilization with PEDICLE SCREWS at L4-S1  Surgery Date:  05/09/2022  Last visit:   Office Visit with Elizabeth Barrett PA-C (02/11/2022)    Next visit: 06/14/2022    AIDAN:         Reason for call:   Patient called LVM wanting to know if she needed to have an appointment with her PCP Dr. Archer during her post op window. I have called and LVM for the patient stating the only appointment she would need from our perspective was her first post op appointment. Advised patient to call with any other questions.

## 2022-06-10 ENCOUNTER — OFFICE VISIT (OUTPATIENT)
Dept: CARDIOLOGY | Facility: CLINIC | Age: 77
End: 2022-06-10

## 2022-06-10 VITALS
OXYGEN SATURATION: 99 % | HEIGHT: 64 IN | HEART RATE: 80 BPM | WEIGHT: 170 LBS | SYSTOLIC BLOOD PRESSURE: 118 MMHG | DIASTOLIC BLOOD PRESSURE: 64 MMHG | BODY MASS INDEX: 29.02 KG/M2

## 2022-06-10 DIAGNOSIS — E78.5 DYSLIPIDEMIA: ICD-10-CM

## 2022-06-10 DIAGNOSIS — E03.9 HYPOTHYROIDISM, UNSPECIFIED TYPE: ICD-10-CM

## 2022-06-10 DIAGNOSIS — E11.69 TYPE 2 DIABETES MELLITUS WITH OTHER SPECIFIED COMPLICATION, WITHOUT LONG-TERM CURRENT USE OF INSULIN: ICD-10-CM

## 2022-06-10 DIAGNOSIS — I10 ESSENTIAL HYPERTENSION: ICD-10-CM

## 2022-06-10 DIAGNOSIS — I25.9 ISCHEMIC HEART DISEASE: Primary | ICD-10-CM

## 2022-06-10 PROCEDURE — 99214 OFFICE O/P EST MOD 30 MIN: CPT | Performed by: INTERNAL MEDICINE

## 2022-06-10 RX ORDER — NITROGLYCERIN 400 UG/1
1 SPRAY ORAL
Qty: 4.9 G | Refills: 3 | Status: SHIPPED | OUTPATIENT
Start: 2022-06-10

## 2022-06-10 NOTE — PROGRESS NOTES
Subjective:     Encounter Date:06/10/2022    Patient ID: Coco Crockett is a 76 y.o.  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      PHYSICIAN: Lobo Archer MD  ORTHOPEDIST: Luisito Cueva MD  GASTROENTEROLOGIST:  Maryam Sutton MD  PAIN MANAGEMENT PHYSICIAN: Justin Chi MD  NEUROSURGEON: Stephen Guillen MD  CURRENT NEUROSURGEON: Humberto Correa MD  RHEUMATOLOGIST: Janice Wilson MD    Chief Complaint:   Chief Complaint   Patient presents with   • ischemic heart disease        Problem List:  1. Combined hypertensive and ischemic cardiovascular disease:  a. Acceptable GXT with abnormal baseline ST, September 1995.   b. Acceptable exercise stress test and normal quantitative SPECT Cardiolite study with low probability for significant obstructive coronary disease with estimated LVEF (0.60), June 1997.  c. CCS class I chest pain/abnormal EKG/intermittent palpitations with GXT echo stress test suggestive of low probability for significant focal obstructive coronary disease, LVEF (0.80), 12/07/2009.   d. Recurrent atypical CCS class II chest pain syndrome with NYHA class I exertional dyspnea with fatigue with diagnostic coronary angiography demonstrating nonobstructive single-vessel LAD disease with associated 75% to 80% stenosis first diagonal branch LAD not felt to be amenable to catheter based intervention with mild plaque in the nondominant left circumflex coronary artery with normal right dominant coronary artery with preserved LVEF (0.70) with associated diastolic LV dysfunction, December 2013.   e. Residual class I angina pectoris/NYHA class I to II exertional dyspnea and fatigue.  f. Stress test, 06/30/2016:  Normal LVEF (0.63); findings consistent with a normal ECG stress test; myocardial perfusion imaging indicates a small-sized infarct located in  the lateral wall with no significant ischemia noted.  g. CCS class I chest discomfort/NYHA class II dyspnea with exertion/fatigue with acceptable EKG and acceptable echocardiogram demonstrating normal LVEF (0.70) without PE/pulmonary arterial hypertension and mild aortic valve sclerosis with mild LVH, July 2017.  h. 24-hour ambulatory blood pressure monitor 7/10/18: Average blood pressure 136/69, 135/68 during the day and 141/71 at night.  Recommendations to continue current medications.  i. CCS class 0 chest discomfort/NYHA class III fatigue symptoms April 2019, October 2019  j. Residual class I symptoms, July 2020, April 2021, October 2021, June 2022  2. Chronic hypertension, probably essential.   3. Dyslipidemia.   4. Mild obesity: BMI 30.47, now on 29.64  5. Chronic hypothyroidism/replacement therapy.   6. Osteopenia with fracture of lumbar vertebra, 2004 with subsequent development of severe back pain and sever lumbosacral osteoarthritis with epidural injections, June 2020  7. TMJ dysfunction  8. GERD.   9. Iron deficiency anemia.   10. Depression.   11. Eczema.   12. Positional vertigo - data deficit.  13. Remote operations:   a. Tonsillectomy and adenoidectomy, 1950.   b. Appendectomy with wedge resection of both ovaries, 1970.  c. MARIZA/BSO secondary to severe endometriosis, 1985.   d. Fall on ice with subsequent ORIF of right ankle/tibia, 1995.   e. Cataracts, OU with bifocal implant, 2008.  f. Lumbar stenosis with neurogenic claudication, June 2020.  14. Intermittent marked dizziness/vertigo with Epley maneuver.  15. Intermittent progressive headache syndrome/possible migraine equivalent.  16. Type 2 diabetes mellitus, Hemoglobin A1c 5.9%, December 2014; 5.8%, July 2017.  17. Remote progressive disabling fatigue with arthralgias and myalgias, June 2018  18. Chronic daytime hypersomnolence and fatigue with apparent acceptable polysomnogram - data deficit, [facility]   19. Colonoscopy and polyp removal, summer  2018  20. Multiple falls without vertigo, dizziness, presyncope, or syncope, September 2018, with minor scrapes/bruising  21. Pneumonia, August 2019  22. Severe spinal stenosis noted on MRI lumbar spine, February 2021.  23. Pneumonia, summer 2021  24. Recent severe anemia (hemoglobin 8) with iron replacement and colonoscopy, December 2021; data deficit, summer 2021.  25. L2-S1 laminectomies with bilateral discectomies, capstone cage placement, and fusion at L4-5 and L5-S1 May 2022.      Allergies   Allergen Reactions   • Keflex [Cephalexin] Itching, Swelling and Angioedema          • Imdur [Isosorbide Dinitrate] Headache     Severe headache/fatigue       • Bactrim [Sulfamethoxazole-Trimethoprim] Rash   • Codeine Sulfate Itching and Rash   • Contrast Dye Itching   • Demerol [Meperidine] Itching and Rash   • Levaquin [Levofloxacin] Other (See Comments)     Numbness and ruptured oneida's tendon   • Morphine Sulfate Itching and Rash   • Penicillin V Potassium Itching and Rash   • Sulfa Antibiotics Rash and Other (See Comments)     severe rash/swelling   • Trimethoprim Hives       Current Outpatient Medications:   •  acetaminophen (TYLENOL) 500 MG tablet, Take 500 mg by mouth 2 (Two) Times a Day As Needed for Mild Pain ., Disp: , Rfl:   •  Alirocumab 75 MG/ML solution auto-injector, Inject 75 mg under the skin into the appropriate area as directed Every 14 (Fourteen) Days., Disp: 6 pen, Rfl: 3  •  amLODIPine (NORVASC) 5 MG tablet, Take 1 tablet by mouth Daily. (Patient taking differently: Take 5 mg by mouth Every Evening.), Disp: 90 tablet, Rfl: 3  •  aspirin 325 MG tablet, Take 325 mg by mouth Every Night., Disp: , Rfl:   •  Breo Ellipta 200-25 MCG/INH inhaler, Daily., Disp: , Rfl:   •  cetirizine (zyrTEC) 10 MG tablet, Take 10 mg by mouth Every Night., Disp: , Rfl:   •  Cholecalciferol (VITAMIN D3 PO), Take 4,000 Units by mouth 2 (Two) Times a Day., Disp: , Rfl:   •  diphenhydrAMINE (BENADRYL) 25 mg capsule, Take 12.5  mg by mouth Every Night., Disp: , Rfl:   •  docusate sodium (COLACE) 250 MG capsule, Take 1 capsule by mouth 2 (Two) Times a Day As Needed for Constipation. Hold for loose stools, Disp: 60 capsule, Rfl: 1  •  DULoxetine (CYMBALTA) 60 MG capsule, Take 60 mg by mouth daily., Disp: , Rfl:   •  estradiol (ESTRACE) 0.5 MG tablet, Take 0.5 mg by mouth Daily., Disp: , Rfl:   •  fenofibric acid (TRILIPIX) 135 MG capsule delayed-release delayed release capsule, Take 1 capsule by mouth Daily., Disp: 90 capsule, Rfl: 3  •  fluticasone (FLONASE) 50 MCG/ACT nasal spray, 2 sprays into each nostril Daily., Disp: , Rfl:   •  gabapentin (NEURONTIN) 100 MG capsule, Take 100 mg by mouth As Needed., Disp: , Rfl:   •  hydroCHLOROthiazide (MICROZIDE) 12.5 MG capsule, TAKE 1 CAPSULE BY MOUTH TWICE A DAY (Patient taking differently: Take 12.5 mg by mouth 2 (Two) Times a Day. Patient only takes once per day per her PCP instructions due to BP falling too low (takes in evening)), Disp: 180 capsule, Rfl: 3  •  icosapent ethyl (Vascepa) 1 g capsule capsule, Take 2 g by mouth 2 (Two) Times a Day With Meals., Disp: 120 capsule, Rfl: 11  •  iron polysaccharides (NIFEREX) 150 MG capsule, Take 150 mg by mouth 2 (Two) Times a Day., Disp: , Rfl:   •  levothyroxine (SYNTHROID, LEVOTHROID) 25 MCG tablet, TAKE 1 TABLET BY MOUTH DAILY., Disp: 90 tablet, Rfl: 0  •  metFORMIN (GLUCOPHAGE) 500 MG tablet, Take 500 mg by mouth Daily. Ordered as BID, but patient only takes once daily (prescribing MD is aware, per pt), Disp: , Rfl:   •  Multiple Vitamin (MULTI VITAMIN DAILY PO), Take  by mouth daily., Disp: , Rfl:   •  nebivolol (BYSTOLIC) 5 MG tablet, Take 2 tablets by mouth Daily. (Patient taking differently: Take 5 mg by mouth Every Evening.), Disp: 180 tablet, Rfl: 3  •  nitroglycerin (NITROLINGUAL) 0.4 MG/SPRAY spray, Place 1 spray under the tongue every 5 (five) minutes as needed for chest pain., Disp: , Rfl:   •  omeprazole (PriLOSEC) 20 MG capsule, Take  20 mg by mouth daily., Disp: , Rfl:   •  oxyCODONE-acetaminophen (PERCOCET) 5-325 MG per tablet, Take 1 tablet by mouth 3 (Three) Times a Day As Needed (Pain)., Disp: 30 tablet, Rfl: 0  •  tiZANidine (ZANAFLEX) 2 MG tablet, Take 1 tablet by mouth 2 (Two) Times a Day As Needed for Muscle Spasms., Disp: 30 tablet, Rfl: 1  •  traMADol (ULTRAM) 50 MG tablet, Take 1 tablet by mouth Every 6 (Six) Hours As Needed for Moderate Pain . (Patient taking differently: Take 50 mg by mouth 2 (Two) Times a Day As Needed for Moderate Pain .), Disp: 50 tablet, Rfl: 0  •  vitamin C (ASCORBIC ACID) 500 MG tablet, Take 500 mg by mouth Daily., Disp: , Rfl:   No current facility-administered medications for this visit.    Facility-Administered Medications Ordered in Other Visits:   •  vancomycin 1250 mg/250 mL 0.9% NS IVPB ([facility]), 15 mg/kg, Intravenous, Once, Tamara Lazaro PA-C    History of Present Illness: Patient returns after 8-month hiatus for follow up. Since last visit, patient had a 4-day [facility], 09 May 2022, admission for lumbar stenosis with neurogenic claudication. She underwent L2-S1 laminectomies with bilateral discectomies, capstone cage placement, and fusion at L4-5 and L5-S1. At discharge, she was given new prescriptions for Colace 250 mg PO BID PRN and hold for loose stools, and Percocet 1 tablet PO TID PRN. The following medication changes were also made: amlodipine now 5 mg PO QD, hydrochlorothiazide now 1 capsule PO BID, nebivolol now 10 mg PO QD, and tramadol now 50 mg PO Q6H PRN.  The patient reports relief from her back pain.  She is ambulating with a walker currently but is planning to progress to a cane soon.  She is scheduled to see Dr. Correa next week.  The patient is very pleased with her results from her surgery.  She went to rehab for 10 days and worked with Physical Therapy and Occupational Therapy 3.5 hours/day while she was in rehab.  She now has home health coming once a week.  She has been able to do her  "ADLs without assistance now.  She denies any chest pain, shortness of breath, palpitations, presyncope, syncope, or edema.    ROS   Obtained and negative except as outlined in problem list and HPI.    Procedures       Objective:       Vitals:    06/10/22 1313 06/10/22 1314   BP: 146/68 118/64   BP Location: Right arm Right arm   Patient Position: Sitting Standing   Pulse: 79 80   SpO2: 97% 99%   Weight: 77.1 kg (170 lb)    Height: 161.3 cm (63.5\")    Recheck blood pressure right arm sitting is 130/58  Body mass index is 29.64 kg/m².  Last weight: 168 lbs    Constitutional:       Appearance: Healthy appearance. Not in distress.          Comments: Ambulating with walker, back incision site well-healed with no signs of erythema or exudate   Neck:      Vascular: No JVR. JVD normal.   Pulmonary:      Effort: Pulmonary effort is normal.      Breath sounds: Decreased breath sounds present. No wheezing. No rhonchi. No rales.   Chest:      Chest wall: Not tender to palpatation.   Cardiovascular:      PMI at left midclavicular line. Normal rate. Regular rhythm. Normal S1. Normal S2.      Murmurs: There is a grade 1/6 systolic murmur at the LLSB.      No gallop. No click. No rub.   Pulses:     Dorsalis pedis: 1+ bilaterally.     Posterior tibial: 1+ bilaterally.  Edema:     Peripheral edema absent.   Abdominal:      General: Bowel sounds are normal.      Palpations: Abdomen is soft.      Tenderness: There is no abdominal tenderness.   Musculoskeletal: Normal range of motion.         General: No tenderness. Skin:     General: Skin is warm and dry.   Neurological:      General: No focal deficit present.      Mental Status: Alert and oriented to person, place and time.           Lab Review:   Lab Results   Component Value Date    GLUCOSE 92 05/06/2022    BUN 25 (H) 05/06/2022    CREATININE 0.78 05/06/2022    BCR 32.1 (H) 05/06/2022     05/06/2022    K 4.2 05/06/2022     05/06/2022    CO2 27.0 05/06/2022    CALCIUM " 10.0 05/06/2022       Lab Results   Component Value Date    WBC 6.13 05/06/2022    HGB 8.8 (L) 05/10/2022    HCT 28.1 (L) 05/10/2022    MCV 80.7 05/06/2022     05/06/2022       Lab Results   Component Value Date    HGBA1C 5.60 05/06/2022     Lab date: 10/21/2021  • FLP: , TG 1947, HDL 17, LDL Not calculated due to elevated triglycerides  • Alk Phos 46  • AST 20  • Bili <0.2  • Alb 4.5  • Protein 7  • TSH: 2.24     · FL O Arm during surgery, 05/09/2022  · FINDINGS/IMPRESSION: 2 seconds of fluoroscopy time provided with 0 images saved during lumbar fusion    · FL C Arm during surgery, 05/09/2022  · FINDINGS: Intraoperative fluoroscopic imaging for L4-S1 spinal fusion. 2 fluoroscopic images saved. Fluoroscopy time: 31 seconds.    · IMPRESSION: Intraoperative fluoroscopic imaging for L4-S1 spinal fusion. 2 fluoroscopic images saved. Fluoroscopy time: 31 seconds.        Assessment:       Overall continued acceptable course with no new interim cardiopulmonary complaints with acceptable functional status on limited activity. We will defer additional diagnostic or therapeutic intervention from a cardiac perspective at this time. Hopefully any upcoming laboratory studies can be shared with us for review.     Diagnosis Plan   1. Ischemic heart disease  No recurrent angina pectoris or CHF on current activity schedule; continue current treatment.   2. Essential hypertension  Controlled, continue current cardiac medications.   3. Dyslipidemia  No new labs to review, continue Repatha, Vascepa.   4. Hypothyroidism, unspecified type  No new labs to review, continue levothyroxine.   5. Type 2 diabetes mellitus with other specified complication, without long-term current use of insulin ([facility])  Hemoglobin A1c 5.6% May 2022, continue heart healthy diet and physical activity as tolerated.          Plan:         1. Patient to continue current medications and close follow up with the above providers.  2. Tentative cardiology  follow up in December 2022, at the [place] office or patient may return sooner PRN.    Scribed for Abel Barillas MD by Lidia Malave, APRN. 6/10/2022  13:30 EDT

## 2022-06-14 ENCOUNTER — HOSPITAL ENCOUNTER (OUTPATIENT)
Dept: GENERAL RADIOLOGY | Facility: HOSPITAL | Age: 77
Discharge: HOME | End: 2022-06-14
Admitting: NEUROLOGICAL SURGERY

## 2022-06-14 ENCOUNTER — OFFICE VISIT (OUTPATIENT)
Dept: NEUROSURGERY | Facility: CLINIC | Age: 77
End: 2022-06-14

## 2022-06-14 VITALS
DIASTOLIC BLOOD PRESSURE: 64 MMHG | BODY MASS INDEX: 28.68 KG/M2 | TEMPERATURE: 96.8 F | SYSTOLIC BLOOD PRESSURE: 120 MMHG | HEIGHT: 64 IN | WEIGHT: 168 LBS

## 2022-06-14 DIAGNOSIS — Z98.1 S/P LUMBAR FUSION: Primary | ICD-10-CM

## 2022-06-14 DIAGNOSIS — Z98.1 S/P LUMBAR FUSION: ICD-10-CM

## 2022-06-14 PROCEDURE — 99024 POSTOP FOLLOW-UP VISIT: CPT | Performed by: PHYSICIAN ASSISTANT

## 2022-06-14 PROCEDURE — 72100 X-RAY EXAM L-S SPINE 2/3 VWS: CPT

## 2022-06-14 NOTE — PROGRESS NOTES
"Subjective     Chief Complaint: Wound check status post L4-S1 lumbar fusion    Patient ID: Coco Crockett is a 76 y.o. female is here today for follow-up.    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  The procedure went without event and Dr. Correa was able to discharge her postop day #4 Home with home health.  Patient has been actively being treated by home health and occupational therapy.  They have been fairly useful to her.  Patient notices whenever she does twisting motion to wipe etc. she will feel little bit of pain in her right flank and right hip which quickly remits.  Patient also has a little bit of numbness in the dorsum of her left shin.  This has not really improved since surgery but is much better than her preoperative pain.    As far as pain goes patient has not even required Tylenol over the last couple of days and is progressing very well.    Patient is ambulating with a walker just for security purposes and is asking about coming off of it.  I think that it is okay during her home exercises that she can go without but I have encouraged her to stay on it for another month or 2 until she gets through some physical therapy and gets her \"legs back under her\".      The following portions of the patient's history were reviewed and updated as appropriate: allergies, current medications, past family history, past medical history, past social history, past surgical history and problem list.    Family history:   Family History   Problem Relation Age of Onset   • Heart disease Father        Social history:   Social History     Socioeconomic History   • Marital status:    Tobacco Use   • Smoking status: Never Smoker   • Smokeless tobacco: Never Used   Vaping Use   • Vaping Use: Never used   Substance and Sexual Activity   • Alcohol use: Yes     " Comment: occasional (1 glass of wine per month at most)   • Drug use: No   • Sexual activity: Defer       Review of Systems   Constitutional: Negative for activity change, appetite change, chills, diaphoresis, fatigue, fever and unexpected weight change.   HENT: Positive for hearing loss and sneezing. Negative for congestion, dental problem, drooling, ear discharge, ear pain, facial swelling, mouth sores, nosebleeds, postnasal drip, rhinorrhea, sinus pressure, sinus pain, sore throat, tinnitus, trouble swallowing and voice change.    Eyes: Negative for photophobia, pain, discharge, redness, itching and visual disturbance.   Respiratory: Negative for apnea, cough, choking, chest tightness, shortness of breath, wheezing and stridor.    Cardiovascular: Negative for chest pain, palpitations and leg swelling.   Gastrointestinal: Negative for abdominal distention, abdominal pain, anal bleeding, blood in stool, constipation, diarrhea, nausea, rectal pain and vomiting.   Endocrine: Positive for heat intolerance and polyuria. Negative for cold intolerance, polydipsia and polyphagia.   Genitourinary: Positive for flank pain and urgency. Negative for decreased urine volume, difficulty urinating, dyspareunia, dysuria, enuresis, frequency, genital sores, hematuria, menstrual problem, pelvic pain, vaginal bleeding, vaginal discharge and vaginal pain.   Musculoskeletal: Positive for myalgias. Negative for arthralgias, back pain, gait problem, joint swelling, neck pain and neck stiffness.   Skin: Negative for color change, pallor, rash and wound.   Allergic/Immunologic: Positive for environmental allergies and food allergies. Negative for immunocompromised state.   Neurological: Positive for dizziness. Negative for tremors, seizures, syncope, facial asymmetry, speech difficulty, weakness, light-headedness, numbness and headaches.   Hematological: Negative for adenopathy. Does not bruise/bleed easily.   Psychiatric/Behavioral:  "Negative for agitation, behavioral problems, confusion, decreased concentration, dysphoric mood, hallucinations, self-injury, sleep disturbance and suicidal ideas. The patient is not nervous/anxious and is not hyperactive.        Objective   Blood pressure 120/64, temperature 96.8 °F (36 °C), temperature source Infrared, height 161.3 cm (63.5\"), weight 76.2 kg (168 lb).  Body mass index is 29.29 kg/m².    Physical Exam     Incision:   The incision is well-healed and well approximated.  No signs of infection, bleeding, or erythema.    Musculoskeletal:                                            Dorsiflexion is 5/5 Bilaterally                    Plantarflexion is 5/5 bilaterally                    Hip Flexion 5/5 bilaterally.                        Neurologic:                                         The patient is alert and oriented by 3.                       Sensation is equal bilaterally with no deficit.                        Reflexes:  2+ throughout     Assessment & Plan     Independent Review of Radiographic Studies:    X-rays of the lumbar spine reviewed show good placement of pedicle screws and hardware at L4-S1.      Medical Decision Making:    Patient is doing exceedingly well at this time.  Patient is about 1 month postop and only has a little bit of residual numbness in the left lower extremity.  Patient is very pleased with postsurgical outcome.    Patient only has intermittent pains here and there with overactivity.    She is going to pursue outpatient physical therapy at this time and follow-up with Dr. Correa in about 8 weeks for a final appointment.    Diagnoses and all orders for this visit:    1. S/P lumbar fusion (Primary)  -     Ambulatory Referral to Physical Therapy Evaluate and treat (Back pain), POST OP      Return in about 8 weeks (around 8/9/2022).       This document signed by Julian Murry PA-C June 14, 2022 14:50 EDT      "

## 2022-08-10 ENCOUNTER — OFFICE VISIT (OUTPATIENT)
Dept: NEUROSURGERY | Facility: CLINIC | Age: 77
End: 2022-08-10

## 2022-08-10 VITALS — TEMPERATURE: 97.5 F | BODY MASS INDEX: 28.68 KG/M2 | HEIGHT: 64 IN | WEIGHT: 168 LBS

## 2022-08-10 DIAGNOSIS — Z98.1 S/P LUMBAR FUSION: Primary | ICD-10-CM

## 2022-08-10 DIAGNOSIS — M48.062 SPINAL STENOSIS OF LUMBAR REGION WITH NEUROGENIC CLAUDICATION: ICD-10-CM

## 2022-08-10 PROCEDURE — 99213 OFFICE O/P EST LOW 20 MIN: CPT | Performed by: NEUROLOGICAL SURGERY

## 2022-08-10 NOTE — PROGRESS NOTES
Patient: Coco Crockett  : 1945    Primary Care Provider: Lobo Archer MD    Requesting Provider: As above        History    Chief Complaint: Low back and lower extremity pain with walking and standing intolerance.    History of Present Illness: Ms. Crockett is a 77-year-old woman with the above-noted complaints.  On 2022 she underwent L4-S1 PLIF with decompression from L2-3 to the sacrum.  She is doing great.  She is done some therapy.  She is much more active.  She is able to stand and walk longer.  She does get some soreness in her thighs when she is up too long.  Overall she is very pleased with her progress.    Review of Systems   Constitutional: Negative for activity change, appetite change, chills, diaphoresis, fatigue, fever and unexpected weight change.   HENT: Negative for congestion, dental problem, drooling, ear discharge, ear pain, facial swelling, hearing loss, mouth sores, nosebleeds, postnasal drip, rhinorrhea, sinus pressure, sinus pain, sneezing, sore throat, tinnitus, trouble swallowing and voice change.    Eyes: Negative for photophobia, pain, discharge, redness, itching and visual disturbance.   Respiratory: Negative for apnea, cough, choking, chest tightness, shortness of breath, wheezing and stridor.    Cardiovascular: Negative for chest pain, palpitations and leg swelling.   Gastrointestinal: Negative for abdominal distention, abdominal pain, anal bleeding, blood in stool, constipation, diarrhea, nausea, rectal pain and vomiting.   Endocrine: Negative for cold intolerance, heat intolerance, polydipsia, polyphagia and polyuria.   Genitourinary: Negative for decreased urine volume, difficulty urinating, dyspareunia, dysuria, enuresis, flank pain, frequency, genital sores, hematuria, menstrual problem, pelvic pain, urgency, vaginal bleeding, vaginal discharge and vaginal pain.   Musculoskeletal: Negative for arthralgias, back pain, gait problem, joint swelling, myalgias, neck  "pain and neck stiffness.   Skin: Negative for color change, pallor, rash and wound.   Allergic/Immunologic: Negative for environmental allergies, food allergies and immunocompromised state.   Neurological: Negative for dizziness, tremors, seizures, syncope, facial asymmetry, speech difficulty, weakness, light-headedness, numbness and headaches.   Hematological: Negative for adenopathy. Does not bruise/bleed easily.   Psychiatric/Behavioral: Negative for agitation, behavioral problems, confusion, decreased concentration, dysphoric mood, hallucinations, self-injury, sleep disturbance and suicidal ideas. The patient is not nervous/anxious and is not hyperactive.        The patient's past medical history, past surgical history, family history, and social history have been reviewed at length in the electronic medical record.      Physical Exam:   Temp 97.5 °F (36.4 °C)   Ht 161.3 cm (63.5\")   Wt 76.2 kg (168 lb)   BMI 29.29 kg/m²   Patient was about comfortably.  Lumbosacral incision is intact.    Medical Decision Making    Data Review:   (All imaging studies were personally reviewed unless stated otherwise)  Plain films from June 14 demonstrate excellent position of her construct from L4-S1.    Diagnosis:   Lumbar spondylolisthesis and stenosis with instability status post multilevel decompression with fusion and stabilization L4-S1.    Treatment Options:   The patient is doing well.  She will steadily increase her activity.  She will follow-up in our clinic in 4 months with new plain films of the lumbar spine.       Diagnosis Plan   1. S/P lumbar fusion     2. Spinal stenosis of lumbar region with neurogenic claudication         Scribed for Humberto Correa MD by ABEL Bearden 8/10/2022 13:44 EDT      I, Dr. Correa, personally performed the services described in the documentation, as scribed in my presence, and it is both accurate and complete.  "

## 2022-08-25 ENCOUNTER — TELEPHONE (OUTPATIENT)
Dept: NEUROSURGERY | Facility: CLINIC | Age: 77
End: 2022-08-25

## 2022-08-25 NOTE — TELEPHONE ENCOUNTER
States she is only having stiffness in her lower back. Fall was caused by numbness in her left leg. Down to foot and toes except big toe. (same as prior to surgery)

## 2022-08-25 NOTE — TELEPHONE ENCOUNTER
Provider:    Surgery/Procedure:  LUMBAR DECOMPRESSON L2-S1 with Fusion and Stabilization with PEDICLE SCREWS at L4-S1  Surgery/Procedure Date:  Surgery with Humberto Correa MD (05/09/2022)    Last visit:     Next visit: Office Visit with Humberto Correa MD (08/10/2022)  Appointment with Coco Ricardo PA-C (12/21/2022)       Reason for call:     Patient presented to the Ed for a fall yesterday at [facility]. She states that she landed face down on the floor. She suffered a laceration inside her mouth, some bruising and fractured rib. She had requested the hospital to do an xray of her back due to her previous back surgery with Dr. Correa however they did not. She is asking if she needs to get some xrays to make sure everything is okay.

## 2022-09-07 ENCOUNTER — TELEPHONE (OUTPATIENT)
Dept: NEUROSURGERY | Facility: CLINIC | Age: 77
End: 2022-09-07

## 2022-09-07 DIAGNOSIS — Z98.1 S/P LUMBAR FUSION: Primary | ICD-10-CM

## 2022-09-07 NOTE — TELEPHONE ENCOUNTER
Provider:  Sunny  Surgery/Procedure:  LUMBAR DECOMPRESSON L2-S1 with Fusion and Stabilization with PEDICLE SCREWS at L4-S1  Surgery/Procedure Date: 5-9-22  Last visit:   8-10-22  Next visit: 12-21-22     Reason for call: Patient called and said that her symptoms are getting worse.  Patient stated that she is afraid that it has missed up her back. Does she need to come in sooner?  Please Advise. Thank you.    Telephone Encounter by Kavitha Hamilton MA (08/25/2022 09:35)  Telephone Encounter by Kavitha Hamilton MA (08/25/2022 11:57)      When did it start: after her fall 8-25-22    Where is it located:left side from pelvic down her hip and side    How long has this been going on/is this new or the same as before surgery: Almost a month    Characteristics of symptom/severity: stabbing pain    Timing- Is it constant or intermittent: intermittent    What makes it worse:  Changing positions    What makes it better: ice pack    What therapies/medications have you tried:  Tylenol

## 2022-09-07 NOTE — TELEPHONE ENCOUNTER
She most likely just flared things up from the fall. IF she wants to go get xrays and come in sooner, we can do that

## 2022-09-09 ENCOUNTER — OFFICE VISIT (OUTPATIENT)
Dept: CARDIOLOGY | Facility: CLINIC | Age: 77
End: 2022-09-09

## 2022-09-09 ENCOUNTER — HOSPITAL ENCOUNTER (OUTPATIENT)
Dept: GENERAL RADIOLOGY | Facility: HOSPITAL | Age: 77
Discharge: HOME | End: 2022-09-09

## 2022-09-09 VITALS
BODY MASS INDEX: 28.51 KG/M2 | HEIGHT: 64 IN | DIASTOLIC BLOOD PRESSURE: 66 MMHG | WEIGHT: 167 LBS | OXYGEN SATURATION: 98 % | SYSTOLIC BLOOD PRESSURE: 156 MMHG | HEART RATE: 73 BPM

## 2022-09-09 DIAGNOSIS — I25.10 CORONARY ARTERY DISEASE INVOLVING NATIVE CORONARY ARTERY OF NATIVE HEART WITHOUT ANGINA PECTORIS: Primary | ICD-10-CM

## 2022-09-09 DIAGNOSIS — E78.2 MIXED HYPERLIPIDEMIA: ICD-10-CM

## 2022-09-09 DIAGNOSIS — Z98.1 S/P LUMBAR FUSION: ICD-10-CM

## 2022-09-09 DIAGNOSIS — I10 PRIMARY HYPERTENSION: ICD-10-CM

## 2022-09-09 PROCEDURE — 72100 X-RAY EXAM L-S SPINE 2/3 VWS: CPT

## 2022-09-09 PROCEDURE — 99214 OFFICE O/P EST MOD 30 MIN: CPT | Performed by: INTERNAL MEDICINE

## 2022-09-09 PROCEDURE — 93000 ELECTROCARDIOGRAM COMPLETE: CPT | Performed by: INTERNAL MEDICINE

## 2022-09-09 NOTE — PROGRESS NOTES
[facility] Cardiology  Office Progress Note  Coco Crockett  1945  [address] [id number]       Visit Date: 09/09/22    PCP: Lobo Archer MD  [address] [facility] [address] [place] [id number]    IDENTIFICATION: A 77 y.o.  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    PROBLEM LIST:     1. Combined hypertensive and ischemic cardiovascular disease:  a. 2013  coronary angiography demonstrating nonobstructive single-vessel LAD disease with associated 75% to 80% stenosis first diagonal branch LAD not felt to be amenable to catheter based intervention LVEF (0.70)   b. 2016 Stress test:  Normal LVEF (0.63); findings consistent with a normal ECG stress test; myocardial perfusion imaging indicates a small-sized infarct located in the lateral wall with no significant ischemia noted.  c. 7/2017  echo LVEF (0.70) without PE/pulmonary arterial hypertension and mild aortic valve sclerosis with mild LVH    2. Chronic hypertension, probably essential.   7/2018 24-hour ambulatory blood pressure monitor \: Average blood pressure 136/69, 135/68 during the day and 141/71 at night.  3. Dyslipidemia.   2019 Direct LDL 24  10/21 310/1947/17/ NC  4. Chronic hypothyroidism/replacement therapy.   5. Osteopenia with fracture of lumbar vertebra, 2004 with subsequent development of severe back pain and sever lumbosacral osteoarthritis with epidural injections, June 2020  6. TMJ dysfunction  7. GERD.   8. Iron deficiency anemia.   9. Depression.   10. Eczema.   11. Positional vertigo - data deficit.  12. Remote operations:   a. Tonsillectomy and adenoidectomy, 1950.   b. Appendectomy with wedge resection of both ovaries, 1970.  c. MARIZA/BSO secondary to severe endometriosis, 1985.   d. Fall on ice with subsequent ORIF of right  ankle/tibia, 1995.   e. Cataracts, OU with bifocal implant, 2008.  f. Lumbar stenosis with neurogenic claudication, June 2020.  13. Intermittent marked dizziness/vertigo with Epley maneuver.  14. Intermittent progressive headache syndrome/possible migraine equivalent.  15. Type 2 diabetes mellitus, Hemoglobin A1c 5.9%, December 2014; 5.8%, July 2017.  16. Remote progressive disabling fatigue with arthralgias and myalgias, June 2018  17. Chronic daytime hypersomnolence and fatigue with apparent acceptable polysomnogram - data deficit, [facility]   18. Colonoscopy and polyp removal, summer 2018  19. Multiple falls without vertigo, dizziness, presyncope, or syncope, September 2018, with minor scrapes/bruising  20. Pneumonia, August 2019  21. Severe spinal stenosis noted on MRI lumbar spine, February 2021.  22. Pneumonia, summer 2021  23. Recent severe anemia (hemoglobin 8) with iron replacement and colonoscopy, December 2021; data deficit, summer 2021.  24. L2-S1 laminectomies with bilateral discectomies, capstone cage placement, and fusion at L4-5 and L5-S1 May 2022.          CC: f/u generalized weakness, ischemic heart disease, htn, HLD, DM    Allergies  Allergies   Allergen Reactions   • Keflex [Cephalexin] Itching, Swelling and Angioedema          • Imdur [Isosorbide Dinitrate] Headache     Severe headache/fatigue       • Bactrim [Sulfamethoxazole-Trimethoprim] Rash   • Codeine Sulfate Itching and Rash   • Contrast Dye Itching   • Demerol [Meperidine] Itching and Rash   • Levaquin [Levofloxacin] Other (See Comments)     Numbness and ruptured oneida's tendon   • Morphine Sulfate Itching and Rash   • Penicillin V Potassium Itching and Rash   • Sulfa Antibiotics Rash and Other (See Comments)     severe rash/swelling   • Trimethoprim Hives       Current Medications    Current Outpatient Medications:   •  acetaminophen (TYLENOL) 500 MG tablet, Take 500 mg by mouth 2 (Two) Times a Day As Needed for Mild Pain ., Disp: , Rfl:    •  Alirocumab 75 MG/ML solution auto-injector, Inject 75 mg under the skin into the appropriate area as directed Every 14 (Fourteen) Days., Disp: 6 pen, Rfl: 3  •  amLODIPine (NORVASC) 5 MG tablet, Take 1 tablet by mouth Daily. (Patient taking differently: Take 5 mg by mouth Every Evening.), Disp: 90 tablet, Rfl: 3  •  aspirin 325 MG tablet, Take 325 mg by mouth Every Night., Disp: , Rfl:   •  Breo Ellipta 200-25 MCG/INH inhaler, Daily., Disp: , Rfl:   •  cetirizine (zyrTEC) 10 MG tablet, Take 10 mg by mouth Every Night., Disp: , Rfl:   •  Cholecalciferol (VITAMIN D3 PO), Take 4,000 Units by mouth 2 (Two) Times a Day., Disp: , Rfl:   •  diphenhydrAMINE (BENADRYL) 25 mg capsule, Take 12.5 mg by mouth Every Night., Disp: , Rfl:   •  docusate sodium (COLACE) 250 MG capsule, Take 1 capsule by mouth 2 (Two) Times a Day As Needed for Constipation. Hold for loose stools, Disp: 60 capsule, Rfl: 1  •  DULoxetine (CYMBALTA) 60 MG capsule, Take 60 mg by mouth daily., Disp: , Rfl:   •  estradiol (ESTRACE) 0.5 MG tablet, Take 0.5 mg by mouth Daily., Disp: , Rfl:   •  fenofibric acid (TRILIPIX) 135 MG capsule delayed-release delayed release capsule, Take 1 capsule by mouth Daily., Disp: 90 capsule, Rfl: 3  •  fluticasone (FLONASE) 50 MCG/ACT nasal spray, 2 sprays into each nostril Daily., Disp: , Rfl:   •  hydroCHLOROthiazide (MICROZIDE) 12.5 MG capsule, TAKE 1 CAPSULE BY MOUTH TWICE A DAY (Patient taking differently: Take 12.5 mg by mouth 2 (Two) Times a Day. Patient only takes once per day per her PCP instructions due to BP falling too low (takes in evening)), Disp: 180 capsule, Rfl: 3  •  icosapent ethyl (Vascepa) 1 g capsule capsule, Take 2 g by mouth 2 (Two) Times a Day With Meals., Disp: 120 capsule, Rfl: 11  •  iron polysaccharides (NIFEREX) 150 MG capsule, Take 150 mg by mouth 2 (Two) Times a Day., Disp: , Rfl:   •  levothyroxine (SYNTHROID, LEVOTHROID) 25 MCG tablet, TAKE 1 TABLET BY MOUTH DAILY., Disp: 90 tablet, Rfl:  "0  •  metFORMIN (GLUCOPHAGE) 500 MG tablet, Take 500 mg by mouth Daily. Ordered as BID, but patient only takes once daily (prescribing MD is aware, per pt), Disp: , Rfl:   •  Multiple Vitamin (MULTI VITAMIN DAILY PO), Take  by mouth daily., Disp: , Rfl:   •  nebivolol (BYSTOLIC) 5 MG tablet, Take 2 tablets by mouth Daily. (Patient taking differently: Take 5 mg by mouth Every Evening.), Disp: 180 tablet, Rfl: 3  •  nitroglycerin (NITROLINGUAL) 0.4 MG/SPRAY spray, Place 1 spray under the tongue Every 5 (Five) Minutes As Needed for Chest Pain., Disp: 4.9 g, Rfl: 3  •  omeprazole (PriLOSEC) 20 MG capsule, Take 20 mg by mouth daily., Disp: , Rfl:   •  vitamin C (ASCORBIC ACID) 500 MG tablet, Take 500 mg by mouth Daily., Disp: , Rfl:   No current facility-administered medications for this visit.    Facility-Administered Medications Ordered in Other Visits:   •  vancomycin 1250 mg/250 mL 0.9% NS IVPB ([facility]), 15 mg/kg, Intravenous, Once, Tamara Lazaro PA-C      History of Present Illness   Coco Crockett is a 77 y.o. year old female here for follow up and transitioning care from Dr. Barillas's practice.  She has seen Dr. Barillas in June of this year.  She has undergone back surgery since then is having significant fatigue thereafter.  She is scheduled to have an upper EGD per her endoscopist Maryam Sutton in short order.  She has been asked to eliminate or decrease her aspirin.    Notes substantial fatigue that has greatly worsened since her surgery      OBJECTIVE:  Vitals:    09/09/22 1132   BP: 156/66   BP Location: Left arm   Patient Position: Sitting   Cuff Size: Adult   Pulse: 73   SpO2: 98%   Weight: 75.8 kg (167 lb)   Height: 161.3 cm (63.5\")     Body mass index is 29.12 kg/m².    Constitutional:       Appearance: Healthy appearance. Not in distress.   Neck:      Vascular: No JVR. JVD normal.   Pulmonary:      Effort: Pulmonary effort is normal.      Breath sounds: Normal breath sounds. No wheezing. No rhonchi. " No rales.   Chest:      Chest wall: Not tender to palpatation.   Cardiovascular:      PMI at left midclavicular line. Normal rate. Regular rhythm. Normal S1. Normal S2.      Murmurs: There is a systolic murmur.      No gallop. No click. No rub.   Pulses:     Intact distal pulses.   Edema:     Peripheral edema present.  Abdominal:      General: Bowel sounds are normal.      Palpations: Abdomen is soft.      Tenderness: There is no abdominal tenderness.   Musculoskeletal: Normal range of motion.         General: No tenderness. Skin:     General: Skin is warm and dry.   Neurological:      General: No focal deficit present.      Mental Status: Alert and oriented to person, place and time.         Diagnostic Data:    ECG 12 Lead    Date/Time: 9/9/2022 1:20 PM  Performed by: Lobo Camacho MD  Authorized by: Lobo Camacho MD   Previous ECG: no previous ECG available  BPM: 73  ST Depression: V5 and V6    Clinical impression: non-specific ECG              ASSESSMENT:   Diagnosis Plan   1. Coronary artery disease involving native coronary artery of native heart without angina pectoris     2. Primary hypertension     3. Mixed hyperlipidemia         PLAN:    CAD nonobstructive historically no overt anginal equivalent with  nondiagnostic EKG at current.  Would recommend low-dose 81 mg enteric-coated aspirin in the interim    Hypertension controlled currently HCTZ Bystolic amlodipine    Mixed dyslipidemia LDL controlled on PCSK9, historically with hypertriglyceridemia.    Await further postoperative anemia GI evaluation could consider further ischemic evaluation if nothing actionable delineated        Lobo Camacho MD, [facility]

## 2022-09-14 ENCOUNTER — OFFICE VISIT (OUTPATIENT)
Dept: NEUROSURGERY | Facility: CLINIC | Age: 77
End: 2022-09-14

## 2022-09-14 VITALS — WEIGHT: 169.2 LBS | HEIGHT: 64 IN | BODY MASS INDEX: 28.89 KG/M2 | RESPIRATION RATE: 16 BRPM

## 2022-09-14 DIAGNOSIS — M51.36 DDD (DEGENERATIVE DISC DISEASE), LUMBAR: ICD-10-CM

## 2022-09-14 DIAGNOSIS — Z98.1 S/P LUMBAR FUSION: ICD-10-CM

## 2022-09-14 DIAGNOSIS — M48.062 SPINAL STENOSIS OF LUMBAR REGION WITH NEUROGENIC CLAUDICATION: Primary | ICD-10-CM

## 2022-09-14 DIAGNOSIS — M43.16 SPONDYLOLISTHESIS OF LUMBAR REGION: ICD-10-CM

## 2022-09-14 DIAGNOSIS — M54.9 MECHANICAL BACK PAIN: ICD-10-CM

## 2022-09-14 PROCEDURE — 99213 OFFICE O/P EST LOW 20 MIN: CPT | Performed by: PHYSICIAN ASSISTANT

## 2022-09-14 RX ORDER — TRAMADOL HYDROCHLORIDE 50 MG/1
50 TABLET ORAL EVERY 8 HOURS PRN
Qty: 45 TABLET | Refills: 0 | Status: SHIPPED | OUTPATIENT
Start: 2022-09-14

## 2022-09-14 NOTE — PROGRESS NOTES
Patient: Coco Crockett  : 1945  Chart #: [id number]    Date of Service: 2022    CHIEF COMPLAINT: Low back and lower extremity pain with walking and standing intolerance    History of Present Illness Patient is a 77-year-old woman who presented with low back and lower extremity pain with symptoms of neurogenic claudication.  On 2022 she underwent L4-S1 Adán with decompression from L2-3 to the sacrum.  She has done very well postoperatively.  A couple weeks ago she had a bad fall where she landed on her face and suffered a minor concussion.  Since that time her low back has been bothering more there is radiation into the left hip and groin area.  The pain is stabbing in nature.  She has been taking Tylenol which helps.  No radicular symptoms.      Past Medical History:   Diagnosis Date   • Anemia    • Anxiety    • Chest pain    • Coronary artery disease    • Depression    • Diabetes mellitus (HCC)    • Dyslipidemia    • Eczema    • Fear of awareness under general anesthesia    • Fear of general anesthetic     has hx of awareness during anesthesia induction, felt like she was dying   • GERD (gastroesophageal reflux disease)    • History of COVID-19 2022   • Hyperlipidemia    • Hypertension     Chronic hypertension, probably essential.    • Hypertensive cardiovascular disease    • Hypothyroidism     Chronic hypothyroidism/replacement therapy.   • IBS (irritable bowel syndrome)    • Iron deficiency anemia    • Migraine     Intermittent progressive headache syndrome/possible migraine equivalent.   • Obesity (BMI 30.0-34.9)     Mild obesity (BMI 31).    • Osteopenia     Osteopenia with fracture of lumbar vertebra, .    • Pneumonia    • TMJ (temporomandibular joint disorder)    • Type 2 diabetes mellitus (HCC)     Type 2 diabetes mellitus, Hemoglobin A1c 5.9%, 2014.   • Urgency incontinence    • Urinary, incontinence, stress female    • Vertigo     Intermittent marked  dizziness/vertigo with Epley maneuver.         Current Outpatient Medications:   •  acetaminophen (TYLENOL) 500 MG tablet, Take 500 mg by mouth 2 (Two) Times a Day As Needed for Mild Pain ., Disp: , Rfl:   •  Alirocumab 75 MG/ML solution auto-injector, Inject 75 mg under the skin into the appropriate area as directed Every 14 (Fourteen) Days., Disp: 6 pen, Rfl: 3  •  amLODIPine (NORVASC) 5 MG tablet, Take 1 tablet by mouth Daily. (Patient taking differently: Take 5 mg by mouth Every Evening.), Disp: 90 tablet, Rfl: 3  •  aspirin 325 MG tablet, Take 325 mg by mouth Every Night., Disp: , Rfl:   •  Breo Ellipta 200-25 MCG/INH inhaler, Daily., Disp: , Rfl:   •  cetirizine (zyrTEC) 10 MG tablet, Take 10 mg by mouth Every Night., Disp: , Rfl:   •  Cholecalciferol (VITAMIN D3 PO), Take 4,000 Units by mouth 2 (Two) Times a Day., Disp: , Rfl:   •  diphenhydrAMINE (BENADRYL) 25 mg capsule, Take 12.5 mg by mouth Every Night., Disp: , Rfl:   •  docusate sodium (COLACE) 250 MG capsule, Take 1 capsule by mouth 2 (Two) Times a Day As Needed for Constipation. Hold for loose stools, Disp: 60 capsule, Rfl: 1  •  DULoxetine (CYMBALTA) 60 MG capsule, Take 60 mg by mouth daily., Disp: , Rfl:   •  estradiol (ESTRACE) 0.5 MG tablet, Take 0.5 mg by mouth Daily., Disp: , Rfl:   •  fenofibric acid (TRILIPIX) 135 MG capsule delayed-release delayed release capsule, Take 1 capsule by mouth Daily., Disp: 90 capsule, Rfl: 3  •  fluticasone (FLONASE) 50 MCG/ACT nasal spray, 2 sprays into each nostril Daily., Disp: , Rfl:   •  hydroCHLOROthiazide (MICROZIDE) 12.5 MG capsule, TAKE 1 CAPSULE BY MOUTH TWICE A DAY (Patient taking differently: Take 12.5 mg by mouth 2 (Two) Times a Day. Patient only takes once per day per her PCP instructions due to BP falling too low (takes in evening)), Disp: 180 capsule, Rfl: 3  •  icosapent ethyl (Vascepa) 1 g capsule capsule, Take 2 g by mouth 2 (Two) Times a Day With Meals., Disp: 120 capsule, Rfl: 11  •  iron  polysaccharides (NIFEREX) 150 MG capsule, Take 150 mg by mouth 2 (Two) Times a Day., Disp: , Rfl:   •  levothyroxine (SYNTHROID, LEVOTHROID) 25 MCG tablet, TAKE 1 TABLET BY MOUTH DAILY., Disp: 90 tablet, Rfl: 0  •  metFORMIN (GLUCOPHAGE) 500 MG tablet, Take 500 mg by mouth Daily. Ordered as BID, but patient only takes once daily (prescribing MD is aware, per pt), Disp: , Rfl:   •  Multiple Vitamin (MULTI VITAMIN DAILY PO), Take  by mouth daily., Disp: , Rfl:   •  nebivolol (BYSTOLIC) 5 MG tablet, Take 2 tablets by mouth Daily. (Patient taking differently: Take 5 mg by mouth Every Evening.), Disp: 180 tablet, Rfl: 3  •  nitroglycerin (NITROLINGUAL) 0.4 MG/SPRAY spray, Place 1 spray under the tongue Every 5 (Five) Minutes As Needed for Chest Pain., Disp: 4.9 g, Rfl: 3  •  omeprazole (PriLOSEC) 20 MG capsule, Take 20 mg by mouth daily., Disp: , Rfl:   •  traMADol (ULTRAM) 50 MG tablet, Take 1 tablet by mouth Every 8 (Eight) Hours As Needed for Moderate Pain., Disp: 45 tablet, Rfl: 0  •  vitamin C (ASCORBIC ACID) 500 MG tablet, Take 500 mg by mouth Daily., Disp: , Rfl:   No current facility-administered medications for this visit.    Facility-Administered Medications Ordered in Other Visits:   •  vancomycin 1250 mg/250 mL 0.9% NS IVPB ([facility]), 15 mg/kg, Intravenous, Once, Tamara Lazaro, ROSHAN    Past Surgical History:   Procedure Laterality Date   • ANKLE OPEN REDUCTION INTERNAL FIXATION Right 1995    Fall on ice   • APPENDECTOMY  1970    Appendectomy with wedge resection of both ovaries, 1970.   • CATARACT EXTRACTION WITH INTRAOCULAR LENS IMPLANT     • COLONOSCOPY     • LASIK Bilateral    • LUMBAR LAMINECTOMY WITH FUSION N/A 5/9/2022    Procedure: LUMBAR DECOMPRESSON L2-S1 with Fusion and Stabilization with PEDICLE SCREWS at L4-S1;  Surgeon: Humberto Correa MD;  Location: [facility];  Service: Neurosurgery;  Laterality: N/A;   • TIBIAL PLATEAU OPEN REDUCTION INTERNAL FIXATION Right    • TONSILLECTOMY AND ADENOIDECTOMY      • TOTAL LAPAROSCOPIC HYSTERECTOMY SALPINGO OOPHORECTOMY  1985    MARIZA/BSO secondary to severe endometriosis, 1985.        Social History     Socioeconomic History   • Marital status:    Tobacco Use   • Smoking status: Never Smoker   • Smokeless tobacco: Never Used   Vaping Use   • Vaping Use: Never used   Substance and Sexual Activity   • Alcohol use: Yes     Comment: occasional (1 glass of wine per month at most)   • Drug use: No   • Sexual activity: Defer         Review of Systems   Constitutional: Positive for activity change and fatigue. Negative for appetite change, chills, diaphoresis, fever and unexpected weight change.   HENT: Positive for hearing loss and sinus pressure. Negative for congestion, dental problem, drooling, ear discharge, ear pain, facial swelling, mouth sores, nosebleeds, postnasal drip, rhinorrhea, sneezing, sore throat, tinnitus, trouble swallowing and voice change.    Eyes: Negative for photophobia, pain, discharge, redness, itching and visual disturbance.   Respiratory: Negative for apnea, cough, choking, chest tightness, shortness of breath, wheezing and stridor.    Cardiovascular: Negative for chest pain, palpitations and leg swelling.   Gastrointestinal: Positive for diarrhea. Negative for abdominal distention, abdominal pain, anal bleeding, blood in stool, constipation, nausea, rectal pain and vomiting.   Endocrine: Negative for cold intolerance, heat intolerance, polydipsia, polyphagia and polyuria.   Genitourinary: Negative for decreased urine volume, difficulty urinating, dysuria, enuresis, flank pain, frequency, genital sores, hematuria and urgency.   Musculoskeletal: Positive for arthralgias and back pain. Negative for gait problem, joint swelling, myalgias, neck pain and neck stiffness.   Skin: Positive for pallor. Negative for color change, rash and wound.   Allergic/Immunologic: Positive for food allergies. Negative for environmental allergies and immunocompromised  "state.   Neurological: Positive for numbness. Negative for dizziness, tremors, seizures, syncope, facial asymmetry, speech difficulty, weakness, light-headedness and headaches.   Hematological: Negative for adenopathy. Bruises/bleeds easily.   Psychiatric/Behavioral: Negative for agitation, behavioral problems, confusion, decreased concentration, dysphoric mood, hallucinations, self-injury, sleep disturbance and suicidal ideas. The patient is not nervous/anxious and is not hyperactive.    All other systems reviewed and are negative.      Objective   Vital Signs: Resp. rate 16, height 161.3 cm (63.5\"), weight 76.7 kg (169 lb 3.2 oz).  Physical Exam  Vitals and nursing note reviewed.   Constitutional:       General: She is not in acute distress.     Appearance: She is well-developed.   HENT:      Head: Normocephalic and atraumatic.   Eyes:      Pupils: Pupils are equal, round, and reactive to light.   Cardiovascular:      Heart sounds: Normal heart sounds.   Pulmonary:      Breath sounds: Normal breath sounds.   Psychiatric:         Behavior: Behavior normal.         Thought Content: Thought content normal.     Musculoskeletal:     Strength is intact in upper and lower extremities to direct testing.     Station and gait are normal.  Neurologic:     Muscle tone is normal throughout.     Coordination is intact.     Sensation is intact to light touch throughout.     Patient is oriented to person, place, and time.         Independent review of radiographic imaging: Plain films of the lumbar spine dated 9/9/2022 demonstrate surgical construct intact at L4-S1 without evidence of hardware complication.    Assessment & Plan   Diagnosis:   1.  Low back pain after mechanical fall  2.  Lumbar spondylolisthesis and stenosis with instability status post multilevel decompression with fusion L4-S1    Medical Decision Making: I reassured patient. She likely flared things up after her fall. She is set to begin therapy with her "  next week. It has been one month since her fall- I think resuming some therapy would be good. I have renewed her tramadol to help with the pain. She takes this very sparingly and also uses tylenol. She will keep her follow up with me in 4 months. Call the office in the interim with new or worsening complaints.       Diagnoses and all orders for this visit:    1. Spinal stenosis of lumbar region with neurogenic claudication (Primary)    2. Spondylolisthesis of lumbar region    3. Mechanical back pain    4. S/P lumbar fusion    5. DDD (degenerative disc disease), lumbar    Other orders  -     traMADol (ULTRAM) 50 MG tablet; Take 1 tablet by mouth Every 8 (Eight) Hours As Needed for Moderate Pain.  Dispense: 45 tablet; Refill: 0                   Coco Ricardo PA-C  Patient Care Team:  Lobo Archer MD as PCP - General (Internal Medicine)  Lobo Archer MD as Referring Physician (Internal Medicine)  Lobo Camacho MD as Consulting Physician (Cardiology)  Casa William PA-C as Physician Assistant (Cardiology)

## 2022-12-21 ENCOUNTER — OFFICE VISIT (OUTPATIENT)
Dept: NEUROSURGERY | Facility: CLINIC | Age: 77
End: 2022-12-21

## 2022-12-21 VITALS
BODY MASS INDEX: 28.74 KG/M2 | DIASTOLIC BLOOD PRESSURE: 66 MMHG | WEIGHT: 162.2 LBS | HEIGHT: 63 IN | TEMPERATURE: 97.1 F | SYSTOLIC BLOOD PRESSURE: 136 MMHG

## 2022-12-21 DIAGNOSIS — M43.16 SPONDYLOLISTHESIS OF LUMBAR REGION: ICD-10-CM

## 2022-12-21 DIAGNOSIS — M51.36 DDD (DEGENERATIVE DISC DISEASE), LUMBAR: ICD-10-CM

## 2022-12-21 DIAGNOSIS — M54.9 MECHANICAL BACK PAIN: ICD-10-CM

## 2022-12-21 DIAGNOSIS — M48.062 SPINAL STENOSIS OF LUMBAR REGION WITH NEUROGENIC CLAUDICATION: Primary | ICD-10-CM

## 2022-12-21 PROCEDURE — 99213 OFFICE O/P EST LOW 20 MIN: CPT | Performed by: PHYSICIAN ASSISTANT

## 2022-12-21 RX ORDER — METOCLOPRAMIDE 5 MG/1
TABLET ORAL
COMMUNITY
Start: 2022-09-15 | End: 2023-02-22 | Stop reason: HOSPADM

## 2022-12-21 RX ORDER — BROMPHENIRAMINE MALEATE, PSEUDOEPHEDRINE HYDROCHLORIDE, AND DEXTROMETHORPHAN HYDROBROMIDE 2; 30; 10 MG/5ML; MG/5ML; MG/5ML
SYRUP ORAL
COMMUNITY
Start: 2022-12-01 | End: 2023-02-22 | Stop reason: HOSPADM

## 2022-12-21 RX ORDER — POLYETHYLENE GLYCOL-3350 AND ELECTROLYTES 236; 6.74; 5.86; 2.97; 22.74 G/274.31G; G/274.31G; G/274.31G; G/274.31G; G/274.31G
POWDER, FOR SOLUTION ORAL
COMMUNITY
Start: 2022-09-15 | End: 2023-02-22 | Stop reason: HOSPADM

## 2022-12-21 RX ORDER — DICYCLOMINE HYDROCHLORIDE 10 MG/1
CAPSULE ORAL
COMMUNITY
Start: 2022-12-18 | End: 2023-02-22 | Stop reason: HOSPADM

## 2022-12-21 RX ORDER — ESTRADIOL 0.5 MG/1
1 TABLET ORAL DAILY
Status: ON HOLD | COMMUNITY
Start: 2022-12-12 | End: 2023-02-22

## 2022-12-21 NOTE — PROGRESS NOTES
Patient: Coco Crockett  : 1945  Chart #: [id number]    Date of Service: 2022    CHIEF COMPLAINT: Low back and lower extremity pain with walking and standing intolerance    History of Present Illness Patient is a 77-year-old woman who presented with low back and lower extremity pain with symptoms of neurogenic claudication.  On 2022 she underwent L4-S1 PLIF with decompression from L2-3 to the sacrum.  She has done very well postoperatively.  When I last saw her, she was having a flareup of back pain after a mechanical fall.  She restarted physical therapy and is doing much better.  She is now recovering from a URI but plans to get back into water therapy in January.  She has some stiffness and generalized arthritic pains for which she takes Tylenol in the morning and in the evening.  Overall she is doing well and has no complaints.  She also occasionally has some burning sensations in the left foot-mostly this is noticed at night and improves quickly.    Past Medical History:   Diagnosis Date   • Anemia    • Anxiety    • Chest pain    • Coronary artery disease    • Depression    • Diabetes mellitus (HCC)    • Dyslipidemia    • Eczema    • Fear of awareness under general anesthesia    • Fear of general anesthetic     has hx of awareness during anesthesia induction, felt like she was dying   • GERD (gastroesophageal reflux disease)    • History of COVID-19 2022   • Hyperlipidemia    • Hypertension     Chronic hypertension, probably essential.    • Hypertensive cardiovascular disease    • Hypothyroidism     Chronic hypothyroidism/replacement therapy.   • IBS (irritable bowel syndrome)    • Iron deficiency anemia    • Migraine     Intermittent progressive headache syndrome/possible migraine equivalent.   • Obesity (BMI 30.0-34.9)     Mild obesity (BMI 31).    • Osteopenia     Osteopenia with fracture of lumbar vertebra, .    • Pneumonia    • TMJ (temporomandibular joint disorder)    • Type  2 diabetes mellitus (HCC)     Type 2 diabetes mellitus, Hemoglobin A1c 5.9%, December 2014.   • Urgency incontinence    • Urinary, incontinence, stress female    • Vertigo     Intermittent marked dizziness/vertigo with Epley maneuver.         Current Outpatient Medications:   •  acetaminophen (TYLENOL) 500 MG tablet, Take 500 mg by mouth 2 (Two) Times a Day As Needed for Mild Pain ., Disp: , Rfl:   •  Alirocumab 75 MG/ML solution auto-injector, Inject 75 mg under the skin into the appropriate area as directed Every 14 (Fourteen) Days., Disp: 6 pen, Rfl: 3  •  amLODIPine (NORVASC) 5 MG tablet, Take 1 tablet by mouth Daily. (Patient taking differently: Take 5 mg by mouth Every Evening.), Disp: 90 tablet, Rfl: 3  •  aspirin 81 MG EC tablet, Take 1 tablet by mouth Daily., Disp: , Rfl:   •  Breo Ellipta 200-25 MCG/INH inhaler, Daily., Disp: , Rfl:   •  brompheniramine-pseudoephedrine-DM 30-2-10 MG/5ML syrup, TAKE 10 ML EVERY 6 HOURS AS NEEDED FOR COUGH, Disp: , Rfl:   •  cetirizine (zyrTEC) 10 MG tablet, Take 10 mg by mouth Every Night., Disp: , Rfl:   •  Cholecalciferol (VITAMIN D3 PO), Take 4,000 Units by mouth 2 (Two) Times a Day., Disp: , Rfl:   •  dicyclomine (BENTYL) 10 MG capsule, , Disp: , Rfl:   •  diphenhydrAMINE (BENADRYL) 25 mg capsule, Take 12.5 mg by mouth Every Night., Disp: , Rfl:   •  docusate sodium (COLACE) 250 MG capsule, Take 1 capsule by mouth 2 (Two) Times a Day As Needed for Constipation. Hold for loose stools, Disp: 60 capsule, Rfl: 1  •  DULoxetine (CYMBALTA) 60 MG capsule, Take 60 mg by mouth daily., Disp: , Rfl:   •  estradiol (ESTRACE) 0.5 MG tablet, Take 0.5 mg by mouth Daily., Disp: , Rfl:   •  estradiol (ESTRACE) 0.5 MG tablet, Take 1 tablet by mouth Daily., Disp: , Rfl:   •  fenofibric acid (TRILIPIX) 135 MG capsule delayed-release delayed release capsule, Take 1 capsule by mouth Daily., Disp: 90 capsule, Rfl: 3  •  fluticasone (FLONASE) 50 MCG/ACT nasal spray, 2 sprays into each nostril  Daily., Disp: , Rfl:   •  GaviLyte-G 236 g solution, PLEASE FOLLOW SPLIT DOSING INSTRUCTIONS AS PROVIDED BY OFFICE, USE ONE JUG ONCE, Disp: , Rfl:   •  hydroCHLOROthiazide (MICROZIDE) 12.5 MG capsule, TAKE 1 CAPSULE BY MOUTH TWICE A DAY (Patient taking differently: Take 12.5 mg by mouth 2 (Two) Times a Day. Patient only takes once per day per her PCP instructions due to BP falling too low (takes in evening)), Disp: 180 capsule, Rfl: 3  •  icosapent ethyl (Vascepa) 1 g capsule capsule, Take 2 g by mouth 2 (Two) Times a Day With Meals., Disp: 120 capsule, Rfl: 11  •  iron polysaccharides (NIFEREX) 150 MG capsule, Take 150 mg by mouth 2 (Two) Times a Day., Disp: , Rfl:   •  levothyroxine (SYNTHROID, LEVOTHROID) 25 MCG tablet, TAKE 1 TABLET BY MOUTH DAILY., Disp: 90 tablet, Rfl: 0  •  metFORMIN (GLUCOPHAGE) 500 MG tablet, Take 500 mg by mouth Daily. Ordered as BID, but patient only takes once daily (prescribing MD is aware, per pt), Disp: , Rfl:   •  methylPREDNISolone (Medrol) 4 MG dose pack, follow package directions, Disp: 1 each, Rfl: 0  •  metoclopramide (REGLAN) 5 MG tablet, TAKE AS DIRECTED PER COLONOSCOPY INSTRUCTIONS, Disp: , Rfl:   •  Multiple Vitamin (MULTI VITAMIN DAILY PO), Take  by mouth daily., Disp: , Rfl:   •  nebivolol (BYSTOLIC) 5 MG tablet, Take 2 tablets by mouth Daily. (Patient taking differently: Take 5 mg by mouth Every Evening.), Disp: 180 tablet, Rfl: 3  •  nitroglycerin (NITROLINGUAL) 0.4 MG/SPRAY spray, Place 1 spray under the tongue Every 5 (Five) Minutes As Needed for Chest Pain., Disp: 4.9 g, Rfl: 3  •  omeprazole (PriLOSEC) 20 MG capsule, Take 20 mg by mouth daily., Disp: , Rfl:   •  traMADol (ULTRAM) 50 MG tablet, Take 1 tablet by mouth Every 8 (Eight) Hours As Needed for Moderate Pain., Disp: 45 tablet, Rfl: 0  •  vitamin C (ASCORBIC ACID) 500 MG tablet, Take 500 mg by mouth Daily., Disp: , Rfl:   No current facility-administered medications for this visit.    Facility-Administered  Medications Ordered in Other Visits:   •  vancomycin 1250 mg/250 mL 0.9% NS IVPB ([facility]), 15 mg/kg, Intravenous, Once, Tamara Lazaro PA-C    Past Surgical History:   Procedure Laterality Date   • ANKLE OPEN REDUCTION INTERNAL FIXATION Right 1995    Fall on ice   • APPENDECTOMY  1970    Appendectomy with wedge resection of both ovaries, 1970.   • CATARACT EXTRACTION WITH INTRAOCULAR LENS IMPLANT     • COLONOSCOPY     • LASIK Bilateral    • LUMBAR LAMINECTOMY WITH FUSION N/A 5/9/2022    Procedure: LUMBAR DECOMPRESSON L2-S1 with Fusion and Stabilization with PEDICLE SCREWS at L4-S1;  Surgeon: Humberto Correa MD;  Location: [facility];  Service: Neurosurgery;  Laterality: N/A;   • TIBIAL PLATEAU OPEN REDUCTION INTERNAL FIXATION Right    • TONSILLECTOMY AND ADENOIDECTOMY     • TOTAL LAPAROSCOPIC HYSTERECTOMY SALPINGO OOPHORECTOMY  1985    MARIZA/BSO secondary to severe endometriosis, 1985.        Social History     Socioeconomic History   • Marital status:    Tobacco Use   • Smoking status: Never   • Smokeless tobacco: Never   Vaping Use   • Vaping Use: Never used   Substance and Sexual Activity   • Alcohol use: Yes     Comment: occasional (1 glass of wine per month at most)   • Drug use: No   • Sexual activity: Defer         Review of Systems   Constitutional: Positive for activity change and fatigue. Negative for appetite change, chills, diaphoresis, fever and unexpected weight change.   HENT: Positive for hearing loss and sinus pressure. Negative for congestion, dental problem, drooling, ear discharge, ear pain, facial swelling, mouth sores, nosebleeds, postnasal drip, rhinorrhea, sneezing, sore throat, tinnitus, trouble swallowing and voice change.    Eyes: Negative for photophobia, pain, discharge, redness, itching and visual disturbance.   Respiratory: Negative for apnea, cough, choking, chest tightness, shortness of breath, wheezing and stridor.    Cardiovascular: Negative for chest pain, palpitations and  "leg swelling.   Gastrointestinal: Positive for diarrhea. Negative for abdominal distention, abdominal pain, anal bleeding, blood in stool, constipation, nausea, rectal pain and vomiting.   Endocrine: Negative for cold intolerance, heat intolerance, polydipsia, polyphagia and polyuria.   Genitourinary: Negative for decreased urine volume, difficulty urinating, dysuria, enuresis, flank pain, frequency, genital sores, hematuria and urgency.   Musculoskeletal: Positive for arthralgias and back pain. Negative for gait problem, joint swelling, myalgias, neck pain and neck stiffness.   Skin: Positive for pallor. Negative for color change, rash and wound.   Allergic/Immunologic: Positive for food allergies. Negative for environmental allergies and immunocompromised state.   Neurological: Positive for numbness. Negative for dizziness, tremors, seizures, syncope, facial asymmetry, speech difficulty, weakness, light-headedness and headaches.   Hematological: Negative for adenopathy. Bruises/bleeds easily.   Psychiatric/Behavioral: Negative for agitation, behavioral problems, confusion, decreased concentration, dysphoric mood, hallucinations, self-injury, sleep disturbance and suicidal ideas. The patient is not nervous/anxious and is not hyperactive.    All other systems reviewed and are negative.      Objective   Vital Signs: Blood pressure 136/66, temperature 97.1 °F (36.2 °C), temperature source Infrared, height 160 cm (63\"), weight 73.6 kg (162 lb 3.2 oz), not currently breastfeeding.  Physical Exam  Vitals and nursing note reviewed.   Constitutional:       General: She is not in acute distress.     Appearance: She is well-developed.   HENT:      Head: Normocephalic and atraumatic.   Eyes:      Pupils: Pupils are equal, round, and reactive to light.   Cardiovascular:      Heart sounds: Normal heart sounds.   Pulmonary:      Breath sounds: Normal breath sounds.   Psychiatric:         Behavior: Behavior normal.         " Thought Content: Thought content normal.   Musculoskeletal:     Strength is intact in upper and lower extremities to direct testing.     Station and gait are normal.  Neurologic:     Muscle tone is normal throughout.     Coordination is intact.     Sensation is intact to light touch throughout.     Patient is oriented to person, place, and time.           Assessment & Plan   Diagnosis:   1.  Lumbar spondylolisthesis and stenosis with instability status post multilevel decompression with fusion L4-S1    Medical Decision Making: Patient is doing better after her fall.  She plans to get back into water therapy in January.  This helps with her arthritic pains.  She also takes Tylenol twice a day.  I will plan to see her back in 5 months with plain films of the lumbar spine.  Call office in the interim with any questions or concerns      Diagnoses and all orders for this visit:    1. Spinal stenosis of lumbar region with neurogenic claudication (Primary)  -     XR spine lumbar ap and lateral; Future    2. Spondylolisthesis of lumbar region  -     XR spine lumbar ap and lateral; Future    3. DDD (degenerative disc disease), lumbar    4. Mechanical back pain                   Coco Ricardo PA-C  Patient Care Team:  Lobo Archer MD as PCP - General (Internal Medicine)  Lobo Archer MD as Referring Physician (Internal Medicine)  Lobo Camacho MD as Consulting Physician (Cardiology)  Casa William PA-C as Physician Assistant (Cardiology)

## 2023-02-14 PROCEDURE — 99204 OFFICE O/P NEW MOD 45 MIN: CPT | Performed by: STUDENT IN AN ORGANIZED HEALTH CARE EDUCATION/TRAINING PROGRAM

## 2023-02-15 ENCOUNTER — APPOINTMENT (OUTPATIENT)
Dept: OTHER | Facility: HOSPITAL | Age: 78
End: 2023-02-15
Payer: MEDICARE

## 2023-02-15 ENCOUNTER — APPOINTMENT (OUTPATIENT)
Dept: MRI IMAGING | Facility: HOSPITAL | Age: 78
End: 2023-02-15
Payer: MEDICARE

## 2023-02-15 ENCOUNTER — HOSPITAL ENCOUNTER (OUTPATIENT)
Facility: HOSPITAL | Age: 78
Setting detail: OBSERVATION
LOS: 1 days | Discharge: HOME | End: 2023-02-22
Attending: INTERNAL MEDICINE | Admitting: HOSPITALIST
Payer: MEDICARE

## 2023-02-15 ENCOUNTER — APPOINTMENT (OUTPATIENT)
Dept: CARDIOLOGY | Facility: HOSPITAL | Age: 78
End: 2023-02-15
Payer: MEDICARE

## 2023-02-15 ENCOUNTER — APPOINTMENT (OUTPATIENT)
Dept: GENERAL RADIOLOGY | Facility: HOSPITAL | Age: 78
End: 2023-02-15
Payer: MEDICARE

## 2023-02-15 DIAGNOSIS — I11.9 HYPERTENSIVE HEART DISEASE WITHOUT HEART FAILURE: ICD-10-CM

## 2023-02-15 DIAGNOSIS — R41.841 COGNITIVE COMMUNICATION DEFICIT: ICD-10-CM

## 2023-02-15 DIAGNOSIS — I10 ESSENTIAL HYPERTENSION: ICD-10-CM

## 2023-02-15 DIAGNOSIS — I63.9 ACUTE ISCHEMIC STROKE: Primary | ICD-10-CM

## 2023-02-15 DIAGNOSIS — R47.01 APHASIA: ICD-10-CM

## 2023-02-15 DIAGNOSIS — Z74.09 IMPAIRED FUNCTIONAL MOBILITY, BALANCE, GAIT, AND ENDURANCE: ICD-10-CM

## 2023-02-15 DIAGNOSIS — R13.10 DYSPHAGIA, UNSPECIFIED TYPE: ICD-10-CM

## 2023-02-15 LAB
ALBUMIN SERPL-MCNC: 4.4 G/DL (ref 3.5–5.2)
ALBUMIN/GLOB SERPL: 1.5 G/DL
ALP SERPL-CCNC: 53 U/L (ref 39–117)
ALT SERPL W P-5'-P-CCNC: 10 U/L (ref 1–33)
ANION GAP SERPL CALCULATED.3IONS-SCNC: 16 MMOL/L (ref 5–15)
AST SERPL-CCNC: 18 U/L (ref 1–32)
B PARAPERT DNA SPEC QL NAA+PROBE: NOT DETECTED
B PERT DNA SPEC QL NAA+PROBE: NOT DETECTED
BACTERIA UR QL AUTO: ABNORMAL /HPF
BASOPHILS # BLD AUTO: 0.04 10*3/MM3 (ref 0–0.2)
BASOPHILS NFR BLD AUTO: 0.4 % (ref 0–1.5)
BH CV ECHO MEAS - AO MAX PG: 9.2 MMHG
BH CV ECHO MEAS - AO MEAN PG: 5 MMHG
BH CV ECHO MEAS - AO ROOT DIAM: 3.1 CM
BH CV ECHO MEAS - AO V2 MAX: 152 CM/SEC
BH CV ECHO MEAS - AO V2 VTI: 28.9 CM
BH CV ECHO MEAS - AVA(I,D): 2.6 CM2
BH CV ECHO MEAS - EDV(CUBED): 68.9 ML
BH CV ECHO MEAS - EDV(MOD-SP2): 86.9 ML
BH CV ECHO MEAS - EDV(MOD-SP4): 96.8 ML
BH CV ECHO MEAS - EF(MOD-BP): 59.7 %
BH CV ECHO MEAS - EF(MOD-SP2): 59.3 %
BH CV ECHO MEAS - EF(MOD-SP4): 58.8 %
BH CV ECHO MEAS - ESV(CUBED): 19.7 ML
BH CV ECHO MEAS - ESV(MOD-SP2): 35.4 ML
BH CV ECHO MEAS - ESV(MOD-SP4): 39.9 ML
BH CV ECHO MEAS - FS: 34.1 %
BH CV ECHO MEAS - IVS/LVPW: 0.83 CM
BH CV ECHO MEAS - IVSD: 1 CM
BH CV ECHO MEAS - LA DIMENSION: 3.8 CM
BH CV ECHO MEAS - LAT PEAK E' VEL: 7.5 CM/SEC
BH CV ECHO MEAS - LV DIASTOLIC VOL/BSA (35-75): 55.8 CM2
BH CV ECHO MEAS - LV MASS(C)D: 151.3 GRAMS
BH CV ECHO MEAS - LV MAX PG: 5.7 MMHG
BH CV ECHO MEAS - LV MEAN PG: 3 MMHG
BH CV ECHO MEAS - LV SYSTOLIC VOL/BSA (12-30): 23 CM2
BH CV ECHO MEAS - LV V1 MAX: 119 CM/SEC
BH CV ECHO MEAS - LV V1 VTI: 23.6 CM
BH CV ECHO MEAS - LVIDD: 4.1 CM
BH CV ECHO MEAS - LVIDS: 2.7 CM
BH CV ECHO MEAS - LVOT AREA: 3.1 CM2
BH CV ECHO MEAS - LVOT DIAM: 2 CM
BH CV ECHO MEAS - LVPWD: 1.2 CM
BH CV ECHO MEAS - MED PEAK E' VEL: 4.8 CM/SEC
BH CV ECHO MEAS - MV A MAX VEL: 145 CM/SEC
BH CV ECHO MEAS - MV DEC SLOPE: 660 CM/SEC2
BH CV ECHO MEAS - MV DEC TIME: 0.17 MSEC
BH CV ECHO MEAS - MV E MAX VEL: 110 CM/SEC
BH CV ECHO MEAS - MV E/A: 0.76
BH CV ECHO MEAS - PA ACC TIME: 0.07 SEC
BH CV ECHO MEAS - PA PR(ACCEL): 45.7 MMHG
BH CV ECHO MEAS - SI(MOD-SP2): 29.7 ML/M2
BH CV ECHO MEAS - SI(MOD-SP4): 32.8 ML/M2
BH CV ECHO MEAS - SV(LVOT): 74.1 ML
BH CV ECHO MEAS - SV(MOD-SP2): 51.5 ML
BH CV ECHO MEAS - SV(MOD-SP4): 56.9 ML
BH CV ECHO MEAS - TAPSE (>1.6): 2.27 CM
BH CV ECHO MEASUREMENTS AVERAGE E/E' RATIO: 17.89
BH CV ECHO SHUNT ASSESSMENT PERFORMED (HIDDEN SCRIPTING): 1
BH CV VAS BP LEFT ARM: NORMAL MMHG
BH CV XLRA - RV BASE: 2.9 CM
BH CV XLRA - RV LENGTH: 7 CM
BH CV XLRA - RV MID: 2.5 CM
BH CV XLRA - TDI S': 18.6 CM/SEC
BILIRUB SERPL-MCNC: 0.6 MG/DL (ref 0–1.2)
BILIRUB UR QL STRIP: NEGATIVE
BUN SERPL-MCNC: 12 MG/DL (ref 8–23)
BUN/CREAT SERPL: 19.4 (ref 7–25)
C PNEUM DNA NPH QL NAA+NON-PROBE: NOT DETECTED
CALCIUM SPEC-SCNC: 9.4 MG/DL (ref 8.6–10.5)
CHLORIDE SERPL-SCNC: 94 MMOL/L (ref 98–107)
CHOLEST SERPL-MCNC: 279 MG/DL (ref 0–200)
CLARITY UR: ABNORMAL
CO2 SERPL-SCNC: 20 MMOL/L (ref 22–29)
COLOR UR: ABNORMAL
CREAT SERPL-MCNC: 0.62 MG/DL (ref 0.57–1)
D-LACTATE SERPL-SCNC: 1.3 MMOL/L (ref 0.5–2)
DEPRECATED RDW RBC AUTO: 44.7 FL (ref 37–54)
EGFRCR SERPLBLD CKD-EPI 2021: 90.7 ML/MIN/1.73
EOSINOPHIL # BLD AUTO: 0 10*3/MM3 (ref 0–0.4)
EOSINOPHIL NFR BLD AUTO: 0 % (ref 0.3–6.2)
ERYTHROCYTE [DISTWIDTH] IN BLOOD BY AUTOMATED COUNT: 17.2 % (ref 12.3–15.4)
FLUAV SUBTYP SPEC NAA+PROBE: NOT DETECTED
FLUBV RNA ISLT QL NAA+PROBE: NOT DETECTED
GLOBULIN UR ELPH-MCNC: 2.9 GM/DL
GLUCOSE BLDC GLUCOMTR-MCNC: 126 MG/DL (ref 70–130)
GLUCOSE BLDC GLUCOMTR-MCNC: 134 MG/DL (ref 70–130)
GLUCOSE BLDC GLUCOMTR-MCNC: 137 MG/DL (ref 70–130)
GLUCOSE BLDC GLUCOMTR-MCNC: 144 MG/DL (ref 70–130)
GLUCOSE SERPL-MCNC: 120 MG/DL (ref 65–99)
GLUCOSE UR STRIP-MCNC: NEGATIVE MG/DL
HADV DNA SPEC NAA+PROBE: NOT DETECTED
HBA1C MFR BLD: 5.3 % (ref 4.8–5.6)
HCOV 229E RNA SPEC QL NAA+PROBE: NOT DETECTED
HCOV HKU1 RNA SPEC QL NAA+PROBE: NOT DETECTED
HCOV NL63 RNA SPEC QL NAA+PROBE: NOT DETECTED
HCOV OC43 RNA SPEC QL NAA+PROBE: NOT DETECTED
HCT VFR BLD AUTO: 32.2 % (ref 34–46.6)
HDLC SERPL-MCNC: 34 MG/DL (ref 40–60)
HGB BLD-MCNC: 9.4 G/DL (ref 12–15.9)
HGB UR QL STRIP.AUTO: ABNORMAL
HMPV RNA NPH QL NAA+NON-PROBE: NOT DETECTED
HPIV1 RNA ISLT QL NAA+PROBE: NOT DETECTED
HPIV2 RNA SPEC QL NAA+PROBE: NOT DETECTED
HPIV3 RNA NPH QL NAA+PROBE: NOT DETECTED
HPIV4 P GENE NPH QL NAA+PROBE: NOT DETECTED
HYALINE CASTS UR QL AUTO: ABNORMAL /LPF
IMM GRANULOCYTES # BLD AUTO: 0.06 10*3/MM3 (ref 0–0.05)
IMM GRANULOCYTES NFR BLD AUTO: 0.6 % (ref 0–0.5)
KETONES UR QL STRIP: ABNORMAL
LDLC SERPL CALC-MCNC: 130 MG/DL (ref 0–100)
LDLC/HDLC SERPL: 3.56 {RATIO}
LEFT ATRIUM VOLUME INDEX: 37.2 ML/M2
LEUKOCYTE ESTERASE UR QL STRIP.AUTO: ABNORMAL
LYMPHOCYTES # BLD AUTO: 1.99 10*3/MM3 (ref 0.7–3.1)
LYMPHOCYTES NFR BLD AUTO: 18.3 % (ref 19.6–45.3)
M PNEUMO IGG SER IA-ACNC: NOT DETECTED
MAXIMAL PREDICTED HEART RATE: 141 BPM
MCH RBC QN AUTO: 21.2 PG (ref 26.6–33)
MCHC RBC AUTO-ENTMCNC: 29.2 G/DL (ref 31.5–35.7)
MCV RBC AUTO: 72.7 FL (ref 79–97)
MONOCYTES # BLD AUTO: 0.66 10*3/MM3 (ref 0.1–0.9)
MONOCYTES NFR BLD AUTO: 6.1 % (ref 5–12)
NEUTROPHILS NFR BLD AUTO: 74.6 % (ref 42.7–76)
NEUTROPHILS NFR BLD AUTO: 8.12 10*3/MM3 (ref 1.7–7)
NITRITE UR QL STRIP: NEGATIVE
NRBC BLD AUTO-RTO: 0 /100 WBC (ref 0–0.2)
PH UR STRIP.AUTO: 6 [PH] (ref 5–8)
PLATELET # BLD AUTO: 358 10*3/MM3 (ref 140–450)
PMV BLD AUTO: 9.5 FL (ref 6–12)
POTASSIUM SERPL-SCNC: 3.8 MMOL/L (ref 3.5–5.2)
PROCALCITONIN SERPL-MCNC: 0.05 NG/ML (ref 0–0.25)
PROT SERPL-MCNC: 7.3 G/DL (ref 6–8.5)
PROT UR QL STRIP: ABNORMAL
RBC # BLD AUTO: 4.43 10*6/MM3 (ref 3.77–5.28)
RBC # UR STRIP: ABNORMAL /HPF
REF LAB TEST METHOD: ABNORMAL
RHINOVIRUS RNA SPEC NAA+PROBE: NOT DETECTED
RSV RNA NPH QL NAA+NON-PROBE: NOT DETECTED
SARS-COV-2 RNA NPH QL NAA+NON-PROBE: NOT DETECTED
SODIUM SERPL-SCNC: 130 MMOL/L (ref 136–145)
SP GR UR STRIP: 1.03 (ref 1–1.03)
SQUAMOUS #/AREA URNS HPF: ABNORMAL /HPF
STRESS TARGET HR: 120 BPM
TRIGL SERPL-MCNC: 619 MG/DL (ref 0–150)
TSH SERPL DL<=0.05 MIU/L-ACNC: 1.27 UIU/ML (ref 0.27–4.2)
UROBILINOGEN UR QL STRIP: ABNORMAL
VLDLC SERPL-MCNC: 115 MG/DL (ref 5–40)
WBC # UR STRIP: ABNORMAL /HPF
WBC NRBC COR # BLD: 10.87 10*3/MM3 (ref 3.4–10.8)

## 2023-02-15 PROCEDURE — 87086 URINE CULTURE/COLONY COUNT: CPT | Performed by: INTERNAL MEDICINE

## 2023-02-15 PROCEDURE — 92610 EVALUATE SWALLOWING FUNCTION: CPT

## 2023-02-15 PROCEDURE — 97166 OT EVAL MOD COMPLEX 45 MIN: CPT

## 2023-02-15 PROCEDURE — 87040 BLOOD CULTURE FOR BACTERIA: CPT | Performed by: INTERNAL MEDICINE

## 2023-02-15 PROCEDURE — 81001 URINALYSIS AUTO W/SCOPE: CPT | Performed by: INTERNAL MEDICINE

## 2023-02-15 PROCEDURE — G0378 HOSPITAL OBSERVATION PER HR: HCPCS

## 2023-02-15 PROCEDURE — 87088 URINE BACTERIA CULTURE: CPT | Performed by: INTERNAL MEDICINE

## 2023-02-15 PROCEDURE — 82962 GLUCOSE BLOOD TEST: CPT

## 2023-02-15 PROCEDURE — 74018 RADEX ABDOMEN 1 VIEW: CPT

## 2023-02-15 PROCEDURE — 96361 HYDRATE IV INFUSION ADD-ON: CPT

## 2023-02-15 PROCEDURE — 94799 UNLISTED PULMONARY SVC/PX: CPT

## 2023-02-15 PROCEDURE — 83605 ASSAY OF LACTIC ACID: CPT | Performed by: INTERNAL MEDICINE

## 2023-02-15 PROCEDURE — 85025 COMPLETE CBC W/AUTO DIFF WBC: CPT | Performed by: NURSE PRACTITIONER

## 2023-02-15 PROCEDURE — 84145 PROCALCITONIN (PCT): CPT | Performed by: INTERNAL MEDICINE

## 2023-02-15 PROCEDURE — 84443 ASSAY THYROID STIM HORMONE: CPT | Performed by: NURSE PRACTITIONER

## 2023-02-15 PROCEDURE — 80053 COMPREHEN METABOLIC PANEL: CPT | Performed by: NURSE PRACTITIONER

## 2023-02-15 PROCEDURE — 93005 ELECTROCARDIOGRAM TRACING: CPT | Performed by: NURSE PRACTITIONER

## 2023-02-15 PROCEDURE — 97162 PT EVAL MOD COMPLEX 30 MIN: CPT

## 2023-02-15 PROCEDURE — 70551 MRI BRAIN STEM W/O DYE: CPT

## 2023-02-15 PROCEDURE — G0379 DIRECT REFER HOSPITAL OBSERV: HCPCS

## 2023-02-15 PROCEDURE — 0202U NFCT DS 22 TRGT SARS-COV-2: CPT | Performed by: INTERNAL MEDICINE

## 2023-02-15 PROCEDURE — 92523 SPEECH SOUND LANG COMPREHEN: CPT

## 2023-02-15 PROCEDURE — 80061 LIPID PANEL: CPT | Performed by: NURSE PRACTITIONER

## 2023-02-15 PROCEDURE — 94640 AIRWAY INHALATION TREATMENT: CPT

## 2023-02-15 PROCEDURE — 93306 TTE W/DOPPLER COMPLETE: CPT | Performed by: INTERNAL MEDICINE

## 2023-02-15 PROCEDURE — 70250 X-RAY EXAM OF SKULL: CPT

## 2023-02-15 PROCEDURE — 83036 HEMOGLOBIN GLYCOSYLATED A1C: CPT | Performed by: NURSE PRACTITIONER

## 2023-02-15 PROCEDURE — 94761 N-INVAS EAR/PLS OXIMETRY MLT: CPT

## 2023-02-15 PROCEDURE — 51798 US URINE CAPACITY MEASURE: CPT

## 2023-02-15 PROCEDURE — 93306 TTE W/DOPPLER COMPLETE: CPT

## 2023-02-15 PROCEDURE — 99223 1ST HOSP IP/OBS HIGH 75: CPT | Performed by: NURSE PRACTITIONER

## 2023-02-15 PROCEDURE — 93010 ELECTROCARDIOGRAM REPORT: CPT | Performed by: INTERNAL MEDICINE

## 2023-02-15 PROCEDURE — 87186 SC STD MICRODIL/AGAR DIL: CPT | Performed by: INTERNAL MEDICINE

## 2023-02-15 PROCEDURE — 71045 X-RAY EXAM CHEST 1 VIEW: CPT

## 2023-02-15 RX ORDER — PANTOPRAZOLE SODIUM 40 MG/1
40 TABLET, DELAYED RELEASE ORAL
Status: DISCONTINUED | OUTPATIENT
Start: 2023-02-15 | End: 2023-02-22 | Stop reason: HOSPADM

## 2023-02-15 RX ORDER — INSULIN LISPRO 100 [IU]/ML
0-7 INJECTION, SOLUTION INTRAVENOUS; SUBCUTANEOUS
Status: DISCONTINUED | OUTPATIENT
Start: 2023-02-15 | End: 2023-02-19

## 2023-02-15 RX ORDER — ACETAMINOPHEN 650 MG/1
650 SUPPOSITORY RECTAL ONCE
Status: DISCONTINUED | OUTPATIENT
Start: 2023-02-15 | End: 2023-02-15

## 2023-02-15 RX ORDER — LEVOTHYROXINE SODIUM 0.03 MG/1
25 TABLET ORAL DAILY
Status: DISCONTINUED | OUTPATIENT
Start: 2023-02-15 | End: 2023-02-22 | Stop reason: HOSPADM

## 2023-02-15 RX ORDER — ACETAMINOPHEN 650 MG/1
650 SUPPOSITORY RECTAL EVERY 4 HOURS PRN
Status: DISCONTINUED | OUTPATIENT
Start: 2023-02-15 | End: 2023-02-19

## 2023-02-15 RX ORDER — ASPIRIN 300 MG/1
300 SUPPOSITORY RECTAL DAILY
Status: DISCONTINUED | OUTPATIENT
Start: 2023-02-15 | End: 2023-02-16

## 2023-02-15 RX ORDER — DEXTROSE MONOHYDRATE 25 G/50ML
25 INJECTION, SOLUTION INTRAVENOUS
Status: DISCONTINUED | OUTPATIENT
Start: 2023-02-15 | End: 2023-02-22 | Stop reason: HOSPADM

## 2023-02-15 RX ORDER — SODIUM CHLORIDE, SODIUM LACTATE, POTASSIUM CHLORIDE, CALCIUM CHLORIDE 600; 310; 30; 20 MG/100ML; MG/100ML; MG/100ML; MG/100ML
75 INJECTION, SOLUTION INTRAVENOUS CONTINUOUS
Status: ACTIVE | OUTPATIENT
Start: 2023-02-15 | End: 2023-02-15

## 2023-02-15 RX ORDER — IBUPROFEN 600 MG/1
1 TABLET ORAL
Status: DISCONTINUED | OUTPATIENT
Start: 2023-02-15 | End: 2023-02-22 | Stop reason: HOSPADM

## 2023-02-15 RX ORDER — ASPIRIN 325 MG
325 TABLET ORAL DAILY
Status: DISCONTINUED | OUTPATIENT
Start: 2023-02-15 | End: 2023-02-16

## 2023-02-15 RX ORDER — NITROFURANTOIN 25; 75 MG/1; MG/1
100 CAPSULE ORAL EVERY 12 HOURS SCHEDULED
Status: DISCONTINUED | OUTPATIENT
Start: 2023-02-15 | End: 2023-02-17

## 2023-02-15 RX ORDER — NICOTINE POLACRILEX 4 MG
15 LOZENGE BUCCAL
Status: DISCONTINUED | OUTPATIENT
Start: 2023-02-15 | End: 2023-02-22 | Stop reason: HOSPADM

## 2023-02-15 RX ORDER — BUDESONIDE AND FORMOTEROL FUMARATE DIHYDRATE 160; 4.5 UG/1; UG/1
2 AEROSOL RESPIRATORY (INHALATION)
Status: DISCONTINUED | OUTPATIENT
Start: 2023-02-15 | End: 2023-02-22 | Stop reason: HOSPADM

## 2023-02-15 RX ORDER — FLUTICASONE PROPIONATE 50 MCG
2 SPRAY, SUSPENSION (ML) NASAL DAILY
Status: DISCONTINUED | OUTPATIENT
Start: 2023-02-15 | End: 2023-02-22 | Stop reason: HOSPADM

## 2023-02-15 RX ORDER — ESTRADIOL 0.5 MG/1
0.5 TABLET ORAL DAILY
Status: DISCONTINUED | OUTPATIENT
Start: 2023-02-15 | End: 2023-02-22 | Stop reason: HOSPADM

## 2023-02-15 RX ORDER — ACETAMINOPHEN 325 MG/1
650 TABLET ORAL EVERY 4 HOURS PRN
Status: DISCONTINUED | OUTPATIENT
Start: 2023-02-15 | End: 2023-02-22 | Stop reason: HOSPADM

## 2023-02-15 RX ORDER — ATORVASTATIN CALCIUM 40 MG/1
40 TABLET, FILM COATED ORAL NIGHTLY
Status: DISCONTINUED | OUTPATIENT
Start: 2023-02-15 | End: 2023-02-22 | Stop reason: HOSPADM

## 2023-02-15 RX ORDER — AMLODIPINE BESYLATE 5 MG/1
5 TABLET ORAL
Status: DISCONTINUED | OUTPATIENT
Start: 2023-02-15 | End: 2023-02-22 | Stop reason: HOSPADM

## 2023-02-15 RX ORDER — SODIUM CHLORIDE 9 MG/ML
100 INJECTION, SOLUTION INTRAVENOUS CONTINUOUS
Status: ACTIVE | OUTPATIENT
Start: 2023-02-15 | End: 2023-02-16

## 2023-02-15 RX ORDER — DULOXETIN HYDROCHLORIDE 60 MG/1
60 CAPSULE, DELAYED RELEASE ORAL DAILY
Status: DISCONTINUED | OUTPATIENT
Start: 2023-02-15 | End: 2023-02-22 | Stop reason: HOSPADM

## 2023-02-15 RX ADMIN — ATORVASTATIN CALCIUM 40 MG: 40 TABLET, FILM COATED ORAL at 20:21

## 2023-02-15 RX ADMIN — ESTRADIOL 0.5 MG: 0.5 TABLET ORAL at 11:47

## 2023-02-15 RX ADMIN — ASPIRIN 325 MG: 325 TABLET ORAL at 11:47

## 2023-02-15 RX ADMIN — ACETAMINOPHEN 325MG 650 MG: 325 TABLET ORAL at 18:15

## 2023-02-15 RX ADMIN — PANTOPRAZOLE SODIUM 40 MG: 40 TABLET, DELAYED RELEASE ORAL at 11:47

## 2023-02-15 RX ADMIN — DULOXETINE HYDROCHLORIDE 60 MG: 60 CAPSULE, DELAYED RELEASE ORAL at 11:47

## 2023-02-15 RX ADMIN — NITROFURANTOIN MONOHYDRATE/MACROCRYSTALS 100 MG: 75; 25 CAPSULE ORAL at 20:21

## 2023-02-15 RX ADMIN — FLUTICASONE PROPIONATE 2 SPRAY: 50 SPRAY, METERED NASAL at 11:46

## 2023-02-15 RX ADMIN — SODIUM CHLORIDE 100 ML/HR: 9 INJECTION, SOLUTION INTRAVENOUS at 18:16

## 2023-02-15 RX ADMIN — SODIUM CHLORIDE, POTASSIUM CHLORIDE, SODIUM LACTATE AND CALCIUM CHLORIDE 75 ML/HR: 600; 310; 30; 20 INJECTION, SOLUTION INTRAVENOUS at 04:35

## 2023-02-15 RX ADMIN — AMLODIPINE BESYLATE 5 MG: 5 TABLET ORAL at 18:15

## 2023-02-15 RX ADMIN — BUDESONIDE AND FORMOTEROL FUMARATE DIHYDRATE 2 PUFF: 160; 4.5 AEROSOL RESPIRATORY (INHALATION) at 19:30

## 2023-02-15 RX ADMIN — BUDESONIDE AND FORMOTEROL FUMARATE DIHYDRATE 2 PUFF: 160; 4.5 AEROSOL RESPIRATORY (INHALATION) at 08:08

## 2023-02-15 RX ADMIN — ACETAMINOPHEN 325MG 650 MG: 325 TABLET ORAL at 11:47

## 2023-02-15 NOTE — THERAPY EVALUATION
Acute Care - Speech Language Pathology Initial Evaluation  [facility]   Cognitive-Communication Evaluation  & Clinical Swallow Evaluation     Patient Name: Coco Crockett  : 1945  MRN: [id number]  Today's Date: 2/15/2023               Admit Date: 2/15/2023     Visit Dx:    ICD-10-CM ICD-9-CM   1. Acute ischemic stroke (HCC)  I63.9 434.91   2. Aphasia  R47.01 784.3   3. Dysphagia, unspecified type  R13.10 787.20     Patient Active Problem List   Diagnosis   • Mild obesity   • Hypertensive cardiovascular disease   • Hypertension   • Dyslipidemia   • Obesity (BMI 30.0-34.9)   • Hypothyroidism   • Osteopenia   • TMJ (temporomandibular joint disorder)   • GERD (gastroesophageal reflux disease)   • Iron deficiency anemia   • Depression   • Eczema   • Vertigo   • Migraine   • Type 2 diabetes mellitus (HCC)   • MALIK (dyspnea on exertion)   • Leg pain, bilateral   • Fatigue   • Ischemic heart disease   • Lumbar stenosis with neurogenic claudication   • Spondylosis of lumbar region without myelopathy or radiculopathy   • Degeneration of lumbar or lumbosacral intervertebral disc   • Greater trochanteric bursitis of both hips   • Spinal stenosis of lumbar region with neurogenic claudication   • S/P lumbar fusion   • Acute ischemic stroke (HCC)   • Stroke (HCC)     Past Medical History:   Diagnosis Date   • Anemia    • Anxiety    • Chest pain    • Coronary artery disease    • Depression    • Diabetes mellitus (HCC)    • Dyslipidemia    • Eczema    • Fear of awareness under general anesthesia    • Fear of general anesthetic     has hx of awareness during anesthesia induction, felt like she was dying   • GERD (gastroesophageal reflux disease)    • History of COVID-19 2022   • Hyperlipidemia    • Hypertension     Chronic hypertension, probably essential.    • Hypertensive cardiovascular disease    • Hypothyroidism     Chronic hypothyroidism/replacement therapy.   • IBS (irritable bowel syndrome)    • Iron  deficiency anemia    • Migraine     Intermittent progressive headache syndrome/possible migraine equivalent.   • Obesity (BMI 30.0-34.9)     Mild obesity (BMI 31).    • Osteopenia     Osteopenia with fracture of lumbar vertebra, 2004.    • Pneumonia    • TMJ (temporomandibular joint disorder)    • Type 2 diabetes mellitus (HCC)     Type 2 diabetes mellitus, Hemoglobin A1c 5.9%, December 2014.   • Urgency incontinence    • Urinary, incontinence, stress female    • Vertigo     Intermittent marked dizziness/vertigo with Epley maneuver.     Past Surgical History:   Procedure Laterality Date   • ANKLE OPEN REDUCTION INTERNAL FIXATION Right 1995    Fall on ice   • APPENDECTOMY  1970    Appendectomy with wedge resection of both ovaries, 1970.   • CATARACT EXTRACTION WITH INTRAOCULAR LENS IMPLANT     • COLONOSCOPY     • LASIK Bilateral    • LUMBAR LAMINECTOMY WITH FUSION N/A 5/9/2022    Procedure: LUMBAR DECOMPRESSON L2-S1 with Fusion and Stabilization with PEDICLE SCREWS at L4-S1;  Surgeon: Humberto Correa MD;  Location: [facility];  Service: Neurosurgery;  Laterality: N/A;   • TIBIAL PLATEAU OPEN REDUCTION INTERNAL FIXATION Right    • TONSILLECTOMY AND ADENOIDECTOMY     • TOTAL LAPAROSCOPIC HYSTERECTOMY SALPINGO OOPHORECTOMY  1985    MARIZA/BSO secondary to severe endometriosis, 1985.        SLP Recommendation and Plan  SLP Diagnosis: moderate-severe, aphasia (02/15/23 1100)  SLP Diagnosis Comments: Suspect global aphasia. Further assessment needed when pt feeling better/more alert. (02/15/23 1100)        Swallow Criteria for Skilled Therapeutic Interventions Met: demonstrates skilled criteria (02/15/23 1120)  SLC Criteria for Skilled Therapy Interventions Met: yes (02/15/23 1100)  Anticipated Discharge Disposition (SLP): anticipate therapy at next level of care, inpatient rehabilitation facility (02/15/23 1120)        Predicted Duration Therapy Intervention (Days): until discharge (02/15/23 1100)  SLC Diagnostic Follow-Up  Needed: auditory comprehension, verbal expression (02/15/23 1100)           Communication Strategy Suggestions: eliminate distractions when speaking with patient, avoid open-ended questions, avoid multi-step instructions (02/15/23 1100)           Plan of Care Reviewed With: patient, daughter, son (02/15/23 1155)  Progress: no change (02/15/23 1155)      SLP EVALUATION (last 72 hours)     SLP SLC Evaluation     Row Name 02/15/23 1100                   Communication Assessment/Intervention    Document Type evaluation  -AC        Subjective Information --  Asked to rest. Difficulty fully communicating complaints--gestures appeared to indicate headache.  -AC        Patient Observations lethargic  -AC        Patient/Family/Caregiver Comments/Observations Dtr & son present.  -AC        Patient Effort fair  -AC           General Information    Patient Profile Reviewed yes  -AC        Pertinent History Of Current Problem Adm w/ r/o CVA when developed communication difficulty, HA. CT Head OSH negative. MRI pending. Hx GERD, TMJ d/o, COPD, spinal fusion. Dtr reported noting increased coughing w/ liquids over the last yr, but no concerns for respiratory decline/aspiration pna. Also reported hx esophageal spasms.  -AC        Precautions/Limitations, Vision difficult to assess  -AC        Precautions/Limitations, Hearing difficult to assess  -AC        Prior Level of Function-Communication WFL  -AC        Plans/Goals Discussed with patient and family;agreed upon  -AC        Barriers to Rehab none identified  -AC        Patient's Goals for Discharge patient did not state  -AC        Family Goals for Discharge family did not state  -AC           Pain    Additional Documentation Pain Scale: FACES Pre/Post-Treatment (Group)  -AC           Pain Scale: FACES Pre/Post-Treatment    Pain: FACES Scale, Pretreatment 4-->hurts little more  -AC        Posttreatment Pain Rating 4-->hurts little more  -AC        Pre/Posttreatment Pain Comment  Grimaced & repeatedly grabbed forhead and attempted to lie down. After stepping out, dtr reported pt appeared to verbally indicate she had a headache and wanted tylenol when dtr asked.  -AC           Comprehension Assessment/Intervention    Comprehension Assessment/Intervention Auditory Comprehension  -AC           Auditory Comprehension Assessment/Intervention    Auditory Comprehension (Communication) moderate impairment;severe impairment  -AC        Answers Questions (Communication) severe impairment;personal;yes/no  -AC        Able to Follow Commands (Communication) moderate impairment;severe impairment;1-step  -AC           Expression Assessment/Intervention    Expression Assessment/Intervention verbal expression  -AC           Verbal Expression Assessment/Intervention    Verbal Expression moderate impairment;severe impairment  -AC        Automatic Speech (Communication) severe impairment;response to greeting  -AC        Repetition severe impairment;sounds  -AC        Spontaneous/Functional Words moderate impairment;severe impairment;neologisms  -AC           Oral Musculature and Cranial Nerve Assessment    Oral Motor General Assessment unable to assess  -AC           Motor Speech Assessment/Intervention    Motor Speech Function unable/difficult to assess;other (see comments)  no detectable dysarthria, but utterances were limited  -AC           Augmentative/Alternative Communication    Gestures (Alternative Communication) facial affect;gesture  -AC        Gestures, Comment Appeared to grimace & repeatedly grab forehead to indicate headache.  -AC           SLP Evaluation Clinical Impressions    SLP Diagnosis moderate-severe;aphasia  -AC        SLP Diagnosis Comments Suspect global aphasia. Further assessment needed when pt feeling better/more alert.  -AC        Rehab Potential/Prognosis good  -AC        SLC Criteria for Skilled Therapy Interventions Met yes  -AC        Functional Impact difficulty communicating  wants, needs  -           Recommendations    Therapy Frequency (SLP SLC) 5 days per week  -AC        Predicted Duration Therapy Intervention (Days) until discharge  -AC        Anticipated Discharge Disposition (SLP) anticipate therapy at next level of care;inpatient rehabilitation facility  -        Communication Strategy Suggestions eliminate distractions when speaking with patient;avoid open-ended questions;avoid multi-step instructions  -        SLC Diagnostic Follow-Up Needed auditory comprehension;verbal expression  -AC              User Key  (r) = Recorded By, (t) = Taken By, (c) = Cosigned By    Initials Name Effective Dates    AC Gwendolyn Can, MS [facility]-SLP 02/03/23 -                    EDUCATION  The patient has been educated in the following areas:     Cognitive Impairment Communication Impairment.           SLP GOALS     Row Name 02/15/23 1100             Patient will demonstrate functional language skills for return to discharge environment     [place] with moderate cues  -AC      Time frame by discharge  -AC         Words/Phrases/Sentences Goal 1 (SLP)    Improve Ability to Comprehend Words/Phrases/Sentences Through: Goal 1 (SLP) identify objects, field of;70%;with maximum cues (25-49%)  field of 2  -AC      Time Frame (Identify Objects and Pictures Goal 1, SLP) short term goal (STG)  -AC         Comprehend Questions Goal 1 (SLP)    Improve Ability to Comprehend Questions Goal 1 (SLP) questions about personal information;simple yes/no questions;70%;with maximum cues (25-49%)  -AC      Time Frame (Comprehend Questions Goal 1, SLP) short term goal (STG)  -AC         Follow Directions Goal 2 (SLP)    Improve Ability to Follow Directions Goal 1 (SLP) 1 step direction without objects;70%;with maximum cues (25-49%)  -AC      Time Frame (Follow Directions Goal 1, SLP) short term goal (STG)  -AC         Word Retrieval Skills Goal 1 (SLP)    Improve Word Retrieval Skills By Goal 1 (SLP) completing  functional word finding tasks;answer WH question with one word;70%;with maximum cues (25-49%)  -AC      Time Frame (Word Retrieval Goal 1, SLP) short term goal (STG)  -AC            User Key  (r) = Recorded By, (t) = Taken By, (c) = Cosigned By    Initials Name Provider Type    Gwendolyn Collier MS CCC-SLP Speech and Language Pathologist                        Time Calculation:      Time Calculation- SLP     Row Name 02/15/23 1156             Time Calculation- SLP    SLP Start Time 1100  -AC      SLP Received On 02/15/23  -AC         Untimed Charges    81323-BB Eval Speech and Production w/ Language Minutes 25  -AC      66093-NE Eval Oral Pharyng Swallow Minutes 36  -AC         Total Minutes    Untimed Charges Total Minutes 61  -AC       Total Minutes 61  -AC            User Key  (r) = Recorded By, (t) = Taken By, (c) = Cosigned By    Initials Name Provider Type     Gwendolyn Can MS CCC-SLP Speech and Language Pathologist                Therapy Charges for Today     Code Description Service Date Service Provider Modifiers Qty    46594038892 HC ST EVAL SPEECH AND PROD W LANG  2 2/15/2023 Gwendolyn Can MS CCC-SLP GN 1    38383104668 HC ST EVAL ORAL PHARYNG SWALLOW 2 2/15/2023 Gwendolyn Can MS CCC-SLP GN 1                     Gwendolyn Can MS CCC-SLP  2/15/2023 and Acute Care - Speech Language Pathology   Swallow Initial Evaluation [facility]     Patient Name: Coco Crockett  : 1945  MRN: [id number]  Today's Date: 2/15/2023               Admit Date: 2/15/2023    Visit Dx:     ICD-10-CM ICD-9-CM   1. Acute ischemic stroke (HCC)  I63.9 434.91   2. Aphasia  R47.01 784.3   3. Dysphagia, unspecified type  R13.10 787.20     Patient Active Problem List   Diagnosis   • Mild obesity   • Hypertensive cardiovascular disease   • Hypertension   • Dyslipidemia   • Obesity (BMI 30.0-34.9)   • Hypothyroidism   • Osteopenia   • TMJ (temporomandibular joint disorder)   • GERD (gastroesophageal reflux disease)   •  Iron deficiency anemia   • Depression   • Eczema   • Vertigo   • Migraine   • Type 2 diabetes mellitus (HCC)   • MALIK (dyspnea on exertion)   • Leg pain, bilateral   • Fatigue   • Ischemic heart disease   • Lumbar stenosis with neurogenic claudication   • Spondylosis of lumbar region without myelopathy or radiculopathy   • Degeneration of lumbar or lumbosacral intervertebral disc   • Greater trochanteric bursitis of both hips   • Spinal stenosis of lumbar region with neurogenic claudication   • S/P lumbar fusion   • Acute ischemic stroke (HCC)   • Stroke (HCC)     Past Medical History:   Diagnosis Date   • Anemia    • Anxiety    • Chest pain    • Coronary artery disease    • Depression    • Diabetes mellitus (HCC)    • Dyslipidemia    • Eczema    • Fear of awareness under general anesthesia    • Fear of general anesthetic     has hx of awareness during anesthesia induction, felt like she was dying   • GERD (gastroesophageal reflux disease)    • History of COVID-19 02/01/2022   • Hyperlipidemia    • Hypertension     Chronic hypertension, probably essential.    • Hypertensive cardiovascular disease    • Hypothyroidism     Chronic hypothyroidism/replacement therapy.   • IBS (irritable bowel syndrome)    • Iron deficiency anemia    • Migraine     Intermittent progressive headache syndrome/possible migraine equivalent.   • Obesity (BMI 30.0-34.9)     Mild obesity (BMI 31).    • Osteopenia     Osteopenia with fracture of lumbar vertebra, 2004.    • Pneumonia    • TMJ (temporomandibular joint disorder)    • Type 2 diabetes mellitus ([facility])     Type 2 diabetes mellitus, Hemoglobin A1c 5.9%, December 2014.   • Urgency incontinence    • Urinary, incontinence, stress female    • Vertigo     Intermittent marked dizziness/vertigo with Epley maneuver.     Past Surgical History:   Procedure Laterality Date   • ANKLE OPEN REDUCTION INTERNAL FIXATION Right 1995    Fall on ice   • APPENDECTOMY  1970    Appendectomy with wedge resection  of both ovaries, 1970.   • CATARACT EXTRACTION WITH INTRAOCULAR LENS IMPLANT     • COLONOSCOPY     • LASIK Bilateral    • LUMBAR LAMINECTOMY WITH FUSION N/A 5/9/2022    Procedure: LUMBAR DECOMPRESSON L2-S1 with Fusion and Stabilization with PEDICLE SCREWS at L4-S1;  Surgeon: Humberto Correa MD;  Location: [facility];  Service: Neurosurgery;  Laterality: N/A;   • TIBIAL PLATEAU OPEN REDUCTION INTERNAL FIXATION Right    • TONSILLECTOMY AND ADENOIDECTOMY     • TOTAL LAPAROSCOPIC HYSTERECTOMY SALPINGO OOPHORECTOMY  1985    MARIZA/BSO secondary to severe endometriosis, 1985.        SLP Recommendation and Plan  SLP Swallowing Diagnosis: R/O pharyngeal dysphagia (02/15/23 1120)  SLP Diet Recommendation: puree, thin liquids, other (see comments) (consider making pt NPO if concerns arise) (02/15/23 1120)  Recommended Precautions and Strategies: upright posture during/after eating, small bites of food and sips of liquid, general aspiration precautions, 1:1 supervision, assist with feeding, other (see comments) (feed only if alert) (02/15/23 1120)  SLP Rec. for Method of Medication Administration: meds whole, meds crushed, with puree, as tolerated (02/15/23 1120)     Monitor for Signs of Aspiration: yes, notify SLP if any concerns (02/15/23 1120)  Recommended Diagnostics: reassess via clinical swallow evaluation (02/15/23 1120)  Swallow Criteria for Skilled Therapeutic Interventions Met: demonstrates skilled criteria (02/15/23 1120)  Anticipated Discharge Disposition (SLP): anticipate therapy at next level of care, inpatient rehabilitation facility (02/15/23 1120)  Rehab Potential/Prognosis, Swallowing: good, to achieve stated therapy goals (02/15/23 1120)     Predicted Duration Therapy Intervention (Days): until discharge (02/15/23 1100)                     Communication Strategy Suggestions: eliminate distractions when speaking with patient, avoid open-ended questions, avoid multi-step instructions (02/15/23 1100)                   Plan of Care Reviewed With: patient, daughter, son  Progress: no change      SWALLOW EVALUATION (last 72 hours)     SLP Adult Swallow Evaluation     Row Name 02/15/23 1122                   Rehab Evaluation    Document Type evaluation  -AC           General Information    Patient Profile Reviewed yes  -AC        Current Method of Nutrition NPO  -AC        Prior Level of Function-Swallowing no diet consistency restrictions;esophageal concerns  -AC        Plans/Goals Discussed with patient and family;agreed upon  -AC        Barriers to Rehab none identified  -AC        Patient's Goals for Discharge patient could not state  -        Family Goals for Discharge family did not state  -AC           Pain Scale: FACES Pre/Post-Treatment    Pain: FACES Scale, Pretreatment 4-->hurts little more  -AC        Posttreatment Pain Rating 4-->hurts little more  -AC        Pre/Posttreatment Pain Comment Grimaced & repeatedly grabbed forhead and attempted to lie down. After stepping out, dtr reported pt appeared to verbally indicate she had a headache and wanted tylenol when dtr asked.  -AC           Oral Motor Structure and Function    Dentition Assessment natural, present and adequate  -AC        Secretion Management WNL/WFL  -AC        Mucosal Quality dry  -AC           Oral Musculature and Cranial Nerve Assessment    Oral Motor General Assessment unable to assess  -AC           General Eating/Swallowing Observations    Respiratory Support Currently in Use room air  -AC        Eating/Swallowing Skills fed by SLP  -AC        Positioning During Eating upright in bed  -AC        Utensils Used spoon;cup;straw  -AC        Consistencies Trialed thin liquids;pureed  -AC           Clinical Swallow Eval    Oral Prep Phase WFL  -AC        Oral Transit WFL  -AC        Oral Residue WFL  -AC        Pharyngeal Phase no overt signs/symptoms of pharyngeal impairment  -AC        Clinical Swallow Evaluation Summary Trialed were limited 2' pt  lethargy & difficult participation (suspct 2' pain). Oral phase seemingly WFL for consistencies trialed. No overt clinical s/sxs aspiration w/ any consistency trialed. DNT solid 2' pt attempting to lie down/decline further trials & lethargy.  -           Swallowing Quality of Life Assessment    Education and counseling provided Signs of aspiration;Risks of aspiration;Aspiration precautions  -           SLP Evaluation Clinical Impression    SLP Swallowing Diagnosis R/O pharyngeal dysphagia  -        Functional Impact risk of aspiration/pneumonia;risk of malnutrition;risk of dehydration  -        Rehab Potential/Prognosis, Swallowing good, to achieve stated therapy goals  -        Swallow Criteria for Skilled Therapeutic Interventions Met demonstrates skilled criteria  -AC           Recommendations    SLP Diet Recommendation puree;thin liquids;other (see comments)  consider making pt NPO if concerns arise  -        Recommended Diagnostics reassess via clinical swallow evaluation  -        Recommended Precautions and Strategies upright posture during/after eating;small bites of food and sips of liquid;general aspiration precautions;1:1 supervision;assist with feeding;other (see comments)  feed only if alert  -        Oral Care Recommendations Oral Care BID/PRN  -        SLP Rec. for Method of Medication Administration meds whole;meds crushed;with puree;as tolerated  -        Monitor for Signs of Aspiration yes;notify SLP if any concerns  -        Anticipated Discharge Disposition (SLP) anticipate therapy at next level of care;inpatient rehabilitation facility  -              User Key  (r) = Recorded By, (t) = Taken By, (c) = Cosigned By    Initials Name Effective Dates     Gwendolyn Can, MS [facility]-SLP 02/03/23 -                 EDUCATION  The patient has been educated in the following areas:   Dysphagia (Swallowing Impairment) Modified Diet Instruction.        SLP GOALS     Row Name 02/15/23  1100             Patient will demonstrate functional language skills for return to discharge environment     [place] with moderate cues  -AC      Time frame by discharge  -AC         Words/Phrases/Sentences Goal 1 (SLP)    Improve Ability to Comprehend Words/Phrases/Sentences Through: Goal 1 (SLP) identify objects, field of;70%;with maximum cues (25-49%)  field of 2  -AC      Time Frame (Identify Objects and Pictures Goal 1, SLP) short term goal (STG)  -AC         Comprehend Questions Goal 1 (SLP)    Improve Ability to Comprehend Questions Goal 1 (SLP) questions about personal information;simple yes/no questions;70%;with maximum cues (25-49%)  -AC      Time Frame (Comprehend Questions Goal 1, SLP) short term goal (STG)  -AC         Follow Directions Goal 2 (SLP)    Improve Ability to Follow Directions Goal 1 (SLP) 1 step direction without objects;70%;with maximum cues (25-49%)  -AC      Time Frame (Follow Directions Goal 1, SLP) short term goal (STG)  -AC         Word Retrieval Skills Goal 1 (SLP)    Improve Word Retrieval Skills By Goal 1 (SLP) completing functional word finding tasks;answer WH question with one word;70%;with maximum cues (25-49%)  -AC      Time Frame (Word Retrieval Goal 1, SLP) short term goal (STG)  -AC            User Key  (r) = Recorded By, (t) = Taken By, (c) = Cosigned By    Initials Name Provider Type    Gwendolyn Collier MS CCC-SLP Speech and Language Pathologist                   Time Calculation:    Time Calculation- SLP     Row Name 02/15/23 1156             Time Calculation- SLP    SLP Start Time 1100  -AC      SLP Received On 02/15/23  -AC         Untimed Charges    19438-FC Eval Speech and Production w/ Language Minutes 25  -AC      09849-CD Eval Oral Pharyng Swallow Minutes 36  -AC         Total Minutes    Untimed Charges Total Minutes 61  -AC       Total Minutes 61  -AC            User Key  (r) = Recorded By, (t) = Taken By, (c) = Cosigned By    Initials Name Provider Type     AC Gwendolyn Can, MS CCC-SLP Speech and Language Pathologist                Therapy Charges for Today     Code Description Service Date Service Provider Modifiers Qty    96122297571  ST EVAL SPEECH AND PROD W LANG  2 2/15/2023 Gwendolyn Can, MS CCC-SLP GN 1    [id number]  ST EVAL ORAL PHARYNG SWALLOW 2 2/15/2023 Gwendolyn Can, MS CCC-SLP GN 1               Gwendolyn Can MS CCC-SLP  2/15/2023

## 2023-02-15 NOTE — PLAN OF CARE
Problem: Adult Inpatient Plan of Care  Goal: Plan of Care Review  2/15/2023 0520 by Kunal Mendez RN  Outcome: Ongoing, Progressing  2/15/2023 0519 by Kunal Mendez RN  Outcome: Ongoing, Progressing     Problem: Adult Inpatient Plan of Care  Goal: Optimal Comfort and Wellbeing  2/15/2023 0520 by Kunal Mendez RN  Outcome: Ongoing, Progressing  2/15/2023 0519 by Kunal Mendez RN  Outcome: Ongoing, Progressing  Intervention: Provide Person-Centered Care  Recent Flowsheet Documentation  Taken 2/15/2023 0126 by Kunal Mendez RN  Trust Relationship/Rapport:   care explained   questions encouraged   questions answered     Problem: Adjustment to Illness (Stroke, Ischemic/Transient Ischemic Attack)  Goal: Optimal Coping  Outcome: Ongoing, Progressing     Problem: Cognitive Impairment (Stroke, Ischemic/Transient Ischemic Attack)  Goal: Optimal Cognitive Function  Outcome: Ongoing, Progressing     Problem: Communication Impairment (Stroke, Ischemic/Transient Ischemic Attack)  Goal: Improved Communication Skills  Outcome: Ongoing, Progressing

## 2023-02-15 NOTE — PROGRESS NOTES
[facility] Medicine Services  ADMISSION FOLLOW-UP NOTE          Patient admitted after midnight, H&P by my partner performed earlier on today's date reviewed.  Interim findings, labs, and charting also reviewed.        The [facility] Hospital Problem List has been managed and updated to include any new diagnoses:  Active Hospital Problems    Diagnosis  POA   • **Acute ischemic stroke (HCC) [I63.9]  Yes   • Ischemic heart disease [I25.9]  Yes   • Hypertension [I10]  Yes   • Dyslipidemia [E78.5]  Yes   • Hypothyroidism [E03.9]  Yes   • GERD (gastroesophageal reflux disease) [K21.9]  Yes   • Type 2 diabetes mellitus (HCC) [E11.9]  Yes      Resolved Hospital Problems   No resolved problems to display.      Ms. Crockett is a 78 yo WF with a history of CAD, T2DM, hypertension, hyperlipidemia, anemia, COPD, GERD, hypothyroidism, migraines, was transferred from [facility] for stroke evaluation      Assessment and Plan:     Headache rule out CVA  -- CT head at outside hospital was negative  --Stroke order set per stroke navigator  -- MRI brain PENDING  -- Aspirin  -- High-dose statin  -- PT/OT/SLP/case management consult  -- TTE PENDING  --Neurochecks  --Consulted neurology  -- A.m. labs    SIRS (fever, leukocytosis)  -- family reports fever of greater than 100.9F, pt also with mild leukocytosis  -- CXR unremarkable  -- check UA/UCx if indicated, blood cultures x 2, lactate and procal. Also check respiratory viral panel  -- defer ABX for now as pt is afebrile at this time. Low threshold to start ABX based on workup and/or clinical course     Hypertension  -- Permissive hypertension  -- Hold Bystolic, HCTZ, amlodipine for now     Hyperlipidemia  -- High-dose statin  -- LDL of 130, not at goal      CAD  -- Follows with Dr. Camacho  -- continue Aspirin     T2DM  -- A1c 5.3%  -- FSBG with SSI  -- Hold metformin     GERD  -- PPI     Hypothyroidism  -- Levothyroxine     COPD  --  symbicort      Expected Discharge 2/16/23    Gabriella Rdz MD  02/15/23

## 2023-02-15 NOTE — PLAN OF CARE
Goal Outcome Evaluation:  Plan of Care Reviewed With: patient, daughter, son           Outcome Evaluation: Limited PT eval performed. Pt presents with impaired cognition, decreased activity redd, and decreased functional mobility independence per PLOF. Pt req assist to transfer sidelying > sit. While seated EOB, very impulsive with mobility, required max encouragement to remain seated, and impulsively attempting to lay back down. Unable to attempt OOB mobility this date. Pt has hx of BPPV, unclear if pt experiencing symptoms this date. Pt will benefit from IPPT services to address limitations, PT rec d/c rehab.

## 2023-02-15 NOTE — THERAPY EVALUATION
Patient Name: Coco Crockett  : 1945    MRN: [id number]                              Today's Date: 2/15/2023       Admit Date: 2/15/2023    Visit Dx:     ICD-10-CM ICD-9-CM   1. Acute ischemic stroke (HCC)  I63.9 434.91   2. Aphasia  R47.01 784.3   3. Dysphagia, unspecified type  R13.10 787.20   4. Impaired functional mobility, balance, gait, and endurance  Z74.09 V49.89     Patient Active Problem List   Diagnosis   • Mild obesity   • Hypertensive cardiovascular disease   • Hypertension   • Dyslipidemia   • Obesity (BMI 30.0-34.9)   • Hypothyroidism   • Osteopenia   • TMJ (temporomandibular joint disorder)   • GERD (gastroesophageal reflux disease)   • Iron deficiency anemia   • Depression   • Eczema   • Vertigo   • Migraine   • Type 2 diabetes mellitus (HCC)   • MALIK (dyspnea on exertion)   • Leg pain, bilateral   • Fatigue   • Ischemic heart disease   • Lumbar stenosis with neurogenic claudication   • Spondylosis of lumbar region without myelopathy or radiculopathy   • Degeneration of lumbar or lumbosacral intervertebral disc   • Greater trochanteric bursitis of both hips   • Spinal stenosis of lumbar region with neurogenic claudication   • S/P lumbar fusion   • Acute ischemic stroke (HCC)   • Stroke (HCC)     Past Medical History:   Diagnosis Date   • Anemia    • Anxiety    • Chest pain    • Coronary artery disease    • Depression    • Diabetes mellitus (HCC)    • Dyslipidemia    • Eczema    • Fear of awareness under general anesthesia    • Fear of general anesthetic     has hx of awareness during anesthesia induction, felt like she was dying   • GERD (gastroesophageal reflux disease)    • History of COVID-19 2022   • Hyperlipidemia    • Hypertension     Chronic hypertension, probably essential.    • Hypertensive cardiovascular disease    • Hypothyroidism     Chronic hypothyroidism/replacement therapy.   • IBS (irritable bowel syndrome)    • Iron deficiency anemia    • Migraine     Intermittent  progressive headache syndrome/possible migraine equivalent.   • Obesity (BMI 30.0-34.9)     Mild obesity (BMI 31).    • Osteopenia     Osteopenia with fracture of lumbar vertebra, 2004.    • Pneumonia    • TMJ (temporomandibular joint disorder)    • Type 2 diabetes mellitus (HCC)     Type 2 diabetes mellitus, Hemoglobin A1c 5.9%, December 2014.   • Urgency incontinence    • Urinary, incontinence, stress female    • Vertigo     Intermittent marked dizziness/vertigo with Epley maneuver.     Past Surgical History:   Procedure Laterality Date   • ANKLE OPEN REDUCTION INTERNAL FIXATION Right 1995    Fall on ice   • APPENDECTOMY  1970    Appendectomy with wedge resection of both ovaries, 1970.   • CATARACT EXTRACTION WITH INTRAOCULAR LENS IMPLANT     • COLONOSCOPY     • LASIK Bilateral    • LUMBAR LAMINECTOMY WITH FUSION N/A 5/9/2022    Procedure: LUMBAR DECOMPRESSON L2-S1 with Fusion and Stabilization with PEDICLE SCREWS at L4-S1;  Surgeon: Humberto Correa MD;  Location[facility];  Service: Neurosurgery;  Laterality: N/A;   • TIBIAL PLATEAU OPEN REDUCTION INTERNAL FIXATION Right    • TONSILLECTOMY AND ADENOIDECTOMY     • TOTAL LAPAROSCOPIC HYSTERECTOMY SALPINGO OOPHORECTOMY  1985    MARIZA/BSO secondary to severe endometriosis, 1985.       General Information     Row Name 02/15/23 1130          Physical Therapy Time and Intention    Document Type evaluation  -     Mode of Treatment physical therapy  -     Row Name 02/15/23 1130          General Information    Patient Profile Reviewed yes  -SJ     Prior Level of Function independent:;community mobility;gait;all household mobility;transfer;bed mobility;ADL's  -     Existing Precautions/Restrictions fall;other (see comments)  aphasia, confusion, hx vertigo  -     Barriers to Rehab cognitive status  -     Row Name 02/15/23 1130          Living Environment    People in Home child(tre), adult  -     Row Name 02/15/23 1130          Home Main Entrance    Number of  Stairs, Main Entrance none  -     Row Name 02/15/23 1130          Cognition    Orientation Status (Cognition) oriented to;person;unable/difficult to assess;place;situation;time;other (see comments)  -     Row Name 02/15/23 1130          Safety Issues, Functional Mobility    Safety Issues Affecting Function (Mobility) ability to follow commands;awareness of need for assistance;impulsivity;insight into deficits/self-awareness;judgment;sequencing abilities;safety precautions follow-through/compliance;safety precaution awareness;problem-solving  -     Impairments Affecting Function (Mobility) balance;cognition;strength;postural/trunk control;sensation/sensory awareness  -     Comment, Safety Issues/Impairments (Mobility) possible vertigo ith mobility this date  -           User Key  (r) = Recorded By, (t) = Taken By, (c) = Cosigned By    Initials Name Provider Type     Lucia Rojo, PT Physical Therapist               Mobility     Row Name 02/15/23 1156          Bed Mobility    Bed Mobility scooting/bridging;supine-sit;sit-supine  -     Scooting/Bridging [place] (Bed Mobility) maximum assist (25% patient effort);2 person assist;verbal cues  -     Supine-Sit [place] (Bed Mobility) moderate assist (50% patient effort);1 person assist;verbal cues;nonverbal cues (demo/gesture)  -     Sit-Supine [place] (Bed Mobility) minimum assist (75% patient effort)  -     Assistive Device (Bed Mobility) bed rails;head of bed elevated;draw sheet  -     Comment, (Bed Mobility) Pt very impulsive with mobility, required max encouragement to transfer to EOB. Once seated EOB, pt impulsively attempting to lay back down. Unable to attempt OOB mobility this date.  -     Row Name 02/15/23 1156          Transfers    Comment, (Transfers) unable to attempt  -[facility] Name 02/15/23 1156          Bed-Chair Transfer    Bed-Chair [place] (Transfers) not tested;unable to assess  -[facility] Name 02/15/23  1156          Gait/Stairs (Locomotion)    [place] Level (Gait) not tested  -           User Key  (r) = Recorded By, (t) = Taken By, (c) = Cosigned By    Initials Name Provider Type    Lucia Kaiser PT Physical Therapist               Obj/Interventions     [facility] Name 02/15/23 1158          Range of Motion Comprehensive    General Range of Motion bilateral lower extremity ROM WFL  -[facility] Name 02/15/23 1158          Strength Comprehensive (MMT)    General Manual Muscle Testing (MMT) Assessment lower extremity strength deficits identified  -     Comment, General Manual Muscle Testing (MMT) Assessment unable to formally assess due to cognition  -[facility] Name 02/15/23 1158          Balance    Balance Assessment sitting static balance;sitting dynamic balance  -SJ     Static Sitting Balance contact guard  -SJ     Dynamic Sitting Balance contact guard  -SJ     Position, Sitting Balance unsupported;sitting edge of bed  -SJ     Comment, Balance Pt impulsive while seated EOB  -SJ           User Key  (r) = Recorded By, (t) = Taken By, (c) = Cosigned By    Initials Name Provider Type    Lucia Kaiser PT Physical Therapist               Goals/Plan     Row Name 02/15/23 1206          Bed Mobility Goal 1 (PT)    Activity/Assistive Device (Bed Mobility Goal 1, PT) sit to supine;supine to sit  -SJ     [place] Level/Cues Needed (Bed Mobility Goal 1, PT) modified independence  -SJ     Time Frame (Bed Mobility Goal 1, PT) long term goal (LTG);2 weeks  -SJ     Row Name 02/15/23 1206          Transfer Goal 1 (PT)    Activity/Assistive Device (Transfer Goal 1, PT) sit-to-stand/stand-to-sit;bed-to-chair/chair-to-bed  -SJ     [place] Level/Cues Needed (Transfer Goal 1, PT) modified independence  -SJ     Time Frame (Transfer Goal 1, PT) long term goal (LTG);2 weeks  -[facility] Name 02/15/23 1206          Gait Training Goal 1 (PT)    Activity/Assistive Device (Gait Training Goal 1, PT) gait (walking  locomotion)  -     [place] Level (Gait Training Goal 1, PT) contact guard required  -SJ     Distance (Gait Training Goal 1, PT) 200  -SJ     Time Frame (Gait Training Goal 1, PT) long term goal (LTG);2 weeks  -     Row Name 02/15/23 1206          Therapy Assessment/Plan (PT)    Planned Therapy Interventions (PT) balance training;bed mobility training;gait training;home exercise program;neuromuscular re-education;transfer training;vestibular therapy;strengthening;patient/family education  -           User Key  (r) = Recorded By, (t) = Taken By, (c) = Cosigned By    Initials Name Provider Type     Lucia Rojo, PT Physical Therapist               Clinical Impression     Row Name 02/15/23 1153          Pain    Pain Location - head  -     Row Name 02/15/23 1158          Pain Scale: FACES Pre/Post-Treatment    Pain: FACES Scale, Pretreatment 2-->hurts little bit  -SJ     Posttreatment Pain Rating 2-->hurts little bit  -SJ     Pre/Posttreatment Pain Comment possible headache  -     Row Name 02/15/23 5320          Plan of Care Review    Plan of Care Reviewed With patient;daughter;son  -     Outcome Evaluation Limited PT eval performed. Pt presents with impaired cognition, decreased activity redd, and decreased functional mobility independence per PLOF. Pt req assist to transfer sidelying > sit. While seated EOB, very impulsive with mobility, required max encouragement to remain seated, and impulsively attempting to lay back down. Unable to attempt OOB mobility this date. Pt has hx of BPPV, unclear if pt experiencing symptoms this date. Pt will benefit from IPPT services to address limitations, PT rec d/c rehab.  -     Row Name 02/15/23 5132          Therapy Assessment/Plan (PT)    Patient/Family Therapy Goals Statement (PT) pt unable to state  -     Rehab Potential (PT) good, to achieve stated therapy goals  -     Criteria for Skilled Interventions Met (PT) yes;skilled treatment is necessary   -SJ     Therapy Frequency (PT) daily  -SJ     Predicted Duration of Therapy Intervention (PT) 2wks  -SJ     Row Name 02/15/23 1159          Positioning and Restraints    Pre-Treatment Position in bed  -SJ     Post Treatment Position bed  -SJ     In Bed side lying left;call light within reach;encouraged to call for assist;exit alarm on;with family/caregiver;with nsg  -SJ           User Key  (r) = Recorded By, (t) = Taken By, (c) = Cosigned By    Initials Name Provider Type    Lucia Kaiser PT Physical Therapist               Outcome Measures     Row Name 02/15/23 1207          How much help from another person do you currently need...    Turning from your back to your side while in flat bed without using bedrails? 3  -SJ     Moving from lying on back to sitting on the side of a flat bed without bedrails? 3  -SJ     Moving to and from a bed to a chair (including a wheelchair)? 3  -SJ     Standing up from a chair using your arms (e.g., wheelchair, bedside chair)? 2  -SJ     Climbing 3-5 steps with a railing? 1  -SJ     To walk in hospital room? 2  -SJ     AM-PAC 6 Clicks Score (PT) 14  -SJ     Highest level of mobility 4 --> Transferred to chair/commode  -SJ     Row Name 02/15/23 1207 02/15/23 0934       Modified Cindy Scale    Modified Ada Scale 4 - Moderately severe disability.  Unable to walk without assistance, and unable to attend to own bodily needs without assistance.  -SJ 3 - Moderate disability.  Requiring some help, but able to walk without assistance.  -    Row Name 02/15/23 1207 02/15/23 0934       Functional Assessment    Outcome Measure Options AM-PAC 6 Clicks Basic Mobility (PT);Modified Cindy  -SJ AM-PAC 6 Clicks Daily Activity (OT);Modified Ada  -CS          User Key  (r) = Recorded By, (t) = Taken By, (c) = Cosigned By    Initials Name Provider Type    Lucia Kaiser, MATT Physical Therapist    Duarte Brown OT Occupational Therapist                             Physical  Therapy Education     Title: PT OT SLP Therapies (In Progress)     Topic: Physical Therapy (In Progress)     Point: Mobility training (In Progress)     Learning Progress Summary           Patient Nonacceptance, E, NR,NL by  at 2/15/2023 1207                   Point: Home exercise program (Not Started)     Learner Progress:  Not documented in this visit.          Point: Body mechanics (In Progress)     Learning Progress Summary           Patient Nonacceptance, E, NR,NL by  at 2/15/2023 1207                   Point: Precautions (Not Started)     Learner Progress:  Not documented in this visit.                      User Key     Initials Effective Dates Name Provider Type Discipline     02/03/23 -  Lucia Rojo, PT Physical Therapist PT              PT Recommendation and Plan  Planned Therapy Interventions (PT): balance training, bed mobility training, gait training, home exercise program, neuromuscular re-education, transfer training, vestibular therapy, strengthening, patient/family education  Plan of Care Reviewed With: patient, daughter, son  Outcome Evaluation: Limited PT eval performed. Pt presents with impaired cognition, decreased activity redd, and decreased functional mobility independence per PLOF. Pt req assist to transfer sidelying > sit. While seated EOB, very impulsive with mobility, required max encouragement to remain seated, and impulsively attempting to lay back down. Unable to attempt OOB mobility this date. Pt has hx of BPPV, unclear if pt experiencing symptoms this date. Pt will benefit from IPPT services to address limitations, PT rec d/c rehab.     Time Calculation:    PT Charges     Row Name 02/15/23 1206             Time Calculation    Start Time 1130  -      PT Non-Billable Time (min) 46 min  -      PT Received On 02/15/23  -      PT Goal Re-Cert Due Date 02/25/23  -            User Key  (r) = Recorded By, (t) = Taken By, (c) = Cosigned By    Initials Name Provider Type      Lucia Rojo, PT Physical Therapist              Therapy Charges for Today     Code Description Service Date Service Provider Modifiers Qty    68237467309 HC PT EVAL MOD COMPLEXITY 4 2/15/2023 Lucia Rojo, PT GP 1    66942150352 HC PT THER SUPP EA 15 MIN 2/15/2023 Lucia Rojo, PT GP 2          PT G-Codes  Outcome Measure Options: AM-PAC 6 Clicks Basic Mobility (PT), Modified Cindy  AM-PAC 6 Clicks Score (PT): 14  AM-PAC 6 Clicks Score (OT): 12  Modified Cindy Scale: 4 - Moderately severe disability.  Unable to walk without assistance, and unable to attend to own bodily needs without assistance.  PT Discharge Summary  Anticipated Discharge Disposition (PT): inpatient rehabilitation facility    Lucia Rojo PT  2/15/2023

## 2023-02-15 NOTE — NURSING NOTE
ACC REVIEW REPORT: [facility]        PATIENT NAME: Coco Crockett    PATIENT ID: [id number]      COVID-19 ACC SCREENING       DOES THE PATIENT HAVE A FEVER GREATER THAN OR EQUAL .4: NO    IS THE PATIENT EXPERIENCING SHORTNESS OF BREATH: NO    DOES THE PATIENT HAVE A COUGH: NO    DOES THE PATIENT HAVE ANY OF THE FOLLOWING RISK FACTORS:    EXPOSURE TO SUSPECTED OR KNOWN COVID-19: NO    RECENT TRAVEL HISTORY TO ENDEMIC AREA (DOMESTIC/LOCAL): NO    IS THE PATIENT A HEALTHCARE WORKER: NO    HAS THE PATIENT EXPERIENCED A LOSS OF SENSE OF TASTE OR SMELL:  NO    HAS THE PATIENT BEEN TESTED FOR COVID-19: YES    DATE TESTED: 2/14/23    LAB TESTING SENT TO: IN-HOUSE  (NEGATIVE)      BED: S324    BED TYPE: TELE    BED GIVEN TO: KIKE DALY RN    TIME BED GIVEN: 1930    TODAY'S DATE: 2/14/2023    TRANSFER DATE: 2/14/23    ETA: 2130    TRANSFERRING FACILITY: [facility]     TRANSFERRING FACILITY PHONE # : [phone]    TRANSFERRING MD: CASSIUS    ACCEPTING PROVIDER: NAVIGATOR    NEUROLOGY PHYSICIAN: IDA    DATE/TIME REQUEST RECEIVED: 2/14/23 @ 1907    [facility] RN: BRYANT CUEVA RN    REPORT FROM: KIKE DALY RN    TIME REPORT TAKEN: 1955    DIAGNOSIS: CVA    REASON FOR TRANSFER TO [facility]: HIGHER LEVEL OF CARE    TRANSPORTATION: AMBULANCE    CLINICAL REASON FOR TRANSFER TO [facility]: HIGHER LEVEL OF CARE      CLINICAL INFORMATION    HEIGHT: 66 INCHES     WEIGHT: 76 KG    ALLERGIES:   Agent Severity Comments   Keflex [Cephalexin] High    Imdur [Isosorbide Dinitrate] Medium Severe headache/fatigue      Bactrim [Sulfamethoxazole-trimethoprim] Low    Codeine Sulfate Low    Contrast Dye (Echo Or Unknown Ct/mr) Low    Demerol [Meperidine] Low    Levaquin [Levofloxacin] Low Numbness and ruptured oneida's tendon   Morphine Sulfate Low    Penicillin V Potassium Low    Sulfa Antibiotics Low severe rash/swelling   Trimethoprim Low          INFECTIOUS DISEASE:  MRSA    ISOLATION: NONE    VITAL SIGNS:   TIME:   TEMP:  98.7  PULSE: 82  B/P: 190/88  RESP: 22      LAB INFORMATION:   UNREMARKABLE    CULTURE INFORMATION: NONE    MEDS/IV FLUIDS:   #20 RIGHT WRIST  #18 RIGHT AC  BENADRYL  TYLENOL  HYDRALAZINE   REGLAN      CARDIAC SYSTEM:    CHEST PAIN: NO    RATE:     SCALE:     RHYTHM: NSR    Is patient taking or has patient been given any drugs that could increase bleeding? NONE        RESPIRATORY SYSTEM:    LUNG SOUNDS: CLEAR      OXYGEN: ROOM AIR    O2 SAT: 98%    RESPIRATORY STATUS: STABLE       CNS/MUSCULOSKELETAL      CRISTINE COMA SCALE:    E: 4  M: 6  V: 4    LAST KNOWN WELL: 2/13/23        NIHSS    Survey Item  0: Means Alert  1: Drowsy or Answer Correctly  2: Incorrect, Forced, Can't Resist Gravity  3: Complete or No Effort  4: No Movement  NT: Not Testable Acceptable As Noted Above      1A: Level of Consciousness: 0    1B: LOC Questions (month, age) : 1    1C: LOC Commands (open/close eyes, make a fist & let go): 1    2:  Best Gaze (eyes open-pt follows examiner's fingers or face): 0    3:  Visual (introduce visual stimulus/threat to pt's visual field quad. Cover 1 eye and hold up fingers in all 4 quadrants) : 0    4.  Facial Palsy (show teeth, raise eyebrows and squeeze eyes tightly shut): 0    5A: Motor Arm-Left (elevate extremity to 90 degrees and score drift/movement.  Count to 10 aloud and use fingers for visual cue): 0    5B:  Motor Arm-Right (elevate extremity to 90 degrees and score drift/movement.  Count to 10 aloud and use fingers for visual cue): 0    6A:  Motor Leg-Left (elevate extremity to 30 degrees and score drift/movement.  Count to 5 out loud and use fingers for visual cue): 0    6B:  Motor Leg-Right (elevate extremity to 30 degrees and score drift/movement.  Count to 5 out loud and use fingers for visual cue): 0    7:  Limb Ataxia- finger to nose, heel down shin: 0    8:  Sensory- pin prick to face, arms, trunk, and legs. Compare sharpness side to side: 0    9:  Best Language- name, items, describe  picture, and read sentences.  Do not forget glasses if they normally wear them: 0    10: Dysarthria- elevate speech clarity by pt reading or repeating words on a list: 1    11: Extinction and Inattention- Use information from prior testing or double simultaneous stimuli testing to identify neglect. Face, arms, legs and visual field: 1    Total NIHSS Score: 4  Date: 2/14/23  Time of NIHSS Assessment: 1900      CAT SCAN RESULTS:   NEGATIVE CT HEAD AND NEGATIVE PERFUSIONS        CNS/MUSCULOSKELETAL NOTES: UNREMARKABLE      GI//GY      ABDOMINAL PAIN: NONE    VOMITING: NO    DIARRHEA: NO    NAUSEA: YES    BOWEL SOUNDS: ACTIVE    OCCULT STOOL: NONE    HEMATURIA: N/A      PAST MEDICAL HISTORY:   Diagnosis Date Comment Source   Anemia      Anxiety      Chest pain      Coronary artery disease      Depression      Diabetes mellitus (HCC)      Dyslipidemia      Eczema      Fear of awareness under general anesthesia      Fear of general anesthetic  has hx of awareness during anesthesia induction, felt like she was dying    GERD (gastroesophageal reflux disease)      History of COVID-19 02/01/2022     Hyperlipidemia      Hypertension  Chronic hypertension, probably essential.     Hypertensive cardiovascular disease      Hypothyroidism  Chronic hypothyroidism/replacement therapy.    IBS (irritable bowel syndrome)      Iron deficiency anemia      Migraine  Intermittent progressive headache syndrome/possible migraine equivalent.    Obesity (BMI 30.0-34.9)  Mild obesity (BMI 31).     Osteopenia  Osteopenia with fracture of lumbar vertebra, 2004.     Pneumonia      TMJ (temporomandibular joint disorder)      Type 2 diabetes mellitus (HCC)  Type 2 diabetes mellitus, Hemoglobin A1c 5.9%, December 2014.    Urgency incontinence      Urinary, incontinence, stress female      Vertigo  Intermittent marked dizziness/vertigo with Epley maneuver.      Past Surgical History     Laterality Date Comments   TONSILLECTOMY AND ADENOIDECTOMY  [[id number]]      Tibial Plateau Open Reduction Internal Fixation [[id number]] Right     Ankle fracture surgery [IRC197] Right 1995 Fall on ice   Appendectomy [SHX54]  1970 Appendectomy with wedge resection of both ovaries, 1970.   TOTAL LAPAROSCOPIC HYSTERECTOMY SALPINGO OOPHORECTOMY [[id number]]  1985 MARIZA/BSO secondary to severe endometriosis, 1985.    Colonoscopy [YPJ414]      LASIK [RHW256] Bilateral     Cataract extraction w/  intraocular lens implant [PIZ336]      LUMBAR LAMINECTOMY WITH FUSION [[id number]] N/A 5/9/2022 Procedure: LUMBAR DECOMPRESSON L2-S1 with Fusion and Stabilization with PEDICLE SCREWS at L4-S1;  Surgeon: Humberto Correa MD;  Location: [facility];  Service: Neurosurgery;  Laterality: N/A;       OTHER SYMPTOM NOTES:   MIGRAINES THAT STARTED LAST NIGHT  PER DAUGHTER SHE WAS CONFUSED AT 0900 THIS MORNING  HYPERTENSIVE 200 SYSTOLIC  FOGGY HEADED  UPON PRESENTATION AT ED SHE WAS BACK TO BASE LINE (1400)  AT 1500 SHE SHOWED SIGNS OF DYSARTHRIA  AND CONFUSED HAS BEEN CONSISTENT SINCE 1500    ADDITIONAL NOTES:           Roxana Soto RN  2/14/2023  19:43 EST

## 2023-02-15 NOTE — CONSULTS
Stroke Consult Note    Patient Name: Coco Crockett   MRN: [id number]  Age: 77 y.o.  Sex: female  : 1945    Primary Care Physician: Lobo Archer MD  Referring Physician:  Dr. Eric Mirza    TIME STROKE TEAM CALLED: 0120 EST     TIME PATIENT SEEN: 0140 EST    Handedness: Right  Race:      Chief Complaint/Reason for Consultation: AMS, aphasia, headache    Subjective .  HPI:   Coco Crockett is a 77-year-old  female with a past medical history significant for anemia, anxiety, CAD status post stenting, T2DM, hyperlipidemia, migraine with aura, IBS, hypertension, thyroid disease, vertigo, TMJ, and obesity.  She presented to outside hospital this afternoon with complaints of hypertension as well as migraine with aura.  Daughter who is at the bedside reports approximately 1130 last night she had her typical migraine with aura and had continued complaints at 1 AM.  At that time she took half a tramadol.  This morning she was confused and noted to be hypertensive.  Of note her daughter reports that she has not had any significant amount of sleep over the last 24 hours.  She had continued complaints and her daughter took her to the emergency department per her PCP recommendation approximately 1 PM.  Around 4:30 PM while at outside hospital she was given hydralazine for treatment of her elevated blood pressure.  Daughter reports that her blood pressure dropped from 190s systolic to 150s systolic within approximately 30 minutes.  Acutely around 5 PM following blood pressure treatment she had an acute onset of dysarthria, word salad.  CT head completed at outside hospital was negative for any acute infarct or hemorrhage.  CTA and CT perfusion were both also completed and normal.  She was transferred to [facility] for further neurologic work-up and evaluation.    On my examination blood pressure 182/80.  NIHSS 7.  She is noted to be aphasic.  She is experiencing difficulty  with following commands.  No note of any unilateral weakness.  She does have pain response in all 4 extremities that is equal.  Blinks to visual threat bilaterally.  Is able to track me around the room during the examination.  Of note her daughter reports an acute UTI that was treated outpatient with oral antibiotics approximately 2 weeks ago.  At baseline she does live with her daughter however she is independent with all ADLs.    Last Known Normal Date/Time: Approximately 1 AM 2-    Review of Systems   Unable to perform ROS: Mental status change      Past Medical History:   Diagnosis Date   • Anemia    • Anxiety    • Chest pain    • Coronary artery disease    • Depression    • Diabetes mellitus (HCC)    • Dyslipidemia    • Eczema    • Fear of awareness under general anesthesia    • Fear of general anesthetic     has hx of awareness during anesthesia induction, felt like she was dying   • GERD (gastroesophageal reflux disease)    • History of COVID-19 02/01/2022   • Hyperlipidemia    • Hypertension     Chronic hypertension, probably essential.    • Hypertensive cardiovascular disease    • Hypothyroidism     Chronic hypothyroidism/replacement therapy.   • IBS (irritable bowel syndrome)    • Iron deficiency anemia    • Migraine     Intermittent progressive headache syndrome/possible migraine equivalent.   • Obesity (BMI 30.0-34.9)     Mild obesity (BMI 31).    • Osteopenia     Osteopenia with fracture of lumbar vertebra, 2004.    • Pneumonia    • TMJ (temporomandibular joint disorder)    • Type 2 diabetes mellitus (HCC)     Type 2 diabetes mellitus, Hemoglobin A1c 5.9%, December 2014.   • Urgency incontinence    • Urinary, incontinence, stress female    • Vertigo     Intermittent marked dizziness/vertigo with Epley maneuver.     Past Surgical History:   Procedure Laterality Date   • ANKLE OPEN REDUCTION INTERNAL FIXATION Right 1995    Fall on ice   • APPENDECTOMY  1970    Appendectomy with wedge resection of  both ovaries, 1970.   • CATARACT EXTRACTION WITH INTRAOCULAR LENS IMPLANT     • COLONOSCOPY     • LASIK Bilateral    • LUMBAR LAMINECTOMY WITH FUSION N/A 5/9/2022    Procedure: LUMBAR DECOMPRESSON L2-S1 with Fusion and Stabilization with PEDICLE SCREWS at L4-S1;  Surgeon: Humberto Correa MD;  Location: [facility];  Service: Neurosurgery;  Laterality: N/A;   • TIBIAL PLATEAU OPEN REDUCTION INTERNAL FIXATION Right    • TONSILLECTOMY AND ADENOIDECTOMY     • TOTAL LAPAROSCOPIC HYSTERECTOMY SALPINGO OOPHORECTOMY  1985    MARIZA/BSO secondary to severe endometriosis, 1985.      Family History   Problem Relation Age of Onset   • Heart disease Father      Social History     Socioeconomic History   • Marital status:    Tobacco Use   • Smoking status: Never   • Smokeless tobacco: Never   Vaping Use   • Vaping Use: Never used   Substance and Sexual Activity   • Alcohol use: Yes     Comment: occasional (1 glass of wine per month at most)   • Drug use: No   • Sexual activity: Defer     Allergies   Allergen Reactions   • Keflex [Cephalexin] Itching, Swelling and Angioedema          • Imdur [Isosorbide Dinitrate] Headache     Severe headache/fatigue       • Bactrim [Sulfamethoxazole-Trimethoprim] Rash   • Codeine Sulfate Itching and Rash   • Contrast Dye (Echo Or Unknown Ct/Mr) Itching   • Demerol [Meperidine] Itching and Rash   • Levaquin [Levofloxacin] Other (See Comments)     Numbness and ruptured oneida's tendon   • Morphine Sulfate Itching and Rash   • Penicillin V Potassium Itching and Rash   • Sulfa Antibiotics Rash and Other (See Comments)     severe rash/swelling   • Trimethoprim Hives     Prior to Admission medications    Medication Sig Start Date End Date Taking? Authorizing Provider   acetaminophen (TYLENOL) 500 MG tablet Take 500 mg by mouth 2 (Two) Times a Day As Needed for Mild Pain .    Provider, MD Maribel   Alirocumab 75 MG/ML solution auto-injector Inject 75 mg under the skin into the appropriate area  as directed Every 14 (Fourteen) Days. 11/10/21   Abel Barillas MD   amLODIPine (NORVASC) 5 MG tablet Take 1 tablet by mouth Daily.  Patient taking differently: Take 5 mg by mouth Every Evening. 7/24/18   Abel Barillas MD   aspirin 81 MG EC tablet Take 1 tablet by mouth Daily.    Maribel Pepe MD   Breo Ellipta 200-25 MCG/INH inhaler Daily. 10/22/21   Maribel Pepe MD   brompheniramine-pseudoephedrine-DM 30-2-10 MG/5ML syrup TAKE 10 ML EVERY 6 HOURS AS NEEDED FOR COUGH 12/1/22   Maribel Pepe MD   cetirizine (zyrTEC) 10 MG tablet Take 10 mg by mouth Every Night.    Maribel Pepe MD   Cholecalciferol (VITAMIN D3 PO) Take 4,000 Units by mouth 2 (Two) Times a Day.    Maribel Pepe MD   dicyclomine (BENTYL) 10 MG capsule  12/18/22   Maribel Pepe MD   diphenhydrAMINE (BENADRYL) 25 mg capsule Take 12.5 mg by mouth Every Night.    Maribel Pepe MD   docusate sodium (COLACE) 250 MG capsule Take 1 capsule by mouth 2 (Two) Times a Day As Needed for Constipation. Hold for loose stools 5/13/22   Humberto Correa MD   DULoxetine (CYMBALTA) 60 MG capsule Take 60 mg by mouth daily.    Maribel Pepe MD   estradiol (ESTRACE) 0.5 MG tablet Take 0.5 mg by mouth Daily. 3/11/22   Maribel Pepe MD   estradiol (ESTRACE) 0.5 MG tablet Take 1 tablet by mouth Daily. 12/12/22   Maribel Pepe MD   fenofibric acid (TRILIPIX) 135 MG capsule delayed-release delayed release capsule Take 1 capsule by mouth Daily. 5/2/22   Abel Barillas MD   fluticasone (FLONASE) 50 MCG/ACT nasal spray 2 sprays into each nostril Daily.    Maribel Pepe MD   GaviLyte-G 236 g solution PLEASE FOLLOW SPLIT DOSING INSTRUCTIONS AS PROVIDED BY OFFICE, USE ONE JUG ONCE 9/15/22   Maribel Pepe MD   hydroCHLOROthiazide (MICROZIDE) 12.5 MG capsule TAKE 1 CAPSULE BY MOUTH TWICE A DAY  Patient taking differently: Take 12.5 mg by mouth 2 (Two) Times a Day. Patient only takes  once per day per her PCP instructions due to BP falling too low (takes in evening) 3/14/22   Lidia Malave APRN   icosapent ethyl (Vascepa) 1 g capsule capsule Take 2 g by mouth 2 (Two) Times a Day With Meals. 5/2/22   Abel Barillas MD   iron polysaccharides (NIFEREX) 150 MG capsule Take 150 mg by mouth 2 (Two) Times a Day. 9/24/21   Maribel Pepe MD   levothyroxine (SYNTHROID, LEVOTHROID) 25 MCG tablet TAKE 1 TABLET BY MOUTH DAILY. 10/24/19   Abel Barillas MD   metFORMIN (GLUCOPHAGE) 500 MG tablet Take 500 mg by mouth Daily. Ordered as BID, but patient only takes once daily (prescribing MD is aware, per pt)    Maribel Pepe MD   methylPREDNISolone (Medrol) 4 MG dose pack follow package directions 10/29/22   Lobo Padilla MD   metoclopramide (REGLAN) 5 MG tablet TAKE AS DIRECTED PER COLONOSCOPY INSTRUCTIONS 9/15/22   Maribel Pepe MD   Multiple Vitamin (MULTI VITAMIN DAILY PO) Take  by mouth daily.    Maribel Pepe MD   nebivolol (BYSTOLIC) 5 MG tablet Take 2 tablets by mouth Daily.  Patient taking differently: Take 5 mg by mouth Every Evening. 8/1/19   Abel Barillas MD   nitroglycerin (NITROLINGUAL) 0.4 MG/SPRAY spray Place 1 spray under the tongue Every 5 (Five) Minutes As Needed for Chest Pain. 6/10/22   Lidia Malave APRN   omeprazole (PriLOSEC) 20 MG capsule Take 20 mg by mouth daily.    Maribel Pepe MD   traMADol (ULTRAM) 50 MG tablet Take 1 tablet by mouth Every 8 (Eight) Hours As Needed for Moderate Pain. 9/14/22   Davion, Coco Delcid PA-C   vitamin C (ASCORBIC ACID) 500 MG tablet Take 500 mg by mouth Daily.    Maribel Pepe MD     Objective        Neurological Exam  Mental Status  Drowsy. Orientation: Disoriented, unable to tell me her name or birthday. Mild dysarthria present. Expressive aphasia and receptive aphasia present.  She is able to intermittently follow simple one-step command with prompting.    Cranial Nerves  CN II: Visual  fields full to confrontation. Blinks to visual threat bilaterally.  CN III, IV, VI: Extraocular movements intact bilaterally. Pupils equal round and reactive to light bilaterally.  CN VII: Full and symmetric facial movement.  CN VIII: Hearing appears to be intact bilaterally.  CN IX, X: Palate elevates symmetrically  CN XII: Tongue midline without atrophy or fasciculations.    Motor  Decreased muscle bulk throughout. No fasciculations present. Normal muscle tone. Strength is 5/5 in all four extremities except as noted.    Sensory  Sensation: Pain response noted in all 4 extremities.     Reflexes                                            Right                      Left  Plantar                           Downgoing                Downgoing    Coordination    Unable to follow command with coordination testing however no overt dysmetria or ataxia to note.    Gait    Not observed.      Physical Exam  Vitals reviewed.   Constitutional:       General: She is not in acute distress.     Appearance: Normal appearance.   HENT:      Head: Normocephalic.      Mouth/Throat:      Pharynx: Oropharynx is clear.   Eyes:      Extraocular Movements: Extraocular movements intact.      Pupils: Pupils are equal, round, and reactive to light.   Cardiovascular:      Rate and Rhythm: Normal rate and regular rhythm.   Pulmonary:      Effort: Pulmonary effort is normal.   Musculoskeletal:      Cervical back: Normal range of motion.   Skin:     General: Skin is warm and dry.   Neurological:      Mental Status: She is disoriented.      Cranial Nerves: Dysarthria present. No cranial nerve deficit.      Sensory: No sensory deficit.      Motor: No weakness.       Acute Stroke Data    IV Thrombolytic (TPA/Tenecteplase) Inclusion / Exclusion Criteria    Time: 20:07 EST  Person Administering Scale: KECIA Allison    Inclusion Criteria  [x]   18 years of age or greater   []   Onset of symptoms < 4.5 hours before beginning treatment (stroke  onset = time patient was last seen well or without symptoms).   []   Diagnosis of acute ischemic stroke causing measurable disabling deficit (Complete Hemianopia, Any Aphasia, Visual or Sensory Extinction, Any weakness limiting sustained effort against gravity)   []   Any remaining deficit considered potentially disabling in view of patient and practitioner   Exclusion criteria (Do not proceed with Alteplase if any are checked under exclusion criteria)  [x]   Onset unknown or GREATER than 4.5 hours   []   ICH on CT/MRI   []   CT demonstrates hypodensity representing acute or subacute infarct   []   Significant head trauma or prior stroke in the previous 3 months   []   Symptoms suggestive of subarachnoid hemorrhage   []   History of un-ruptured intracranial aneurysm GREATER than 10 mm   []   Recent intracranial or intraspinal surgery within the last 3 months   []   Arterial puncture at a non-compressible site in the previous 7 days   []   Active internal bleeding   []   Acute bleeding tendency   []   Platelet count LESS than 100,000 for known hematological diseases such as leukemia, thrombocytopenia or chronic cirrhosis   []   Current use of anticoagulant with INR GREATER than 1.7 or PT GREATER than 15 seconds, aPTT GREATER than 40 seconds   []   Heparin received within 48 hours, resulting in abnormally elevated aPTT GREATER than upper limit of normal   []   Current use of direct thrombin inhibitors or direct factor Xa inhibitors in the past 48 hours   []   Elevated blood pressure refractory to treatment (systolic GREATER than 185 mm/Hg or diastolic  GREATER than 110 mm/Hg   []   Suspected infective endocarditis and aortic arch dissection   []   Current use of therapeutic treatment dose of low-molecular-weight heparin (LMWH) within the previous 24 hours   []   Structural GI malignancy or bleed   Relative exclusion for all patients  []   Only minor nondisabling symptoms   []   Pregnancy   []   Seizure at onset with  postictal residual neurological impairments   []   Major surgery or previous trauma within past 14 days   []   History of previous spontaneous ICH, intracranial neoplasm, or AV malformation   []   Postpartum (within previous 14 days)   []   Recent GI or urinary tract hemorrhage (within previous 21 days)   []   Recent acute MI (within previous 3 months)   []   History of unruptured intracranial aneurysm LESS than 10 mm   []   History of ruptured intracranial aneurysm   []   Blood glucose LESS than 50 mg/dL (2.7 mmol/L)   []   Dural puncture within the last 7 days   []   Known GREATER than 10 cerebral microbleeds   Additional exclusions for patients with symptoms onset between 3 and 4.5 hours.  []   Age > 80.   []   On any anticoagulants regardless of INR  >>> Warfarin (Coumadin), Heparin, Enoxaparin (Lovenox), fondaparinux (Arixtra), bivalirudin (Angiomax), Argatroban, dabigatran (Pradaxa), rivaroxaban (Xarelto), or apixaban (Eliquis)   []   Severe stroke (NIHSS > 25).   []   History of BOTH diabetes and previous ischemic stroke.   []   The risks and benefits have been discussed with the patient or family related to the administration of IV alteplase for stroke symptoms.   []   I have discussed and reviewed the patient's case and imaging with the attending prior to IV Thrombolytic (TPA/Tenecteplase).   N/A Time Thrombolytic administered     Deferred TPA administration at OSH.     Hospital Meds:  Scheduled-   Infusions- No current facility-administered medications for this encounter.     PRNs-     Functional Status Prior to Current Stroke/Anmoore Score: 1    NIH Stroke Scale  Time: 20:07 EST  Person Administering Scale: KECIA Allison  Interval: baseline  1a. Level of Consciousness: 1-->Not alert, but arousable by minor stimulation to obey, answer, or respond  1b. LOC Questions: 2-->Answers neither question correctly  1c. LOC Commands: 1-->Performs one task correctly  2. Best Gaze: 0-->Normal  3. Visual:  0-->No visual loss (Blinks to visual threat bilaterally)  4. Facial Palsy: 0-->Normal symmetrical movements  5a. Motor Arm, Left: 0-->No drift, limb holds 90 (or 45) degrees for full 10 secs  5b. Motor Arm, Right: 0-->No drift, limb holds 90 (or 45) degrees for full 10 secs  6a. Motor Leg, Left: 0-->No drift, leg holds 30 degree position for full 5 secs  6b. Motor Leg, Right: 0-->No drift, leg holds 30 degree position for full 5 secs  7. Limb Ataxia: 0-->Absent  8. Sensory: 0-->Normal, no sensory loss  9. Best Language: 2-->Severe aphasia, all communication is through fragmentary expression, great need for inference, questioning, and guessing by the listener. Range of information that can be exchanged is limited, listener carries burden of. . . (see row details)  10. Dysarthria: 1-->Mild-to-moderate dysarthria, patient slurs at least some words and, at worst, can be understood with some difficulty  11. Extinction and Inattention (formerly Neglect): 0-->No abnormality    Total (NIH Stroke Scale): 7    Results Reviewed:  I have personally reviewed current lab, radiology, and data and agree with results.    OSH imaging reports reviewed    OSH lab results reviewed    Assessment/Plan:  77 year old  female with vascular risk factors who presented to OSH with complaints of confusion and migraine headache. While in the ED the patient was noted to have an acute onset of dysarthria and difficulty with recall. Decision was made at OSH to not provide the patient with TPA. She is not an endovascular therapy candidate secondary to lack of LVO.    Antiplatelet: ?ASA 81, daughter is unsure if still taking  Anticoagulant: None      1. Dysarthria, Confusion, Migraine  -Differentials include migraine with aura, CVA  -CVA/TIA orderset   -CT and vessel imaging completed at OSH  -Please have images from OSH uploaded  -Bedside dysphagia screening prior to any PO intake including medications  -If passes appropriate for a cardiac  diabetic diet  -Allow for permissive hypertension overnight with a goal SBP <220, management per Hospital Medicine Team   -Aspirin 325  -Lipitor 40  -Hemoglobin A1c and FLP in a.m.  -Neurochecks and NIHSS  -MRI brain without contrast, routine  -TTE with bubble in a.m.  -Ambulate as tolerated  -PT/OT/SLP to see and assess in AM   -Diabetes educator to see and assess if appropriate  -Case management to see and assist with any discharge needs  -EEG in a.m.  -Fall precautions  -EKG on arrival  -AM labs   -Continuous cardiac monitoring  -Stroke education  -Diabetes management per hospital medicine team    Stroke neurology will continue to follow. Plan of care discussed with the Hospital medicine team as well as the patient and her daughter who is present at the bedside. Thanks for the consult in the care of this patient. Please call with any further questions or concerns.     Julissa Gonzales, APRN  February 14, 2023  20:07 EST

## 2023-02-15 NOTE — PLAN OF CARE
Problem: Adult Inpatient Plan of Care  Goal: Plan of Care Review  Recent Flowsheet Documentation  Taken 2/15/2023 0678 by Duarte Fair OT  Progress: no change  Plan of Care Reviewed With:   patient   son  Outcome Evaluation: Baseline ADL completion limited by impaired balance, strength, cognition, and activity tolerance warranting skilled IP OT services to promote return to PLOF. Demo'd limited command following and communication this date due to confusion/aphasia. History of vertigo, sleeps inclined position at baseline, RN notified. Rec d/c to IP rehab.

## 2023-02-15 NOTE — PLAN OF CARE
Goal Outcome Evaluation:      Difficult to assess orientation this shift as patient is aphasic and communication is fragmented. NIH charted within. ECHO done at bedside this shift. Blood cultures obtained this shift. UA sent this shift. Patient with decreased  mL total this shift. Bladder scan post void shows no volume. New fluid order and norvasc order obtained. This evening patient resting in bed.       Contacted MRI this shift multiple times, unable to receive patient this shift per MRI.

## 2023-02-15 NOTE — THERAPY EVALUATION
Patient Name: Coco Crockett  : 1945    MRN: [id number]                              Today's Date: 2/15/2023       Admit Date: 2/15/2023    Visit Dx: No diagnosis found.  Patient Active Problem List   Diagnosis   • Mild obesity   • Hypertensive cardiovascular disease   • Hypertension   • Dyslipidemia   • Obesity (BMI 30.0-34.9)   • Hypothyroidism   • Osteopenia   • TMJ (temporomandibular joint disorder)   • GERD (gastroesophageal reflux disease)   • Iron deficiency anemia   • Depression   • Eczema   • Vertigo   • Migraine   • Type 2 diabetes mellitus (HCC)   • MALIK (dyspnea on exertion)   • Leg pain, bilateral   • Fatigue   • Ischemic heart disease   • Lumbar stenosis with neurogenic claudication   • Spondylosis of lumbar region without myelopathy or radiculopathy   • Degeneration of lumbar or lumbosacral intervertebral disc   • Greater trochanteric bursitis of both hips   • Spinal stenosis of lumbar region with neurogenic claudication   • S/P lumbar fusion   • Acute ischemic stroke (HCC)     Past Medical History:   Diagnosis Date   • Anemia    • Anxiety    • Chest pain    • Coronary artery disease    • Depression    • Diabetes mellitus (HCC)    • Dyslipidemia    • Eczema    • Fear of awareness under general anesthesia    • Fear of general anesthetic     has hx of awareness during anesthesia induction, felt like she was dying   • GERD (gastroesophageal reflux disease)    • History of COVID-19 2022   • Hyperlipidemia    • Hypertension     Chronic hypertension, probably essential.    • Hypertensive cardiovascular disease    • Hypothyroidism     Chronic hypothyroidism/replacement therapy.   • IBS (irritable bowel syndrome)    • Iron deficiency anemia    • Migraine     Intermittent progressive headache syndrome/possible migraine equivalent.   • Obesity (BMI 30.0-34.9)     Mild obesity (BMI 31).    • Osteopenia     Osteopenia with fracture of lumbar vertebra, .    • Pneumonia    • TMJ  (temporomandibular joint disorder)    • Type 2 diabetes mellitus (HCC)     Type 2 diabetes mellitus, Hemoglobin A1c 5.9%, December 2014.   • Urgency incontinence    • Urinary, incontinence, stress female    • Vertigo     Intermittent marked dizziness/vertigo with Epley maneuver.     Past Surgical History:   Procedure Laterality Date   • ANKLE OPEN REDUCTION INTERNAL FIXATION Right 1995    Fall on ice   • APPENDECTOMY  1970    Appendectomy with wedge resection of both ovaries, 1970.   • CATARACT EXTRACTION WITH INTRAOCULAR LENS IMPLANT     • COLONOSCOPY     • LASIK Bilateral    • LUMBAR LAMINECTOMY WITH FUSION N/A 5/9/2022    Procedure: LUMBAR DECOMPRESSON L2-S1 with Fusion and Stabilization with PEDICLE SCREWS at L4-S1;  Surgeon: Humberto Correa MD;  Location: [facility];  Service: Neurosurgery;  Laterality: N/A;   • TIBIAL PLATEAU OPEN REDUCTION INTERNAL FIXATION Right    • TONSILLECTOMY AND ADENOIDECTOMY     • TOTAL LAPAROSCOPIC HYSTERECTOMY SALPINGO OOPHORECTOMY  1985    MARIZA/BSO secondary to severe endometriosis, 1985.       General Information     Row Name 02/15/23 0904          OT Time and Intention    Document Type evaluation  -CS     Mode of Treatment occupational therapy  -CS     Row Name 02/15/23 0904          General Information    Patient Profile Reviewed yes  -CS     Prior Level of Function independent:;all household mobility;ADL's;driving;cooking  Pt has used SPC when needed, walk-in shower w/ seating in place.  -CS     Existing Precautions/Restrictions fall;other (see comments)  aphasia, confusion, history of vertigo  -CS     Barriers to Rehab none identified  -CS     Row Name 02/15/23 0904          Living Environment    People in Home child(tre), adult  -CS     Row Name 02/15/23 0904          Home Main Entrance    Number of Stairs, Main Entrance none  -CS     Row Name 02/15/23 0904          Stairs Within Home, Primary    Number of Stairs, Within Home, Primary none  -CS     Row Name 02/15/23 0904           Cognition    Orientation Status (Cognition) oriented to;person;unable/difficult to assess;place;situation;time;other (see comments)  aphasia  -CS     Row Name 02/15/23 0904          Safety Issues, Functional Mobility    Safety Issues Affecting Function (Mobility) ability to follow commands;awareness of need for assistance;insight into deficits/self-awareness;problem-solving;safety precaution awareness;safety precautions follow-through/compliance;sequencing abilities  -CS     Impairments Affecting Function (Mobility) balance;cognition;endurance/activity tolerance;strength;postural/trunk control  -CS     Cognitive Impairments, Mobility Safety/Performance insight into deficits/self-awareness;problem-solving/reasoning;safety precaution awareness;safety precaution follow-through;sequencing abilities;awareness, need for assistance  -CS           User Key  (r) = Recorded By, (t) = Taken By, (c) = Cosigned By    Initials Name Provider Type    CS Duarte Fair OT Occupational Therapist                 Mobility/ADL's     Row Name 02/15/23 0944          Bed Mobility    Bed Mobility supine-sit;sit-supine;scooting/bridging  -CS     Scooting/Bridging Parrish (Bed Mobility) moderate assist (50% patient effort);2 person assist;nonverbal cues (demo/gesture);verbal cues  -CS     Supine-Sit [place] (Bed Mobility) minimum assist (75% patient effort);verbal cues;nonverbal cues (demo/gesture)  -CS     Sit-Supine [place] (Bed Mobility) moderate assist (50% patient effort);nonverbal cues (demo/gesture);verbal cues  -CS     Bed Mobility, Safety Issues impaired trunk control for bed mobility;cognitive deficits limit understanding  -CS     Assistive Device (Bed Mobility) bed rails;head of bed elevated;draw sheet  -CS     Comment, (Bed Mobility) Pt aphasic - but indicated need to return to sidelying, demonstrated symptoms of dizziness - no nystagmus observed and BP stable  -CS     Row Name 02/15/23 0991           Transfers    Transfers toilet transfer  -     Row Name 02/15/23 0915          Toilet Transfer    Type (Toilet Transfer) stand pivot/stand step  -CS     [place] Level (Toilet Transfer) minimum assist (75% patient effort);verbal cues;nonverbal cues (demo/gesture)  -CS     Assistive Device (Toilet Transfer) other (see comments);commode, bedside without drop arms  -CS     Comment, (Toilet Transfer) BUE support  -CS     Row Name 02/15/23 0915          Activities of Daily Living    BADL Assessment/Intervention lower body dressing;toileting  -     Row Name 02/15/23 0915          Lower Body Dressing Assessment/Training    [place] Level (Lower Body Dressing) don;socks;dependent (less than 25% patient effort)  -CS     Position (Lower Body Dressing) edge of bed sitting  -CS     Comment, (Lower Body Dressing) unsafe to attempt due to poor dynamic sitting balance and decreased cognition  -CS     Row Name 02/15/23 0915          Toileting Assessment/Training    [place] Level (Toileting) adjust/manage clothing;perform perineal hygiene;maximum assist (25% patient effort)  -CS     Position (Toileting) supported standing;supported sitting  -CS     Comment, (Toileting) Pt impulsive with need to urinate  -CS           User Key  (r) = Recorded By, (t) = Taken By, (c) = Cosigned By    Initials Name Provider Type    CS Duarte Fair, OT Occupational Therapist               Obj/Interventions     Row Name 02/15/23 0920          Sensory Assessment (Somatosensory)    Sensory Assessment (Somatosensory) unable/difficult to assess  -CS     Sensory Assessment aphasia, inconsistent command following  -     Row Name 02/15/23 0920          Vision Assessment/Intervention    Visual Impairment/Limitations visual/perceptual impairments present;unable/difficult to assess  -CS     Vision Assessment Comment Difficulty to assess due to limited command following/confusion/aphasia. Noted difficulty tracking/scanning to R side, no  nystagmus observed upon sitting  -CS     [facility] Name 02/15/23 0920          Range of Motion Comprehensive    General Range of Motion bilateral upper extremity ROM WFL  -[facility] Name 02/15/23 0920          Strength Comprehensive (MMT)    General Manual Muscle Testing (MMT) Assessment upper extremity strength deficits identified  -CS     Comment, General Manual Muscle Testing (MMT) Assessment Formal MMT not performed, demo'd gross functional strength of 3+/5 bilaterally - unable to assess symmetry  -[facility] Name 02/15/23 0920          Motor Skills    Motor Skills functional endurance  -CS     Functional Endurance O2 sats stable on RA  -CS     [facility] Name 02/15/23 0920          Balance    Balance Interventions sitting;sit to stand;standing;occupation based/functional task  -CS     Comment, Balance CGA for static activities and Theo for dynamic  -CS           User Key  (r) = Recorded By, (t) = Taken By, (c) = Cosigned By    Initials Name Provider Type    Duarte Brown, OT Occupational Therapist               Goals/Plan     Row Name 02/15/23 0933          Bed Mobility Goal 1 (OT)    Activity/Assistive Device (Bed Mobility Goal 1, OT) sit to supine;supine to sit;scooting  -CS     [place] Level/Cues Needed (Bed Mobility Goal 1, OT) standby assist  -CS     Time Frame (Bed Mobility Goal 1, OT) long term goal (LTG);10 days  -CS     Progress/Outcomes (Bed Mobility Goal 1, OT) goal ongoing  -CS     Row Name 02/15/23 0933          Transfer Goal 1 (OT)    Activity/Assistive Device (Transfer Goal 1, OT) bed-to-chair/chair-to-bed;toilet  -CS     [place] Level/Cues Needed (Transfer Goal 1, OT) standby assist  -CS     Time Frame (Transfer Goal 1, OT) long term goal (LTG);10 days  -CS     Strategies/Barriers (Transfers Goal 1, OT) AD recs per PT  -CS     Progress/Outcome (Transfer Goal 1, OT) goal ongoing  -CS     Row Name 02/15/23 0933          Dressing Goal 1 (OT)    Activity/Device (Dressing Goal 1, OT) lower body  dressing  -CS     [place]/Cues Needed (Dressing Goal 1, OT) contact guard required  -CS     Time Frame (Dressing Goal 1, OT) long term goal (LTG);10 days  -CS     Progress/Outcome (Dressing Goal 1, OT) goal ongoing  -CS     Row Name 02/15/23 0933          Grooming Goal 1 (OT)    Activity/Device (Grooming Goal 1, OT) hair care;oral care;wash face, hands  -CS     [place] (Grooming Goal 1, OT) standby assist  -CS     Time Frame (Grooming Goal 1, OT) long term goal (LTG);10 days  -CS     Progress/Outcome (Grooming Goal 1, OT) goal ongoing  -CS     Row Name 02/15/23 0933          Therapy Assessment/Plan (OT)    Planned Therapy Interventions (OT) activity tolerance training;functional balance retraining;occupation/activity based interventions;strengthening exercise;transfer/mobility retraining;patient/caregiver education/training;neuromuscular control/coordination retraining;adaptive equipment training  -CS           User Key  (r) = Recorded By, (t) = Taken By, (c) = Cosigned By    Initials Name Provider Type    CS Duarte Fair, OT Occupational Therapist               Clinical Impression     Row Name 02/15/23 0925          Pain Assessment    Additional Documentation Pain Scale: FACES Pre/Post-Treatment (Group)  -CS     [facility] Name 02/15/23 0925          Pain Scale: FACES Pre/Post-Treatment    Pain: FACES Scale, Pretreatment 0-->no hurt  -CS     Posttreatment Pain Rating 0-->no hurt  -CS     Pre/Posttreatment Pain Comment no visual/verbal indications of pain during eval  -CS     Row Name 02/15/23 0925          Plan of Care Review    Plan of Care Reviewed With patient;son  -CS     Progress no change  -CS     Outcome Evaluation Baseline ADL completion limited by impaired balance, strength, cognition, and activity tolerance warranting skilled IP OT services to promote return to PLOF. Demo'd limited command following and communication this date due to confusion/aphasia. History of vertigo, sleeps inclined position  at baseline, RN notified. Rec d/c to IP rehab.  -CS     Row Name 02/15/23 0925          Therapy Assessment/Plan (OT)    Rehab Potential (OT) good, to achieve stated therapy goals  -CS     Criteria for Skilled Therapeutic Interventions Met (OT) yes;skilled treatment is necessary;meets criteria  -CS     Therapy Frequency (OT) daily  -CS     Row Name 02/15/23 0925          Therapy Plan Review/Discharge Plan (OT)    Anticipated Discharge Disposition (OT) inpatient rehabilitation facility  -CS     Row Name 02/15/23 0925          Vital Signs    Pre Systolic BP Rehab 152  RN cleared for eval, VSS on RA  -CS     Pre Treatment Diastolic BP 93  -CS     Post Systolic BP Rehab 177  -CS     Post Treatment Diastolic BP 69  -CS     O2 Delivery Pre Treatment room air  -CS     O2 Delivery Intra Treatment room air  -CS     O2 Delivery Post Treatment room air  -CS     Pre Patient Position Supine  -CS     Intra Patient Position Standing  -CS     Post Patient Position Sitting  -CS     Row Name 02/15/23 0925          Positioning and Restraints    Pre-Treatment Position in bed  -CS     Post Treatment Position bed  -CS     In Bed notified nsg;side lying left;call light within reach;exit alarm on;encouraged to call for assist;with family/caregiver  -CS           User Key  (r) = Recorded By, (t) = Taken By, (c) = Cosigned By    Initials Name Provider Type    CS Duarte Fair, OT Occupational Therapist               Outcome Measures     Row Name 02/15/23 0934          How much help from another is currently needed...    Putting on and taking off regular lower body clothing? 2  -CS     Bathing (including washing, rinsing, and drying) 2  -CS     Toileting (which includes using toilet bed pan or urinal) 2  -CS     Putting on and taking off regular upper body clothing 2  -CS     Taking care of personal grooming (such as brushing teeth) 2  -CS     Eating meals 2  -CS     AM-PAC 6 Clicks Score (OT) 12  -CS     Row Name 02/15/23 0934           Modified Weld Scale    Modified Weld Scale 3 - Moderate disability.  Requiring some help, but able to walk without assistance.  -     Row Name 02/15/23 0934          Functional Assessment    Outcome Measure Options AM-PAC 6 Clicks Daily Activity (OT);Modified Weld  -CS           User Key  (r) = Recorded By, (t) = Taken By, (c) = Cosigned By    Initials Name Provider Type     Duarte Fair OT Occupational Therapist                Occupational Therapy Education     Title: PT OT SLP Therapies (In Progress)     Topic: Occupational Therapy (In Progress)     Point: ADL training (In Progress)     Description:   Instruct learner(s) on proper safety adaptation and remediation techniques during self care or transfers.   Instruct in proper use of assistive devices.              Learning Progress Summary           Patient Acceptance, E,D, NR by CS at 2/15/2023 0935   Family Acceptance, E,D, NR by CS at 2/15/2023 0935                   Point: Home exercise program (Not Started)     Description:   Instruct learner(s) on appropriate technique for monitoring, assisting and/or progressing therapeutic exercises/activities.              Learner Progress:  Not documented in this visit.          Point: Precautions (In Progress)     Description:   Instruct learner(s) on prescribed precautions during self-care and functional transfers.              Learning Progress Summary           Patient Acceptance, E,D, NR by CS at 2/15/2023 0935   Family Acceptance, E,D, NR by CS at 2/15/2023 0935                   Point: Body mechanics (In Progress)     Description:   Instruct learner(s) on proper positioning and spine alignment during self-care, functional mobility activities and/or exercises.              Learning Progress Summary           Patient Acceptance, E,D, NR by CS at 2/15/2023 0935   Family Acceptance, E,D, NR by CS at 2/15/2023 0935                               User Key     Initials Effective Dates Name Provider Type  Discipline     06/16/21 -  Duarte Fair OT Occupational Therapist OT              OT Recommendation and Plan  Planned Therapy Interventions (OT): activity tolerance training, functional balance retraining, occupation/activity based interventions, strengthening exercise, transfer/mobility retraining, patient/caregiver education/training, neuromuscular control/coordination retraining, adaptive equipment training  Therapy Frequency (OT): daily  Plan of Care Review  Plan of Care Reviewed With: patient, son  Progress: no change  Outcome Evaluation: Baseline ADL completion limited by impaired balance, strength, cognition, and activity tolerance warranting skilled IP OT services to promote return to PLOF. Demo'd limited command following and communication this date due to confusion/aphasia. History of vertigo, sleeps inclined position at baseline, RN notified. Rec d/c to IP rehab.     Time Calculation:    Time Calculation- OT     Row Name 02/15/23 0935             Time Calculation- OT    OT Start Time 0841  -CS      OT Received On 02/15/23  -CS      OT Goal Re-Cert Due Date 02/25/23  -CS         Untimed Charges    OT Eval/Re-eval Minutes 50  -CS         Total Minutes    Untimed Charges Total Minutes 50  -CS       Total Minutes 50  -CS            User Key  (r) = Recorded By, (t) = Taken By, (c) = Cosigned By    Initials Name Provider Type     Duarte Fair OT Occupational Therapist              Therapy Charges for Today     Code Description Service Date Service Provider Modifiers Qty    88477141422 HC OT EVAL MOD COMPLEXITY 4 2/15/2023 Duarte Fair OT GO 1               Duarte Fair OT  2/15/2023

## 2023-02-15 NOTE — CASE MANAGEMENT/SOCIAL WORK
Discharge Planning Assessment  [facility]     Patient Name: Coco Crockett  MRN: [id number]  Today's Date: 2/15/2023    Admit Date: 2/15/2023    Plan: Home   Discharge Needs Assessment     Row Name 02/15/23 0948       Living Environment    People in Home child(tre), adult    Name(s) of People in Home Lv crockett Son [phone]    Current Living Arrangements home    Primary Care Provided by self    Provides Primary Care For no one    Family Caregiver if Needed child(tre), adult    Family Caregiver Names Lv crockett Son [phone]    Quality of Family Relationships helpful;involved;supportive    Able to Return to Prior Arrangements yes       Resource/Environmental Concerns    Resource/Environmental Concerns none    Transportation Concerns none       Food Insecurity    Within the past 12 months, you worried that your food would run out before you got the money to buy more. Never true    Within the past 12 months, the food you bought just didn't last and you didn't have money to get more. Never true       Transition Planning    Patient/Family Anticipates Transition to home with family    Patient/Family Anticipated Services at Transition     Transportation Anticipated family or friend will provide       Discharge Needs Assessment    Readmission Within the Last 30 Days no previous admission in last 30 days    Equipment Currently Used at Home walker, rolling;cane, straight    Concerns to be Addressed denies needs/concerns at this time    Anticipated Changes Related to Illness none               Discharge Plan     Row Name 02/15/23 0950       Plan    Plan Home    Patient/Family in Agreement with Plan yes    Plan Comments CM spoke with patient's son at bedside regarding DC planning. Patient resides in [facility] [place] [facility] with her daughter. Patient is independent with ADL’s, denies any current DME use. Patient has a rolling walker and a cane in her home. Patient still drives and is very active at home per son.  Patient denies any current home health or outpatient services. Patient has been to skilled rehab 5/22 [facility] [place] [facility]/skilled bed. Patient has medical insurance, prescription coverage and is able to afford/obtain medications without difficulty. Case management team will continue to follow plan of care and assist with discharge planning as recommendations are available. Patient/family is hopeful for DC to home.    Final Discharge Disposition Code 01 - home or self-care              Continued Care and Services - Admitted Since 2/15/2023    Coordination has not been started for this encounter.       Expected Discharge Date and Time     Expected Discharge Date Expected Discharge Time    Feb 16, 2023          Demographic Summary     Row Name 02/15/23 0946       General Information    Arrived From home    Referral Source emergency department    Reason for Consult discharge planning    Preferred Language English       Contact Information    Contact Information Comments Jaylin Crockett Daughter   [phone]               Functional Status     Row Name 02/15/23 0947       Functional Status    Usual Activity Tolerance good    Current Activity Tolerance moderate       Physical Activity    On average, how many days per week do you engage in moderate to strenuous exercise (like a brisk walk)? 5 days    On average, how many minutes do you engage in exercise at this level? 10 min    Number of minutes of exercise per week 50       Assessment of Health Literacy    How often do you have someone help you read hospital materials? Occasionally    How often do you have problems learning about your medical condition because of difficulty understanding written information? Occasionally    How often do you have a problem understanding what is told to you about your medical condition? Occasionally    How confident are you filling out medical forms by yourself? Quite a bit    Health Literacy Good       Functional  Status, IADL    Medications independent    Meal Preparation independent    Housekeeping independent    Laundry independent    Shopping independent       Mental Status    General Appearance WDL WDL       Mental Status Summary    Recent Changes in Mental Status/Cognitive Functioning unable to assess       Employment/    Employment Status retired               Psychosocial    No documentation.                Abuse/Neglect    No documentation.                Legal    No documentation.                Substance Abuse    No documentation.                Patient Forms    No documentation.                   Claudia Abebe RN

## 2023-02-15 NOTE — H&P
"    [facility] Medicine Services  HISTORY AND PHYSICAL    Patient Name: Coco Crockett  : 1945  MRN: [id number]  Primary Care Physician: Lobo Archer MD  Date of admission: 2/15/2023    Subjective   Subjective     Chief Complaint:  Difficulty with speech    HPI:  Coco Crockett is a 77 y.o. female with a history of CAD, COPD, T2DM, hypertension, hyperlipidemia, anemia, GERD, hypothyroidism, migraines, was transferred from [facility] for stroke evaluation.  Patient is a poor historian, information for H&P per family at bedside.  Daughter reports that patient complained of a headache all night long that was typical of her usual migraines.  At 0930 this morning she checked her blood pressure and it was 193/100.  She took all of her home medications including her antihypertensive medications with no change in her blood pressure.  She called her PCP and was instructed to go to the ED at 1230.  Daughter reports that patient was confused and \" foggy\" at that time.  She was having difficulty remembering her birthdate and the president was at that time.  At 1700 daughter states that her speech was mostly gibberish but intermittently would become clear.  Daughter states that recently patient has been complaining of fatigue and some vertigo.  No recent fevers, cough, shortness of air, chest pain, focal weakness or any other complaints at this time that family is aware of.  Patient was treated 2 weeks ago for a UTI.  CT of head at OSH was negative.  Patient is being admitted to the hospital medicine service for further evaluation and management.        Review of Systems   Unable to perform ROS: Mental status change        All other systems reviewed and are negative.     Personal History     Past Medical History:   Diagnosis Date   • Anemia    • Anxiety    • Chest pain    • Coronary artery disease    • Depression    • Diabetes mellitus (HCC)    • Dyslipidemia    • Eczema  "   • Fear of awareness under general anesthesia    • Fear of general anesthetic     has hx of awareness during anesthesia induction, felt like she was dying   • GERD (gastroesophageal reflux disease)    • History of COVID-19 02/01/2022   • Hyperlipidemia    • Hypertension     Chronic hypertension, probably essential.    • Hypertensive cardiovascular disease    • Hypothyroidism     Chronic hypothyroidism/replacement therapy.   • IBS (irritable bowel syndrome)    • Iron deficiency anemia    • Migraine     Intermittent progressive headache syndrome/possible migraine equivalent.   • Obesity (BMI 30.0-34.9)     Mild obesity (BMI 31).    • Osteopenia     Osteopenia with fracture of lumbar vertebra, 2004.    • Pneumonia    • TMJ (temporomandibular joint disorder)    • Type 2 diabetes mellitus (HCC)     Type 2 diabetes mellitus, Hemoglobin A1c 5.9%, December 2014.   • Urgency incontinence    • Urinary, incontinence, stress female    • Vertigo     Intermittent marked dizziness/vertigo with Epley maneuver.             Past Surgical History:   Procedure Laterality Date   • ANKLE OPEN REDUCTION INTERNAL FIXATION Right 1995    Fall on ice   • APPENDECTOMY  1970    Appendectomy with wedge resection of both ovaries, 1970.   • CATARACT EXTRACTION WITH INTRAOCULAR LENS IMPLANT     • COLONOSCOPY     • LASIK Bilateral    • LUMBAR LAMINECTOMY WITH FUSION N/A 5/9/2022    Procedure: LUMBAR DECOMPRESSON L2-S1 with Fusion and Stabilization with PEDICLE SCREWS at L4-S1;  Surgeon: Humberto Correa MD;  Location: [facility];  Service: Neurosurgery;  Laterality: N/A;   • TIBIAL PLATEAU OPEN REDUCTION INTERNAL FIXATION Right    • TONSILLECTOMY AND ADENOIDECTOMY     • TOTAL LAPAROSCOPIC HYSTERECTOMY SALPINGO OOPHORECTOMY  1985    MARIZA/BSO secondary to severe endometriosis, 1985.        Family History:  family history includes Heart disease in her father. Otherwise pertinent FHx was reviewed and unremarkable.     Social History:  reports that she  has never smoked. She has never used smokeless tobacco. She reports current alcohol use. She reports that she does not use drugs.  Social History     Social History Narrative   • Not on file       Medications:  Alirocumab, Cholecalciferol, DULoxetine, Fluticasone Furoate-Vilanterol, acetaminophen, amLODIPine, aspirin, brompheniramine-pseudoephedrine-DM, cetirizine, dicyclomine, diphenhydrAMINE, docusate sodium, estradiol, fenofibric acid, fluticasone, hydroCHLOROthiazide, icosapent ethyl, iron polysaccharides, levothyroxine, metFORMIN, methylPREDNISolone, metoclopramide, multivitamin, nebivolol, nitroglycerin, omeprazole, polyethylene glycol, traMADol, and vitamin C    Allergies   Allergen Reactions   • Keflex [Cephalexin] Itching, Swelling and Angioedema          • Imdur [Isosorbide Dinitrate] Headache     Severe headache/fatigue       • Bactrim [Sulfamethoxazole-Trimethoprim] Rash   • Codeine Sulfate Itching and Rash   • Contrast Dye (Echo Or Unknown Ct/Mr) Itching   • Demerol [Meperidine] Itching and Rash   • Levaquin [Levofloxacin] Other (See Comments)     Numbness and ruptured oneida's tendon   • Morphine Sulfate Itching and Rash   • Penicillin V Potassium Itching and Rash   • Sulfa Antibiotics Rash and Other (See Comments)     severe rash/swelling   • Trimethoprim Hives       Objective   Objective     Vital Signs:        Physical Exam   Constitutional: Awake, alert, resting in bed, family at bedside  Eyes: PERRLA, sclerae anicteric, no conjunctival injection  HENT: NCAT, mucous membranes moist  Neck: Supple, no thyromegaly, no lymphadenopathy, trachea midline  Respiratory: Clear to auscultation bilaterally, nonlabored respirations   Cardiovascular: RRR, no murmurs, rubs, or gallops, palpable pedal pulses bilaterally  Gastrointestinal: Positive bowel sounds, soft, nontender, nondistended  Musculoskeletal: No bilateral ankle edema, no clubbing or cyanosis to extremities  Psychiatric: Appropriate affect,  cooperative  Neurologic: Oriented to self,  slightly weaker on right side, no upper extremity drift, dysarthria  Skin: No rashes       Result Review:  I have personally reviewed the results from the time of this admission to 2/15/2023 02:19 EST and agree with these findings:  [x]  Laboratory list / accordion  []  Microbiology  [x]  Radiology  []  EKG/Telemetry   []  Cardiology/Vascular   []  Pathology  []  Old records  []  Other:  Most notable findings include:     LAB RESULTS:        Invalid input(s):  CKTOTAL                          Brief Urine Lab Results     None        Microbiology Results (last 10 days)     ** No results found for the last 240 hours. **          XR Outside Films    Result Date: 2/15/2023  This procedure was auto-finalized with no dictation required.    CT Outside Films    Result Date: 2/15/2023  This procedure was auto-finalized with no dictation required.      Results for orders placed in visit on 07/12/17    Adult Transthoracic Echo Complete    Interpretation Summary  · Left ventricular wall thickness is consistent with mild concentric hypertrophy.  · Left ventricular systolic function is normal. Estimated EF = 70%.  · Left ventricular diastolic dysfunction (grade I) consistent with impaired relaxation.  · Normal right ventricular cavity size, wall thickness, systolic function and septal motion noted.  · There is no evidence of pericardial effusion.  · No evidence of pulmonary hypertension is present.  · The aortic valve exhibits sclerosis.      Assessment & Plan   Assessment & Plan       Acute ischemic stroke (HCC)    Hypertension    Dyslipidemia    Hypothyroidism    GERD (gastroesophageal reflux disease)    Type 2 diabetes mellitus (HCC)    Ischemic heart disease    Coco Crockett is a 77 y.o. female with a history of CAD, T2DM, hypertension, hyperlipidemia, anemia, COPD, GERD, hypothyroidism, migraines, was transferred from [facility] for stroke evaluation      Assessment and Plan:    Headache rule out CVA  -- CT head at outside hospital was negative  --Stroke order set per stroke navigator  -- MRI brain  -- Aspirin  -- High-dose statin  -- PT/OT/SLP/case management consult  -- Echo in the a.m.  --Neurochecks  --Consult neurology  -- A.m. labs    Hypertension  -- Permissive hypertension for now  -- Hold Bystolic, HCTZ, amlodipine for now    Hyperlipidemia  -- High-dose statin  -- FLP in the a.m.    CAD  -- Follows with Dr. Camacho  -- Aspirin    T2DM  -- A1c in the a.m.  -- FSBG with SSI  -- Hold metformin    GERD  -- PPI    Hypothyroidism  -- Levothyroxine    COPD  -- symbicort    DVT prophylaxis:  SCDS    CODE STATUS:    Level Of Support Discussed With: Next of Kin (If No Surrogate)  Code Status (Patient has no pulse and is not breathing): CPR (Attempt to Resuscitate)  Medical Interventions (Patient has pulse or is breathing): Full Support  Release to patient: Routine Release      Expected Discharge        This note has been completed as part of a split-shared workflow.     Signature: Electronically signed by KECIA Francis, 02/15/23, 2:18 AM EST.  Total time spent: 60 minutes  Time spent includes time reviewing chart, face-to-face time, counseling patient/family/caregiver, ordering medications/tests/procedures, communicating with other health care professionals, documenting clinical information in the electronic health record, and coordination of care.            Attending   Admission Attestation       I have performed an independent face-to-face diagnostic evaluation including performing an independent physical examination as documented here.  The documented plan of care above was reviewed and developed with the advanced practice clinician (APC).      Brief Summary Statement:   Coco Crockett is a 77 y.o. female with past medical history of type 2 diabetes, essential hypertension, hyperlipidemia, GERD, hypothyroidism, anemia, migraines, coronary artery  "disease, COPD, who presents as a transfer from [facility] for possible stroke.    Patient initially presented to the ER for [place] with complaints of headache.  She reports that she has history of migraines and thought that was lasting longer than her typical migraines.  She checked her blood pressure noted to be in the 190s systolic.  She had no change with her home blood pressure medications and therefore was instructed to go to the ER by her primary care physician.  Around 1230, patient began having confusion and difficulty remembering her birthdate.  She then started talking and \"gibberish\" at approximately 1700.  She continues to have headache and intermittent vertigo.  Denies prior history of stroke.    Remainder of detailed HPI is as noted by APC and has been reviewed and/or edited by me for completeness.    Attending Physical Exam:  Temp:  [99.8 °F (37.7 °C)] 99.8 °F (37.7 °C)  Heart Rate:  [80-87] 87  Resp:  [16] 16  BP: (164-182)/(65-84) 164/65    Constitutional: Awake, alert, confused  Eyes: PERRLA, sclerae anicteric, no conjunctival injection  HENT: NCAT, mucous membranes moist  Neck: Supple, no thyromegaly, no lymphadenopathy, trachea midline  Respiratory: Clear to auscultation bilaterally, nonlabored respirations   Cardiovascular: RRR, no murmurs, rubs, or gallops, palpable pedal pulses bilaterally  Gastrointestinal: Positive bowel sounds, soft, nontender, nondistended  Musculoskeletal: No bilateral ankle edema, no clubbing or cyanosis to extremities  Psychiatric: Appropriate affect, cooperative  Neurologic: Confused, strength symmetric in all extremities, Cranial Nerves grossly intact to confrontation, speech with expressive aphasia/word salad  Skin: No rashes      Brief Assessment/Plan :  See detailed assessment and plan developed with APC which I have reviewed and/or edited for completeness.            Eric Mirza DO  02/15/23          Total time spent: 37  Time spent includes " time reviewing chart, face-to-face time, counseling patient/family/caregiver, ordering medications/tests/procedures, communicating with other health care professionals, documenting clinical information in the electronic health record, and coordination of care.

## 2023-02-15 NOTE — SIGNIFICANT NOTE
Attested the consult note.     77 female who lives alone, presented with AMS. She only follows 50% of commands. Did not appreciate any focal weakness. Normal blood pressure goals. PT/OT/Speech can continue to work with the patient. This could be metabolic vs hypertensive encephalopathy. Awaiting MRI brain. Okay for aspirin full dose and Lipitor for now.         Stroke team will continue to follow the patient.

## 2023-02-15 NOTE — CONSULTS
Order noted for diabetes education per stroke protocol. A1c 5.3%, diabetes well controlled, on metformin on PTA medication list. GCS 12, not appropriate for education. Please re consult if needed. Thank you.

## 2023-02-16 ENCOUNTER — APPOINTMENT (OUTPATIENT)
Dept: NEUROLOGY | Facility: HOSPITAL | Age: 78
End: 2023-02-16
Payer: MEDICARE

## 2023-02-16 LAB
ALBUMIN SERPL-MCNC: 4.1 G/DL (ref 3.5–5.2)
ALBUMIN/GLOB SERPL: 1.6 G/DL
ALP SERPL-CCNC: 48 U/L (ref 39–117)
ALT SERPL W P-5'-P-CCNC: 8 U/L (ref 1–33)
ANION GAP SERPL CALCULATED.3IONS-SCNC: 15 MMOL/L (ref 5–15)
AST SERPL-CCNC: 19 U/L (ref 1–32)
BASOPHILS # BLD AUTO: 0.04 10*3/MM3 (ref 0–0.2)
BASOPHILS NFR BLD AUTO: 0.6 % (ref 0–1.5)
BILIRUB SERPL-MCNC: 0.5 MG/DL (ref 0–1.2)
BUN SERPL-MCNC: 15 MG/DL (ref 8–23)
BUN/CREAT SERPL: 24.2 (ref 7–25)
CALCIUM SPEC-SCNC: 9 MG/DL (ref 8.6–10.5)
CHLORIDE SERPL-SCNC: 97 MMOL/L (ref 98–107)
CO2 SERPL-SCNC: 20 MMOL/L (ref 22–29)
CREAT SERPL-MCNC: 0.62 MG/DL (ref 0.57–1)
DEPRECATED RDW RBC AUTO: 46 FL (ref 37–54)
EGFRCR SERPLBLD CKD-EPI 2021: 90.7 ML/MIN/1.73
EOSINOPHIL # BLD AUTO: 0.02 10*3/MM3 (ref 0–0.4)
EOSINOPHIL NFR BLD AUTO: 0.3 % (ref 0.3–6.2)
ERYTHROCYTE [DISTWIDTH] IN BLOOD BY AUTOMATED COUNT: 17.3 % (ref 12.3–15.4)
GLOBULIN UR ELPH-MCNC: 2.6 GM/DL
GLUCOSE BLDC GLUCOMTR-MCNC: 110 MG/DL (ref 70–130)
GLUCOSE BLDC GLUCOMTR-MCNC: 117 MG/DL (ref 70–130)
GLUCOSE BLDC GLUCOMTR-MCNC: 126 MG/DL (ref 70–130)
GLUCOSE SERPL-MCNC: 106 MG/DL (ref 65–99)
HCT VFR BLD AUTO: 29.5 % (ref 34–46.6)
HGB BLD-MCNC: 8.6 G/DL (ref 12–15.9)
IMM GRANULOCYTES # BLD AUTO: 0.04 10*3/MM3 (ref 0–0.05)
IMM GRANULOCYTES NFR BLD AUTO: 0.6 % (ref 0–0.5)
LYMPHOCYTES # BLD AUTO: 1.9 10*3/MM3 (ref 0.7–3.1)
LYMPHOCYTES NFR BLD AUTO: 28.9 % (ref 19.6–45.3)
MCH RBC QN AUTO: 21.6 PG (ref 26.6–33)
MCHC RBC AUTO-ENTMCNC: 29.2 G/DL (ref 31.5–35.7)
MCV RBC AUTO: 73.9 FL (ref 79–97)
MONOCYTES # BLD AUTO: 0.81 10*3/MM3 (ref 0.1–0.9)
MONOCYTES NFR BLD AUTO: 12.3 % (ref 5–12)
NEUTROPHILS NFR BLD AUTO: 3.76 10*3/MM3 (ref 1.7–7)
NEUTROPHILS NFR BLD AUTO: 57.3 % (ref 42.7–76)
NRBC BLD AUTO-RTO: 0 /100 WBC (ref 0–0.2)
PLATELET # BLD AUTO: 290 10*3/MM3 (ref 140–450)
PMV BLD AUTO: 9.5 FL (ref 6–12)
POTASSIUM SERPL-SCNC: 3.6 MMOL/L (ref 3.5–5.2)
PROT SERPL-MCNC: 6.7 G/DL (ref 6–8.5)
RBC # BLD AUTO: 3.99 10*6/MM3 (ref 3.77–5.28)
SODIUM SERPL-SCNC: 132 MMOL/L (ref 136–145)
WBC NRBC COR # BLD: 6.57 10*3/MM3 (ref 3.4–10.8)

## 2023-02-16 PROCEDURE — G0378 HOSPITAL OBSERVATION PER HR: HCPCS

## 2023-02-16 PROCEDURE — 80053 COMPREHEN METABOLIC PANEL: CPT | Performed by: INTERNAL MEDICINE

## 2023-02-16 PROCEDURE — 94761 N-INVAS EAR/PLS OXIMETRY MLT: CPT

## 2023-02-16 PROCEDURE — 82962 GLUCOSE BLOOD TEST: CPT

## 2023-02-16 PROCEDURE — 92507 TX SP LANG VOICE COMM INDIV: CPT

## 2023-02-16 PROCEDURE — 94799 UNLISTED PULMONARY SVC/PX: CPT

## 2023-02-16 PROCEDURE — 92610 EVALUATE SWALLOWING FUNCTION: CPT

## 2023-02-16 PROCEDURE — 99233 SBSQ HOSP IP/OBS HIGH 50: CPT | Performed by: INTERNAL MEDICINE

## 2023-02-16 PROCEDURE — 95819 EEG AWAKE AND ASLEEP: CPT

## 2023-02-16 PROCEDURE — 97535 SELF CARE MNGMENT TRAINING: CPT

## 2023-02-16 PROCEDURE — 96361 HYDRATE IV INFUSION ADD-ON: CPT

## 2023-02-16 PROCEDURE — 99214 OFFICE O/P EST MOD 30 MIN: CPT | Performed by: NURSE PRACTITIONER

## 2023-02-16 PROCEDURE — 97530 THERAPEUTIC ACTIVITIES: CPT

## 2023-02-16 PROCEDURE — 85025 COMPLETE CBC W/AUTO DIFF WBC: CPT | Performed by: INTERNAL MEDICINE

## 2023-02-16 RX ORDER — NEBIVOLOL 5 MG/1
10 TABLET ORAL
Status: DISCONTINUED | OUTPATIENT
Start: 2023-02-16 | End: 2023-02-22 | Stop reason: HOSPADM

## 2023-02-16 RX ORDER — ASPIRIN 81 MG/1
81 TABLET, CHEWABLE ORAL DAILY
Status: DISCONTINUED | OUTPATIENT
Start: 2023-02-17 | End: 2023-02-22 | Stop reason: HOSPADM

## 2023-02-16 RX ORDER — LEVETIRACETAM 500 MG/1
500 TABLET ORAL 2 TIMES DAILY
Status: DISCONTINUED | OUTPATIENT
Start: 2023-02-16 | End: 2023-02-22 | Stop reason: HOSPADM

## 2023-02-16 RX ADMIN — NEBIVOLOL 10 MG: 5 TABLET ORAL at 14:52

## 2023-02-16 RX ADMIN — BUDESONIDE AND FORMOTEROL FUMARATE DIHYDRATE 2 PUFF: 160; 4.5 AEROSOL RESPIRATORY (INHALATION) at 21:00

## 2023-02-16 RX ADMIN — PANTOPRAZOLE SODIUM 40 MG: 40 TABLET, DELAYED RELEASE ORAL at 14:52

## 2023-02-16 RX ADMIN — NITROFURANTOIN MONOHYDRATE/MACROCRYSTALS 100 MG: 75; 25 CAPSULE ORAL at 08:51

## 2023-02-16 RX ADMIN — DULOXETINE HYDROCHLORIDE 60 MG: 60 CAPSULE, DELAYED RELEASE ORAL at 08:20

## 2023-02-16 RX ADMIN — LEVOTHYROXINE SODIUM 25 MCG: 25 TABLET ORAL at 05:40

## 2023-02-16 RX ADMIN — NITROFURANTOIN MONOHYDRATE/MACROCRYSTALS 100 MG: 75; 25 CAPSULE ORAL at 20:06

## 2023-02-16 RX ADMIN — ACETAMINOPHEN 325MG 650 MG: 325 TABLET ORAL at 20:07

## 2023-02-16 RX ADMIN — AMLODIPINE BESYLATE 5 MG: 5 TABLET ORAL at 08:20

## 2023-02-16 RX ADMIN — ESTRADIOL 0.5 MG: 0.5 TABLET ORAL at 08:50

## 2023-02-16 RX ADMIN — LEVETIRACETAM 500 MG: 500 TABLET, FILM COATED ORAL at 20:06

## 2023-02-16 RX ADMIN — ACETAMINOPHEN 325MG 650 MG: 325 TABLET ORAL at 08:47

## 2023-02-16 RX ADMIN — ASPIRIN 325 MG: 325 TABLET ORAL at 08:20

## 2023-02-16 RX ADMIN — ATORVASTATIN CALCIUM 40 MG: 40 TABLET, FILM COATED ORAL at 20:07

## 2023-02-16 NOTE — THERAPY RE-EVALUATION
Acute Care - Speech Language Pathology Treatment Note   Jaron   Cognitive-Communication Treatment  & Clinical Swallow Re-evaluation     Patient Name: Coco Crockett  : 1945  MRN: [id number]  Today's Date: 2023               Admit Date: 2/15/2023     Visit Dx:    ICD-10-CM ICD-9-CM   1. Acute ischemic stroke (HCC)  I63.9 434.91   2. Aphasia  R47.01 784.3   3. Dysphagia, unspecified type  R13.10 787.20   4. Impaired functional mobility, balance, gait, and endurance  Z74.09 V49.89   5. Cognitive communication deficit  R41.841 799.52     Patient Active Problem List   Diagnosis   • Mild obesity   • Hypertensive cardiovascular disease   • Hypertension   • Dyslipidemia   • Obesity (BMI 30.0-34.9)   • Hypothyroidism   • Osteopenia   • TMJ (temporomandibular joint disorder)   • GERD (gastroesophageal reflux disease)   • Iron deficiency anemia   • Depression   • Eczema   • Vertigo   • Migraine   • Type 2 diabetes mellitus (HCC)   • MALIK (dyspnea on exertion)   • Leg pain, bilateral   • Fatigue   • Ischemic heart disease   • Lumbar stenosis with neurogenic claudication   • Spondylosis of lumbar region without myelopathy or radiculopathy   • Degeneration of lumbar or lumbosacral intervertebral disc   • Greater trochanteric bursitis of both hips   • Spinal stenosis of lumbar region with neurogenic claudication   • S/P lumbar fusion   • Acute ischemic stroke (HCC)   • Stroke (HCC)     Past Medical History:   Diagnosis Date   • Anemia    • Anxiety    • Chest pain    • Coronary artery disease    • Depression    • Diabetes mellitus (HCC)    • Dyslipidemia    • Eczema    • Fear of awareness under general anesthesia    • Fear of general anesthetic     has hx of awareness during anesthesia induction, felt like she was dying   • GERD (gastroesophageal reflux disease)    • History of COVID-19 2022   • Hyperlipidemia    • Hypertension     Chronic hypertension, probably essential.    • Hypertensive cardiovascular  disease    • Hypothyroidism     Chronic hypothyroidism/replacement therapy.   • IBS (irritable bowel syndrome)    • Iron deficiency anemia    • Migraine     Intermittent progressive headache syndrome/possible migraine equivalent.   • Obesity (BMI 30.0-34.9)     Mild obesity (BMI 31).    • Osteopenia     Osteopenia with fracture of lumbar vertebra, 2004.    • Pneumonia    • TMJ (temporomandibular joint disorder)    • Type 2 diabetes mellitus (HCC)     Type 2 diabetes mellitus, Hemoglobin A1c 5.9%, December 2014.   • Urgency incontinence    • Urinary, incontinence, stress female    • Vertigo     Intermittent marked dizziness/vertigo with Epley maneuver.     Past Surgical History:   Procedure Laterality Date   • ANKLE OPEN REDUCTION INTERNAL FIXATION Right 1995    Fall on ice   • APPENDECTOMY  1970    Appendectomy with wedge resection of both ovaries, 1970.   • CATARACT EXTRACTION WITH INTRAOCULAR LENS IMPLANT     • COLONOSCOPY     • LASIK Bilateral    • LUMBAR LAMINECTOMY WITH FUSION N/A 5/9/2022    Procedure: LUMBAR DECOMPRESSON L2-S1 with Fusion and Stabilization with PEDICLE SCREWS at L4-S1;  Surgeon: Humberto Correa MD;  Location: [facility];  Service: Neurosurgery;  Laterality: N/A;   • TIBIAL PLATEAU OPEN REDUCTION INTERNAL FIXATION Right    • TONSILLECTOMY AND ADENOIDECTOMY     • TOTAL LAPAROSCOPIC HYSTERECTOMY SALPINGO OOPHORECTOMY  1985    MARIZA/BSO secondary to severe endometriosis, 1985.        SLP Recommendation and Plan         Swallow Criteria for Skilled Therapeutic Interventions Met: no problems identified which require skilled intervention (02/16/23 1130)  Anticipated Discharge Disposition (SLP): anticipate therapy at next level of care, inpatient rehabilitation facility (02/16/23 1115)            Daily Summary of Progress (SLP): progress toward functional goals as expected (02/16/23 1115)        Communication Strategy Suggestions: eliminate distractions when speaking with patient, avoid open-ended  questions, avoid multi-step instructions (02/16/23 1115)  Treatment Assessment (SLP): moderate-severe, cognitive-linguistic disorder, improved (02/16/23 1115)     Plan for Continued Treatment (SLP): continue treatment per plan of care (02/16/23 1115)  Plan of Care Reviewed With: patient, daughter (02/16/23 1350)  Progress: improving (02/16/23 1350)      SLP EVALUATION (last 72 hours)     SLP SLC Evaluation     Row Name 02/16/23 1115 02/15/23 1100                Communication Assessment/Intervention    Document Type therapy note (daily note)  -AC evaluation  -AC       Subjective Information complains of;pain  appeared fatigued after working w/ OT  -AC --  Asked to rest. Difficulty fully communicating complaints--gestures appeared to indicate headache.  -AC       Patient Observations alert;cooperative  -AC lethargic  -AC       Patient/Family/Caregiver Comments/Observations Dtr present.  -AC Dtr & son present.  -AC       Patient Effort adequate  -AC fair  -AC          General Information    Patient Profile Reviewed -- yes  -AC       Pertinent History Of Current Problem -- Adm w/ r/o CVA when developed communication difficulty, HA. CT Head OSH negative. MRI pending. Hx GERD, TMJ d/o, COPD, spinal fusion. Dtr reported noting increased coughing w/ liquids over the last yr, but no concerns for respiratory decline/aspiration pna. Also reported hx esophageal spasms.  -AC       Precautions/Limitations, Vision -- difficult to assess  -AC       Precautions/Limitations, Hearing -- difficult to assess  -AC       Prior Level of Function-Communication -- WFL  -AC       Plans/Goals Discussed with -- patient and family;agreed upon  -AC       Barriers to Rehab -- none identified  -AC       Patient's Goals for Discharge -- patient did not state  -AC       Family Goals for Discharge -- family did not state  -AC          Pain    Additional Documentation Pain Scale: Word Pre/Post-Treatment (Group)  -AC Pain Scale: FACES Pre/Post-Treatment  (Group)  -AC          Pain Scale: Word Pre/Post-Treatment    Pain: Word Scale, Pretreatment 2 - mild pain  -AC --       Posttreatment Pain Rating 2 - mild pain  -AC --       Pain Location - head  -AC --       Pre/Posttreatment Pain Comment Notified RN.  -AC --          Pain Scale: FACES Pre/Post-Treatment    Pain: FACES Scale, Pretreatment -- 4-->hurts little more  -AC       Posttreatment Pain Rating -- 4-->hurts little more  -AC       Pre/Posttreatment Pain Comment -- Grimaced & repeatedly grabbed forhead and attempted to lie down. After stepping out, dtr reported pt appeared to verbally indicate she had a headache and wanted tylenol when dtr asked.  -AC          Comprehension Assessment/Intervention    Comprehension Assessment/Intervention -- Auditory Comprehension  -AC          Auditory Comprehension Assessment/Intervention    Auditory Comprehension (Communication) -- moderate impairment;severe impairment  -AC       Answers Questions (Communication) -- severe impairment;personal;yes/no  -AC       Able to Follow Commands (Communication) -- moderate impairment;severe impairment;1-step  -AC          Expression Assessment/Intervention    Expression Assessment/Intervention -- verbal expression  -AC          Verbal Expression Assessment/Intervention    Verbal Expression -- moderate impairment;severe impairment  -AC       Automatic Speech (Communication) -- severe impairment;response to greeting  -AC       Repetition -- severe impairment;sounds  -AC       Spontaneous/Functional Words -- moderate impairment;severe impairment;neologisms  -AC          Oral Musculature and Cranial Nerve Assessment    Oral Motor General Assessment -- unable to assess  -AC          Motor Speech Assessment/Intervention    Motor Speech Function -- unable/difficult to assess;other (see comments)  no detectable dysarthria, but utterances were limited  -AC          Augmentative/Alternative Communication    Gestures (Alternative Communication) --  facial affect;gesture  -       Gestures, Comment -- Appeared to grimace & repeatedly grab forehead to indicate headache.  -          SLP Evaluation Clinical Impressions    SLP Diagnosis -- moderate-severe;aphasia  -       SLP Diagnosis Comments -- Suspect global aphasia. Further assessment needed when pt feeling better/more alert.  -       Rehab Potential/Prognosis -- good  -[facility] Criteria for Skilled Therapy Interventions Met -- yes  -       Functional Impact -- difficulty communicating wants, needs  -          SLP Treatment Clinical Impressions    Treatment Assessment (SLP) moderate-severe;cognitive-linguistic disorder;improved  - --       Daily Summary of Progress (SLP) progress toward functional goals as expected  - --       Plan for Continued Treatment (SLP) continue treatment per plan of care  - --       Care Plan Review evaluation/treatment results reviewed;care plan/treatment goals reviewed;risks/benefits reviewed;current/potential barriers reviewed;patient/other agree to care plan  - --       Care Plan Review, Other Participant(s) daughter  - --          Recommendations    Therapy Frequency (SLP SLC) -- 5 days per week  -       Predicted Duration Therapy Intervention (Days) -- until discharge  -       Anticipated Discharge Disposition (SLP) anticipate therapy at next level of care;inpatient rehabilitation facility  - anticipate therapy at next level of care;inpatient rehabilitation facility  -       Communication Strategy Suggestions eliminate distractions when speaking with patient;avoid open-ended questions;avoid multi-step instructions  - eliminate distractions when speaking with patient;avoid open-ended questions;avoid multi-step instructions  -[facility] Diagnostic Follow-Up Needed -- auditory comprehension;verbal expression  -             User Key  (r) = Recorded By, (t) = Taken By, (c) = Cosigned By    Initials Name Effective Dates     Gwendolyn Can, MS  CCC-SLP 02/03/23 -                    EDUCATION  The patient has been educated in the following areas:     Cognitive Impairment Communication Impairment.           SLP GOALS     Row Name 02/16/23 1130 02/15/23 1100          Patient will demonstrate functional language skills for return to discharge environment     [place] with moderate cues  -AC with moderate cues  -AC     Time frame by discharge  -AC by discharge  -AC     Progress/Outcomes continuing progress toward goal  -AC --     Comments Perseverations noted across all tasks.  -AC --        Words/Phrases/Sentences Goal 1 (SLP)    Improve Ability to Comprehend Words/Phrases/Sentences Through: Goal 1 (SLP) identify objects, field of;70%;with maximum cues (25-49%)  -AC identify objects, field of;70%;with maximum cues (25-49%)  field of 2  -AC     Time Frame (Identify Objects and Pictures Goal 1, SLP) short term goal (STG)  -AC short term goal (STG)  -AC     Progress/Outcomes (Identify Objects and Pictures Goal 1, SLP) goal ongoing  -AC --        Comprehend Questions Goal 1 (SLP)    Improve Ability to Comprehend Questions Goal 1 (SLP) simple yes/no questions;complex yes/no questions;90%;with minimal cues (75-90%)  -AC questions about personal information;simple yes/no questions;70%;with maximum cues (25-49%)  -AC     Time Frame (Comprehend Questions Goal 1, SLP) short term goal (STG)  -AC short term goal (STG)  -AC     Progress (Ability to Comprehend Questions Goal 1, SLP) 60%;with minimal cues (75-90%)  -AC --     Progress/Outcomes (Comprehend Questions Goal 1, SLP) goal revised this date  -AC --     Comment (Comprehend Questions Goal 1, SLP) Revised 2' improvements noted.  -AC --        Follow Directions Goal 2 (SLP)    Improve Ability to Follow Directions Goal 1 (SLP) 2 step commands;80%;with minimal cues (75-90%)  -AC 1 step direction without objects;70%;with maximum cues (25-49%)  -AC     Time Frame (Follow Directions Goal 1, SLP) short term goal (STG)   -AC short term goal (STG)  -AC     Progress (Ability to Follow Directions Goal 1, SLP) 20%;with minimal cues (75-90%)  -AC --     Progress/Outcomes (Follow Directions Goal 1, SLP) goal revised this date  -AC --     Comment (Follow Directions Goal 1, SLP) Revised 2' improvements noted. Met goal for 1 step commands.  -AC --        Word Retrieval Skills Goal 1 (SLP)    Improve Word Retrieval Skills By Goal 1 (SLP) completing functional word finding tasks;answer WH question with one word;90%;with minimal cues (75-90%)  -AC completing functional word finding tasks;answer WH question with one word;70%;with maximum cues (25-49%)  -AC     Time Frame (Word Retrieval Goal 1, SLP) short term goal (STG)  -AC short term goal (STG)  -AC     Progress (Word Retrieval Skills Goal 1, SLP) 60%;with minimal cues (75-90%)  -AC --     Progress/Outcomes (Word Retrieval Goal 1, SLP) goal revised this date  -AC --     Comment (Word Retrieval Goal 1, SLP) Revised 2' improvements noted.  -AC --           User Key  (r) = Recorded By, (t) = Taken By, (c) = Cosigned By    Initials Name Provider Type     Gwendolyn Can MS CCC-SLP Speech and Language Pathologist                        Time Calculation:      Time Calculation- SLP     Row Name 02/16/23 1350             Time Calculation- SLP    SLP Start Time 1115  -AC      SLP Received On 02/16/23  -AC         Untimed Charges    53981-YR Eval Oral Pharyng Swallow Minutes 30  -AC      65282-YL Treatment/ST Modification Prosth Aug Alter  35  -AC         Total Minutes    Untimed Charges Total Minutes 65  -AC       Total Minutes 65  -AC            User Key  (r) = Recorded By, (t) = Taken By, (c) = Cosigned By    Initials Name Provider Type     Gwendolyn Can MS CCC-SLP Speech and Language Pathologist                Therapy Charges for Today     Code Description Service Date Service Provider Modifiers Qty    43541092086 HC ST EVAL SPEECH AND PROD W LANG  2 2/15/2023 Gwendolyn Can MS CCC-SLP GN 1     12263949082 HC ST EVAL ORAL PHARYNG SWALLOW 2 2/15/2023 Gwendolyn Can, MS CCC-SLP GN 1    91699946902 HC ST TREATMENT SPEECH 2 2023 Gwendolyn Can, MS CCC-SLP GN 1    96318971648 HC ST EVAL ORAL PHARYNG SWALLOW 2 2023 Gwendolyn Can, MS CCC-SLP GN 1                     Gwendolyn Can, MS CCC-SLP  2023 and [facility] Care - Speech Language Pathology   Swallow Re-Evaluation [facility]     Patient Name: Coco Crockett  : 1945  MRN: [id number]  Today's Date: 2023               Admit Date: 2/15/2023    Visit Dx:     ICD-10-CM ICD-9-CM   1. Acute ischemic stroke (HCC)  I63.9 434.91   2. Aphasia  R47.01 784.3   3. Dysphagia, unspecified type  R13.10 787.20   4. Impaired functional mobility, balance, gait, and endurance  Z74.09 V49.89   5. Cognitive communication deficit  R41.841 799.52     Patient Active Problem List   Diagnosis   • Mild obesity   • Hypertensive cardiovascular disease   • Hypertension   • Dyslipidemia   • Obesity (BMI 30.0-34.9)   • Hypothyroidism   • Osteopenia   • TMJ (temporomandibular joint disorder)   • GERD (gastroesophageal reflux disease)   • Iron deficiency anemia   • Depression   • Eczema   • Vertigo   • Migraine   • Type 2 diabetes mellitus (HCC)   • MALIK (dyspnea on exertion)   • Leg pain, bilateral   • Fatigue   • Ischemic heart disease   • Lumbar stenosis with neurogenic claudication   • Spondylosis of lumbar region without myelopathy or radiculopathy   • Degeneration of lumbar or lumbosacral intervertebral disc   • Greater trochanteric bursitis of both hips   • Spinal stenosis of lumbar region with neurogenic claudication   • S/P lumbar fusion   • Acute ischemic stroke (HCC)   • Stroke (HCC)     Past Medical History:   Diagnosis Date   • Anemia    • Anxiety    • Chest pain    • Coronary artery disease    • Depression    • Diabetes mellitus (HCC)    • Dyslipidemia    • Eczema    • Fear of awareness under general anesthesia    • Fear of general anesthetic     has  hx of awareness during anesthesia induction, felt like she was dying   • GERD (gastroesophageal reflux disease)    • History of COVID-19 02/01/2022   • Hyperlipidemia    • Hypertension     Chronic hypertension, probably essential.    • Hypertensive cardiovascular disease    • Hypothyroidism     Chronic hypothyroidism/replacement therapy.   • IBS (irritable bowel syndrome)    • Iron deficiency anemia    • Migraine     Intermittent progressive headache syndrome/possible migraine equivalent.   • Obesity (BMI 30.0-34.9)     Mild obesity (BMI 31).    • Osteopenia     Osteopenia with fracture of lumbar vertebra, 2004.    • Pneumonia    • TMJ (temporomandibular joint disorder)    • Type 2 diabetes mellitus (HCC)     Type 2 diabetes mellitus, Hemoglobin A1c 5.9%, December 2014.   • Urgency incontinence    • Urinary, incontinence, stress female    • Vertigo     Intermittent marked dizziness/vertigo with Epley maneuver.     Past Surgical History:   Procedure Laterality Date   • ANKLE OPEN REDUCTION INTERNAL FIXATION Right 1995    Fall on ice   • APPENDECTOMY  1970    Appendectomy with wedge resection of both ovaries, 1970.   • CATARACT EXTRACTION WITH INTRAOCULAR LENS IMPLANT     • COLONOSCOPY     • LASIK Bilateral    • LUMBAR LAMINECTOMY WITH FUSION N/A 5/9/2022    Procedure: LUMBAR DECOMPRESSON L2-S1 with Fusion and Stabilization with PEDICLE SCREWS at L4-S1;  Surgeon: Humberto Correa MD;  Location: [facility];  Service: Neurosurgery;  Laterality: N/A;   • TIBIAL PLATEAU OPEN REDUCTION INTERNAL FIXATION Right    • TONSILLECTOMY AND ADENOIDECTOMY     • TOTAL LAPAROSCOPIC HYSTERECTOMY SALPINGO OOPHORECTOMY  1985    MARIZA/BSO secondary to severe endometriosis, 1985.        SLP Recommendation and Plan  SLP Swallowing Diagnosis: swallow WFL/no suspected pharyngeal impairment (02/16/23 1130)  SLP Diet Recommendation: regular textures, thin liquids (02/16/23 1130)  Recommended Precautions and Strategies: upright posture  during/after eating, general aspiration precautions, assist with feeding (02/16/23 1130)  SLP Rec. for Method of Medication Administration: meds whole, with thin liquids, with puree, as tolerated (02/16/23 1130)     Monitor for Signs of Aspiration: yes, notify SLP if any concerns (02/16/23 1130)     Swallow Criteria for Skilled Therapeutic Interventions Met: no problems identified which require skilled intervention (02/16/23 1130)  Anticipated Discharge Disposition (SLP): anticipate therapy at next level of care, inpatient rehabilitation facility (02/16/23 1115)         Daily Summary of Progress (SLP): progress toward functional goals as expected (02/16/23 1115)            Communication Strategy Suggestions: eliminate distractions when speaking with patient, avoid open-ended questions, avoid multi-step instructions (02/16/23 1115)  Treatment Assessment (SLP): moderate-severe, cognitive-linguistic disorder, improved (02/16/23 1115)     Plan for Continued Treatment (SLP): continue treatment per plan of care (02/16/23 1115)         Plan of Care Reviewed With: patient, daughter  Progress: improving      SWALLOW EVALUATION (last 72 hours)     SLP Adult Swallow Evaluation     Row Name 02/16/23 1130 02/15/23 1120                Rehab Evaluation    Document Type re-evaluation  -AC evaluation  -AC          General Information    Patient Profile Reviewed yes  -AC yes  -AC       Pertinent History Of Current Problem See previous eval. MRI was negative for acute stroke. Encephalopathy r/t HTN & UTI suspected.  -AC --       Current Method of Nutrition pureed;thin liquids  -AC NPO  -AC       Prior Level of Function-Swallowing no diet consistency restrictions;esophageal concerns  -AC no diet consistency restrictions;esophageal concerns  -AC       Plans/Goals Discussed with patient and family;agreed upon  -AC patient and family;agreed upon  -AC       Barriers to Rehab none identified  -AC none identified  -AC       Patient's Goals  for Discharge return to regular diet  -AC patient could not state  -AC       Family Goals for Discharge family did not state  -AC family did not state  -AC          Pain Scale: Word Pre/Post-Treatment    Pain: Word Scale, Pretreatment 2 - mild pain  -AC --       Posttreatment Pain Rating 2 - mild pain  -AC --       Pain Location - head  -AC --       Pre/Posttreatment Pain Comment Notified RN.  -AC --          Pain Scale: FACES Pre/Post-Treatment    Pain: FACES Scale, Pretreatment -- 4-->hurts little more  -AC       Posttreatment Pain Rating -- 4-->hurts little more  -AC       Pre/Posttreatment Pain Comment -- Grimaced & repeatedly grabbed forhead and attempted to lie down. After stepping out, dtr reported pt appeared to verbally indicate she had a headache and wanted tylenol when dtr asked.  -AC          Oral Motor Structure and Function    Dentition Assessment natural, present and adequate  -AC natural, present and adequate  -AC       Secretion Management WNL/WFL  -AC WNL/WFL  -AC       Mucosal Quality moist, healthy  -AC dry  -AC          Oral Musculature and Cranial Nerve Assessment    Oral Motor General Assessment WFL  -AC unable to assess  -AC          General Eating/Swallowing Observations    Respiratory Support Currently in Use room air  -AC room air  -AC       Eating/Swallowing Skills fed by SLP;self-fed;appropriate self-feeding skills observed  -AC fed by SLP  -AC       Positioning During Eating upright 90 degree;upright in chair  -AC upright in bed  -AC       Utensils Used spoon;cup;straw  -AC spoon;cup;straw  -AC       Consistencies Trialed thin liquids;regular textures  -AC thin liquids;pureed  -AC          Clinical Swallow Eval    Oral Prep Phase impaired  -AC WFL  -AC       Oral Transit WFL  -AC WFL  -AC       Oral Residue WFL  -AC WFL  -AC       Pharyngeal Phase no overt signs/symptoms of pharyngeal impairment  -AC no overt signs/symptoms of pharyngeal impairment  -AC       Clinical Swallow Evaluation  Summary No overt clinical s/sxs pharyngeal dysphagia/aspiration.  -AC Trialed were limited 2' pt lethargy & difficult participation (suspct 2' pain). Oral phase seemingly WFL for consistencies trialed. No overt clinical s/sxs aspiration w/ any consistency trialed. DNT solid 2' pt attempting to lie down/decline further trials & lethargy.  -AC          Oral Prep Concerns    Oral Prep Concerns increased prep time  -AC --       Increased Prep Time regular consistencies  - --       Oral Prep Concerns, Comment Increased oral prep time, but adequate mastication. No significant oral residue or safety concerns. Likely r/t fatigue.  - --          Swallowing Quality of Life Assessment    Education and counseling provided -- Signs of aspiration;Risks of aspiration;Aspiration precautions  -          SLP Evaluation Clinical Impression    SLP Swallowing Diagnosis swallow WFL/no suspected pharyngeal impairment  - R/O pharyngeal dysphagia  -       Functional Impact -- risk of aspiration/pneumonia;risk of malnutrition;risk of dehydration  -       Rehab Potential/Prognosis, Swallowing -- good, to achieve stated therapy goals  -       Swallow Criteria for Skilled Therapeutic Interventions Met no problems identified which require skilled intervention  - demonstrates skilled criteria  -          Recommendations    SLP Diet Recommendation regular textures;thin liquids  - puree;thin liquids;other (see comments)  consider making pt NPO if concerns arise  -       Recommended Diagnostics -- reassess via clinical swallow evaluation  -       Recommended Precautions and Strategies upright posture during/after eating;general aspiration precautions;assist with feeding  - upright posture during/after eating;small bites of food and sips of liquid;general aspiration precautions;1:1 supervision;assist with feeding;other (see comments)  feed only if alert  -       Oral Care Recommendations Oral Care BID/PRN  - Oral Care  BID/PRN  -AC       SLP Rec. for Method of Medication Administration meds whole;with thin liquids;with puree;as tolerated  -AC meds whole;meds crushed;with puree;as tolerated  -AC       Monitor for Signs of Aspiration yes;notify SLP if any concerns  -AC yes;notify SLP if any concerns  -AC       Anticipated Discharge Disposition (SLP) -- anticipate therapy at next level of care;inpatient rehabilitation facility  -AC             User Key  (r) = Recorded By, (t) = Taken By, (c) = Cosigned By    Initials Name Effective Dates    AC Gwendolyn Can, MS [facility]-SLP 02/03/23 -                 EDUCATION  The patient has been educated in the following areas:   Dysphagia (Swallowing Impairment).        SLP GOALS     Row Name 02/16/23 1130 02/15/23 1100          Patient will demonstrate functional language skills for return to discharge environment     [place] with moderate cues  -AC with moderate cues  -AC     Time frame by discharge  -AC by discharge  -AC     Progress/Outcomes continuing progress toward goal  -AC --     Comments Perseverations noted across all tasks.  -AC --        Words/Phrases/Sentences Goal 1 (SLP)    Improve Ability to Comprehend Words/Phrases/Sentences Through: Goal 1 (SLP) identify objects, field of;70%;with maximum cues (25-49%)  -AC identify objects, field of;70%;with maximum cues (25-49%)  field of 2  -AC     Time Frame (Identify Objects and Pictures Goal 1, SLP) short term goal (STG)  -AC short term goal (STG)  -AC     Progress/Outcomes (Identify Objects and Pictures Goal 1, SLP) goal ongoing  -AC --        Comprehend Questions Goal 1 (SLP)    Improve Ability to Comprehend Questions Goal 1 (SLP) simple yes/no questions;complex yes/no questions;90%;with minimal cues (75-90%)  -AC questions about personal information;simple yes/no questions;70%;with maximum cues (25-49%)  -AC     Time Frame (Comprehend Questions Goal 1, SLP) short term goal (STG)  -AC short term goal (STG)  -AC     Progress (Ability to  Comprehend Questions Goal 1, SLP) 60%;with minimal cues (75-90%)  -AC --     Progress/Outcomes (Comprehend Questions Goal 1, SLP) goal revised this date  -AC --     Comment (Comprehend Questions Goal 1, SLP) Revised 2' improvements noted.  -AC --        Follow Directions Goal 2 (SLP)    Improve Ability to Follow Directions Goal 1 (SLP) 2 step commands;80%;with minimal cues (75-90%)  -AC 1 step direction without objects;70%;with maximum cues (25-49%)  -AC     Time Frame (Follow Directions Goal 1, SLP) short term goal (STG)  -AC short term goal (STG)  -AC     Progress (Ability to Follow Directions Goal 1, SLP) 20%;with minimal cues (75-90%)  -AC --     Progress/Outcomes (Follow Directions Goal 1, SLP) goal revised this date  -AC --     Comment (Follow Directions Goal 1, SLP) Revised 2' improvements noted. Met goal for 1 step commands.  -AC --        Word Retrieval Skills Goal 1 (SLP)    Improve Word Retrieval Skills By Goal 1 (SLP) completing functional word finding tasks;answer WH question with one word;90%;with minimal cues (75-90%)  -AC completing functional word finding tasks;answer WH question with one word;70%;with maximum cues (25-49%)  -AC     Time Frame (Word Retrieval Goal 1, SLP) short term goal (STG)  -AC short term goal (STG)  -AC     Progress (Word Retrieval Skills Goal 1, SLP) 60%;with minimal cues (75-90%)  -AC --     Progress/Outcomes (Word Retrieval Goal 1, SLP) goal revised this date  -AC --     Comment (Word Retrieval Goal 1, SLP) Revised 2' improvements noted.  -AC --           User Key  (r) = Recorded By, (t) = Taken By, (c) = Cosigned By    Initials Name Provider Type    AC Gwendolyn Can MS CCC-SLP Speech and Language Pathologist                   Time Calculation:    Time Calculation- SLP     Row Name 02/16/23 1350             Time Calculation- SLP    SLP Start Time 1115  -AC      SLP Received On 02/16/23  -         Untimed Charges    87672-LU Eval Oral Pharyng Swallow Minutes 30  -AC       90786-UK Treatment/ST Modification Prosth Aug Alter  35  -AC         Total Minutes    Untimed Charges Total Minutes 65  -AC       Total Minutes 65  -AC            User Key  (r) = Recorded By, (t) = Taken By, (c) = Cosigned By    Initials Name Provider Type    Gwendolyn Collier, MS CCC-SLP Speech and Language Pathologist                Therapy Charges for Today     Code Description Service Date Service Provider Modifiers Qty    69464684729 HC ST EVAL SPEECH AND PROD W LANG  2 2/15/2023 Gwendolyn Can, MS CCC-SLP GN 1    71202305964 HC ST EVAL ORAL PHARYNG SWALLOW 2 2/15/2023 Gwendolyn Can, MS CCC-SLP GN 1    99938770653 HC ST TREATMENT SPEECH 2 2/16/2023 Gwendolyn Can, MS CCC-SLP GN 1    75251199594 HC ST EVAL ORAL PHARYNG SWALLOW 2 2/16/2023 Gwendolyn Can, MS CCC-SLP GN 1               Gwendolyn Can MS CCC-SLP  2/16/2023

## 2023-02-16 NOTE — PLAN OF CARE
Problem: Adult Inpatient Plan of Care  Goal: Plan of Care Review  Recent Flowsheet Documentation  Taken 2/16/2023 1114 by Duarte Fair, OT  Progress: improving  Plan of Care Reviewed With:   patient   daughter  Outcome Evaluation: Pt demonstrated improved activity tolerance, balance, and command following this date. Baseline ADL completion remains limited by impaired cognition and mild strength/balance deficits warranting cont IPOT per POC. Pt would benefit from short IP rehab at discharge, if not then home with assist and 24/7 supervision/assist.

## 2023-02-16 NOTE — PLAN OF CARE
Patient oriented to person, place, and time when RN administering morning synthroid. Patient is slow to respond verbally however aphasia has improved from last night and the previous night. Updated Jaylin (daughter) on patient's progress. Will continue to monitor. Bed alarm on, call light within reach.      Problem: Adult Inpatient Plan of Care  Goal: Plan of Care Review  Outcome: Ongoing, Progressing     Problem: Adult Inpatient Plan of Care  Goal: Optimal Comfort and Wellbeing  Outcome: Ongoing, Progressing     Problem: Fall Injury Risk  Goal: Absence of Fall and Fall-Related Injury  Outcome: Ongoing, Progressing  Intervention: Identify and Manage Contributors  Recent Flowsheet Documentation  Taken 2/15/2023 2000 by Kunal Mendez RN  Medication Review/Management: medications reviewed  Intervention: Promote Injury-Free Environment  Recent Flowsheet Documentation  Taken 2/16/2023 0400 by Kunal Mendez RN  Safety Promotion/Fall Prevention:  • activity supervised  • assistive device/personal items within reach  • clutter free environment maintained  • fall prevention program maintained  • lighting adjusted  • nonskid shoes/slippers when out of bed  • room organization consistent  • safety round/check completed  • toileting scheduled  Taken 2/16/2023 0200 by Kunal Mendez RN  Safety Promotion/Fall Prevention:  • activity supervised  • assistive device/personal items within reach  • clutter free environment maintained  • fall prevention program maintained  • lighting adjusted  • nonskid shoes/slippers when out of bed  • room organization consistent  • safety round/check completed  • toileting scheduled  Taken 2/16/2023 0000 by Kunal Mendez RN  Safety Promotion/Fall Prevention:  • activity supervised  • assistive device/personal items within reach  • clutter free environment maintained  • fall prevention program maintained  • lighting adjusted  • nonskid shoes/slippers when out of bed  • room organization  consistent  • safety round/check completed  • toileting scheduled  Taken 2/15/2023 2200 by Kunal Mendez, RN  Safety Promotion/Fall Prevention:  • activity supervised  • assistive device/personal items within reach  • clutter free environment maintained  • fall prevention program maintained  • lighting adjusted  • nonskid shoes/slippers when out of bed  • room organization consistent  • safety round/check completed  • toileting scheduled  Taken 2/15/2023 2000 by Kunal Mendez, RN  Safety Promotion/Fall Prevention:  • activity supervised  • assistive device/personal items within reach  • clutter free environment maintained  • fall prevention program maintained  • lighting adjusted  • nonskid shoes/slippers when out of bed  • room organization consistent  • safety round/check completed  • toileting scheduled     Problem: Cognitive Impairment (Stroke, Ischemic/Transient Ischemic Attack)  Goal: Optimal Cognitive Function  Outcome: Ongoing, Progressing     Problem: Communication Impairment (Stroke, Ischemic/Transient Ischemic Attack)  Goal: Improved Communication Skills  Outcome: Ongoing, Progressing

## 2023-02-16 NOTE — PROGRESS NOTES
Stroke Progress Note       Chief Complaint:  AMS, speech difficulty    Subjective    Subjective     Subjective:  Sitting up in bed eating pudding.  Tells me she is feeling better.  Much more interactive and conversant today; able to tell me her name, age, month and location.  Clearly, still confused as she tells me she lives with her mother. Able to follow most one step commands. Daughter is at bedside who says this is big improvement from last night but she is still not at her baseline level of mentation.    Review of Systems   Unable to perform ROS: Mental status change            Objective    Objective      Temp:  [98 °F (36.7 °C)-99.1 °F (37.3 °C)] 98.3 °F (36.8 °C)  Heart Rate:  [73-88] 76  Resp:  [16-18] 16  BP: (144-176)/(64-83) 165/78        Neurological Exam  Mental Status  Alert. Oriented only to person, place and time. Orientation: Intermittent confusion. Speech is normal. Expressive aphasia present. Attention and concentration are normal.    Cranial Nerves  CN II: Intact to visual threat. Intact to visual threat. Visual fields full to confrontation.  CN III, IV, VI: Extraocular movements intact bilaterally. Normal lids and orbits bilaterally. Pupils equal round and reactive to light bilaterally.  CN V: Facial sensation is normal.  CN VII: Full and symmetric facial movement.  CN XI: Shoulder shrug strength is normal.  CN XII: Tongue midline without atrophy or fasciculations.    Motor   No fasciculations present. Normal muscle tone. No abnormal involuntary movements. Strength is 5/5 throughout all four extremities.    Sensory  Light touch is normal in upper and lower extremities.     Reflexes                                            Right                      Left  Plantar                           Downgoing                Downgoing    Coordination    Difficulty following these commands.    Gait    Not tested.      Physical Exam  Vitals and nursing note reviewed.   Constitutional:       Appearance:  Normal appearance.   HENT:      Head: Normocephalic and atraumatic.   Eyes:      General: Lids are normal.      Extraocular Movements: Extraocular movements intact.      Pupils: Pupils are equal, round, and reactive to light.   Cardiovascular:      Rate and Rhythm: Normal rate and regular rhythm.   Pulmonary:      Effort: Pulmonary effort is normal. No respiratory distress.   Musculoskeletal:         General: No swelling. Normal range of motion.      Cervical back: Normal range of motion and neck supple.   Skin:     General: Skin is warm and dry.   Neurological:      General: No focal deficit present.      Mental Status: She is alert. She is disoriented.      Cranial Nerves: No cranial nerve deficit.      Sensory: No sensory deficit.      Motor: Motor strength is normal. No weakness.   Psychiatric:         Mood and Affect: Mood normal.         Speech: Speech normal.         Behavior: Behavior normal.         Results Review:    I reviewed the patient's new clinical results.  WBC   Date Value Ref Range Status   02/16/2023 6.57 3.40 - 10.80 10*3/mm3 Final     RBC   Date Value Ref Range Status   02/16/2023 3.99 3.77 - 5.28 10*6/mm3 Final     Hemoglobin   Date Value Ref Range Status   02/16/2023 8.6 (L) 12.0 - 15.9 g/dL Final     Hematocrit   Date Value Ref Range Status   02/16/2023 29.5 (L) 34.0 - 46.6 % Final     MCV   Date Value Ref Range Status   02/16/2023 73.9 (L) 79.0 - 97.0 fL Final     MCH   Date Value Ref Range Status   02/16/2023 21.6 (L) 26.6 - 33.0 pg Final     MCHC   Date Value Ref Range Status   02/16/2023 29.2 (L) 31.5 - 35.7 g/dL Final     RDW   Date Value Ref Range Status   02/16/2023 17.3 (H) 12.3 - 15.4 % Final     RDW-SD   Date Value Ref Range Status   02/16/2023 46.0 37.0 - 54.0 fl Final     MPV   Date Value Ref Range Status   02/16/2023 9.5 6.0 - 12.0 fL Final     Platelets   Date Value Ref Range Status   02/16/2023 290 140 - 450 10*3/mm3 Final     Neutrophil %   Date Value Ref Range Status    02/16/2023 57.3 42.7 - 76.0 % Final     Lymphocyte %   Date Value Ref Range Status   02/16/2023 28.9 19.6 - 45.3 % Final     Monocyte %   Date Value Ref Range Status   02/16/2023 12.3 (H) 5.0 - 12.0 % Final     Eosinophil %   Date Value Ref Range Status   02/16/2023 0.3 0.3 - 6.2 % Final     Basophil %   Date Value Ref Range Status   02/16/2023 0.6 0.0 - 1.5 % Final     Immature Grans %   Date Value Ref Range Status   02/16/2023 0.6 (H) 0.0 - 0.5 % Final     Neutrophils, Absolute   Date Value Ref Range Status   02/16/2023 3.76 1.70 - 7.00 10*3/mm3 Final     Lymphocytes, Absolute   Date Value Ref Range Status   02/16/2023 1.90 0.70 - 3.10 10*3/mm3 Final     Monocytes, Absolute   Date Value Ref Range Status   02/16/2023 0.81 0.10 - 0.90 10*3/mm3 Final     Eosinophils, Absolute   Date Value Ref Range Status   02/16/2023 0.02 0.00 - 0.40 10*3/mm3 Final     Basophils, Absolute   Date Value Ref Range Status   02/16/2023 0.04 0.00 - 0.20 10*3/mm3 Final     Immature Grans, Absolute   Date Value Ref Range Status   02/16/2023 0.04 0.00 - 0.05 10*3/mm3 Final     nRBC   Date Value Ref Range Status   02/16/2023 0.0 0.0 - 0.2 /100 WBC Final     Lab Results   Component Value Date    GLUCOSE 106 (H) 02/16/2023    BUN 15 02/16/2023    CREATININE 0.62 02/16/2023    EGFR 90.7 02/16/2023    BCR 24.2 02/16/2023    K 3.6 02/16/2023    CO2 20.0 (L) 02/16/2023    CALCIUM 9.0 02/16/2023    ALBUMIN 4.1 02/16/2023    AST 19 02/16/2023    ALT 8 02/16/2023   A1c 5.3  , triglycerides 619  MRI Brain Without Contrast    Result Date: 2/16/2023  Impression: Mild age-related changes are present. There is no evidence of acute infarct, hemorrhage, mass or mass effect. Electronically Signed: Ramses Paul  2/16/2023 5:13 AM EST  Workstation ID: [id number]    XR Chest 1 View    Result Date: 2/15/2023  1.  Dental implants and spinal fusion hardware. Otherwise, no retained radiopaque foreign body is identified. 2.  Low lung volumes without focal  consolidation. 3.  No acute intra-abdominal findings. Electronically signed by:  Jayna Johnson D.O.  2/15/2023 2:09 AM Mountain Time    XR Abdomen KUB    Result Date: 2/15/2023  1.  Dental implants and spinal fusion hardware. Otherwise, no retained radiopaque foreign body is identified. 2.  Low lung volumes without focal consolidation. 3.  No acute intra-abdominal findings. Electronically signed by:  Jayna Johnson D.O.  2/15/2023 2:09 AM Mountain Time    XR Skull Lateral & Ap    Result Date: 2/15/2023  1.  Dental implants and spinal fusion hardware. Otherwise, no retained radiopaque foreign body is identified. 2.  Low lung volumes without focal consolidation. 3.  No acute intra-abdominal findings. Electronically signed by:  Jayna Johnson D.O.  2/15/2023 2:09 AM Mountain Time      Results for orders placed during the hospital encounter of 02/15/23    Adult Transthoracic Echo Complete W/ Cont if Necessary Per Protocol (With Agitated Saline)    Interpretation Summary  •  Left ventricular systolic function is normal. Calculated left ventricular EF = 59.7% Left ventricular ejection fraction appears to be 61 - 65%.  •  Left ventricular diastolic function is consistent with (grade I) impaired relaxation.  •  Saline test results are negative.  •  There is calcification of the aortic valve.  •  Aortic valve maximum pressure gradient is 9 mmHg.            Assessment/Plan     Assessment/Plan: 77-year-old,  diagnosis of T2DM, HTN, HLD, migraine with aura, CAD s/p stent, thyroid disease, vertigo, IBS, and anxiety who presented as a transfer with AMS, altered speech and headache.  Patient had been seen at OSH with complaint of severe headache and systolic blood pressure in the 190s.  She had an abrupt drop in blood pressure to the 150s within 30 minutes following which she developed altered speech with dysarthria and word salad.  CT head negative for acute abnormality, CTP negative CTA H/N unrevealing.  NIH on arrival 7, nonfocal  exam on arrival with difficulty following commands.  BP on arrival 182/80    PTA antiplatelet: Aspirin 81 mg (uncertain if patient is still taking)  PTA anticoagulant: None      1. AMS -suspect confusion and speech difficulties secondary to hypertensive encephalopathy in the setting of UTI  -MRI brain is negative for stroke, I do not believe this is related to a cerebrovascular event  -EEG completed this morning and pending  -Okay to continue aspirin 81 mg daily  -Continue statin  -Clinically improving today after beginning Macrobid last evening  -Patient's daughter states she was treated for UTI with Macrobid 2 weeks ago  -Blood culture, urine cultures are pending  -Echo, normal EF, mild left atrial dilation, negative bubble study  -HTN:  Normal bp parameters, ok to resume home HTN meds  -Continue PT/OT, therapy recommending rehab at discharge      Plan of care discussed with patient's daughter and Dr. Randhawa.  Explained to daughter that imaging is reassuring and she does not appear to have had a stroke, nor is this a TIA.  I discussed with daughter that her severe headache was likely due to her very high blood pressure and confusion and speech changes are common with HTN and UTI.  No further stroke workup, will sign off.           Gwendolyn Gimenez, APRN  02/16/23  10:05 EST     yes

## 2023-02-16 NOTE — SIGNIFICANT NOTE
EEG results show intermittent slowing in the right temporal lobe with occasional sharp waves in the right temporal lobe consistent with partial seizures.  REsults discussed with Dr. Sommer.    Suspect partial seizures in the setting of UTI    -Start Keppra 500 mg twice daily x 14 days then stop

## 2023-02-16 NOTE — THERAPY TREATMENT NOTE
Patient Name: Coco Crockett  : 1945    MRN: [id number]                              Today's Date: 2023       Admit Date: 2/15/2023    Visit Dx:     ICD-10-CM ICD-9-CM   1. Acute ischemic stroke (HCC)  I63.9 434.91   2. Aphasia  R47.01 784.3   3. Dysphagia, unspecified type  R13.10 787.20   4. Impaired functional mobility, balance, gait, and endurance  Z74.09 V49.89     Patient Active Problem List   Diagnosis   • Mild obesity   • Hypertensive cardiovascular disease   • Hypertension   • Dyslipidemia   • Obesity (BMI 30.0-34.9)   • Hypothyroidism   • Osteopenia   • TMJ (temporomandibular joint disorder)   • GERD (gastroesophageal reflux disease)   • Iron deficiency anemia   • Depression   • Eczema   • Vertigo   • Migraine   • Type 2 diabetes mellitus (HCC)   • MALIK (dyspnea on exertion)   • Leg pain, bilateral   • Fatigue   • Ischemic heart disease   • Lumbar stenosis with neurogenic claudication   • Spondylosis of lumbar region without myelopathy or radiculopathy   • Degeneration of lumbar or lumbosacral intervertebral disc   • Greater trochanteric bursitis of both hips   • Spinal stenosis of lumbar region with neurogenic claudication   • S/P lumbar fusion   • Acute ischemic stroke (HCC)   • Stroke (HCC)     Past Medical History:   Diagnosis Date   • Anemia    • Anxiety    • Chest pain    • Coronary artery disease    • Depression    • Diabetes mellitus (HCC)    • Dyslipidemia    • Eczema    • Fear of awareness under general anesthesia    • Fear of general anesthetic     has hx of awareness during anesthesia induction, felt like she was dying   • GERD (gastroesophageal reflux disease)    • History of COVID-19 2022   • Hyperlipidemia    • Hypertension     Chronic hypertension, probably essential.    • Hypertensive cardiovascular disease    • Hypothyroidism     Chronic hypothyroidism/replacement therapy.   • IBS (irritable bowel syndrome)    • Iron deficiency anemia    • Migraine     Intermittent  progressive headache syndrome/possible migraine equivalent.   • Obesity (BMI 30.0-34.9)     Mild obesity (BMI 31).    • Osteopenia     Osteopenia with fracture of lumbar vertebra, 2004.    • Pneumonia    • TMJ (temporomandibular joint disorder)    • Type 2 diabetes mellitus (HCC)     Type 2 diabetes mellitus, Hemoglobin A1c 5.9%, December 2014.   • Urgency incontinence    • Urinary, incontinence, stress female    • Vertigo     Intermittent marked dizziness/vertigo with Epley maneuver.     Past Surgical History:   Procedure Laterality Date   • ANKLE OPEN REDUCTION INTERNAL FIXATION Right 1995    Fall on ice   • APPENDECTOMY  1970    Appendectomy with wedge resection of both ovaries, 1970.   • CATARACT EXTRACTION WITH INTRAOCULAR LENS IMPLANT     • COLONOSCOPY     • LASIK Bilateral    • LUMBAR LAMINECTOMY WITH FUSION N/A 5/9/2022    Procedure: LUMBAR DECOMPRESSON L2-S1 with Fusion and Stabilization with PEDICLE SCREWS at L4-S1;  Surgeon: Humberto Correa MD;  Location: [facility];  Service: Neurosurgery;  Laterality: N/A;   • TIBIAL PLATEAU OPEN REDUCTION INTERNAL FIXATION Right    • TONSILLECTOMY AND ADENOIDECTOMY     • TOTAL LAPAROSCOPIC HYSTERECTOMY SALPINGO OOPHORECTOMY  1985    MARIZA/BSO secondary to severe endometriosis, 1985.       General Information     Row Name 02/16/23 1107          OT Time and Intention    Document Type therapy note (daily note)  -CS     Mode of Treatment occupational therapy  -CS     Row Name 02/16/23 1107          General Information    Patient Profile Reviewed yes  -CS     Existing Precautions/Restrictions fall;other (see comments)  intermittent confusion/aphasia, hx of vertigo  -CS     Barriers to Rehab cognitive status  -CS     Row Name 02/16/23 1107          Cognition    Orientation Status (Cognition) oriented to;person;place;verbal cues/prompts needed for orientation;time;situation  -CS     Row Name 02/16/23 1107          Safety Issues, Functional Mobility    Safety Issues Affecting  Function (Mobility) awareness of need for assistance;insight into deficits/self-awareness;safety precaution awareness;safety precautions follow-through/compliance;positioning of assistive device;sequencing abilities  -CS     Impairments Affecting Function (Mobility) balance;cognition;strength;endurance/activity tolerance  -CS     Cognitive Impairments, Mobility Safety/Performance insight into deficits/self-awareness;problem-solving/reasoning;safety precaution awareness;safety precaution follow-through;sequencing abilities;awareness, need for assistance  -           User Key  (r) = Recorded By, (t) = Taken By, (c) = Cosigned By    Initials Name Provider Type    CS Duarte Fair OT Occupational Therapist                 Mobility/ADL's     Row Name 02/16/23 1108          Bed Mobility    Bed Mobility supine-sit;scooting/bridging  -     Scooting/Bridging [place] (Bed Mobility) standby assist  -CS     Supine-Sit [place] (Bed Mobility) contact guard  -     Assistive Device (Bed Mobility) head of bed elevated  -CS     Comment, (Bed Mobility) no dizziness reported upon sitting, fair balance  -     Row Name 02/16/23 1108          Transfers    Transfers sit-stand transfer;toilet transfer;bed-chair transfer  -CS     Comment, (Transfers) assist for improved sequencing/positioning w/ RW use  -     Row Name 02/16/23 1108          Bed-Chair Transfer    Bed-Chair [place] (Transfers) contact guard;verbal cues  -     Assistive Device (Bed-Chair Transfers) walker, front-wheeled  -     Row Name 02/16/23 1108          Sit-Stand Transfer    Sit-Stand [place] (Transfers) contact guard;verbal cues  -     Assistive Device (Sit-Stand Transfers) walker, front-wheeled  -     Row Name 02/16/23 1108          Toilet Transfer    Type (Toilet Transfer) stand-sit;sit-stand  -     [place] Level (Toilet Transfer) contact guard;1 person assist;verbal cues  -     Assistive Device (Toilet Transfer)  walker, front-wheeled;grab bars/safety frame  -     Row Name 02/16/23 1108          Functional Mobility    Functional Mobility- Comment CGA for in-room distance to sinkside, toilet, and recliner. Assist for improved AD positioning/sequencing throughout  -     Row Name 02/16/23 1108          Activities of Daily Living    BADL Assessment/Intervention lower body dressing;grooming;upper body dressing  -     Row Name 02/16/23 1108          Lower Body Dressing Assessment/Training    [place] Level (Lower Body Dressing) socks;doff;standby assist;don;minimum assist (75% patient effort)  -CS     Position (Lower Body Dressing) edge of bed sitting  -CS     Comment, (Lower Body Dressing) assist required for reach to RLE in crossed leg position, doffed independently  -     Row Name 02/16/23 1108          Upper Body Dressing Assessment/Training    [place] Level (Upper Body Dressing) don;pajama/robe;minimum assist (75% patient effort);set up  -CS     Position (Upper Body Dressing) edge of bed sitting  -     Row Name 02/16/23 1108          Toileting Assessment/Training    [place] Level (Toileting) adjust/manage clothing;minimum assist (75% patient effort);perform perineal hygiene;standby assist  -CS     Position (Toileting) supported standing;supported sitting  -     Row Name 02/16/23 1108          Grooming Assessment/Training    [place] Level (Grooming) wash face, hands;oral care regimen;contact guard assist;hair care, combing/brushing;moderate assist (50% patient effort)  -CS     Position (Grooming) sink side;supported sitting  -CS     Comment, (Grooming) oral care sinkside - appropriate seqencing intact, hair washing limited by fatigue  -           User Key  (r) = Recorded By, (t) = Taken By, (c) = Cosigned By    Initials Name Provider Type     Duarte Fair, OT Occupational Therapist               Obj/Interventions     Row Name 02/16/23 1113          Motor Skills    Motor Skills motor  control/coordination interventions;functional endurance  -CS     Functional Endurance improved, fatigued following standing ADLs  -     Motor Control/Coordination Interventions occupation/activity based treatment  -     Row Name 02/16/23 1113          Balance    Balance Assessment sitting static balance;sitting dynamic balance;standing static balance;standing dynamic balance  -     Static Sitting Balance standby assist  -     Dynamic Sitting Balance contact guard  -CS     Position, Sitting Balance unsupported;sitting edge of bed  -     Static Standing Balance standby assist  -     Dynamic Standing Balance minimal assist  -CS     Position/Device Used, Standing Balance supported;walker, front-wheeled  -     Balance Interventions sitting;standing;sit to stand;occupation based/functional task  -           User Key  (r) = Recorded By, (t) = Taken By, (c) = Cosigned By    Initials Name Provider Type     Duarte Fair OT Occupational Therapist               Goals/Plan    No documentation.                Clinical Impression     Row Name 02/16/23 1114          Pain Assessment    Pretreatment Pain Rating 0/10 - no pain  -CS     Posttreatment Pain Rating 0/10 - no pain  -CS     Pre/Posttreatment Pain Comment tolerated  -     Pain Intervention(s) Repositioned;Ambulation/increased activity  -     Row Name 02/16/23 1114          Plan of Care Review    Plan of Care Reviewed With patient;daughter  -     Progress improving  -     Outcome Evaluation Pt demonstrated improved activity tolerance, balance, and command following this date. Baseline ADL completion remains limited by impaired cognition and mild strength/balance deficits warranting cont IPOT per POC. Pt would benefit from short IP rehab at discharge, if not then home with assist and 24/7 supervision/assist.  -     Row Name 02/16/23 1114          Therapy Plan Review/Discharge Plan (OT)    Anticipated Discharge Disposition (OT) inpatient  rehabilitation facility  -     Row Name 02/16/23 1114          Vital Signs    Pre Systolic BP Rehab --  RN cleared for tx, VSS on RA  -CS     O2 Delivery Pre Treatment room air  -CS     O2 Delivery Intra Treatment room air  -CS     O2 Delivery Post Treatment room air  -CS     Pre Patient Position Supine  -CS     Intra Patient Position Standing  -CS     Post Patient Position Sitting  -CS     Row Name 02/16/23 1114          Positioning and Restraints    Pre-Treatment Position in bed  -CS     Post Treatment Position chair  -CS     In Chair notified nsg;reclined;sitting;call light within reach;exit alarm on;encouraged to call for assist;waffle cushion;legs elevated;heels elevated;with family/caregiver;with other staff;on mechanical lift sling  -CS           User Key  (r) = Recorded By, (t) = Taken By, (c) = Cosigned By    Initials Name Provider Type    CS Duarte Fair, OT Occupational Therapist               Outcome Measures     Row Name 02/16/23 1120          How much help from another is currently needed...    Putting on and taking off regular lower body clothing? 3  -CS     Bathing (including washing, rinsing, and drying) 3  -CS     Toileting (which includes using toilet bed pan or urinal) 3  -CS     Putting on and taking off regular upper body clothing 3  -CS     Taking care of personal grooming (such as brushing teeth) 3  -CS     Eating meals 3  -CS     AM-PAC 6 Clicks Score (OT) 18  -CS     Row Name 02/16/23 0813          How much help from another person do you currently need...    Turning from your back to your side while in flat bed without using bedrails? 4  -MC     Moving from lying on back to sitting on the side of a flat bed without bedrails? 4  -MC     Moving to and from a bed to a chair (including a wheelchair)? 4  -MC     Standing up from a chair using your arms (e.g., wheelchair, bedside chair)? 3  -MC     Climbing 3-5 steps with a railing? 3  -MC     To walk in hospital room? 3  -MC     AM-PAC 6  Clicks Score (PT) 21  -     Highest level of mobility 6 --> Walked 10 steps or more  -     Row Name 02/16/23 1120          Modified Cindy Scale    Modified Glynn Scale --  -     Row Name 02/16/23 1120          Functional Assessment    Outcome Measure Options AM-PAC 6 Clicks Daily Activity (OT)  -CS           User Key  (r) = Recorded By, (t) = Taken By, (c) = Cosigned By    Initials Name Provider Type     Duarte Fair OT Occupational Therapist    Natalia Pan, RN Registered Nurse                Occupational Therapy Education     Title: PT OT SLP Therapies (In Progress)     Topic: Occupational Therapy (Done)     Point: ADL training (Done)     Description:   Instruct learner(s) on proper safety adaptation and remediation techniques during self care or transfers.   Instruct in proper use of assistive devices.              Learning Progress Summary           Patient Acceptance, E,D, VU,DU by  at 2/16/2023 1120    Acceptance, E,D, NR by  at 2/15/2023 0935   Family Acceptance, E,D, VU,DU by  at 2/16/2023 1120    Acceptance, E,D, NR by  at 2/15/2023 0935                   Point: Home exercise program (Done)     Description:   Instruct learner(s) on appropriate technique for monitoring, assisting and/or progressing therapeutic exercises/activities.              Learning Progress Summary           Patient Acceptance, E,D, VU,DU by  at 2/16/2023 1120   Family Acceptance, E,D, VU,DU by  at 2/16/2023 1120                   Point: Precautions (Done)     Description:   Instruct learner(s) on prescribed precautions during self-care and functional transfers.              Learning Progress Summary           Patient Acceptance, E,D, VU,DU by  at 2/16/2023 1120    Acceptance, E,D, NR by  at 2/15/2023 0935   Family Acceptance, E,D, VU,DU by  at 2/16/2023 1120    Acceptance, E,D, NR by  at 2/15/2023 0935                   Point: Body mechanics (Done)     Description:   Instruct learner(s) on  proper positioning and spine alignment during self-care, functional mobility activities and/or exercises.              Learning Progress Summary           Patient Acceptance, E,D, VU,DU by  at 2/16/2023 1120    Acceptance, E,D, NR by  at 2/15/2023 0935   Family Acceptance, E,D, VU,DU by  at 2/16/2023 1120    Acceptance, E,D, NR by  at 2/15/2023 0935                               User Key     Initials Effective Dates Name Provider Type Discipline     06/16/21 -  Duarte Fair OT Occupational Therapist OT              OT Recommendation and Plan  Planned Therapy Interventions (OT): activity tolerance training, functional balance retraining, occupation/activity based interventions, strengthening exercise, transfer/mobility retraining, patient/caregiver education/training, neuromuscular control/coordination retraining, adaptive equipment training  Therapy Frequency (OT): daily  Plan of Care Review  Plan of Care Reviewed With: patient, daughter  Progress: improving  Outcome Evaluation: Pt demonstrated improved activity tolerance, balance, and command following this date. Baseline ADL completion remains limited by impaired cognition and mild strength/balance deficits warranting cont IPOT per POC. Pt would benefit from short IP rehab at discharge, if not then home with assist and 24/7 supervision/assist.     Time Calculation:    Time Calculation- OT     Row Name 02/16/23 1121             Time Calculation- OT    OT Start Time 1016  -CS      OT Received On 02/16/23  -CS      OT Goal Re-Cert Due Date 02/25/23  -CS         Timed Charges    25718 - OT Therapeutic Activity Minutes 10  -CS      61048 - OT Self Care/Mgmt Minutes 30  -CS         Total Minutes    Timed Charges Total Minutes 40  -CS       Total Minutes 40  -CS            User Key  (r) = Recorded By, (t) = Taken By, (c) = Cosigned By    Initials Name Provider Type    CS Duarte Fair OT Occupational Therapist              Therapy Charges for Today      Code Description Service Date Service Provider Modifiers Qty    82441143998 HC OT EVAL MOD COMPLEXITY 4 2/15/2023 Duarte Fair OT GO 1    [id number]  OT THERAPEUTIC ACT EA 15 MIN 2/16/2023 Duarte Fair OT GO 1    [id number]  OT SELF CARE/MGMT/TRAIN EA 15 MIN 2/16/2023 Duarte Fair OT GO 2               Duarte Fair OT  2/16/2023

## 2023-02-16 NOTE — PLAN OF CARE
Goal Outcome Evaluation:  Plan of Care Reviewed With: patient, daughter        Progress: improving   SLP re-evaluation and treatment completed. Will continue to address cognitive-linguistic deficits in tx. Swallowing improved/seemingly WFL--no further swallow intervention indicated @ this time. Please see note for further details and recommendations.

## 2023-02-16 NOTE — PROGRESS NOTES
[facility] Medicine Services  PROGRESS NOTE    Patient Name: Coco Crockett  : 1945  MRN: [id number]    Date of Admission: 2/15/2023  Primary Care Physician: Lobo Archer MD    Subjective   Subjective     CC:  AMS, slurred speech    HPI:  Doing much better today, ambulating in room with OT and walker assistance.  More alert.  Only complaint is frontal headache. Daughter also at bedside. Reports pt was just treated with Macrobid for ten days about two weeks ago    ROS:  Gen- No fevers, chills  CV- No chest pain, palpitations  Resp- No cough, dyspnea  GI- No N/V/D, abd pain      Objective   Objective     Vital Signs:   Temp:  [98 °F (36.7 °C)-99.1 °F (37.3 °C)] 98.3 °F (36.8 °C)  Heart Rate:  [73-88] 76  Resp:  [16-18] 16  BP: (144-176)/(64-83) 165/78     Physical Exam:  Constitutional: No acute distress, awake, alert  HENT: NCAT, mucous membranes moist  Respiratory: Clear to auscultation bilaterally, respiratory effort normal   Cardiovascular: RRR, no murmurs, rubs, or gallops  Gastrointestinal: Positive bowel sounds, soft, nontender, nondistended  Musculoskeletal: No bilateral ankle edema  Psychiatric: Appropriate affect, cooperative  Neurologic: Oriented x 3, strength symmetric in all extremities, Cranial Nerves grossly intact to confrontation, speech clear  Skin: No rashes    Results Reviewed:  LAB RESULTS:      Lab 23  0608 02/15/23  1424 02/15/23  1222 02/15/23  0631   WBC 6.57  --   --  10.87*   HEMOGLOBIN 8.6*  --   --  9.4*   HEMATOCRIT 29.5*  --   --  32.2*   PLATELETS 290  --   --  358   NEUTROS ABS 3.76  --   --  8.12*   IMMATURE GRANS (ABS) 0.04  --   --  0.06*   LYMPHS ABS 1.90  --   --  1.99   MONOS ABS 0.81  --   --  0.66   EOS ABS 0.02  --   --  0.00   MCV 73.9*  --   --  72.7*   PROCALCITONIN  --   --  0.05  --    LACTATE  --  1.3  --   --          Lab 23  0608 02/15/23  0631   SODIUM 132* 130*   POTASSIUM 3.6 3.8   CHLORIDE 97* 94*   CO2 20.0*  20.0*   ANION GAP 15.0 16.0*   BUN 15 12   CREATININE 0.62 0.62   EGFR 90.7 90.7   GLUCOSE 106* 120*   CALCIUM 9.0 9.4   HEMOGLOBIN A1C  --  5.30   TSH  --  1.270         Lab 02/16/23  0608 02/15/23  0631   TOTAL PROTEIN 6.7 7.3   ALBUMIN 4.1 4.4   GLOBULIN 2.6 2.9   ALT (SGPT) 8 10   AST (SGOT) 19 18   BILIRUBIN 0.5 0.6   ALK PHOS 48 53             Lab 02/15/23  0631   CHOLESTEROL 279*   LDL CHOL 130*   HDL CHOL 34*   TRIGLYCERIDES 619*             Brief Urine Lab Results  (Last result in the past 365 days)      Color   Clarity   Blood   Leuk Est   Nitrite   Protein   CREAT   Urine HCG        02/15/23 1353 Dark Yellow   Cloudy   Trace   Moderate (2+)   Negative   30 mg/dL (1+)                 Microbiology Results Abnormal     Procedure Component Value - Date/Time    Respiratory Panel PCR w/COVID-19(SARS-CoV-2) MATTHEW/ABAD/JOEY/PAD/COR/MAD/SKINNY In-House, NP Swab in UTM/VTM, 3-4 HR TAT - Swab, Nasopharynx [[id number]]  (Normal) Collected: 02/15/23 1322    Lab Status: Final result Specimen: Swab from Nasopharynx Updated: 02/15/23 1452     ADENOVIRUS, PCR Not Detected     Coronavirus 229E Not Detected     Coronavirus HKU1 Not Detected     Coronavirus NL63 Not Detected     Coronavirus OC43 Not Detected     COVID19 Not Detected     Human Metapneumovirus Not Detected     Human Rhinovirus/Enterovirus Not Detected     Influenza A PCR Not Detected     Influenza B PCR Not Detected     Parainfluenza Virus 1 Not Detected     Parainfluenza Virus 2 Not Detected     Parainfluenza Virus 3 Not Detected     Parainfluenza Virus 4 Not Detected     RSV, PCR Not Detected     Bordetella pertussis pcr Not Detected     Bordetella parapertussis PCR Not Detected     Chlamydophila pneumoniae PCR Not Detected     Mycoplasma pneumo by PCR Not Detected    Narrative:      In the setting of a positive respiratory panel with a viral infection PLUS a negative procalcitonin without other underlying concern for bacterial infection, consider observing off  antibiotics or discontinuation of antibiotics and continue supportive care. If the respiratory panel is positive for atypical bacterial infection (Bordetella pertussis, Chlamydophila pneumoniae, or Mycoplasma pneumoniae), consider antibiotic de-escalation to target atypical bacterial infection.          Adult Transthoracic Echo Complete W/ Cont if Necessary Per Protocol (With Agitated Saline)    Result Date: 2/15/2023  •  Left ventricular systolic function is normal. Calculated left ventricular EF = 59.7% Left ventricular ejection fraction appears to be 61 - 65%. •  Left ventricular diastolic function is consistent with (grade I) impaired relaxation. •  Saline test results are negative. •  There is calcification of the aortic valve. •  Aortic valve maximum pressure gradient is 9 mmHg.     MRI Brain Without Contrast    Result Date: 2/16/2023  MRI BRAIN WO CONTRAST Date of Exam: 2/15/2023 11:38 PM EST Indication: Stroke, follow up.  Comparison: Outside CT one day prior Technique:  Routine multiplanar/multisequence sequence images of the brain were obtained without contrast administration. Findings: No acute infarct is present on diffusion weighted sequences. Midline structures are normal and the craniocervical junction is satisfactory in appearance. There is mild generalized volume loss. There is otherwise no evidence of intracranial hemorrhage, mass or mass effect. There is mild ex vacuo prominence of the ventricles. The orbits are normal. The paranasal sinuses are grossly clear.     Impression: Impression: Mild age-related changes are present. There is no evidence of acute infarct, hemorrhage, mass or mass effect. Electronically Signed: Ramses Paul  2/16/2023 5:13 AM EST  Workstation ID: [id number]    XR Chest 1 View    Result Date: 2/15/2023  XR SKULL LATERAL AND AP, XR CHEST 1 VW, XR ABDOMEN KUB: 2/15/2023 2:43 AM COMPARISON: None available HISTORY: MRI clearance TECHNIQUE: 2 views of the skull, single view the  abdomen, single view of the chest. FINDINGS: Bones: Multiple dental implants and fillings or crowns are present. Disc prosthesis and spinal fusion rods and pedicle screws from L4 to S1. No foreign body overlying the chest. Heart size is normal. No effusion or pneumothorax. Low lung volumes without focal consolidation. No retained radiopaque foreign body is identified. Multiple calcifications overlying the spleen, likely related to remote granulomatous infection. Calcifications overlying the pelvis likely phleboliths.     Impression: 1.  Dental implants and spinal fusion hardware. Otherwise, no retained radiopaque foreign body is identified. 2.  Low lung volumes without focal consolidation. 3.  No acute intra-abdominal findings. Electronically signed by:  Jayna Johnson D.O.  2/15/2023 2:09 AM Mountain Time    XR Outside Films    Result Date: 2/15/2023  This procedure was auto-finalized with no dictation required.    XR Abdomen KUB    Result Date: 2/15/2023  XR SKULL LATERAL AND AP, XR CHEST 1 VW, XR ABDOMEN KUB: 2/15/2023 2:43 AM COMPARISON: None available HISTORY: MRI clearance TECHNIQUE: 2 views of the skull, single view the abdomen, single view of the chest. FINDINGS: Bones: Multiple dental implants and fillings or crowns are present. Disc prosthesis and spinal fusion rods and pedicle screws from L4 to S1. No foreign body overlying the chest. Heart size is normal. No effusion or pneumothorax. Low lung volumes without focal consolidation. No retained radiopaque foreign body is identified. Multiple calcifications overlying the spleen, likely related to remote granulomatous infection. Calcifications overlying the pelvis likely phleboliths.     Impression: 1.  Dental implants and spinal fusion hardware. Otherwise, no retained radiopaque foreign body is identified. 2.  Low lung volumes without focal consolidation. 3.  No acute intra-abdominal findings. Electronically signed by:  Jayna Johnson D.O.  2/15/2023 2:09 AM Mountain  Time    XR Skull Lateral & Ap    Result Date: 2/15/2023  XR SKULL LATERAL AND AP, XR CHEST 1 VW, XR ABDOMEN KUB: 2/15/2023 2:43 AM COMPARISON: None available HISTORY: MRI clearance TECHNIQUE: 2 views of the skull, single view the abdomen, single view of the chest. FINDINGS: Bones: Multiple dental implants and fillings or crowns are present. Disc prosthesis and spinal fusion rods and pedicle screws from L4 to S1. No foreign body overlying the chest. Heart size is normal. No effusion or pneumothorax. Low lung volumes without focal consolidation. No retained radiopaque foreign body is identified. Multiple calcifications overlying the spleen, likely related to remote granulomatous infection. Calcifications overlying the pelvis likely phleboliths.     Impression: 1.  Dental implants and spinal fusion hardware. Otherwise, no retained radiopaque foreign body is identified. 2.  Low lung volumes without focal consolidation. 3.  No acute intra-abdominal findings. Electronically signed by:  Jayna Johnson D.O.  2/15/2023 2:09 AM Mountain Time    CT Outside Films    Result Date: 2/15/2023  This procedure was auto-finalized with no dictation required.      Results for orders placed during the hospital encounter of 02/15/23    Adult Transthoracic Echo Complete W/ Cont if Necessary Per Protocol (With Agitated Saline)    Interpretation Summary  •  Left ventricular systolic function is normal. Calculated left ventricular EF = 59.7% Left ventricular ejection fraction appears to be 61 - 65%.  •  Left ventricular diastolic function is consistent with (grade I) impaired relaxation.  •  Saline test results are negative.  •  There is calcification of the aortic valve.  •  Aortic valve maximum pressure gradient is 9 mmHg.      I have reviewed the medications:  Scheduled Meds:amLODIPine, 5 mg, Oral, Q24H  aspirin, 325 mg, Oral, Daily   Or  aspirin, 300 mg, Rectal, Daily  atorvastatin, 40 mg, Oral, Nightly  budesonide-formoterol, 2 puff,  Inhalation, BID - RT  DULoxetine, 60 mg, Oral, Daily  estradiol, 0.5 mg, Oral, Daily  fluticasone, 2 spray, Nasal, Daily  insulin lispro, 0-7 Units, Subcutaneous, TID AC  levothyroxine, 25 mcg, Oral, Daily  nitrofurantoin (macrocrystal-monohydrate), 100 mg, Oral, Q12H  pantoprazole, 40 mg, Oral, Daily Before Lunch      Continuous Infusions:   PRN Meds:.•  acetaminophen  •  acetaminophen  •  dextrose  •  dextrose  •  glucagon (human recombinant)    Assessment & Plan   Assessment & Plan     Active Hospital Problems    Diagnosis  POA   • **Acute ischemic stroke (HCC) [I63.9]  Yes   • Stroke (HCC) [I63.9]  Yes   • Ischemic heart disease [I25.9]  Yes   • Hypertension [I10]  Yes   • Dyslipidemia [E78.5]  Yes   • Hypothyroidism [E03.9]  Yes   • GERD (gastroesophageal reflux disease) [K21.9]  Yes   • Type 2 diabetes mellitus (HCC) [E11.9]  Yes      Resolved Hospital Problems   No resolved problems to display.        Brief Hospital Course to date:  Coco Crockett is a 77 y.o. female with a history of CAD, T2DM, hypertension, hyperlipidemia, anemia, COPD, GERD, hypothyroidism, migraines, was transferred from [facility] for stroke evaluation      Assessment and Plan:     Headache rule out CVA  -- CT head at outside hospital was negative  --Stroke order set per stroke navigator  -- MRI brain with no stroke  -- Aspirin  -- High-dose statin  -- PT/OT/SLP/case management consult  -- TTE with normal EF, grade I diastolic dysfunction, no PFO  --Neurochecks  --Consulted neurology  -- A.m. labs     Sepsis (fever, leukocytosis)- resolved  UTI  -- family reports fever of greater than 100.9F, pt also with mild leukocytosis  -- CXR unremarkable  -- blood cx NGTD, respiratory panel negative  --UA c/w UTI, UCx PENDING, pt is allergic to multiple different ABX so started on PO Macrobid D2/7     Hypertension  -- resume Bystolic, amlodipine   -- hold HCTZ    Hyperlipidemia  -- High-dose statin  -- LDL of 130, not at goal       CAD  -- Follows with Dr. Camacho  -- continue Aspirin     T2DM  -- A1c 5.3%  -- FSBG with SSI  -- Hold metformin     GERD  -- PPI     Hypothyroidism  -- Levothyroxine     COPD  -- symbicort      Expected Discharge Location and Transportation: rehab  Expected Discharge   Expected Discharge Date and Time     Expected Discharge Date Expected Discharge Time    Feb 17, 2023            DVT prophylaxis:  Mechanical DVT prophylaxis orders are present.     AM-PAC 6 Clicks Score (PT): 14 (02/15/23 1207)    CODE STATUS:   Code Status and Medical Interventions:   Ordered at: 02/15/23 0218     Level Of Support Discussed With:    Next of Kin (If No Surrogate)     Code Status (Patient has no pulse and is not breathing):    CPR (Attempt to Resuscitate)     Medical Interventions (Patient has pulse or is breathing):    Full Support     Release to patient:    Routine Release       Gabriella Rdz MD  02/16/23

## 2023-02-17 PROBLEM — I63.9 ACUTE ISCHEMIC STROKE (HCC): Status: RESOLVED | Noted: 2023-02-15 | Resolved: 2023-02-17

## 2023-02-17 PROBLEM — B96.20 E. COLI UTI (URINARY TRACT INFECTION): Status: ACTIVE | Noted: 2023-02-17

## 2023-02-17 PROBLEM — R56.9 SEIZURE (HCC): Status: ACTIVE | Noted: 2023-02-17

## 2023-02-17 PROBLEM — N39.0 E. COLI UTI (URINARY TRACT INFECTION): Status: ACTIVE | Noted: 2023-02-17

## 2023-02-17 LAB
BACTERIA SPEC AEROBE CULT: ABNORMAL
GLUCOSE BLDC GLUCOMTR-MCNC: 111 MG/DL (ref 70–130)
GLUCOSE BLDC GLUCOMTR-MCNC: 117 MG/DL (ref 70–130)
GLUCOSE BLDC GLUCOMTR-MCNC: 120 MG/DL (ref 70–130)

## 2023-02-17 PROCEDURE — 25010000002 GENTAMICIN PER 80 MG: Performed by: INTERNAL MEDICINE

## 2023-02-17 PROCEDURE — 97110 THERAPEUTIC EXERCISES: CPT

## 2023-02-17 PROCEDURE — 99232 SBSQ HOSP IP/OBS MODERATE 35: CPT | Performed by: INTERNAL MEDICINE

## 2023-02-17 PROCEDURE — 99214 OFFICE O/P EST MOD 30 MIN: CPT | Performed by: NURSE PRACTITIONER

## 2023-02-17 PROCEDURE — 97116 GAIT TRAINING THERAPY: CPT

## 2023-02-17 PROCEDURE — G0378 HOSPITAL OBSERVATION PER HR: HCPCS

## 2023-02-17 PROCEDURE — 94799 UNLISTED PULMONARY SVC/PX: CPT

## 2023-02-17 PROCEDURE — 82962 GLUCOSE BLOOD TEST: CPT

## 2023-02-17 RX ADMIN — ATORVASTATIN CALCIUM 40 MG: 40 TABLET, FILM COATED ORAL at 20:48

## 2023-02-17 RX ADMIN — AMLODIPINE BESYLATE 5 MG: 5 TABLET ORAL at 08:54

## 2023-02-17 RX ADMIN — ACETAMINOPHEN 325MG 650 MG: 325 TABLET ORAL at 03:43

## 2023-02-17 RX ADMIN — LEVETIRACETAM 500 MG: 500 TABLET, FILM COATED ORAL at 20:48

## 2023-02-17 RX ADMIN — ASPIRIN 81 MG 81 MG: 81 TABLET ORAL at 08:54

## 2023-02-17 RX ADMIN — BUDESONIDE AND FORMOTEROL FUMARATE DIHYDRATE 2 PUFF: 160; 4.5 AEROSOL RESPIRATORY (INHALATION) at 19:48

## 2023-02-17 RX ADMIN — NITROFURANTOIN MONOHYDRATE/MACROCRYSTALS 100 MG: 75; 25 CAPSULE ORAL at 09:11

## 2023-02-17 RX ADMIN — ACETAMINOPHEN 325MG 650 MG: 325 TABLET ORAL at 06:51

## 2023-02-17 RX ADMIN — GENTAMICIN SULFATE 300 MG: 40 INJECTION, SOLUTION INTRAMUSCULAR; INTRAVENOUS at 15:03

## 2023-02-17 RX ADMIN — ACETAMINOPHEN 325MG 650 MG: 325 TABLET ORAL at 15:17

## 2023-02-17 RX ADMIN — LEVOTHYROXINE SODIUM 25 MCG: 25 TABLET ORAL at 03:52

## 2023-02-17 RX ADMIN — ESTRADIOL 0.5 MG: 0.5 TABLET ORAL at 09:12

## 2023-02-17 RX ADMIN — DULOXETINE HYDROCHLORIDE 60 MG: 60 CAPSULE, DELAYED RELEASE ORAL at 08:55

## 2023-02-17 RX ADMIN — NEBIVOLOL 10 MG: 5 TABLET ORAL at 08:54

## 2023-02-17 RX ADMIN — LEVETIRACETAM 500 MG: 500 TABLET, FILM COATED ORAL at 08:55

## 2023-02-17 RX ADMIN — BUDESONIDE AND FORMOTEROL FUMARATE DIHYDRATE 2 PUFF: 160; 4.5 AEROSOL RESPIRATORY (INHALATION) at 06:30

## 2023-02-17 RX ADMIN — ACETAMINOPHEN 325MG 650 MG: 325 TABLET ORAL at 10:53

## 2023-02-17 NOTE — CASE MANAGEMENT/SOCIAL WORK
"Discharge Planning Assessment  [facility]     Patient Name: Coco Crockett  MRN: [id number]  Today's Date: 2/17/2023    Admit Date: 2/15/2023    Plan: [facility]                   Discharge Plan     Row Name 02/17/23 1427       Plan    Plan [facility]    Patient/Family in Agreement with Plan yes    Plan Comments Followed up with Ms. Crockett and her daughter, Jaylin, at the bedside, for discharge planning.    Ms. Crockett has been evaluated by PT and \"patient ambulated 40 feet with CGA/Min assist at times to control walker, verbal cues to stay close to walker and to improve posture. No LOB noticed this date. Distance limited by weakness and fatigue. Needs encouragement to participate and progress PT POC. Recommend IRF at d/c.\"    Discussed rehab and the patient and Jaylin requested referrals to [facility].  Called and faxed referrals to [facility] at [facility],  [phone], fax [phone].  A prior auth with Ms. Crockett's [facility] Medicare will be required for the rehab admission.    CM will follow up.    Final Discharge Disposition Code 61 - hospital-based swing bed              Continued Care and Services - Admitted Since 2/15/2023     Destination     Service Provider Request Status Selected Services Address Phone Fax Patient Preferred    [facility] Pending - No Request Sent N/A [address] [id number] [phone] -- --              Expected Discharge Date and Time     Expected Discharge Date Expected Discharge Time    Feb 20, 2023                    Molly Antonio RN    "

## 2023-02-17 NOTE — PROGRESS NOTES
Stroke Progress Note       Chief Complaint:  AMS, speech difficulty    Subjective    Subjective     Subjective:  Sitting up in the recliner.  Daughter is at bedside, states she had greatly improved from initial onset but is not quite at her baseline.  No new or worsening stroke symptoms    Review of Systems   Unable to perform ROS: Mental status change   Constitutional: Negative for fever.   Respiratory: Negative for cough and shortness of breath.    Cardiovascular: Negative for chest pain and palpitations.   Neurological: Positive for weakness. Negative for facial asymmetry, numbness and headaches.        General weakness working with PT   Psychiatric/Behavioral: Positive for confusion.            Objective    Objective      Temp:  [97.9 °F (36.6 °C)-98.9 °F (37.2 °C)] 98.2 °F (36.8 °C)  Heart Rate:  [63-81] 67  Resp:  [16-18] 18  BP: (130-179)/(52-70) 134/53        Neurological Exam  Mental Status  Alert. Oriented only to person, place and time. Remains mildly confused. Orientation: Intermittent confusion. Speech is normal. Language is fluent with no aphasia. Attention and concentration are normal.    Cranial Nerves  CN II: Intact to visual threat. Intact to visual threat. Visual fields full to confrontation.  CN III, IV, VI: Extraocular movements intact bilaterally. Normal lids and orbits bilaterally. Pupils equal round and reactive to light bilaterally.  CN V: Facial sensation is normal.  CN VII: Full and symmetric facial movement.  CN XI: Shoulder shrug strength is normal.  CN XII: Tongue midline without atrophy or fasciculations.    Motor   No fasciculations present. Normal muscle tone. No abnormal involuntary movements. Strength is 5/5 throughout all four extremities.    Sensory  Light touch is normal in upper and lower extremities.     Reflexes                                            Right                      Left  Plantar                           Downgoing                 Downgoing    Coordination    Difficulty following these commands.    Gait    Not tested.      Physical Exam  Vitals and nursing note reviewed.   Constitutional:       Appearance: Normal appearance.   HENT:      Head: Normocephalic and atraumatic.   Eyes:      General: Lids are normal.      Extraocular Movements: Extraocular movements intact.      Pupils: Pupils are equal, round, and reactive to light.   Cardiovascular:      Rate and Rhythm: Normal rate and regular rhythm.   Pulmonary:      Effort: Pulmonary effort is normal. No respiratory distress.   Musculoskeletal:         General: No swelling. Normal range of motion.      Cervical back: Normal range of motion and neck supple.   Skin:     General: Skin is warm and dry.   Neurological:      General: No focal deficit present.      Mental Status: She is alert. She is disoriented.      Cranial Nerves: No cranial nerve deficit.      Sensory: No sensory deficit.      Motor: Motor strength is normal. No weakness.   Psychiatric:         Mood and Affect: Mood normal.         Speech: Speech normal.         Behavior: Behavior normal.         Results Review:    I reviewed the patient's new clinical results.  WBC   Date Value Ref Range Status   02/16/2023 6.57 3.40 - 10.80 10*3/mm3 Final     RBC   Date Value Ref Range Status   02/16/2023 3.99 3.77 - 5.28 10*6/mm3 Final     Hemoglobin   Date Value Ref Range Status   02/16/2023 8.6 (L) 12.0 - 15.9 g/dL Final     Hematocrit   Date Value Ref Range Status   02/16/2023 29.5 (L) 34.0 - 46.6 % Final     MCV   Date Value Ref Range Status   02/16/2023 73.9 (L) 79.0 - 97.0 fL Final     MCH   Date Value Ref Range Status   02/16/2023 21.6 (L) 26.6 - 33.0 pg Final     MCHC   Date Value Ref Range Status   02/16/2023 29.2 (L) 31.5 - 35.7 g/dL Final     RDW   Date Value Ref Range Status   02/16/2023 17.3 (H) 12.3 - 15.4 % Final     RDW-SD   Date Value Ref Range Status   02/16/2023 46.0 37.0 - 54.0 fl Final     MPV   Date Value Ref Range  Status   02/16/2023 9.5 6.0 - 12.0 fL Final     Platelets   Date Value Ref Range Status   02/16/2023 290 140 - 450 10*3/mm3 Final     Neutrophil %   Date Value Ref Range Status   02/16/2023 57.3 42.7 - 76.0 % Final     Lymphocyte %   Date Value Ref Range Status   02/16/2023 28.9 19.6 - 45.3 % Final     Monocyte %   Date Value Ref Range Status   02/16/2023 12.3 (H) 5.0 - 12.0 % Final     Eosinophil %   Date Value Ref Range Status   02/16/2023 0.3 0.3 - 6.2 % Final     Basophil %   Date Value Ref Range Status   02/16/2023 0.6 0.0 - 1.5 % Final     Immature Grans %   Date Value Ref Range Status   02/16/2023 0.6 (H) 0.0 - 0.5 % Final     Neutrophils, Absolute   Date Value Ref Range Status   02/16/2023 3.76 1.70 - 7.00 10*3/mm3 Final     Lymphocytes, Absolute   Date Value Ref Range Status   02/16/2023 1.90 0.70 - 3.10 10*3/mm3 Final     Monocytes, Absolute   Date Value Ref Range Status   02/16/2023 0.81 0.10 - 0.90 10*3/mm3 Final     Eosinophils, Absolute   Date Value Ref Range Status   02/16/2023 0.02 0.00 - 0.40 10*3/mm3 Final     Basophils, Absolute   Date Value Ref Range Status   02/16/2023 0.04 0.00 - 0.20 10*3/mm3 Final     Immature Grans, Absolute   Date Value Ref Range Status   02/16/2023 0.04 0.00 - 0.05 10*3/mm3 Final     nRBC   Date Value Ref Range Status   02/16/2023 0.0 0.0 - 0.2 /100 WBC Final     Lab Results   Component Value Date    GLUCOSE 106 (H) 02/16/2023    BUN 15 02/16/2023    CREATININE 0.62 02/16/2023    EGFR 90.7 02/16/2023    BCR 24.2 02/16/2023    K 3.6 02/16/2023    CO2 20.0 (L) 02/16/2023    CALCIUM 9.0 02/16/2023    ALBUMIN 4.1 02/16/2023    AST 19 02/16/2023    ALT 8 02/16/2023   A1c 5.3  , triglycerides 619  EEG    Result Date: 2/16/2023  The combination of focal slowing and sharp waves can be seen in seizure disorders of the partial type Clinical correlation is as always suggested This report is transcribed using the Dragon dictation system.      MRI Brain Without  Contrast    Result Date: 2/16/2023  Impression: Mild age-related changes are present. There is no evidence of acute infarct, hemorrhage, mass or mass effect. Electronically Signed: Ramses Paul  2/16/2023 5:13 AM EST  Workstation ID: [id number]      Results for orders placed during the hospital encounter of 02/15/23    Adult Transthoracic Echo Complete W/ Cont if Necessary Per Protocol (With Agitated Saline)    Interpretation Summary  •  Left ventricular systolic function is normal. Calculated left ventricular EF = 59.7% Left ventricular ejection fraction appears to be 61 - 65%.  •  Left ventricular diastolic function is consistent with (grade I) impaired relaxation.  •  Saline test results are negative.  •  There is calcification of the aortic valve.  •  Aortic valve maximum pressure gradient is 9 mmHg.            Assessment/Plan     Assessment/Plan: 77-year-old,  diagnosis of T2DM, HTN, HLD, migraine with aura, CAD s/p stent, thyroid disease, vertigo, IBS, and anxiety who presented as a transfer with AMS, altered speech and headache.  Patient had been seen at OSH with complaint of severe headache and systolic blood pressure in the 190s.  She had an abrupt drop in blood pressure to the 150s within 30 minutes following which she developed altered speech with dysarthria and word salad.  CT head negative for acute abnormality, CTP negative CTA H/N unrevealing.  NIH on arrival 7, nonfocal exam on arrival with difficulty following commands.  BP on arrival 182/80    PTA antiplatelet: Aspirin 81 mg (uncertain if patient is still taking)  PTA anticoagulant: None      1. AMS -suspect confusion and speech difficulties secondary to hypertensive encephalopathy in the setting of UTI  -clinically she had much improved since beginning antibiotics  -MRI brain is negative for stroke, I do not believe her presentation is related to a cerebrovascular event  -Okay to continue aspirin 81 mg daily  -Continue statin  -Echo,  normal EF, mild left atrial dilation, negative bubble study  -HTN:  Normal bp parameters, ok to resume home HTN meds  -Continue PT/OT, therapy recommending rehab at discharge  -Can follow up with us in the stroke clinic re: seizures in 1 month    2.  Focal seizure with impairment of awareness in the setting of active UTI  -EEG results show intermittent slowing in the right temporal lobe with occasional sharp waves in the right temporal lobe consistent with partial seizures  -Continue Keppra 500 mg BID x 14 day then stop  -Can follow up with us in the stroke clinic re: seizures in 1 month      Plan of care discussed with patient's daughter and Dr. Randhawa.   No further stroke workup, will sign off.           Gwendolyn Gimenez, KECIA  02/17/23  11:40 EST

## 2023-02-17 NOTE — PLAN OF CARE
Goal Outcome Evaluation:  Plan of Care Reviewed With: patient        Progress: improving  Outcome Evaluation: patient ambulated 40 feet with CGA/Min assist at times to control walker, verbal cues to stay close to walker and to improve posture. No LOB noticed this date. Distance limited by weakness and fatigue. Needs encouragement to participate and progress PT POC. Recommend IRF at d/c.

## 2023-02-17 NOTE — PROGRESS NOTES
[facility] Medicine Services  PROGRESS NOTE    Patient Name: Coco Crockett  : 1945  MRN: [id number]    Date of Admission: 2/15/2023  Primary Care Physician: Lobo Archer MD    Subjective   Subjective     CC:  AMS, slurred speech    HPI:  Pt doing well this am, denies any issues overnight. Sitting up in chair at bedside. Daughter present this am.     ROS:  Gen- No fevers, chills  CV- No chest pain, palpitations  Resp- No cough, dyspnea  GI- No N/V/D, abd pain      Objective   Objective     Vital Signs:   Temp:  [97.9 °F (36.6 °C)-98.9 °F (37.2 °C)] 98 °F (36.7 °C)  Heart Rate:  [63-81] 74  Resp:  [16-18] 18  BP: (130-179)/(52-70) 144/52     Physical Exam:  Constitutional: No acute distress, awake, alert  HENT: NCAT, mucous membranes moist  Respiratory: Clear to auscultation bilaterally, respiratory effort normal   Cardiovascular: RRR, no murmurs, rubs, or gallops  Gastrointestinal: Positive bowel sounds, soft, nontender, nondistended  Musculoskeletal: No bilateral ankle edema  Psychiatric: Appropriate affect, cooperative  Neurologic: Oriented x 3, strength symmetric in all extremities, Cranial Nerves grossly intact to confrontation, speech clear  Skin: No rashes    Results Reviewed:  LAB RESULTS:      Lab 23  0608 02/15/23  1424 02/15/23  1222 02/15/23  0631   WBC 6.57  --   --  10.87*   HEMOGLOBIN 8.6*  --   --  9.4*   HEMATOCRIT 29.5*  --   --  32.2*   PLATELETS 290  --   --  358   NEUTROS ABS 3.76  --   --  8.12*   IMMATURE GRANS (ABS) 0.04  --   --  0.06*   LYMPHS ABS 1.90  --   --  1.99   MONOS ABS 0.81  --   --  0.66   EOS ABS 0.02  --   --  0.00   MCV 73.9*  --   --  72.7*   PROCALCITONIN  --   --  0.05  --    LACTATE  --  1.3  --   --          Lab 23  0608 02/15/23  0631   SODIUM 132* 130*   POTASSIUM 3.6 3.8   CHLORIDE 97* 94*   CO2 20.0* 20.0*   ANION GAP 15.0 16.0*   BUN 15 12   CREATININE 0.62 0.62   EGFR 90.7 90.7   GLUCOSE 106* 120*   CALCIUM 9.0  9.4   HEMOGLOBIN A1C  --  5.30   TSH  --  1.270         Lab 02/16/23  0608 02/15/23  0631   TOTAL PROTEIN 6.7 7.3   ALBUMIN 4.1 4.4   GLOBULIN 2.6 2.9   ALT (SGPT) 8 10   AST (SGOT) 19 18   BILIRUBIN 0.5 0.6   ALK PHOS 48 53             Lab 02/15/23  0631   CHOLESTEROL 279*   LDL CHOL 130*   HDL CHOL 34*   TRIGLYCERIDES 619*             Brief Urine Lab Results  (Last result in the past 365 days)      Color   Clarity   Blood   Leuk Est   Nitrite   Protein   CREAT   Urine HCG        02/15/23 1353 Dark Yellow   Cloudy   Trace   Moderate (2+)   Negative   30 mg/dL (1+)                 Microbiology Results Abnormal     Procedure Component Value - Date/Time    Blood Culture - Blood, Arm, Left [[id number]]  (Normal) Collected: 02/15/23 1222    Lab Status: Preliminary result Specimen: Blood from Arm, Left Updated: 02/16/23 1245     Blood Culture No growth at 24 hours    Blood Culture - Blood, Hand, Left [[id number]]  (Normal) Collected: 02/15/23 1222    Lab Status: Preliminary result Specimen: Blood from Hand, Left Updated: 02/16/23 1245     Blood Culture No growth at 24 hours    Respiratory Panel PCR w/COVID-19(SARS-CoV-2) MATTHEW/ABAD/JOEY/PAD/COR/MAD/SKINNY In-House, NP Swab in UTM/VTM, 3-4 HR TAT - Swab, Nasopharynx [[id number]]  (Normal) Collected: 02/15/23 1322    Lab Status: Final result Specimen: Swab from Nasopharynx Updated: 02/15/23 1452     ADENOVIRUS, PCR Not Detected     Coronavirus 229E Not Detected     Coronavirus HKU1 Not Detected     Coronavirus NL63 Not Detected     Coronavirus OC43 Not Detected     COVID19 Not Detected     Human Metapneumovirus Not Detected     Human Rhinovirus/Enterovirus Not Detected     Influenza A PCR Not Detected     Influenza B PCR Not Detected     Parainfluenza Virus 1 Not Detected     Parainfluenza Virus 2 Not Detected     Parainfluenza Virus 3 Not Detected     Parainfluenza Virus 4 Not Detected     RSV, PCR Not Detected     Bordetella pertussis pcr Not Detected     Bordetella parapertussis  PCR Not Detected     Chlamydophila pneumoniae PCR Not Detected     Mycoplasma pneumo by PCR Not Detected    Narrative:      In the setting of a positive respiratory panel with a viral infection PLUS a negative procalcitonin without other underlying concern for bacterial infection, consider observing off antibiotics or discontinuation of antibiotics and continue supportive care. If the respiratory panel is positive for atypical bacterial infection (Bordetella pertussis, Chlamydophila pneumoniae, or Mycoplasma pneumoniae), consider antibiotic de-escalation to target atypical bacterial infection.          Adult Transthoracic Echo Complete W/ Cont if Necessary Per Protocol (With Agitated Saline)    Result Date: 2/15/2023  •  Left ventricular systolic function is normal. Calculated left ventricular EF = 59.7% Left ventricular ejection fraction appears to be 61 - 65%. •  Left ventricular diastolic function is consistent with (grade I) impaired relaxation. •  Saline test results are negative. •  There is calcification of the aortic valve. •  Aortic valve maximum pressure gradient is 9 mmHg.     EEG    Result Date: 2/16/2023  Reason for referral: 77 y.o.female with migraine headaches, altered mental status Technical Summary:  A 19 channel digital EEG was performed using the international 10-20 placement system, including eye leads and EKG leads. Duration: 22 minutes Findings: The patient is drowsy at the beginning of the study.  The background shows diffuse low to medium amplitude 6-7 Hz theta which is present symmetrically over both hemispheres.  As a study proceeds, light sleep is seen with further slowing of the background.  At times, there is a greater degree of slowing present over the right temporal leads when compared to the left.  In addition, several sharp waves are seen with phase reversal at T4 in the right temporal lead which do stand out from the background.  Toward the latter portion of the study when the  patient is more awake, a background of intermixed theta and alpha activity is seen over both hemispheres.  Photic stimulation does not change the tracing but hyperventilation is not performed. Video: On Technical quality: Superior EKG: Regular, 60 bpm SUMMARY: Intermittent right temporal slow, mild Occasional right temporal sharp waves     Impression: The combination of focal slowing and sharp waves can be seen in seizure disorders of the partial type Clinical correlation is as always suggested This report is transcribed using the Dragon dictation system.      MRI Brain Without Contrast    Result Date: 2/16/2023  MRI BRAIN WO CONTRAST Date of Exam: 2/15/2023 11:38 PM EST Indication: Stroke, follow up.  Comparison: Outside CT one day prior Technique:  Routine multiplanar/multisequence sequence images of the brain were obtained without contrast administration. Findings: No acute infarct is present on diffusion weighted sequences. Midline structures are normal and the craniocervical junction is satisfactory in appearance. There is mild generalized volume loss. There is otherwise no evidence of intracranial hemorrhage, mass or mass effect. There is mild ex vacuo prominence of the ventricles. The orbits are normal. The paranasal sinuses are grossly clear.     Impression: Impression: Mild age-related changes are present. There is no evidence of acute infarct, hemorrhage, mass or mass effect. Electronically Signed: Ramses Riojasord  2/16/2023 5:13 AM EST  Workstation ID: [id number]      Results for orders placed during the hospital encounter of 02/15/23    Adult Transthoracic Echo Complete W/ Cont if Necessary Per Protocol (With Agitated Saline)    Interpretation Summary  •  Left ventricular systolic function is normal. Calculated left ventricular EF = 59.7% Left ventricular ejection fraction appears to be 61 - 65%.  •  Left ventricular diastolic function is consistent with (grade I) impaired relaxation.  •  Saline test results  are negative.  •  There is calcification of the aortic valve.  •  Aortic valve maximum pressure gradient is 9 mmHg.      I have reviewed the medications:  Scheduled Meds:amLODIPine, 5 mg, Oral, Q24H  aspirin, 81 mg, Oral, Daily  atorvastatin, 40 mg, Oral, Nightly  budesonide-formoterol, 2 puff, Inhalation, BID - RT  DULoxetine, 60 mg, Oral, Daily  estradiol, 0.5 mg, Oral, Daily  fluticasone, 2 spray, Nasal, Daily  insulin lispro, 0-7 Units, Subcutaneous, TID AC  levETIRAcetam, 500 mg, Oral, BID  levothyroxine, 25 mcg, Oral, Daily  nebivolol, 10 mg, Oral, Q24H  nitrofurantoin (macrocrystal-monohydrate), 100 mg, Oral, Q12H  pantoprazole, 40 mg, Oral, Daily Before Lunch      Continuous Infusions:   PRN Meds:.•  acetaminophen  •  acetaminophen  •  dextrose  •  dextrose  •  glucagon (human recombinant)    Assessment & Plan   Assessment & Plan     Active Hospital Problems    Diagnosis  POA   • **Seizure (HCC) [R56.9]  Unknown     Priority: High   • E. coli UTI (urinary tract infection) [N39.0, B96.20]  Unknown     Priority: High   • Stroke (HCC) [I63.9]  Yes   • Ischemic heart disease [I25.9]  Yes   • Hypertension [I10]  Yes   • Dyslipidemia [E78.5]  Yes   • Hypothyroidism [E03.9]  Yes   • GERD (gastroesophageal reflux disease) [K21.9]  Yes   • Type 2 diabetes mellitus (HCC) [E11.9]  Yes      Resolved Hospital Problems    Diagnosis Date Resolved POA   • Acute ischemic stroke (HCC) [I63.9] 02/17/2023 Yes        Brief Hospital Course to date:  Coco Crockett is a 77 y.o. female with a history of CAD, T2DM, hypertension, hyperlipidemia, anemia, COPD, GERD, hypothyroidism, migraines, was transferred from [facility] for stroke evaluation      Assessment and Plan:     AMS  -- CT head at outside hospital was negative  --Stroke order set per stroke navigator  -- MRI brain with no stroke  -- Aspirin  -- High-dose statin  -- PT/OT/SLP/case management consult  -- TTE with normal EF, grade I diastolic dysfunction,  no PFO  --EEG c/w focal seizures. Neuro thinks this is related to acute UTI.  Started Keppra 500 mg BID x 14 days with plans to d/c after 14 days     Sepsis (fever, leukocytosis)- resolved   E coli UTI  -- family reports fever of greater than 100.9F, pt also with mild leukocytosis  -- CXR unremarkable  -- blood cx NGTD, respiratory panel negative  --UA c/w UTI, UCx with ?100 K E coli resistant to Levaquin, Ampicillin and Amp-Sulbactam, pt is allergic to multiple different ABX so started on PO Macrobid D3/7 (ANNA </= 16)- discussed with Pharmacy today and, given her allergies and less than ideal ANNA with Macrobid (as well as sepsis upon admission) IV ABX preferred, so doing IV Gentamicin for three days total, D1/3 today.      Hypertension  -- resumed Bystolic, amlodipine   -- hold HCTZ    Hyperlipidemia  -- High-dose statin  -- LDL of 130, not at goal      CAD  -- Follows with Dr. Camacho  -- continue Aspirin     T2DM  -- A1c 5.3%  -- FSBG with SSI  -- Hold metformin     GERD  -- PPI     Hypothyroidism  -- Levothyroxine     COPD  -- symbicort      Expected Discharge Location and Transportation: rehab  Expected Discharge   Expected Discharge Date and Time     Expected Discharge Date Expected Discharge Time    Feb 19, 2023            DVT prophylaxis:  Mechanical DVT prophylaxis orders are present.     AM-PAC 6 Clicks Score (PT): 21 (02/16/23 0813)    CODE STATUS:   Code Status and Medical Interventions:   Ordered at: 02/15/23 0218     Level Of Support Discussed With:    Next of Kin (If No Surrogate)     Code Status (Patient has no pulse and is not breathing):    CPR (Attempt to Resuscitate)     Medical Interventions (Patient has pulse or is breathing):    Full Support     Release to patient:    Routine Release       Gabriella Rdz MD  02/17/23

## 2023-02-17 NOTE — PLAN OF CARE
Problem: Adult Inpatient Plan of Care  Goal: Plan of Care Review  Outcome: Ongoing, Progressing     Problem: Fall Injury Risk  Goal: Absence of Fall and Fall-Related Injury  Outcome: Ongoing, Progressing  Intervention: Identify and Manage Contributors  Recent Flowsheet Documentation  Taken 2/16/2023 2000 by Kunal Mendez RN  Medication Review/Management: medications reviewed  Intervention: Promote Injury-Free Environment  Recent Flowsheet Documentation  Taken 2/17/2023 0400 by Kunal Mendez RN  Safety Promotion/Fall Prevention:   activity supervised   assistive device/personal items within reach   clutter free environment maintained   fall prevention program maintained   lighting adjusted   nonskid shoes/slippers when out of bed   room organization consistent   toileting scheduled   safety round/check completed  Taken 2/17/2023 0200 by Kunal Mendez RN  Safety Promotion/Fall Prevention:   activity supervised   assistive device/personal items within reach   clutter free environment maintained   fall prevention program maintained   lighting adjusted   nonskid shoes/slippers when out of bed   room organization consistent   toileting scheduled   safety round/check completed  Taken 2/17/2023 0000 by Kunal Mendez RN  Safety Promotion/Fall Prevention:   activity supervised   assistive device/personal items within reach   clutter free environment maintained   fall prevention program maintained   lighting adjusted   nonskid shoes/slippers when out of bed   room organization consistent   toileting scheduled   safety round/check completed  Taken 2/16/2023 2200 by Kunal Mendez RN  Safety Promotion/Fall Prevention:   activity supervised   assistive device/personal items within reach   clutter free environment maintained   fall prevention program maintained   lighting adjusted   nonskid shoes/slippers when out of bed   room organization consistent   toileting scheduled   safety round/check completed  Taken 2/16/2023  2000 by Kunal Mendez, RN  Safety Promotion/Fall Prevention:   activity supervised   assistive device/personal items within reach   clutter free environment maintained   fall prevention program maintained   lighting adjusted   nonskid shoes/slippers when out of bed   room organization consistent   toileting scheduled   safety round/check completed     Problem: Adult Inpatient Plan of Care  Goal: Absence of Hospital-Acquired Illness or Injury  Intervention: Prevent Infection  Recent Flowsheet Documentation  Taken 2/17/2023 0400 by Kunal Mendez RN  Infection Prevention: environmental surveillance performed  Taken 2/17/2023 0200 by Kunal Mendez RN  Infection Prevention: environmental surveillance performed  Taken 2/17/2023 0000 by Kunal Mendez RN  Infection Prevention: environmental surveillance performed  Taken 2/16/2023 2200 by Kunal Mendez RN  Infection Prevention: environmental surveillance performed  Taken 2/16/2023 2000 by Kunal Mendez, RN  Infection Prevention: environmental surveillance performed

## 2023-02-17 NOTE — THERAPY TREATMENT NOTE
Patient Name: Coco Crockett  : 1945    MRN: [id number]                              Today's Date: 2023       Admit Date: 2/15/2023    Visit Dx:     ICD-10-CM ICD-9-CM   1. Acute ischemic stroke (HCC)  I63.9 434.91   2. Aphasia  R47.01 784.3   3. Dysphagia, unspecified type  R13.10 787.20   4. Impaired functional mobility, balance, gait, and endurance  Z74.09 V49.89   5. Cognitive communication deficit  R41.841 799.52     Patient Active Problem List   Diagnosis   • Mild obesity   • Hypertensive cardiovascular disease   • Hypertension   • Dyslipidemia   • Obesity (BMI 30.0-34.9)   • Hypothyroidism   • Osteopenia   • TMJ (temporomandibular joint disorder)   • GERD (gastroesophageal reflux disease)   • Iron deficiency anemia   • Depression   • Eczema   • Vertigo   • Migraine   • Type 2 diabetes mellitus (HCC)   • MLAIK (dyspnea on exertion)   • Leg pain, bilateral   • Fatigue   • Ischemic heart disease   • Lumbar stenosis with neurogenic claudication   • Spondylosis of lumbar region without myelopathy or radiculopathy   • Degeneration of lumbar or lumbosacral intervertebral disc   • Greater trochanteric bursitis of both hips   • Spinal stenosis of lumbar region with neurogenic claudication   • S/P lumbar fusion   • Stroke (HCC)   • Seizure ([facility])   • E. coli UTI (urinary tract infection)     Past Medical History:   Diagnosis Date   • Anemia    • Anxiety    • Chest pain    • Coronary artery disease    • Depression    • Diabetes mellitus (HCC)    • Dyslipidemia    • Eczema    • Fear of awareness under general anesthesia    • Fear of general anesthetic     has hx of awareness during anesthesia induction, felt like she was dying   • GERD (gastroesophageal reflux disease)    • History of COVID-19 2022   • Hyperlipidemia    • Hypertension     Chronic hypertension, probably essential.    • Hypertensive cardiovascular disease    • Hypothyroidism     Chronic hypothyroidism/replacement therapy.   • IBS  (irritable bowel syndrome)    • Iron deficiency anemia    • Migraine     Intermittent progressive headache syndrome/possible migraine equivalent.   • Obesity (BMI 30.0-34.9)     Mild obesity (BMI 31).    • Osteopenia     Osteopenia with fracture of lumbar vertebra, 2004.    • Pneumonia    • TMJ (temporomandibular joint disorder)    • Type 2 diabetes mellitus (HCC)     Type 2 diabetes mellitus, Hemoglobin A1c 5.9%, December 2014.   • Urgency incontinence    • Urinary, incontinence, stress female    • Vertigo     Intermittent marked dizziness/vertigo with Epley maneuver.     Past Surgical History:   Procedure Laterality Date   • ANKLE OPEN REDUCTION INTERNAL FIXATION Right 1995    Fall on ice   • APPENDECTOMY  1970    Appendectomy with wedge resection of both ovaries, 1970.   • CATARACT EXTRACTION WITH INTRAOCULAR LENS IMPLANT     • COLONOSCOPY     • LASIK Bilateral    • LUMBAR LAMINECTOMY WITH FUSION N/A 5/9/2022    Procedure: LUMBAR DECOMPRESSON L2-S1 with Fusion and Stabilization with PEDICLE SCREWS at L4-S1;  Surgeon: Humberto Correa MD;  Location: [facility];  Service: Neurosurgery;  Laterality: N/A;   • TIBIAL PLATEAU OPEN REDUCTION INTERNAL FIXATION Right    • TONSILLECTOMY AND ADENOIDECTOMY     • TOTAL LAPAROSCOPIC HYSTERECTOMY SALPINGO OOPHORECTOMY  1985    MARIZA/BSO secondary to severe endometriosis, 1985.       General Information     Row Name 02/17/23 1118          Physical Therapy Time and Intention    Document Type therapy note (daily note)  -AS     Mode of Treatment physical therapy  -AS     Row Name 02/17/23 1118          General Information    Patient Profile Reviewed yes  -AS     Existing Precautions/Restrictions fall;other (see comments)  h/x of vertigo, intermitent confusion/aphasia  -AS     Barriers to Rehab cognitive status  -AS     Row Name 02/17/23 1118          Cognition    Orientation Status (Cognition) oriented to;person;place;verbal cues/prompts needed for orientation;time;situation  -AS      Row Name 02/17/23 1118          Safety Issues, Functional Mobility    Safety Issues Affecting Function (Mobility) awareness of need for assistance;insight into deficits/self-awareness;safety precaution awareness;safety precautions follow-through/compliance;sequencing abilities  -AS     Impairments Affecting Function (Mobility) balance;cognition;strength;endurance/activity tolerance  -AS     Cognitive Impairments, Mobility Safety/Performance awareness, need for assistance;insight into deficits/self-awareness;problem-solving/reasoning;safety precaution awareness;safety precaution follow-through;sequencing abilities  -AS     Comment, Safety Issues/Impairments (Mobility) no c/o vertigo or dizziness this date  -AS           User Key  (r) = Recorded By, (t) = Taken By, (c) = Cosigned By    Initials Name Provider Type    AS Kimberly Barroso PTA Physical Therapist Assistant               Mobility     [facility] Name 02/17/23 1120          Bed Mobility    Supine-Sit [place] (Bed Mobility) verbal cues;contact guard;1 person assist  -AS     Assistive Device (Bed Mobility) head of bed elevated;bed rails  -AS     Comment, (Bed Mobility) cues for sequencing, increased time and effort to reach EOB  -AS     [facility] Name 02/17/23 1120          Transfers    Comment, (Transfers) multiple verbal cues for hand placement  -AS     Row Name 02/17/23 1120          Bed-Chair Transfer    Bed-Chair [place] (Transfers) verbal cues;contact guard;1 person assist  -AS     Assistive Device (Bed-Chair Transfers) walker, front-wheeled  -AS     [facility] Name 02/17/23 1120          Sit-Stand Transfer    Sit-Stand [place] (Transfers) verbal cues;contact guard;1 person assist  -AS     Assistive Device (Sit-Stand Transfers) walker, front-wheeled  -AS     Row Name 02/17/23 1120          Gait/Stairs (Locomotion)    [place] Level (Gait) verbal cues;contact guard;minimum assist (75% patient effort);1 person assist  -AS     Assistive Device (Gait)  walker, front-wheeled  -AS     Distance in Feet (Gait) 40  -AS     Deviations/Abnormal Patterns (Gait) bilateral deviations;base of support, narrow;sridevi decreased  -AS     Bilateral Gait Deviations forward flexed posture;weight shift ability decreased  -AS     Comment, (Gait/Stairs) patient ambulated 40 feet with CGA/Min assist at times to control walker, verbal cues to stay close to walker and to improve posture, Distance limited by weakness and fatigue. Needs encouragement to participate and progress PT POC.  -AS           User Key  (r) = Recorded By, (t) = Taken By, (c) = Cosigned By    Initials Name Provider Type    AS Kimberly Barroso PTA Physical Therapist Assistant               Obj/Interventions     Row Name 02/17/23 1123          Motor Skills    Therapeutic Exercise knee;shoulder;ankle  -AS     Row Name 02/17/23 1123          Shoulder (Therapeutic Exercise)    Shoulder (Therapeutic Exercise) AROM (active range of motion)  -AS     Shoulder AROM (Therapeutic Exercise) bilateral;flexion;extension;aDduction;aBduction;sitting;10 repetitions  bicep curls  -AS     Row Name 02/17/23 1123          Knee (Therapeutic Exercise)    Knee (Therapeutic Exercise) strengthening exercise  -AS     Knee Strengthening (Therapeutic Exercise) bilateral;marching while seated;LAQ (long arc quad);sitting;10 repetitions  -AS     Row Name 02/17/23 1123          Ankle (Therapeutic Exercise)    Ankle (Therapeutic Exercise) AROM (active range of motion)  -AS     Ankle AROM (Therapeutic Exercise) bilateral;dorsiflexion;plantarflexion;sitting;10 repetitions  -AS     Row Name 02/17/23 1123          Balance    Dynamic Standing Balance verbal cues;contact guard;minimal assist;1-person assist  -AS     Position/Device Used, Standing Balance supported;walker, front-wheeled  -AS           User Key  (r) = Recorded By, (t) = Taken By, (c) = Cosigned By    Initials Name Provider Type    AS Kimberly Barroso PTA Physical Therapist Assistant                Goals/Plan    No documentation.                Clinical Impression     Row Name 02/17/23 1124          Pain    Pretreatment Pain Rating 0/10 - no pain  -AS     Posttreatment Pain Rating 0/10 - no pain  -AS     Row Name 02/17/23 1124          Plan of Care Review    Plan of Care Reviewed With patient  -AS     Progress improving  -AS     Outcome Evaluation patient ambulated 40 feet with CGA/Min assist at times to control walker, verbal cues to stay close to walker and to improve posture. No LOB noticed this date. Distance limited by weakness and fatigue. Needs encouragement to participate and progress PT POC. Recommend IRF at d/c.  -AS     Row Name 02/17/23 1124          Positioning and Restraints    Pre-Treatment Position in bed  -AS     Post Treatment Position chair  -AS     In Chair reclined;call light within reach;encouraged to call for assist;exit alarm on;waffle cushion;legs elevated;with nsg;with family/caregiver  -AS           User Key  (r) = Recorded By, (t) = Taken By, (c) = Cosigned By    Initials Name Provider Type    AS Kimberly Barroso PTA Physical Therapist Assistant               Outcome Measures     Row Name 02/17/23 1125 02/17/23 0843       How much help from another person do you currently need...    Turning from your back to your side while in flat bed without using bedrails? 3  -AS 4  -MC    Moving from lying on back to sitting on the side of a flat bed without bedrails? 3  -AS 4  -MC    Moving to and from a bed to a chair (including a wheelchair)? 3  -AS 4  -MC    Standing up from a chair using your arms (e.g., wheelchair, bedside chair)? 3  -AS 3  -MC    Climbing 3-5 steps with a railing? 2  -AS 3  -MC    To walk in hospital room? 3  -AS 3  -MC    AM-PAC 6 Clicks Score (PT) 17  -AS 21  -MC    Highest level of mobility 5 --> Static standing  -AS 6 --> Walked 10 steps or more  -    Row Name 02/17/23 1125          Functional Assessment    Outcome Measure Options AM-PAC 6 Clicks  Basic Mobility (PT)  -AS           User Key  (r) = Recorded By, (t) = Taken By, (c) = Cosigned By    Initials Name Provider Type    AS Kimberly Barroso PTA Physical Therapist Assistant    Natalia Pan RN Registered Nurse                             Physical Therapy Education     Title: PT OT SLP Therapies (In Progress)     Topic: Physical Therapy (In Progress)     Point: Mobility training (In Progress)     Learning Progress Summary           Patient Acceptance, E, NR by AS at 2/17/2023 1125    Nonacceptance, E, NR,NL by  at 2/15/2023 1207                   Point: Home exercise program (In Progress)     Learning Progress Summary           Patient Acceptance, E, NR by AS at 2/17/2023 1125                   Point: Body mechanics (In Progress)     Learning Progress Summary           Patient Acceptance, E, NR by AS at 2/17/2023 1125    Nonacceptance, E, NR,NL by  at 2/15/2023 1207                   Point: Precautions (In Progress)     Learning Progress Summary           Patient Acceptance, E, NR by AS at 2/17/2023 1125                               User Key     Initials Effective Dates Name Provider Type Discipline     02/03/23 -  Lucia Rojo, PT Physical Therapist PT    AS 02/03/23 -  Kimberly Barroso PTA Physical Therapist Assistant PT              PT Recommendation and Plan     Plan of Care Reviewed With: patient  Progress: improving  Outcome Evaluation: patient ambulated 40 feet with CGA/Min assist at times to control walker, verbal cues to stay close to walker and to improve posture. No LOB noticed this date. Distance limited by weakness and fatigue. Needs encouragement to participate and progress PT POC. Recommend IRF at d/c.     Time Calculation:    PT Charges     Row Name 02/17/23 1126             Time Calculation    Start Time 1050  -AS      PT Received On 02/17/23  -AS      PT Goal Re-Cert Due Date 02/25/23  -AS         Timed Charges    40073 - PT Therapeutic Exercise Minutes 10  -AS       06078 - Gait Training Minutes  14  -AS         Total Minutes    Timed Charges Total Minutes 24  -AS       Total Minutes 24  -AS            User Key  (r) = Recorded By, (t) = Taken By, (c) = Cosigned By    Initials Name Provider Type    AS Kimberly Barroso PTA Physical Therapist Assistant              Therapy Charges for Today     Code Description Service Date Service Provider Modifiers Qty    64691095458 HC PT THER PROC EA 15 MIN 2/17/2023 Kimberly Barroso PTA GP 1    88570654805 HC GAIT TRAINING EA 15 MIN 2/17/2023 Kimberly Barroso PTA GP 1          PT G-Codes  Outcome Measure Options: AM-PAC 6 Clicks Basic Mobility (PT)  AM-PAC 6 Clicks Score (PT): 17  AM-PAC 6 Clicks Score (OT): 18  Modified Cindy Scale: 4 - Moderately severe disability.  Unable to walk without assistance, and unable to attend to own bodily needs without assistance.       Kimberly Barroso PTA  2/17/2023

## 2023-02-18 LAB
ALBUMIN SERPL-MCNC: 3.4 G/DL (ref 3.5–5.2)
ALBUMIN/GLOB SERPL: 1.5 G/DL
ALP SERPL-CCNC: 43 U/L (ref 39–117)
ALT SERPL W P-5'-P-CCNC: 10 U/L (ref 1–33)
ANION GAP SERPL CALCULATED.3IONS-SCNC: 10 MMOL/L (ref 5–15)
ANION GAP SERPL CALCULATED.3IONS-SCNC: 11 MMOL/L (ref 5–15)
AST SERPL-CCNC: 12 U/L (ref 1–32)
BASOPHILS # BLD AUTO: 0.02 10*3/MM3 (ref 0–0.2)
BASOPHILS NFR BLD AUTO: 0.4 % (ref 0–1.5)
BILIRUB SERPL-MCNC: 0.2 MG/DL (ref 0–1.2)
BUN SERPL-MCNC: 16 MG/DL (ref 8–23)
BUN SERPL-MCNC: 19 MG/DL (ref 8–23)
BUN/CREAT SERPL: 23.2 (ref 7–25)
BUN/CREAT SERPL: 30.6 (ref 7–25)
CALCIUM SPEC-SCNC: 8.5 MG/DL (ref 8.6–10.5)
CALCIUM SPEC-SCNC: 8.6 MG/DL (ref 8.6–10.5)
CHLORIDE SERPL-SCNC: 102 MMOL/L (ref 98–107)
CHLORIDE SERPL-SCNC: 104 MMOL/L (ref 98–107)
CO2 SERPL-SCNC: 22 MMOL/L (ref 22–29)
CO2 SERPL-SCNC: 23 MMOL/L (ref 22–29)
CREAT SERPL-MCNC: 0.62 MG/DL (ref 0.57–1)
CREAT SERPL-MCNC: 0.69 MG/DL (ref 0.57–1)
DEPRECATED RDW RBC AUTO: 46.4 FL (ref 37–54)
EGFRCR SERPLBLD CKD-EPI 2021: 88.4 ML/MIN/1.73
EGFRCR SERPLBLD CKD-EPI 2021: 90.7 ML/MIN/1.73
EOSINOPHIL # BLD AUTO: 0.62 10*3/MM3 (ref 0–0.4)
EOSINOPHIL NFR BLD AUTO: 12.4 % (ref 0.3–6.2)
ERYTHROCYTE [DISTWIDTH] IN BLOOD BY AUTOMATED COUNT: 17.9 % (ref 12.3–15.4)
GLOBULIN UR ELPH-MCNC: 2.3 GM/DL
GLUCOSE BLDC GLUCOMTR-MCNC: 115 MG/DL (ref 70–130)
GLUCOSE BLDC GLUCOMTR-MCNC: 116 MG/DL (ref 70–130)
GLUCOSE SERPL-MCNC: 100 MG/DL (ref 65–99)
GLUCOSE SERPL-MCNC: 106 MG/DL (ref 65–99)
HCT VFR BLD AUTO: 27.6 % (ref 34–46.6)
HGB BLD-MCNC: 8.3 G/DL (ref 12–15.9)
IMM GRANULOCYTES # BLD AUTO: 0.01 10*3/MM3 (ref 0–0.05)
IMM GRANULOCYTES NFR BLD AUTO: 0.2 % (ref 0–0.5)
LYMPHOCYTES # BLD AUTO: 1.41 10*3/MM3 (ref 0.7–3.1)
LYMPHOCYTES NFR BLD AUTO: 28.1 % (ref 19.6–45.3)
MCH RBC QN AUTO: 22.3 PG (ref 26.6–33)
MCHC RBC AUTO-ENTMCNC: 30.1 G/DL (ref 31.5–35.7)
MCV RBC AUTO: 74.2 FL (ref 79–97)
MONOCYTES # BLD AUTO: 0.23 10*3/MM3 (ref 0.1–0.9)
MONOCYTES NFR BLD AUTO: 4.6 % (ref 5–12)
NEUTROPHILS NFR BLD AUTO: 2.72 10*3/MM3 (ref 1.7–7)
NEUTROPHILS NFR BLD AUTO: 54.3 % (ref 42.7–76)
NRBC BLD AUTO-RTO: 0 /100 WBC (ref 0–0.2)
PLATELET # BLD AUTO: 223 10*3/MM3 (ref 140–450)
PMV BLD AUTO: 9.5 FL (ref 6–12)
POTASSIUM SERPL-SCNC: 3.2 MMOL/L (ref 3.5–5.2)
POTASSIUM SERPL-SCNC: 3.7 MMOL/L (ref 3.5–5.2)
POTASSIUM SERPL-SCNC: 4.3 MMOL/L (ref 3.5–5.2)
PROT SERPL-MCNC: 5.7 G/DL (ref 6–8.5)
RBC # BLD AUTO: 3.72 10*6/MM3 (ref 3.77–5.28)
SODIUM SERPL-SCNC: 135 MMOL/L (ref 136–145)
SODIUM SERPL-SCNC: 137 MMOL/L (ref 136–145)
WBC NRBC COR # BLD: 5.01 10*3/MM3 (ref 3.4–10.8)

## 2023-02-18 PROCEDURE — 97535 SELF CARE MNGMENT TRAINING: CPT

## 2023-02-18 PROCEDURE — 94799 UNLISTED PULMONARY SVC/PX: CPT

## 2023-02-18 PROCEDURE — 82962 GLUCOSE BLOOD TEST: CPT

## 2023-02-18 PROCEDURE — 85025 COMPLETE CBC W/AUTO DIFF WBC: CPT | Performed by: INTERNAL MEDICINE

## 2023-02-18 PROCEDURE — 97530 THERAPEUTIC ACTIVITIES: CPT

## 2023-02-18 PROCEDURE — 25010000002 GENTAMICIN PER 80 MG: Performed by: INTERNAL MEDICINE

## 2023-02-18 PROCEDURE — 82607 VITAMIN B-12: CPT | Performed by: PHYSICIAN ASSISTANT

## 2023-02-18 PROCEDURE — 82746 ASSAY OF FOLIC ACID SERUM: CPT | Performed by: PHYSICIAN ASSISTANT

## 2023-02-18 PROCEDURE — 85045 AUTOMATED RETICULOCYTE COUNT: CPT | Performed by: PHYSICIAN ASSISTANT

## 2023-02-18 PROCEDURE — 80053 COMPREHEN METABOLIC PANEL: CPT | Performed by: INTERNAL MEDICINE

## 2023-02-18 PROCEDURE — 84132 ASSAY OF SERUM POTASSIUM: CPT | Performed by: INTERNAL MEDICINE

## 2023-02-18 PROCEDURE — 99232 SBSQ HOSP IP/OBS MODERATE 35: CPT | Performed by: INTERNAL MEDICINE

## 2023-02-18 PROCEDURE — G0378 HOSPITAL OBSERVATION PER HR: HCPCS

## 2023-02-18 RX ORDER — POTASSIUM CHLORIDE 1.5 G/1.77G
40 POWDER, FOR SOLUTION ORAL EVERY 4 HOURS
Status: DISPENSED | OUTPATIENT
Start: 2023-02-18 | End: 2023-02-18

## 2023-02-18 RX ORDER — POTASSIUM CHLORIDE 750 MG/1
40 CAPSULE, EXTENDED RELEASE ORAL ONCE
Status: COMPLETED | OUTPATIENT
Start: 2023-02-18 | End: 2023-02-18

## 2023-02-18 RX ADMIN — AMLODIPINE BESYLATE 5 MG: 5 TABLET ORAL at 08:34

## 2023-02-18 RX ADMIN — LEVETIRACETAM 500 MG: 500 TABLET, FILM COATED ORAL at 21:39

## 2023-02-18 RX ADMIN — ASPIRIN 81 MG 81 MG: 81 TABLET ORAL at 08:34

## 2023-02-18 RX ADMIN — NEBIVOLOL 10 MG: 5 TABLET ORAL at 08:34

## 2023-02-18 RX ADMIN — ATORVASTATIN CALCIUM 40 MG: 40 TABLET, FILM COATED ORAL at 21:39

## 2023-02-18 RX ADMIN — ESTRADIOL 0.5 MG: 0.5 TABLET ORAL at 08:34

## 2023-02-18 RX ADMIN — ACETAMINOPHEN 325MG 650 MG: 325 TABLET ORAL at 03:44

## 2023-02-18 RX ADMIN — BUDESONIDE AND FORMOTEROL FUMARATE DIHYDRATE 2 PUFF: 160; 4.5 AEROSOL RESPIRATORY (INHALATION) at 07:19

## 2023-02-18 RX ADMIN — LEVOTHYROXINE SODIUM 25 MCG: 25 TABLET ORAL at 05:21

## 2023-02-18 RX ADMIN — GENTAMICIN SULFATE 300 MG: 40 INJECTION, SOLUTION INTRAMUSCULAR; INTRAVENOUS at 08:34

## 2023-02-18 RX ADMIN — DULOXETINE HYDROCHLORIDE 60 MG: 60 CAPSULE, DELAYED RELEASE ORAL at 08:34

## 2023-02-18 RX ADMIN — LEVETIRACETAM 500 MG: 500 TABLET, FILM COATED ORAL at 08:34

## 2023-02-18 RX ADMIN — PANTOPRAZOLE SODIUM 40 MG: 40 TABLET, DELAYED RELEASE ORAL at 12:30

## 2023-02-18 RX ADMIN — POTASSIUM CHLORIDE 40 MEQ: 750 CAPSULE, EXTENDED RELEASE ORAL at 15:44

## 2023-02-18 RX ADMIN — BUDESONIDE AND FORMOTEROL FUMARATE DIHYDRATE 2 PUFF: 160; 4.5 AEROSOL RESPIRATORY (INHALATION) at 20:28

## 2023-02-18 RX ADMIN — FLUTICASONE PROPIONATE 2 SPRAY: 50 SPRAY, METERED NASAL at 08:34

## 2023-02-18 NOTE — PLAN OF CARE
Goal Outcome Evaluation:      Patient's needs attended, no active complains on this night shift.

## 2023-02-18 NOTE — THERAPY TREATMENT NOTE
Patient Name: Coco Crockett  : 1945    MRN: [id number]                              Today's Date: 2023       Admit Date: 2/15/2023    Visit Dx:     ICD-10-CM ICD-9-CM   1. Acute ischemic stroke (HCC)  I63.9 434.91   2. Aphasia  R47.01 784.3   3. Dysphagia, unspecified type  R13.10 787.20   4. Impaired functional mobility, balance, gait, and endurance  Z74.09 V49.89   5. Cognitive communication deficit  R41.841 799.52     Patient Active Problem List   Diagnosis   • Mild obesity   • Hypertensive cardiovascular disease   • Hypertension   • Dyslipidemia   • Obesity (BMI 30.0-34.9)   • Hypothyroidism   • Osteopenia   • TMJ (temporomandibular joint disorder)   • GERD (gastroesophageal reflux disease)   • Iron deficiency anemia   • Depression   • Eczema   • Vertigo   • Migraine   • Type 2 diabetes mellitus (HCC)   • MALIK (dyspnea on exertion)   • Leg pain, bilateral   • Fatigue   • Ischemic heart disease   • Lumbar stenosis with neurogenic claudication   • Spondylosis of lumbar region without myelopathy or radiculopathy   • Degeneration of lumbar or lumbosacral intervertebral disc   • Greater trochanteric bursitis of both hips   • Spinal stenosis of lumbar region with neurogenic claudication   • S/P lumbar fusion   • Stroke (HCC)   • Seizure ([facility])   • E. coli UTI (urinary tract infection)     Past Medical History:   Diagnosis Date   • Anemia    • Anxiety    • Chest pain    • Coronary artery disease    • Depression    • Diabetes mellitus (HCC)    • Dyslipidemia    • Eczema    • Fear of awareness under general anesthesia    • Fear of general anesthetic     has hx of awareness during anesthesia induction, felt like she was dying   • GERD (gastroesophageal reflux disease)    • History of COVID-19 2022   • Hyperlipidemia    • Hypertension     Chronic hypertension, probably essential.    • Hypertensive cardiovascular disease    • Hypothyroidism     Chronic hypothyroidism/replacement therapy.   • IBS  (irritable bowel syndrome)    • Iron deficiency anemia    • Migraine     Intermittent progressive headache syndrome/possible migraine equivalent.   • Obesity (BMI 30.0-34.9)     Mild obesity (BMI 31).    • Osteopenia     Osteopenia with fracture of lumbar vertebra, 2004.    • Pneumonia    • TMJ (temporomandibular joint disorder)    • Type 2 diabetes mellitus (HCC)     Type 2 diabetes mellitus, Hemoglobin A1c 5.9%, December 2014.   • Urgency incontinence    • Urinary, incontinence, stress female    • Vertigo     Intermittent marked dizziness/vertigo with Epley maneuver.     Past Surgical History:   Procedure Laterality Date   • ANKLE OPEN REDUCTION INTERNAL FIXATION Right 1995    Fall on ice   • APPENDECTOMY  1970    Appendectomy with wedge resection of both ovaries, 1970.   • CATARACT EXTRACTION WITH INTRAOCULAR LENS IMPLANT     • COLONOSCOPY     • LASIK Bilateral    • LUMBAR LAMINECTOMY WITH FUSION N/A 5/9/2022    Procedure: LUMBAR DECOMPRESSON L2-S1 with Fusion and Stabilization with PEDICLE SCREWS at L4-S1;  Surgeon: Humberto Correa MD;  Location: [facility];  Service: Neurosurgery;  Laterality: N/A;   • TIBIAL PLATEAU OPEN REDUCTION INTERNAL FIXATION Right    • TONSILLECTOMY AND ADENOIDECTOMY     • TOTAL LAPAROSCOPIC HYSTERECTOMY SALPINGO OOPHORECTOMY  1985    MARIZA/BSO secondary to severe endometriosis, 1985.       General Information     Row Name 02/18/23 0930          OT Time and Intention    Document Type therapy note (daily note)  -TA     Mode of Treatment occupational therapy  -TA     Row Name 02/18/23 0930          General Information    Patient Profile Reviewed yes  -TA     Existing Precautions/Restrictions fall  h/o vertigo  -TA     Barriers to Rehab medically complex  -TA     Row Name 02/18/23 0902          Occupational Profile    Reason for Services/Referral (Occupational Profile) fxl decline from PLOF  -TA     Patient Goals (Occupational Profile) Rehab  -TA     Row Name 02/18/23 0954           Cognition    Orientation Status (Cognition) oriented x 4  -TA     Row Name 02/18/23 0930          Safety Issues, Functional Mobility    Safety Issues Affecting Function (Mobility) safety precaution awareness;safety precautions follow-through/compliance  -TA     Impairments Affecting Function (Mobility) endurance/activity tolerance;strength;balance  -TA     Cognitive Impairments, Mobility Safety/Performance safety precaution awareness;safety precaution follow-through  -TA           User Key  (r) = Recorded By, (t) = Taken By, (c) = Cosigned By    Initials Name Provider Type    TA Foreign Meredith OT Occupational Therapist                 Mobility/ADL's     Row Name 02/18/23 0930          Bed Mobility    Bed Mobility supine-sit  -TA     Supine-Sit [place] (Bed Mobility) standby assist  -TA     Bed Mobility, Safety Issues decreased use of legs for bridging/pushing;decreased use of arms for pushing/pulling  -TA     Assistive Device (Bed Mobility) head of bed elevated  -TA     Row Name 02/18/23 0930          Transfers    Transfers sit-stand transfer;toilet transfer  -TA     Row Name 02/18/23 0930          Sit-Stand Transfer    Sit-Stand [place] (Transfers) contact guard  -TA     Assistive Device (Sit-Stand Transfers) walker, front-wheeled  -TA     Row Name 02/18/23 0930          Toilet Transfer    Type (Toilet Transfer) sit-stand  -TA     [place] Level (Toilet Transfer) standby assist;verbal cues  -TA     Assistive Device (Toilet Transfer) grab bars/safety frame;walker, front-wheeled  -TA     Row Name 02/18/23 0930          Functional Mobility    Functional Mobility- Ind. Level contact guard assist  -TA     Functional Mobility- Device walker, front-wheeled  -TA     Functional Mobility-Distance (Feet) 150  -TA     Functional Mobility- Safety Issues balance decreased during turns;step length decreased  -TA     Functional Mobility- Comment No LOB  -TA     Row Name 02/18/23 0930          Activities of  Daily Living    BADL Assessment/Intervention upper body dressing;grooming;toileting  -TA     Row Name 02/18/23 0930          Upper Body Dressing Assessment/Training    [place] Level (Upper Body Dressing) don;pajama/robe;minimum assist (75% patient effort);verbal cues  increased assist d/t line mgt  -TA     Position (Upper Body Dressing) supported standing  -TA     Row Name 02/18/23 0930          Toileting Assessment/Training    [place] Level (Toileting) adjust/manage clothing;perform perineal hygiene;standby assist  -TA     Position (Toileting) unsupported sitting;supported standing  -TA     Row Name 02/18/23 0930          Grooming Assessment/Training    [place] Level (Grooming) wash face, hands;oral care regimen;hair care, combing/brushing;set up;standby assist  -TA     Position (Grooming) sink side;supported standing  -TA           User Key  (r) = Recorded By, (t) = Taken By, (c) = Cosigned By    Initials Name Provider Type    Foreign Aguilera OT Occupational Therapist               Obj/Interventions     Row Name 02/18/23 0930          Vision Assessment/Intervention    Visual Impairment/Limitations WFL  -TA     Row Name 02/18/23 0930          Balance    Balance Assessment sitting dynamic balance;standing dynamic balance  -TA     Dynamic Sitting Balance standby assist  -TA     Position, Sitting Balance sitting edge of bed  -TA     Dynamic Standing Balance contact guard;standby assist  -TA     Position/Device Used, Standing Balance walker, front-wheeled  -TA     Balance Interventions sit to stand;weight shifting activity;occupation based/functional task;UE activity with balance activity  -TA           User Key  (r) = Recorded By, (t) = Taken By, (c) = Cosigned By    Initials Name Provider Type    Foreign Aguilera OT Occupational Therapist               Goals/Plan     Row Name 02/18/23 0930          Therapy Assessment/Plan (OT)    Planned Therapy Interventions (OT) activity tolerance  training;BADL retraining;functional balance retraining;occupation/activity based interventions;patient/caregiver education/training;ROM/therapeutic exercise;strengthening exercise;transfer/mobility retraining  -TA           User Key  (r) = Recorded By, (t) = Taken By, (c) = Cosigned By    Initials Name Provider Type    TA Foreign Meredith, OT Occupational Therapist               Clinical Impression     Row Name 02/18/23 0930          Pain Assessment    Pretreatment Pain Rating 0/10 - no pain  -TA     Posttreatment Pain Rating 0/10 - no pain  -TA     Pre/Posttreatment Pain Comment Pt denied pain, tolerated  -TA     Pain Intervention(s) Ambulation/increased activity;Repositioned  -TA     Row Name 02/18/23 0930          Plan of Care Review    Plan of Care Reviewed With patient  -TA     Progress improving  -TA     Outcome Evaluation Pt progressing with fxl mobility requiring CGA/SBA for STS from EOB and toilet, CGA/RW to ambulate 150' in hallway/room. Also progressing with ADL performance req'ing SBA toileting and sink side grooming in standing, Min A UBD. Pt remains below baseline from  ADL performance. Continue per OT POC. Recommend SNF for rehab at discharge.  -TA     Row Name 02/18/23 0930          Therapy Assessment/Plan (OT)    Patient/Family Therapy Goal Statement (OT) rehab  -TA     Rehab Potential (OT) good, to achieve stated therapy goals  -TA     Criteria for Skilled Therapeutic Interventions Met (OT) yes;skilled treatment is necessary  -TA     Therapy Frequency (OT) daily  -TA     Predicted Duration of Therapy Intervention (OT) 5 days  -TA     Row Name 02/18/23 0930          Therapy Plan Review/Discharge Plan (OT)    Anticipated Discharge Disposition (OT) skilled nursing facility  rehab  -TA     Row Name 02/18/23 0930          Vital Signs    Pre Systolic BP Rehab 142  -TA     Pre Treatment Diastolic BP 63  -TA     Post Systolic BP Rehab 128  -TA     Post Treatment Diastolic BP 56  -TA     Pretreatment  Heart Rate (beats/min) 65  -TA     Posttreatment Heart Rate (beats/min) 66  -TA     Pre SpO2 (%) 98  -TA     O2 Delivery Pre Treatment room air  -TA     Post SpO2 (%) 98  -TA     O2 Delivery Post Treatment room air  -TA     Pre Patient Position Supine  -TA     Intra Patient Position Standing  -TA     Post Patient Position Sitting  -TA     Row Name 02/18/23 0930          Positioning and Restraints    Pre-Treatment Position in bed  -TA     Post Treatment Position chair  -TA     In Chair notified nsg;reclined;call light within reach;encouraged to call for assist;exit alarm on;waffle cushion;legs elevated  IV intact  -TA           User Key  (r) = Recorded By, (t) = Taken By, (c) = Cosigned By    Initials Name Provider Type    Foreign Aguilera OT Occupational Therapist               Outcome Measures     Row Name 02/18/23 0930          How much help from another is currently needed...    Putting on and taking off regular lower body clothing? 2  -TA     Bathing (including washing, rinsing, and drying) 2  -TA     Toileting (which includes using toilet bed pan or urinal) 3  -TA     Putting on and taking off regular upper body clothing 3  -TA     Taking care of personal grooming (such as brushing teeth) 3  -TA     Eating meals 4  -TA     AM-PAC 6 Clicks Score (OT) 17  -TA     Row Name 02/18/23 0930          Modified Giles Scale    Pre-Stroke Modified Cindy Scale 6 - Unable to determine (UTD) from the medical record documentation  -TA     Modified Cindy Scale 3 - Moderate disability.  Requiring some help, but able to walk without assistance.  -TA     Row Name 02/18/23 0930          Functional Assessment    Outcome Measure Options AM-PAC 6 Clicks Daily Activity (OT);Modified Giles  -TA           User Key  (r) = Recorded By, (t) = Taken By, (c) = Cosigned By    Initials Name Provider Type    Foreign Aguilera OT Occupational Therapist                Occupational Therapy Education     Title: PT OT SLP Therapies  (In Progress)     Topic: Occupational Therapy (Done)     Point: ADL training (Done)     Description:   Instruct learner(s) on proper safety adaptation and remediation techniques during self care or transfers.   Instruct in proper use of assistive devices.              Learning Progress Summary           Patient Acceptance, E,D, VU,DU by  at 2/16/2023 1120    Acceptance, E,D, NR by  at 2/15/2023 0935   Family Acceptance, E,D, VU,DU by  at 2/16/2023 1120    Acceptance, E,D, NR by CS at 2/15/2023 0935                   Point: Home exercise program (Done)     Description:   Instruct learner(s) on appropriate technique for monitoring, assisting and/or progressing therapeutic exercises/activities.              Learning Progress Summary           Patient Acceptance, E,D, VU,DU by  at 2/16/2023 1120   Family Acceptance, E,D, VU,DU by  at 2/16/2023 1120                   Point: Precautions (Done)     Description:   Instruct learner(s) on prescribed precautions during self-care and functional transfers.              Learning Progress Summary           Patient Acceptance, E,D, VU,DU by  at 2/16/2023 1120    Acceptance, E,D, NR by  at 2/15/2023 0935   Family Acceptance, E,D, VU,DU by  at 2/16/2023 1120    Acceptance, E,D, NR by  at 2/15/2023 0935                   Point: Body mechanics (Done)     Description:   Instruct learner(s) on proper positioning and spine alignment during self-care, functional mobility activities and/or exercises.              Learning Progress Summary           Patient Acceptance, E,D, VU,DU by  at 2/16/2023 1120    Acceptance, E,D, NR by  at 2/15/2023 0935   Family Acceptance, E,D, VU,DU by  at 2/16/2023 1120    Acceptance, E,D, NR by  at 2/15/2023 0935                               User Key     Initials Effective Dates Name Provider Type Discipline     06/16/21 -  Duarte Fair OT Occupational Therapist OT              OT Recommendation and Plan  Planned Therapy  Interventions (OT): activity tolerance training, BADL retraining, functional balance retraining, occupation/activity based interventions, patient/caregiver education/training, ROM/therapeutic exercise, strengthening exercise, transfer/mobility retraining  Therapy Frequency (OT): daily  Plan of Care Review  Plan of Care Reviewed With: patient  Progress: improving  Outcome Evaluation: Pt progressing with fxl mobility requiring CGA/SBA for STS from EOB and toilet, CGA/RW to ambulate 150' in hallway/room. Also progressing with ADL performance req'ing SBA toileting and sink side grooming in standing, Min A UBD. Pt remains below baseline from  ADL performance. Continue per OT POC. Recommend SNF for rehab at discharge.     Time Calculation:    Time Calculation- OT     Row Name 02/18/23 0930             Time Calculation- OT    OT Start Time 0930  ttc 39 minutes  -TA      Total Timed Code Minutes- OT 39 minute(s)  -TA      OT Goal Re-Cert Due Date 02/25/23  -TA         Timed Charges    69057 - OT Therapeutic Activity Minutes 10  -TA      97277 - OT Orthotic Management 29  -TA         Total Minutes    Timed Charges Total Minutes 39  -TA       Total Minutes 39  -TA            User Key  (r) = Recorded By, (t) = Taken By, (c) = Cosigned By    Initials Name Provider Type    TA Foreign Meredith OT Occupational Therapist              Therapy Charges for Today     Code Description Service Date Service Provider Modifiers Qty    04222628992 HC OT SELF CARE/MGMT/TRAIN EA 15 MIN 2/18/2023 Foreign Meredith OT GO 2    44006494764 HC OT THERAPEUTIC ACT EA 15 MIN 2/18/2023 Foreign Meredith OT GO 1               Foreign Meredith OT  2/18/2023

## 2023-02-18 NOTE — PROGRESS NOTES
[facility] Medicine Services  PROGRESS NOTE    Patient Name: Coco Crockett  : 1945  MRN: [id number]    Date of Admission: 2/15/2023  Primary Care Physician: Lobo Archer MD    Subjective   Subjective     CC:  AMS, UTI    HPI:  Patient is doing better this morning.  Family member at the bedside reports she is back to closer to her baseline.  Would like her to go to rehab.    ROS:  General: denies fevers or chills  CV: denies chest pain  Resp: denies shortness of breath  Abd: denies abd pain, nausea      Objective   Objective     Vital Signs:   Temp:  [97.7 °F (36.5 °C)-98.4 °F (36.9 °C)] 98.4 °F (36.9 °C)  Heart Rate:  [61-75] 61  Resp:  [16-18] 18  BP: (123-142)/(53-63) 142/63     Physical Exam:  Constitutional: No acute distress, awake, alert, sitting in bedside chair  Respiratory: Clear to auscultation bilaterally, respiratory effort normal on room air cardiovascular: RRR, no murmurs, rubs, or gallops  Gastrointestinal: Positive bowel sounds, soft, nontender, nondistended  Musculoskeletal: No bilateral ankle edema  Psychiatric: Appropriate affect, cooperative  Neurologic: Oriented x 3, no focal neurological deficits      Results Reviewed:  LAB RESULTS:      Lab 23  0826 23  0608 02/15/23  1424 02/15/23  1222 02/15/23  0631   WBC 5.01 6.57  --   --  10.87*   HEMOGLOBIN 8.3* 8.6*  --   --  9.4*   HEMATOCRIT 27.6* 29.5*  --   --  32.2*   PLATELETS 223 290  --   --  358   NEUTROS ABS 2.72 3.76  --   --  8.12*   IMMATURE GRANS (ABS) 0.01 0.04  --   --  0.06*   LYMPHS ABS 1.41 1.90  --   --  1.99   MONOS ABS 0.23 0.81  --   --  0.66   EOS ABS 0.62* 0.02  --   --  0.00   MCV 74.2* 73.9*  --   --  72.7*   PROCALCITONIN  --   --   --  0.05  --    LACTATE  --   --  1.3  --   --          Lab 23  0826 23  0608 02/15/23  0631   SODIUM 137 132* 130*   POTASSIUM 3.2* 3.6 3.8   CHLORIDE 104 97* 94*   CO2 23.0 20.0* 20.0*   ANION GAP 10.0 15.0 16.0*   BUN 16 15  12   CREATININE 0.69 0.62 0.62   EGFR 88.4 90.7 90.7   GLUCOSE 100* 106* 120*   CALCIUM 8.5* 9.0 9.4   HEMOGLOBIN A1C  --   --  5.30   TSH  --   --  1.270         Lab 02/18/23  0826 02/16/23  0608 02/15/23  0631   TOTAL PROTEIN 5.7* 6.7 7.3   ALBUMIN 3.4* 4.1 4.4   GLOBULIN 2.3 2.6 2.9   ALT (SGPT) 10 8 10   AST (SGOT) 12 19 18   BILIRUBIN 0.2 0.5 0.6   ALK PHOS 43 48 53             Lab 02/15/23  0631   CHOLESTEROL 279*   LDL CHOL 130*   HDL CHOL 34*   TRIGLYCERIDES 619*             Brief Urine Lab Results  (Last result in the past 365 days)      Color   Clarity   Blood   Leuk Est   Nitrite   Protein   CREAT   Urine HCG        02/15/23 1353 Dark Yellow   Cloudy   Trace   Moderate (2+)   Negative   30 mg/dL (1+)                 Microbiology Results Abnormal     Procedure Component Value - Date/Time    Blood Culture - Blood, Arm, Left [[id number]]  (Normal) Collected: 02/15/23 1222    Lab Status: Preliminary result Specimen: Blood from Arm, Left Updated: 02/17/23 1245     Blood Culture No growth at 2 days    Blood Culture - Blood, Hand, Left [[id number]]  (Normal) Collected: 02/15/23 1222    Lab Status: Preliminary result Specimen: Blood from Hand, Left Updated: 02/17/23 1245     Blood Culture No growth at 2 days    Respiratory Panel PCR w/COVID-19(SARS-CoV-2) MATTHEW/ABAD/JOEY/PAD/COR/MAD/SKINNY In-House, NP Swab in UTM/VTM, 3-4 HR TAT - Swab, Nasopharynx [[id number]]  (Normal) Collected: 02/15/23 1322    Lab Status: Final result Specimen: Swab from Nasopharynx Updated: 02/15/23 1452     ADENOVIRUS, PCR Not Detected     Coronavirus 229E Not Detected     Coronavirus HKU1 Not Detected     Coronavirus NL63 Not Detected     Coronavirus OC43 Not Detected     COVID19 Not Detected     Human Metapneumovirus Not Detected     Human Rhinovirus/Enterovirus Not Detected     Influenza A PCR Not Detected     Influenza B PCR Not Detected     Parainfluenza Virus 1 Not Detected     Parainfluenza Virus 2 Not Detected     Parainfluenza Virus 3 Not  Detected     Parainfluenza Virus 4 Not Detected     RSV, PCR Not Detected     Bordetella pertussis pcr Not Detected     Bordetella parapertussis PCR Not Detected     Chlamydophila pneumoniae PCR Not Detected     Mycoplasma pneumo by PCR Not Detected    Narrative:      In the setting of a positive respiratory panel with a viral infection PLUS a negative procalcitonin without other underlying concern for bacterial infection, consider observing off antibiotics or discontinuation of antibiotics and continue supportive care. If the respiratory panel is positive for atypical bacterial infection (Bordetella pertussis, Chlamydophila pneumoniae, or Mycoplasma pneumoniae), consider antibiotic de-escalation to target atypical bacterial infection.          No radiology results from the last 24 hrs    Results for orders placed during the hospital encounter of 02/15/23    Adult Transthoracic Echo Complete W/ Cont if Necessary Per Protocol (With Agitated Saline)    Interpretation Summary  •  Left ventricular systolic function is normal. Calculated left ventricular EF = 59.7% Left ventricular ejection fraction appears to be 61 - 65%.  •  Left ventricular diastolic function is consistent with (grade I) impaired relaxation.  •  Saline test results are negative.  •  There is calcification of the aortic valve.  •  Aortic valve maximum pressure gradient is 9 mmHg.      I have reviewed the medications:  Scheduled Meds:amLODIPine, 5 mg, Oral, Q24H  aspirin, 81 mg, Oral, Daily  atorvastatin, 40 mg, Oral, Nightly  budesonide-formoterol, 2 puff, Inhalation, BID - RT  DULoxetine, 60 mg, Oral, Daily  estradiol, 0.5 mg, Oral, Daily  fluticasone, 2 spray, Nasal, Daily  gentamicin, 5 mg/kg (Adjusted), Intravenous, Q24H  insulin lispro, 0-7 Units, Subcutaneous, TID AC  levETIRAcetam, 500 mg, Oral, BID  levothyroxine, 25 mcg, Oral, Daily  nebivolol, 10 mg, Oral, Q24H  pantoprazole, 40 mg, Oral, Daily Before Lunch      Continuous Infusions:   PRN  Meds:.•  acetaminophen  •  acetaminophen  •  dextrose  •  dextrose  •  glucagon (human recombinant)    Assessment & Plan   Assessment & Plan     Active Hospital Problems    Diagnosis  POA   • **Seizure (HCC) [R56.9]  Unknown   • E. coli UTI (urinary tract infection) [N39.0, B96.20]  Unknown   • Stroke (HCC) [I63.9]  Yes   • Ischemic heart disease [I25.9]  Yes   • Hypertension [I10]  Yes   • Dyslipidemia [E78.5]  Yes   • Hypothyroidism [E03.9]  Yes   • GERD (gastroesophageal reflux disease) [K21.9]  Yes   • Type 2 diabetes mellitus (HCC) [E11.9]  Yes      Resolved Hospital Problems    Diagnosis Date Resolved POA   • Acute ischemic stroke (HCC) [I63.9] 02/17/2023 Yes        Coco Crockett is a 77 y.o. female with a history of CAD, T2DM, hypertension, hyperlipidemia, anemia, COPD, GERD, hypothyroidism, migraines, was transferred from [facility] for stroke evaluation. CT head at outside hospital was negative.  Patient was noted to have fever, leukocytosis.  UA consistent with urinary tract infection.  Urine culture growing UTI.  Blood cultures remain negative.    Metabolic encephalopathy  -- CT head at outside hospital was negative  -- MRI of the brain showed age-related changes, no evidence of acute infarct, hemorrhage or mass  -- TTE with normal EF, grade I diastolic dysfunction, no PFO  -- Continue ASA, statin  --EEG c/w focal seizures. Neuro thinks this is related to acute UTI.  Started Keppra 500 mg BID x 14 days with plans to d/c after 14 days  -Neurology following    Hypokalemia  - low this morning at 3.2. Replace per protocol.     Sepsis (fever, leukocytosis)- resolved   E coli UTI  -- family reports fever of greater than 100.9F, pt also with mild leukocytosis  -- CXR unremarkable  -- blood cx NGTD, respiratory panel negative  --UCx with ?100 K E coli resistant to Levaquin, Ampicillin and Amp-Sulbactam, pt is allergic to multiple different ABX so started on PO Macrobid (ANNA </= 16). Previous  provider, Dr. Rdz, discussed with Pharmacy and given her allergies and less than ideal ANNA with Macrobid (as well as sepsis upon admission) IV ABX preferred, so doing IV Gentamicin for three days total  - Today is day 2 of gentamycin     Hypertension  -- continue Bystolic, amlodipine   -- hold HCTZ     Hyperlipidemia  -- continue High-dose statin     CAD  -- Follows with Dr. Camacho  -- continue Aspirin     T2DM, controlled  -- A1c 5.3%  -- FSBG with SSI  -- Hold metformin     GERD  -- PPI     Hypothyroidism  -- Levothyroxine     COPD  -- symbicort        Expected Discharge Location and Transportation: rehab  Expected Discharge   Expected Discharge Date and Time     Expected Discharge Date Expected Discharge Time    Feb 19, 2023            DVT prophylaxis:  Mechanical DVT prophylaxis orders are present.     AM-PAC 6 Clicks Score (PT): 17 (02/17/23 6091)    CODE STATUS:   Code Status and Medical Interventions:   Ordered at: 02/15/23 0218     Level Of Support Discussed With:    Next of Kin (If No Surrogate)     Code Status (Patient has no pulse and is not breathing):    CPR (Attempt to Resuscitate)     Medical Interventions (Patient has pulse or is breathing):    Full Support     Release to patient:    Routine Release     This patient's problems and plans were partially entered by my partner and updated as appropriate by me 02/18/23. Today is my first day evaluating this patient's active medical problems. I Personally reviewed chart and adjusted note to reflect daily changes in management/clinical condition. Copied text in this note has been reviewed and is accurate as of 2/18.       Jessica Kidd MD  02/18/23

## 2023-02-18 NOTE — PLAN OF CARE
Problem: Adult Inpatient Plan of Care  Goal: Plan of Care Review  Recent Flowsheet Documentation  Taken 2/18/2023 0930 by Foreign Meredith, OT  Progress: improving  Plan of Care Reviewed With: patient  Outcome Evaluation: Pt progressing with fxl mobility requiring CGA/SBA for STS from EOB and toilet, CGA/RW to ambulate 150' in hallway/room. Also progressing with ADL performance req'ing SBA toileting and sink side grooming in standing, Min A UBD. Pt remains below baseline from  ADL performance. Continue per OT POC. Recommend SNF for rehab at discharge.   Goal Outcome Evaluation:  Plan of Care Reviewed With: patient        Progress: improving  Outcome Evaluation: Pt progressing with fxl mobility requiring CGA/SBA for STS from EOB and toilet, CGA/RW to ambulate 150' in hallway/room. Also progressing with ADL performance req'ing SBA toileting and sink side grooming in standing, Min A UBD. Pt remains below baseline from  ADL performance. Continue per OT POC. Recommend SNF for rehab at discharge.

## 2023-02-18 NOTE — PLAN OF CARE
Goal Outcome Evaluation:         Patient alert and oriented x4 with intermittent confusion. VSS on RA. Up with assist x1 to BSC or bathroom. Daughter at bedside wants pt to be discharged to inpt rehab. CM aware. Will continue to assess and monitor.       MATTHEW Acuña

## 2023-02-19 LAB
ANION GAP SERPL CALCULATED.3IONS-SCNC: 11 MMOL/L (ref 5–15)
BUN SERPL-MCNC: 20 MG/DL (ref 8–23)
BUN/CREAT SERPL: 30.8 (ref 7–25)
CALCIUM SPEC-SCNC: 8.8 MG/DL (ref 8.6–10.5)
CHLORIDE SERPL-SCNC: 105 MMOL/L (ref 98–107)
CO2 SERPL-SCNC: 23 MMOL/L (ref 22–29)
CREAT SERPL-MCNC: 0.65 MG/DL (ref 0.57–1)
EGFRCR SERPLBLD CKD-EPI 2021: 89.7 ML/MIN/1.73
FERRITIN SERPL-MCNC: 39.02 NG/ML (ref 13–150)
FOLATE SERPL-MCNC: 14.3 NG/ML (ref 4.78–24.2)
GLUCOSE BLDC GLUCOMTR-MCNC: 105 MG/DL (ref 70–130)
GLUCOSE SERPL-MCNC: 101 MG/DL (ref 65–99)
IRON 24H UR-MRATE: 14 MCG/DL (ref 37–145)
IRON SATN MFR SERPL: 3 % (ref 20–50)
POTASSIUM SERPL-SCNC: 4 MMOL/L (ref 3.5–5.2)
RETICS # AUTO: 0.07 10*6/MM3 (ref 0.02–0.13)
RETICS/RBC NFR AUTO: 1.83 % (ref 0.7–1.9)
SODIUM SERPL-SCNC: 139 MMOL/L (ref 136–145)
TIBC SERPL-MCNC: 505 MCG/DL (ref 298–536)
TRANSFERRIN SERPL-MCNC: 339 MG/DL (ref 200–360)
VIT B12 BLD-MCNC: 404 PG/ML (ref 211–946)

## 2023-02-19 PROCEDURE — 97530 THERAPEUTIC ACTIVITIES: CPT

## 2023-02-19 PROCEDURE — 80048 BASIC METABOLIC PNL TOTAL CA: CPT

## 2023-02-19 PROCEDURE — 94761 N-INVAS EAR/PLS OXIMETRY MLT: CPT

## 2023-02-19 PROCEDURE — 82962 GLUCOSE BLOOD TEST: CPT

## 2023-02-19 PROCEDURE — 97116 GAIT TRAINING THERAPY: CPT

## 2023-02-19 PROCEDURE — 82728 ASSAY OF FERRITIN: CPT | Performed by: PHYSICIAN ASSISTANT

## 2023-02-19 PROCEDURE — 96367 TX/PROPH/DG ADDL SEQ IV INF: CPT

## 2023-02-19 PROCEDURE — 96366 THER/PROPH/DIAG IV INF ADDON: CPT

## 2023-02-19 PROCEDURE — 83540 ASSAY OF IRON: CPT | Performed by: PHYSICIAN ASSISTANT

## 2023-02-19 PROCEDURE — 94664 DEMO&/EVAL PT USE INHALER: CPT

## 2023-02-19 PROCEDURE — 84466 ASSAY OF TRANSFERRIN: CPT | Performed by: PHYSICIAN ASSISTANT

## 2023-02-19 PROCEDURE — 25010000002 GENTAMICIN PER 80 MG: Performed by: INTERNAL MEDICINE

## 2023-02-19 PROCEDURE — 25010000002 NA FERRIC GLUC CPLX PER 12.5 MG: Performed by: PHYSICIAN ASSISTANT

## 2023-02-19 PROCEDURE — 96365 THER/PROPH/DIAG IV INF INIT: CPT

## 2023-02-19 PROCEDURE — 94799 UNLISTED PULMONARY SVC/PX: CPT

## 2023-02-19 PROCEDURE — G0378 HOSPITAL OBSERVATION PER HR: HCPCS

## 2023-02-19 PROCEDURE — 99232 SBSQ HOSP IP/OBS MODERATE 35: CPT | Performed by: PHYSICIAN ASSISTANT

## 2023-02-19 RX ORDER — HYDROCHLOROTHIAZIDE 12.5 MG/1
12.5 CAPSULE, GELATIN COATED ORAL DAILY
Status: DISCONTINUED | OUTPATIENT
Start: 2023-02-19 | End: 2023-02-22 | Stop reason: HOSPADM

## 2023-02-19 RX ADMIN — BUDESONIDE AND FORMOTEROL FUMARATE DIHYDRATE 2 PUFF: 160; 4.5 AEROSOL RESPIRATORY (INHALATION) at 10:18

## 2023-02-19 RX ADMIN — BUDESONIDE AND FORMOTEROL FUMARATE DIHYDRATE 2 PUFF: 160; 4.5 AEROSOL RESPIRATORY (INHALATION) at 21:08

## 2023-02-19 RX ADMIN — ESTRADIOL 0.5 MG: 0.5 TABLET ORAL at 08:33

## 2023-02-19 RX ADMIN — ATORVASTATIN CALCIUM 40 MG: 40 TABLET, FILM COATED ORAL at 21:35

## 2023-02-19 RX ADMIN — ACETAMINOPHEN 325MG 650 MG: 325 TABLET ORAL at 15:08

## 2023-02-19 RX ADMIN — NEBIVOLOL 10 MG: 5 TABLET ORAL at 08:32

## 2023-02-19 RX ADMIN — LEVETIRACETAM 500 MG: 500 TABLET, FILM COATED ORAL at 08:32

## 2023-02-19 RX ADMIN — LEVETIRACETAM 500 MG: 500 TABLET, FILM COATED ORAL at 21:35

## 2023-02-19 RX ADMIN — GENTAMICIN SULFATE 300 MG: 40 INJECTION, SOLUTION INTRAMUSCULAR; INTRAVENOUS at 11:46

## 2023-02-19 RX ADMIN — LEVOTHYROXINE SODIUM 25 MCG: 25 TABLET ORAL at 06:14

## 2023-02-19 RX ADMIN — DULOXETINE HYDROCHLORIDE 60 MG: 60 CAPSULE, DELAYED RELEASE ORAL at 08:32

## 2023-02-19 RX ADMIN — PANTOPRAZOLE SODIUM 40 MG: 40 TABLET, DELAYED RELEASE ORAL at 11:43

## 2023-02-19 RX ADMIN — ACETAMINOPHEN 325MG 650 MG: 325 TABLET ORAL at 06:21

## 2023-02-19 RX ADMIN — FLUTICASONE PROPIONATE 2 SPRAY: 50 SPRAY, METERED NASAL at 11:43

## 2023-02-19 RX ADMIN — HYDROCHLOROTHIAZIDE 12.5 MG: 12.5 CAPSULE ORAL at 15:08

## 2023-02-19 RX ADMIN — AMLODIPINE BESYLATE 5 MG: 5 TABLET ORAL at 08:33

## 2023-02-19 RX ADMIN — ASPIRIN 81 MG 81 MG: 81 TABLET ORAL at 08:33

## 2023-02-19 RX ADMIN — SODIUM CHLORIDE 250 MG: 9 INJECTION, SOLUTION INTRAVENOUS at 12:26

## 2023-02-19 NOTE — PLAN OF CARE
Goal Outcome Evaluation:  Plan of Care Reviewed With: patient        Progress: improving  Outcome Evaluation: Patient demonstrating progressions in physical therapy this date. Able to advance ambulation distance to 200' with FWW SBA with improvement of gait pattern. Less physical assistance also required for bed mobility and transfers. Cont IP PT POC.

## 2023-02-19 NOTE — THERAPY TREATMENT NOTE
Patient Name: Coco Crockett  : 1945    MRN: [id number]                              Today's Date: 2023       Admit Date: 2/15/2023    Visit Dx:     ICD-10-CM ICD-9-CM   1. Acute ischemic stroke (HCC)  I63.9 434.91   2. Aphasia  R47.01 784.3   3. Dysphagia, unspecified type  R13.10 787.20   4. Impaired functional mobility, balance, gait, and endurance  Z74.09 V49.89   5. Cognitive communication deficit  R41.841 799.52     Patient Active Problem List   Diagnosis   • Mild obesity   • Hypertensive cardiovascular disease   • Hypertension   • Dyslipidemia   • Obesity (BMI 30.0-34.9)   • Hypothyroidism   • Osteopenia   • TMJ (temporomandibular joint disorder)   • GERD (gastroesophageal reflux disease)   • Iron deficiency anemia   • Depression   • Eczema   • Vertigo   • Migraine   • Type 2 diabetes mellitus (HCC)   • MALIK (dyspnea on exertion)   • Leg pain, bilateral   • Fatigue   • Ischemic heart disease   • Lumbar stenosis with neurogenic claudication   • Spondylosis of lumbar region without myelopathy or radiculopathy   • Degeneration of lumbar or lumbosacral intervertebral disc   • Greater trochanteric bursitis of both hips   • Spinal stenosis of lumbar region with neurogenic claudication   • S/P lumbar fusion   • Stroke (HCC)   • Seizure ([facility])   • E. coli UTI (urinary tract infection)     Past Medical History:   Diagnosis Date   • Anemia    • Anxiety    • Chest pain    • Coronary artery disease    • Depression    • Diabetes mellitus (HCC)    • Dyslipidemia    • Eczema    • Fear of awareness under general anesthesia    • Fear of general anesthetic     has hx of awareness during anesthesia induction, felt like she was dying   • GERD (gastroesophageal reflux disease)    • History of COVID-19 2022   • Hyperlipidemia    • Hypertension     Chronic hypertension, probably essential.    • Hypertensive cardiovascular disease    • Hypothyroidism     Chronic hypothyroidism/replacement therapy.   • IBS  (irritable bowel syndrome)    • Iron deficiency anemia    • Migraine     Intermittent progressive headache syndrome/possible migraine equivalent.   • Obesity (BMI 30.0-34.9)     Mild obesity (BMI 31).    • Osteopenia     Osteopenia with fracture of lumbar vertebra, 2004.    • Pneumonia    • TMJ (temporomandibular joint disorder)    • Type 2 diabetes mellitus (HCC)     Type 2 diabetes mellitus, Hemoglobin A1c 5.9%, December 2014.   • Urgency incontinence    • Urinary, incontinence, stress female    • Vertigo     Intermittent marked dizziness/vertigo with Epley maneuver.     Past Surgical History:   Procedure Laterality Date   • ANKLE OPEN REDUCTION INTERNAL FIXATION Right 1995    Fall on ice   • APPENDECTOMY  1970    Appendectomy with wedge resection of both ovaries, 1970.   • CATARACT EXTRACTION WITH INTRAOCULAR LENS IMPLANT     • COLONOSCOPY     • LASIK Bilateral    • LUMBAR LAMINECTOMY WITH FUSION N/A 5/9/2022    Procedure: LUMBAR DECOMPRESSON L2-S1 with Fusion and Stabilization with PEDICLE SCREWS at L4-S1;  Surgeon: Humberto Correa MD;  Location: [facility];  Service: Neurosurgery;  Laterality: N/A;   • TIBIAL PLATEAU OPEN REDUCTION INTERNAL FIXATION Right    • TONSILLECTOMY AND ADENOIDECTOMY     • TOTAL LAPAROSCOPIC HYSTERECTOMY SALPINGO OOPHORECTOMY  1985    MARIZA/BSO secondary to severe endometriosis, 1985.       General Information     Row Name 02/19/23 1541          Physical Therapy Time and Intention    Document Type therapy note (daily note)  -LO     Mode of Treatment individual therapy;physical therapy  -LO     Row Name 02/19/23 1541          General Information    Patient Profile Reviewed yes  -LO     Existing Precautions/Restrictions fall  h/o vertigo  -LO     Row Name 02/19/23 1541          Cognition    Orientation Status (Cognition) oriented x 4  -LO     Row Name 02/19/23 1541          Safety Issues, Functional Mobility    Impairments Affecting Function (Mobility) endurance/activity  tolerance;strength;balance  -LO           User Key  (r) = Recorded By, (t) = Taken By, (c) = Cosigned By    Initials Name Provider Type    Chelsea Slater PT Physical Therapist               Mobility     Row Name 02/19/23 1542          Bed Mobility    Bed Mobility supine-sit  -LO     Scooting/Bridging Swift (Bed Mobility) standby assist  -LO     Supine-Sit Swift (Bed Mobility) standby assist  -LO     Sit-Supine Swift (Bed Mobility) standby assist  -LO     Assistive Device (Bed Mobility) head of bed elevated  -LO     Comment, (Bed Mobility) vc for sequencing, no physical assist required  -LO     Row Name 02/19/23 1542          Transfers    Comment, (Transfers) EOB>FWW>commode in BR>FWW>EOB; minimal cuing for HP  -LO     Row Name 02/19/23 1542          Sit-Stand Transfer    Sit-Stand Swift (Transfers) standby assist  -LO     Assistive Device (Sit-Stand Transfers) walker, front-wheeled  -LO     Row Name 02/19/23 1542          Gait/Stairs (Locomotion)    Swift Level (Gait) standby assist  -LO     Assistive Device (Gait) walker, front-wheeled  -LO     Distance in Feet (Gait) 200  -LO     Deviations/Abnormal Patterns (Gait) base of support, narrow;sridevi decreased  -LO     Bilateral Gait Deviations forward flexed posture  -LO     Comment, (Gait/Stairs) Able to increase ambulation distance to 200' with FWW SBA, no evidence for losses of balance noted.  -LO           User Key  (r) = Recorded By, (t) = Taken By, (c) = Cosigned By    Initials Name Provider Type    Chelsea Slater PT Physical Therapist               Obj/Interventions     Row Name 02/19/23 1545          Balance    Balance Assessment sitting static balance;sitting dynamic balance;standing static balance;standing dynamic balance  -LO     Static Sitting Balance supervision  -LO     Dynamic Sitting Balance supervision  -LO     Position, Sitting Balance unsupported;sitting edge of bed  -LO     Static Standing Balance standby  assist  -LO     Dynamic Standing Balance standby assist  -LO     Position/Device Used, Standing Balance walker, rolling  -LO     Comment, Balance SBA with FWW  -LO           User Key  (r) = Recorded By, (t) = Taken By, (c) = Cosigned By    Initials Name Provider Type    Chelsea Slater, PT Physical Therapist               Goals/Plan    No documentation.                Clinical Impression     Row Name 02/19/23 1516          Pain    Pretreatment Pain Rating 0/10 - no pain  -LO     Posttreatment Pain Rating 0/10 - no pain  -LO     Row Name 02/19/23 1549          Plan of Care Review    Plan of Care Reviewed With patient  -LO     Progress improving  -LO     Outcome Evaluation Patient demonstrating progressions in physical therapy this date. Able to advance ambulation distance to 200' with FWW SBA with improvement of gait pattern. Less physical assistance also required for bed mobility and transfers. Cont IP PT POC.  -LO     Row Name 02/19/23 1549          Therapy Assessment/Plan (PT)    Rehab Potential (PT) good, to achieve stated therapy goals  -LO     Criteria for Skilled Interventions Met (PT) yes;skilled treatment is necessary  -LO     Therapy Frequency (PT) daily  -LO     Row Name 02/19/23 1549          Vital Signs    Pre Systolic BP Rehab 132  -LO     Pre Treatment Diastolic BP 52  -LO     Post Systolic BP Rehab 147  -LO     Post Treatment Diastolic   -LO     O2 Delivery Pre Treatment room air  -LO     O2 Delivery Intra Treatment room air  -LO     O2 Delivery Post Treatment room air  -LO     Pre Patient Position Supine  -LO     Intra Patient Position Standing  -LO     Post Patient Position Supine  -LO     Row Name 02/19/23 1549          Positioning and Restraints    Pre-Treatment Position in bed  -LO     Post Treatment Position bed  -LO     In Bed notified nsg;supine;exit alarm on;encouraged to call for assist;call light within reach;fowlers  -LO           User Key  (r) = Recorded By, (t) = Taken By, (c) =  Cosigned By    Initials Name Provider Type    Chelsea Slater, MATT Physical Therapist               Outcome Measures     Row Name 02/19/23 1551 02/19/23 0830       How much help from another person do you currently need...    Turning from your back to your side while in flat bed without using bedrails? 3  -LO 3  -LM    Moving from lying on back to sitting on the side of a flat bed without bedrails? 3  -LO 3  -LM    Moving to and from a bed to a chair (including a wheelchair)? 3  -LO 3  -LM    Standing up from a chair using your arms (e.g., wheelchair, bedside chair)? 3  -LO 3  -LM    Climbing 3-5 steps with a railing? 2  -LO 2  -LM    To walk in hospital room? 3  -LO 3  -LM    AM-PAC 6 Clicks Score (PT) 17  -LO 17  -LM    Highest level of mobility 5 --> Static standing  -LO 5 --> Static standing  -LM    Row Name 02/19/23 1551          Functional Assessment    Outcome Measure Options AM-PAC 6 Clicks Basic Mobility (PT)  -LO           User Key  (r) = Recorded By, (t) = Taken By, (c) = Cosigned By    Initials Name Provider Type    Stacia Wright RN Registered Nurse    Chelsea Slater, MATT Physical Therapist                             Physical Therapy Education     Title: PT OT SLP Therapies (Done)     Topic: Physical Therapy (Done)     Point: Mobility training (Done)     Learning Progress Summary           Patient Acceptance, E, VU,NR by WARNER at 2/19/2023 1540    Comment: PT POC    Acceptance, E, NR by AS at 2/17/2023 1125    Nonacceptance, E, NR,NL by  at 2/15/2023 1207                   Point: Home exercise program (Done)     Learning Progress Summary           Patient Acceptance, E, VU,NR by  at 2/19/2023 1540    Comment: PT POC    Acceptance, E, NR by AS at 2/17/2023 1125                   Point: Body mechanics (Done)     Learning Progress Summary           Patient Acceptance, E, VU,NR by  at 2/19/2023 1540    Comment: PT POC    Acceptance, E, NR by AS at 2/17/2023 1125    Nonacceptance, E, NR,NL by  at  2/15/2023 1207                   Point: Precautions (Done)     Learning Progress Summary           Patient Acceptance, E, VU,NR by LO at 2/19/2023 1540    Comment: PT POC    Acceptance, E, NR by AS at 2/17/2023 1125                               User Key     Initials Effective Dates Name Provider Type Discipline    SJ 02/03/23 -  Lucia Rojo, PT Physical Therapist PT    AS 02/03/23 -  Kimberly Barroso, PTA Physical Therapist Assistant PT    LO 06/16/21 -  Chelsea Chacon, MATT Physical Therapist PT              PT Recommendation and Plan     Plan of Care Reviewed With: patient  Progress: improving  Outcome Evaluation: Patient demonstrating progressions in physical therapy this date. Able to advance ambulation distance to 200' with FWW SBA with improvement of gait pattern. Less physical assistance also required for bed mobility and transfers. Cont IP PT POC.     Time Calculation:    PT Charges     Row Name 02/19/23 1540             Time Calculation    Start Time 1540  -LO      PT Received On 02/19/23  -LO      PT Goal Re-Cert Due Date 02/25/23  -LO         Timed Charges    34139 - Gait Training Minutes  16  -LO      79870 - PT Therapeutic Activity Minutes 10  -LO         Total Minutes    Timed Charges Total Minutes 26  -LO       Total Minutes 26  -LO            User Key  (r) = Recorded By, (t) = Taken By, (c) = Cosigned By    Initials Name Provider Type     Chelsea Chacon, PT Physical Therapist              Therapy Charges for Today     Code Description Service Date Service Provider Modifiers Qty    54920123389 HC GAIT TRAINING EA 15 MIN 2/19/2023 Chelsea Chacon, PT GP 1    43258230952 HC PT THERAPEUTIC ACT EA 15 MIN 2/19/2023 Chelsea Chacon, PT GP 1          PT G-Codes  Outcome Measure Options: AM-PAC 6 Clicks Basic Mobility (PT)  AM-PAC 6 Clicks Score (PT): 17  AM-PAC 6 Clicks Score (OT): 17  Modified Cindy Scale: 3 - Moderate disability.  Requiring some help, but able to walk without assistance.  PT Discharge  Summary  Anticipated Discharge Disposition (PT): inpatient rehabilitation facility    Chelsea Chacon, PT  2/19/2023

## 2023-02-19 NOTE — PROGRESS NOTES
[facility] Medicine Services  PROGRESS NOTE    Patient Name: Coco Crockett  : 1945  MRN: [id number]    Date of Admission: 2/15/2023  Primary Care Physician: Lobo Archer MD    Subjective     CC: AMS, UTI     HPI:  Sitting up in bed. Daughter and brother at bedside. She states that she is doing very well and feels that she is improving everyday. She slept well and reports taking a shower last night. Family feels her mobility and her cognitive function are improving. Hopes to go to rehab soon. We discussed iron-deficiency and she was open to IV iron infusions while in the hospital.     ROS:  Gen- No fevers, chills, (+) fatigue   CV- No chest pain, palpitations  Resp- No cough, dyspnea  GI- No N/V/D, abd pain    Objective     Vital Signs:   Temp:  [97.4 °F (36.3 °C)-98.7 °F (37.1 °C)] 98.5 °F (36.9 °C)  Heart Rate:  [65-68] 66  Resp:  [16-18] 16  BP: (125-189)/(54-77) 189/77     Physical Exam:  Constitutional: No acute distress, awake, alert, conversant. Sitting up in bed with family at bedside.   HENT: NCAT, mucous membranes moist  Respiratory: Clear to auscultation bilaterally, respiratory effort normal   Cardiovascular: RRR, no murmurs, rubs, or gallops  Gastrointestinal: Positive bowel sounds, soft, nontender, nondistended  Musculoskeletal: No bilateral ankle edema  Psychiatric: Appropriate affect, cooperative  Neurologic: Oriented to person, place, time, and situation - got the name of the president incorrect but was able to correct this with prompting, moves all extremities spontaneously, speech clear  Skin: No rashes     Results Reviewed:  LAB RESULTS:      Lab 23  0826 23  0608 02/15/23  1424 02/15/23  1222 02/15/23  0631   WBC 5.01 6.57  --   --  10.87*   HEMOGLOBIN 8.3* 8.6*  --   --  9.4*   HEMATOCRIT 27.6* 29.5*  --   --  32.2*   PLATELETS 223 290  --   --  358   NEUTROS ABS 2.72 3.76  --   --  8.12*   IMMATURE GRANS (ABS) 0.01 0.04  --   --  0.06*    LYMPHS ABS 1.41 1.90  --   --  1.99   MONOS ABS 0.23 0.81  --   --  0.66   EOS ABS 0.62* 0.02  --   --  0.00   MCV 74.2* 73.9*  --   --  72.7*   PROCALCITONIN  --   --   --  0.05  --    LACTATE  --   --  1.3  --   --          Lab 02/19/23  0536 02/18/23  1851 02/18/23  1407 02/18/23  0826 02/16/23  0608 02/15/23  0631   SODIUM 139  --  135* 137 132* 130*   POTASSIUM 4.0 4.3 3.7 3.2* 3.6 3.8   CHLORIDE 105  --  102 104 97* 94*   CO2 23.0  --  22.0 23.0 20.0* 20.0*   ANION GAP 11.0  --  11.0 10.0 15.0 16.0*   BUN 20  --  19 16 15 12   CREATININE 0.65  --  0.62 0.69 0.62 0.62   EGFR 89.7  --  90.7 88.4 90.7 90.7   GLUCOSE 101*  --  106* 100* 106* 120*   CALCIUM 8.8  --  8.6 8.5* 9.0 9.4   HEMOGLOBIN A1C  --   --   --   --   --  5.30   TSH  --   --   --   --   --  1.270         Lab 02/18/23  0826 02/16/23  0608 02/15/23  0631   TOTAL PROTEIN 5.7* 6.7 7.3   ALBUMIN 3.4* 4.1 4.4   GLOBULIN 2.3 2.6 2.9   ALT (SGPT) 10 8 10   AST (SGOT) 12 19 18   BILIRUBIN 0.2 0.5 0.6   ALK PHOS 43 48 53         Lab 02/15/23  0631   CHOLESTEROL 279*   LDL CHOL 130*   HDL CHOL 34*   TRIGLYCERIDES 619*         Lab 02/19/23  0536 02/18/23  0826   IRON 14*  --    IRON SATURATION 3*  --    TIBC 505  --    TRANSFERRIN 339  --    FERRITIN 39.02  --    FOLATE  --  14.30   VITAMIN B 12  --  404         Brief Urine Lab Results  (Last result in the past 365 days)      Color   Clarity   Blood   Leuk Est   Nitrite   Protein   CREAT   Urine HCG        02/15/23 1353 Dark Yellow   Cloudy   Trace   Moderate (2+)   Negative   30 mg/dL (1+)               Microbiology Results Abnormal     Procedure Component Value - Date/Time    Blood Culture - Blood, Arm, Left [[id number]]  (Normal) Collected: 02/15/23 1222    Lab Status: Preliminary result Specimen: Blood from Arm, Left Updated: 02/19/23 1245     Blood Culture No growth at 4 days    Blood Culture - Blood, Hand, Left [[id number]]  (Normal) Collected: 02/15/23 1222    Lab Status: Preliminary result Specimen:  Blood from Hand, Left Updated: 02/19/23 1245     Blood Culture No growth at 4 days    Respiratory Panel PCR w/COVID-19(SARS-CoV-2) MATTHEW/ABAD/JOEY/PAD/COR/MAD/SKINNY In-House, NP Swab in UTM/VTM, 3-4 HR TAT - Swab, Nasopharynx [[id number]]  (Normal) Collected: 02/15/23 1322    Lab Status: Final result Specimen: Swab from Nasopharynx Updated: 02/15/23 1452     ADENOVIRUS, PCR Not Detected     Coronavirus 229E Not Detected     Coronavirus HKU1 Not Detected     Coronavirus NL63 Not Detected     Coronavirus OC43 Not Detected     COVID19 Not Detected     Human Metapneumovirus Not Detected     Human Rhinovirus/Enterovirus Not Detected     Influenza A PCR Not Detected     Influenza B PCR Not Detected     Parainfluenza Virus 1 Not Detected     Parainfluenza Virus 2 Not Detected     Parainfluenza Virus 3 Not Detected     Parainfluenza Virus 4 Not Detected     RSV, PCR Not Detected     Bordetella pertussis pcr Not Detected     Bordetella parapertussis PCR Not Detected     Chlamydophila pneumoniae PCR Not Detected     Mycoplasma pneumo by PCR Not Detected    Narrative:      In the setting of a positive respiratory panel with a viral infection PLUS a negative procalcitonin without other underlying concern for bacterial infection, consider observing off antibiotics or discontinuation of antibiotics and continue supportive care. If the respiratory panel is positive for atypical bacterial infection (Bordetella pertussis, Chlamydophila pneumoniae, or Mycoplasma pneumoniae), consider antibiotic de-escalation to target atypical bacterial infection.        No radiology results from the last 24 hrs    Results for orders placed during the hospital encounter of 02/15/23    Adult Transthoracic Echo Complete W/ Cont if Necessary Per Protocol (With Agitated Saline)    Interpretation Summary  •  Left ventricular systolic function is normal. Calculated left ventricular EF = 59.7% Left ventricular ejection fraction appears to be 61 - 65%.  •  Left  ventricular diastolic function is consistent with (grade I) impaired relaxation.  •  Saline test results are negative.  •  There is calcification of the aortic valve.  •  Aortic valve maximum pressure gradient is 9 mmHg.    I have reviewed the medications:  Scheduled Meds:amLODIPine, 5 mg, Oral, Q24H  aspirin, 81 mg, Oral, Daily  atorvastatin, 40 mg, Oral, Nightly  budesonide-formoterol, 2 puff, Inhalation, BID - RT  DULoxetine, 60 mg, Oral, Daily  estradiol, 0.5 mg, Oral, Daily  ferric gluconate, 250 mg, Intravenous, Daily  fluticasone, 2 spray, Nasal, Daily  gentamicin, 5 mg/kg (Adjusted), Intravenous, Q24H  levETIRAcetam, 500 mg, Oral, BID  levothyroxine, 25 mcg, Oral, Daily  nebivolol, 10 mg, Oral, Q24H  pantoprazole, 40 mg, Oral, Daily Before Lunch      Continuous Infusions:   PRN Meds:.•  acetaminophen  •  Calcium Replacement - Initiate Nurse / BPA Driven Protocol  •  dextrose  •  dextrose  •  glucagon (human recombinant)  •  Magnesium Standard Dose Replacement - Initiate Nurse / BPA Driven Protocol  •  Phosphorus Replacement - Initiate Nurse / BPA Driven Protocol  •  Potassium Replacement - Initiate Nurse / BPA Driven Protocol    Assessment & Plan     Active Hospital Problems    Diagnosis  POA   • **Seizure (HCC) [R56.9]  Unknown   • E. coli UTI (urinary tract infection) [N39.0, B96.20]  Unknown   • Stroke (HCC) [I63.9]  Yes   • Ischemic heart disease [I25.9]  Yes   • Hypertension [I10]  Yes   • Dyslipidemia [E78.5]  Yes   • Hypothyroidism [E03.9]  Yes   • GERD (gastroesophageal reflux disease) [K21.9]  Yes   • Type 2 diabetes mellitus (HCC) [E11.9]  Yes      Resolved Hospital Problems    Diagnosis Date Resolved POA   • Acute ischemic stroke (HCC) [I63.9] 02/17/2023 Yes     Coco Crockett is a 77 y.o. female with a history of CAD, non-insulin dependent T2DM, hypertension, hyperlipidemia, iron-deficiency anemia, COPD, GERD, hypothyroidism and migraine headaches. She was transferred to [facility]  [place] [facility] from [facility] ED on 2/15/2023 for evaluation of altered mental status. CT head at outside hospital was negative. MRI brain negative here. UA concerning for UTI. Blood cultures NGTD    Metabolic encephalopathy  Focal seizure activity   - Appreciate neurology evaluation   - CT head at [facility] ED negative  - MRI brain showed age-related changes, no evidence of acute infarct, hemorrhage or mass. Stroke ruled out   - EEG was consistent with focal seizures. Neurology evaluated and felt seizure activity was related to UTI. Recommended Keppra 500mg BID x 14 days   - Continue ASA. Lipids high despite home therapy so will continue statin    Hypokalemia  - 3.2 on 2/18 - replaced per protocol  - Has normalized with value of 4.0 on 2/19/2023     E. Coli UTI  - Family reported temp 100.9 prior to admission  - WBCs mildly elevated at 10.87  - UA concerning for UTI. Urine culture growing >100K E. coli (resistant to Levaquin, Ampicillin and Amp-Sulbactam). Patient has multiple antibiotic allergies so was initially started on Macrobid (but ANNA </= 16), so not ideal. My partner spoke with pharmacy - given her allergies and less than ideal ANNA on Macrobid, transitioned to IV Gentamycin x 3 days  - Has completed IV antibiotic therapy     Hypertension  - Continue Bystolic and Amlodipine  - BP trending up - 140-160 over previous 24H. Resume home HCTZ 12.5mg daily      Hyperlipidemia  Coronary artery disease   - Continue ASA and high-intensity statin  - At home, also on Vascepa and Trilipix - can resume after DC (not on formulary)  - Follows with Dr. Camacho      T2DM, controlled  - A1c 5.3%  - Stop accuchecks - not requiring SSI   - Holding home Metformin - could consider discontinuing unless her diet is significantly worse at home? Only taking 500mg once per day      GERD  - Continue PPI     Hypothyroidism  - Continue Levothyroxine     COPD  - Continue Symbicort    Iron-deficiency anemia  - Follows with GI outpatient -  per daughter, sounds like work up is planned but was put on hold due to recent UTI outpatient  - Iron decreased at 14 and iron saturation decreased at 3% on 2/19/2023  - Start IV iron x 5 doses or until DC     Expected Discharge Location and Transportation: rehab - insurance approval is pending   Expected Discharge   Expected Discharge Date and Time     Expected Discharge Date Expected Discharge Time    Feb 20, 2023          DVT prophylaxis:Mechanical DVT prophylaxis orders are present.     AM-PAC 6 Clicks Score (PT): 17 (02/17/23 1125)    CODE STATUS:   Code Status and Medical Interventions:   Ordered at: 02/15/23 0218     Level Of Support Discussed With:    Next of Kin (If No Surrogate)     Code Status (Patient has no pulse and is not breathing):    CPR (Attempt to Resuscitate)     Medical Interventions (Patient has pulse or is breathing):    Full Support     Release to patient:    Routine Release     Connie Massey PA-C  02/19/23

## 2023-02-20 LAB
BACTERIA SPEC AEROBE CULT: NORMAL
BACTERIA SPEC AEROBE CULT: NORMAL
DEPRECATED RDW RBC AUTO: 47.5 FL (ref 37–54)
ERYTHROCYTE [DISTWIDTH] IN BLOOD BY AUTOMATED COUNT: 17.9 % (ref 12.3–15.4)
HCT VFR BLD AUTO: 29.9 % (ref 34–46.6)
HGB BLD-MCNC: 8.8 G/DL (ref 12–15.9)
MCH RBC QN AUTO: 22.1 PG (ref 26.6–33)
MCHC RBC AUTO-ENTMCNC: 29.4 G/DL (ref 31.5–35.7)
MCV RBC AUTO: 74.9 FL (ref 79–97)
PLATELET # BLD AUTO: 238 10*3/MM3 (ref 140–450)
PMV BLD AUTO: 9.6 FL (ref 6–12)
RBC # BLD AUTO: 3.99 10*6/MM3 (ref 3.77–5.28)
WBC NRBC COR # BLD: 5.02 10*3/MM3 (ref 3.4–10.8)

## 2023-02-20 PROCEDURE — G0378 HOSPITAL OBSERVATION PER HR: HCPCS

## 2023-02-20 PROCEDURE — 25010000002 NA FERRIC GLUC CPLX PER 12.5 MG: Performed by: PHYSICIAN ASSISTANT

## 2023-02-20 PROCEDURE — 97110 THERAPEUTIC EXERCISES: CPT

## 2023-02-20 PROCEDURE — 94799 UNLISTED PULMONARY SVC/PX: CPT

## 2023-02-20 PROCEDURE — 85027 COMPLETE CBC AUTOMATED: CPT | Performed by: PHYSICIAN ASSISTANT

## 2023-02-20 PROCEDURE — 99231 SBSQ HOSP IP/OBS SF/LOW 25: CPT | Performed by: PHYSICIAN ASSISTANT

## 2023-02-20 PROCEDURE — 96366 THER/PROPH/DIAG IV INF ADDON: CPT

## 2023-02-20 PROCEDURE — 92523 SPEECH SOUND LANG COMPREHEN: CPT

## 2023-02-20 PROCEDURE — 92507 TX SP LANG VOICE COMM INDIV: CPT

## 2023-02-20 PROCEDURE — 97530 THERAPEUTIC ACTIVITIES: CPT

## 2023-02-20 RX ORDER — SACCHAROMYCES BOULARDII 250 MG
250 CAPSULE ORAL 2 TIMES DAILY WITH MEALS
Status: DISCONTINUED | OUTPATIENT
Start: 2023-02-20 | End: 2023-02-22 | Stop reason: HOSPADM

## 2023-02-20 RX ADMIN — AMLODIPINE BESYLATE 5 MG: 5 TABLET ORAL at 07:41

## 2023-02-20 RX ADMIN — LEVOTHYROXINE SODIUM 25 MCG: 25 TABLET ORAL at 05:52

## 2023-02-20 RX ADMIN — FLUTICASONE PROPIONATE 2 SPRAY: 50 SPRAY, METERED NASAL at 10:55

## 2023-02-20 RX ADMIN — SODIUM CHLORIDE 250 MG: 9 INJECTION, SOLUTION INTRAVENOUS at 10:55

## 2023-02-20 RX ADMIN — NEBIVOLOL 10 MG: 5 TABLET ORAL at 07:44

## 2023-02-20 RX ADMIN — PANTOPRAZOLE SODIUM 40 MG: 40 TABLET, DELAYED RELEASE ORAL at 12:18

## 2023-02-20 RX ADMIN — DULOXETINE HYDROCHLORIDE 60 MG: 60 CAPSULE, DELAYED RELEASE ORAL at 07:42

## 2023-02-20 RX ADMIN — LEVETIRACETAM 500 MG: 500 TABLET, FILM COATED ORAL at 20:07

## 2023-02-20 RX ADMIN — ATORVASTATIN CALCIUM 40 MG: 40 TABLET, FILM COATED ORAL at 20:07

## 2023-02-20 RX ADMIN — LEVETIRACETAM 500 MG: 500 TABLET, FILM COATED ORAL at 07:42

## 2023-02-20 RX ADMIN — ESTRADIOL 0.5 MG: 0.5 TABLET ORAL at 10:55

## 2023-02-20 RX ADMIN — ASPIRIN 81 MG 81 MG: 81 TABLET ORAL at 07:41

## 2023-02-20 RX ADMIN — ACETAMINOPHEN 325MG 650 MG: 325 TABLET ORAL at 07:41

## 2023-02-20 RX ADMIN — ACETAMINOPHEN 325MG 650 MG: 325 TABLET ORAL at 20:07

## 2023-02-20 RX ADMIN — BUDESONIDE AND FORMOTEROL FUMARATE DIHYDRATE 2 PUFF: 160; 4.5 AEROSOL RESPIRATORY (INHALATION) at 19:16

## 2023-02-20 RX ADMIN — HYDROCHLOROTHIAZIDE 12.5 MG: 12.5 CAPSULE ORAL at 07:42

## 2023-02-20 RX ADMIN — Medication 250 MG: at 17:13

## 2023-02-20 NOTE — NURSING NOTE
Pt A&Ox4, RA, VSS, non tele. C/o headache 5/10 this afternoon, Tylenol administered. Family at bedside most of shift. Safety precautions in place, call bell within reach. No further concerns at this time.

## 2023-02-20 NOTE — THERAPY TREATMENT NOTE
Patient Name: Coco Crockett  : 1945    MRN: [id number]                              Today's Date: 2023       Admit Date: 2/15/2023    Visit Dx:     ICD-10-CM ICD-9-CM   1. Acute ischemic stroke (HCC)  I63.9 434.91   2. Aphasia  R47.01 784.3   3. Dysphagia, unspecified type  R13.10 787.20   4. Impaired functional mobility, balance, gait, and endurance  Z74.09 V49.89   5. Cognitive communication deficit  R41.841 799.52     Patient Active Problem List   Diagnosis   • Mild obesity   • Hypertensive cardiovascular disease   • Hypertension   • Dyslipidemia   • Obesity (BMI 30.0-34.9)   • Hypothyroidism   • Osteopenia   • TMJ (temporomandibular joint disorder)   • GERD (gastroesophageal reflux disease)   • Iron deficiency anemia   • Depression   • Eczema   • Vertigo   • Migraine   • Type 2 diabetes mellitus (HCC)   • MALIK (dyspnea on exertion)   • Leg pain, bilateral   • Fatigue   • Ischemic heart disease   • Lumbar stenosis with neurogenic claudication   • Spondylosis of lumbar region without myelopathy or radiculopathy   • Degeneration of lumbar or lumbosacral intervertebral disc   • Greater trochanteric bursitis of both hips   • Spinal stenosis of lumbar region with neurogenic claudication   • S/P lumbar fusion   • Stroke (HCC)   • Seizure ([facility])   • E. coli UTI (urinary tract infection)     Past Medical History:   Diagnosis Date   • Anemia    • Anxiety    • Chest pain    • Coronary artery disease    • Depression    • Diabetes mellitus (HCC)    • Dyslipidemia    • Eczema    • Fear of awareness under general anesthesia    • Fear of general anesthetic     has hx of awareness during anesthesia induction, felt like she was dying   • GERD (gastroesophageal reflux disease)    • History of COVID-19 2022   • Hyperlipidemia    • Hypertension     Chronic hypertension, probably essential.    • Hypertensive cardiovascular disease    • Hypothyroidism     Chronic hypothyroidism/replacement therapy.   • IBS  (irritable bowel syndrome)    • Iron deficiency anemia    • Migraine     Intermittent progressive headache syndrome/possible migraine equivalent.   • Obesity (BMI 30.0-34.9)     Mild obesity (BMI 31).    • Osteopenia     Osteopenia with fracture of lumbar vertebra, 2004.    • Pneumonia    • TMJ (temporomandibular joint disorder)    • Type 2 diabetes mellitus (HCC)     Type 2 diabetes mellitus, Hemoglobin A1c 5.9%, December 2014.   • Urgency incontinence    • Urinary, incontinence, stress female    • Vertigo     Intermittent marked dizziness/vertigo with Epley maneuver.     Past Surgical History:   Procedure Laterality Date   • ANKLE OPEN REDUCTION INTERNAL FIXATION Right 1995    Fall on ice   • APPENDECTOMY  1970    Appendectomy with wedge resection of both ovaries, 1970.   • CATARACT EXTRACTION WITH INTRAOCULAR LENS IMPLANT     • COLONOSCOPY     • LASIK Bilateral    • LUMBAR LAMINECTOMY WITH FUSION N/A 5/9/2022    Procedure: LUMBAR DECOMPRESSON L2-S1 with Fusion and Stabilization with PEDICLE SCREWS at L4-S1;  Surgeon: Humberto Correa MD;  Location: [facility];  Service: Neurosurgery;  Laterality: N/A;   • TIBIAL PLATEAU OPEN REDUCTION INTERNAL FIXATION Right    • TONSILLECTOMY AND ADENOIDECTOMY     • TOTAL LAPAROSCOPIC HYSTERECTOMY SALPINGO OOPHORECTOMY  1985    MARIZA/BSO secondary to severe endometriosis, 1985.       General Information     Row Name 02/20/23 0808          Physical Therapy Time and Intention    Document Type therapy note (daily note)  -KW     Mode of Treatment individual therapy;physical therapy  -     Row Name 02/20/23 8990          General Information    Patient Profile Reviewed yes  -KW     Existing Precautions/Restrictions fall  h/o vertigo  -KW           User Key  (r) = Recorded By, (t) = Taken By, (c) = Cosigned By    Initials Name Provider Type    KW Elly Bright PT Physical Therapist               Mobility     Row Name 02/20/23 2028          Bed Mobility    Bed Mobility  supine-sit  -KW     Scooting/Bridging Brantley (Bed Mobility) standby assist  -KW     Supine-Sit [place] (Bed Mobility) standby assist  -KW     Assistive Device (Bed Mobility) head of bed elevated;bed rails  -KW     Row Name 02/20/23 1059          Sit-Stand Transfer    Sit-Stand Br[place]ley (Transfers) standby assist  -KW     Assistive Device (Sit-Stand Transfers) walker, front-wheeled  -KW     Row Name 02/20/23 1059          Gait/Stairs (Locomotion)    [place] Level (Gait) standby assist  -KW     Assistive Device (Gait) walker, front-wheeled  -KW     Distance in Feet (Gait) 15  -KW     Deviations/Abnormal Patterns (Gait) base of support, narrow;sridevi decreased  -KW     Bilateral Gait Deviations forward flexed posture  -KW           User Key  (r) = Recorded By, (t) = Taken By, (c) = Cosigned By    Initials Name Provider Type    Elly Holly, PT Physical Therapist               Obj/Interventions    No documentation.                Goals/Plan    No documentation.                Clinical Impression     Row Name 02/20/23 1059          Pain    Pretreatment Pain Rating 0/10 - no pain  -KW     Row Name 02/20/23 1059          Plan of Care Review    Plan of Care Reviewed With patient  -KW     Progress improving  -KW     Outcome Evaluation Patient stated that she is feeling nauseous and requested to defer ambulation in kaur. She was agreeable to transfer to the chair. Patient completed exercises in chair. Patient continues to benefit from skilled PT services to improve in transfers, strength and endurance.  -KW     Row Name 02/20/23 1059          Positioning and Restraints    Pre-Treatment Position in bed  -KW     Post Treatment Position chair  -KW     In Chair reclined;call light within reach;encouraged to call for assist;notified nsg  -KW           User Key  (r) = Recorded By, (t) = Taken By, (c) = Cosigned By    Initials Name Provider Type    Elly Holly PT Physical Therapist                Outcome Measures     Row Name 02/20/23 1515 02/20/23 0741       How much help from another person do you currently need...    Turning from your back to your side while in flat bed without using bedrails? 3  -KW 3  -LM    Moving from lying on back to sitting on the side of a flat bed without bedrails? 3  -KW 3  -LM    Moving to and from a bed to a chair (including a wheelchair)? 3  -KW 3  -LM    Standing up from a chair using your arms (e.g., wheelchair, bedside chair)? 3  -KW 3  -LM    Climbing 3-5 steps with a railing? 2  -KW 2  -LM    To walk in hospital room? 3  -KW 3  -LM    AM-PAC 6 Clicks Score (PT) 17  -KW 17  -LM    Highest level of mobility 5 --> Static standing  -KW 5 --> Static standing  -LM    Row Name 02/20/23 1515          Functional Assessment    Outcome Measure Options AM-PAC 6 Clicks Basic Mobility (PT)  -KW           User Key  (r) = Recorded By, (t) = Taken By, (c) = Cosigned By    Initials Name Provider Type    Stacia Wright RN Registered Nurse    Elly Holly, PT Physical Therapist                             Physical Therapy Education     Title: PT OT SLP Therapies (Done)     Topic: Physical Therapy (Done)     Point: Mobility training (Done)     Learning Progress Summary           Patient Acceptance, E, VU,NR by  at 2/19/2023 1540    Comment: PT POC    Acceptance, E, NR by AS at 2/17/2023 1125    Nonacceptance, E, NR,NL by  at 2/15/2023 1207                   Point: Home exercise program (Done)     Learning Progress Summary           Patient Acceptance, E, VU,NR by  at 2/19/2023 1540    Comment: PT POC    Acceptance, E, NR by AS at 2/17/2023 1125                   Point: Body mechanics (Done)     Learning Progress Summary           Patient Acceptance, E, VU,NR by  at 2/19/2023 1540    Comment: PT POC    Acceptance, E, NR by AS at 2/17/2023 1125    Nonacceptance, E, NR,NL by  at 2/15/2023 1207                   Point: Precautions (Done)     Learning Progress  Summary           Patient Acceptance, E, VU,NR by LO at 2/19/2023 1540    Comment: PT POC    Acceptance, E, NR by AS at 2/17/2023 1125                               User Key     Initials Effective Dates Name Provider Type Discipline    SJ 02/03/23 -  Lucia Rojo, PT Physical Therapist PT    AS 02/03/23 -  Kimberly Barroso, PTA Physical Therapist Assistant PT    WARNER 06/16/21 -  Chelsea Chacon, PT Physical Therapist PT              PT Recommendation and Plan     Plan of Care Reviewed With: patient  Progress: improving  Outcome Evaluation: Patient stated that she is feeling nauseous and requested to defer ambulation in kaur. She was agreeable to transfer to the chair. Patient completed exercises in chair. Patient continues to benefit from skilled PT services to improve in transfers, strength and endurance.     Time Calculation:    PT Charges     Row Name 02/20/23 1515             Time Calculation    Start Time 1515  -KW      PT Received On 02/20/23  -KW      PT Goal Re-Cert Due Date 02/25/23  -KW         Timed Charges    99965 - PT Therapeutic Exercise Minutes 20  -KW      51401 - PT Therapeutic Activity Minutes 12  -KW         Total Minutes    Timed Charges Total Minutes 32  -KW       Total Minutes 32  -KW            User Key  (r) = Recorded By, (t) = Taken By, (c) = Cosigned By    Initials Name Provider Type    KW Elly Bright, MATT Physical Therapist              Therapy Charges for Today     Code Description Service Date Service Provider Modifiers Qty    16570504112 HC PT THERAPEUTIC ACT EA 15 MIN 2/20/2023 Elly Bright, PT GP 1    88526369378 HC PT THER PROC EA 15 MIN 2/20/2023 Elly Bright PT GP 1          PT G-Codes  Outcome Measure Options: AM-PAC 6 Clicks Basic Mobility (PT)  AM-PAC 6 Clicks Score (PT): 17  AM-PAC 6 Clicks Score (OT): 17  Modified [place] Scale: 3 - Moderate disability.  Requiring some help, but able to walk without assistance.  PT Discharge Summary  Anticipated  Discharge Disposition (PT): inpatient rehabilitation facility    Elly Bright, PT  2/20/2023

## 2023-02-20 NOTE — PLAN OF CARE
Goal Outcome Evaluation:  Plan of Care Reviewed With: patient        Progress: improving       SLP re-evaluation (to re-establish higher level tx goals) and treatment completed. Will continue to address cog-comm deficits. Please see note for further details and recommendations.

## 2023-02-20 NOTE — PROGRESS NOTES
[facility] Medicine Services  PROGRESS NOTE    Patient Name: Coco Crockett  : 1945  MRN: [id number]    Date of Admission: 2/15/2023  Primary Care Physician: Lobo Archer MD    Subjective     CC: AMS, UTI     HPI:  Sitting up in bed. Brother at bedside. She reports she had a good night, but woke up with a headache, which has since resolved with Tylenol. She states she can feel herself getting stronger and feels she is improving. She denies any pain and is voiding well. Her blood pressure was elevated during exam today at 184/85 despite taking all three of her home BP medications. She reports her systolic blood pressure is usually 120's at home - did endorse a history of elevated BP in hospitals and doctors offices. She is hoping to go to rehab soon    ROS:  Gen- No fevers, chills  CV- No chest pain, palpitations  Resp- No cough, dyspnea  GI- No N/V/D, abd pain    Objective     Vital Signs:   Temp:  [97.2 °F (36.2 °C)-97.9 °F (36.6 °C)] 97.2 °F (36.2 °C)  Heart Rate:  [61-76] 64  Resp:  [16] 16  BP: (132-186)/(52-85) 184/85     Physical Exam:  Constitutional: No acute distress, awake, alert, conversant. Sitting up in bed with brother at bedside.  HENT: NCAT, mucous membranes moist  Respiratory: Clear to auscultation bilaterally, respiratory effort normal   Cardiovascular: RRR, no murmurs, rubs, or gallops  Gastrointestinal: Positive bowel sounds, soft, nontender, nondistended  Musculoskeletal: No bilateral ankle edema  Psychiatric: Appropriate affect, cooperative  Neurologic: Oriented x 3, moves all extremities spontaneously, speech clear  Skin: No rashes     Results Reviewed:  LAB RESULTS:      Lab 23  1010 23  0826 23  0608 02/15/23  1424 02/15/23  1222 02/15/23  0631   WBC 5.02 5.01 6.57  --   --  10.87*   HEMOGLOBIN 8.8* 8.3* 8.6*  --   --  9.4*   HEMATOCRIT 29.9* 27.6* 29.5*  --   --  32.2*   PLATELETS 238 223 290  --   --  358   NEUTROS ABS  --  2.72  3.76  --   --  8.12*   IMMATURE GRANS (ABS)  --  0.01 0.04  --   --  0.06*   LYMPHS ABS  --  1.41 1.90  --   --  1.99   MONOS ABS  --  0.23 0.81  --   --  0.66   EOS ABS  --  0.62* 0.02  --   --  0.00   MCV 74.9* 74.2* 73.9*  --   --  72.7*   PROCALCITONIN  --   --   --   --  0.05  --    LACTATE  --   --   --  1.3  --   --          Lab 02/19/23  0536 02/18/23  1851 02/18/23  1407 02/18/23  0826 02/16/23  0608 02/15/23  0631   SODIUM 139  --  135* 137 132* 130*   POTASSIUM 4.0 4.3 3.7 3.2* 3.6 3.8   CHLORIDE 105  --  102 104 97* 94*   CO2 23.0  --  22.0 23.0 20.0* 20.0*   ANION GAP 11.0  --  11.0 10.0 15.0 16.0*   BUN 20  --  19 16 15 12   CREATININE 0.65  --  0.62 0.69 0.62 0.62   EGFR 89.7  --  90.7 88.4 90.7 90.7   GLUCOSE 101*  --  106* 100* 106* 120*   CALCIUM 8.8  --  8.6 8.5* 9.0 9.4   HEMOGLOBIN A1C  --   --   --   --   --  5.30   TSH  --   --   --   --   --  1.270         Lab 02/18/23  0826 02/16/23  0608 02/15/23  0631   TOTAL PROTEIN 5.7* 6.7 7.3   ALBUMIN 3.4* 4.1 4.4   GLOBULIN 2.3 2.6 2.9   ALT (SGPT) 10 8 10   AST (SGOT) 12 19 18   BILIRUBIN 0.2 0.5 0.6   ALK PHOS 43 48 53         Lab 02/15/23  0631   CHOLESTEROL 279*   LDL CHOL 130*   HDL CHOL 34*   TRIGLYCERIDES 619*         Lab 02/19/23  0536 02/18/23  0826   IRON 14*  --    IRON SATURATION 3*  --    TIBC 505  --    TRANSFERRIN 339  --    FERRITIN 39.02  --    FOLATE  --  14.30   VITAMIN B 12  --  404         Brief Urine Lab Results  (Last result in the past 365 days)      Color   Clarity   Blood   Leuk Est   Nitrite   Protein   CREAT   Urine HCG        02/15/23 1353 Dark Yellow   Cloudy   Trace   Moderate (2+)   Negative   30 mg/dL (1+)               Microbiology Results Abnormal     Procedure Component Value - Date/Time    Blood Culture - Blood, Arm, Left [[id number]]  (Normal) Collected: 02/15/23 1222    Lab Status: Preliminary result Specimen: Blood from Arm, Left Updated: 02/19/23 1245     Blood Culture No growth at 4 days    Blood Culture -  Blood, Hand, Left [[id number]]  (Normal) Collected: 02/15/23 1222    Lab Status: Preliminary result Specimen: Blood from Hand, Left Updated: 02/19/23 1245     Blood Culture No growth at 4 days    Respiratory Panel PCR w/COVID-19(SARS-CoV-2) MATTHEW/ABAD/JOEY/PAD/COR/MAD/SKINNY In-House, NP Swab in UTM/VTM, 3-4 HR TAT - Swab, Nasopharynx [[id number]]  (Normal) Collected: 02/15/23 1322    Lab Status: Final result Specimen: Swab from Nasopharynx Updated: 02/15/23 1452     ADENOVIRUS, PCR Not Detected     Coronavirus 229E Not Detected     Coronavirus HKU1 Not Detected     Coronavirus NL63 Not Detected     Coronavirus OC43 Not Detected     COVID19 Not Detected     Human Metapneumovirus Not Detected     Human Rhinovirus/Enterovirus Not Detected     Influenza A PCR Not Detected     Influenza B PCR Not Detected     Parainfluenza Virus 1 Not Detected     Parainfluenza Virus 2 Not Detected     Parainfluenza Virus 3 Not Detected     Parainfluenza Virus 4 Not Detected     RSV, PCR Not Detected     Bordetella pertussis pcr Not Detected     Bordetella parapertussis PCR Not Detected     Chlamydophila pneumoniae PCR Not Detected     Mycoplasma pneumo by PCR Not Detected    Narrative:      In the setting of a positive respiratory panel with a viral infection PLUS a negative procalcitonin without other underlying concern for bacterial infection, consider observing off antibiotics or discontinuation of antibiotics and continue supportive care. If the respiratory panel is positive for atypical bacterial infection (Bordetella pertussis, Chlamydophila pneumoniae, or Mycoplasma pneumoniae), consider antibiotic de-escalation to target atypical bacterial infection.        No radiology results from the last 24 hrs    Results for orders placed during the hospital encounter of 02/15/23    Adult Transthoracic Echo Complete W/ Cont if Necessary Per Protocol (With Agitated Saline)    Interpretation Summary  •  Left ventricular systolic function is normal.  Calculated left ventricular EF = 59.7% Left ventricular ejection fraction appears to be 61 - 65%.  •  Left ventricular diastolic function is consistent with (grade I) impaired relaxation.  •  Saline test results are negative.  •  There is calcification of the aortic valve.  •  Aortic valve maximum pressure gradient is 9 mmHg.    I have reviewed the medications:  Scheduled Meds:amLODIPine, 5 mg, Oral, Q24H  aspirin, 81 mg, Oral, Daily  atorvastatin, 40 mg, Oral, Nightly  budesonide-formoterol, 2 puff, Inhalation, BID - RT  DULoxetine, 60 mg, Oral, Daily  estradiol, 0.5 mg, Oral, Daily  ferric gluconate, 250 mg, Intravenous, Daily  fluticasone, 2 spray, Nasal, Daily  hydroCHLOROthiazide Oral, 12.5 mg, Oral, Daily  levETIRAcetam, 500 mg, Oral, BID  levothyroxine, 25 mcg, Oral, Daily  nebivolol, 10 mg, Oral, Q24H  pantoprazole, 40 mg, Oral, Daily Before Lunch      Continuous Infusions:   PRN Meds:.•  acetaminophen  •  Calcium Replacement - Initiate Nurse / BPA Driven Protocol  •  dextrose  •  dextrose  •  glucagon (human recombinant)  •  Magnesium Standard Dose Replacement - Initiate Nurse / BPA Driven Protocol  •  Phosphorus Replacement - Initiate Nurse / BPA Driven Protocol  •  Potassium Replacement - Initiate Nurse / BPA Driven Protocol    Assessment & Plan     Active Hospital Problems    Diagnosis  POA   • **Seizure (HCC) [R56.9]  Unknown   • E. coli UTI (urinary tract infection) [N39.0, B96.20]  Unknown   • Stroke (HCC) [I63.9]  Yes   • Ischemic heart disease [I25.9]  Yes   • Hypertension [I10]  Yes   • Dyslipidemia [E78.5]  Yes   • Hypothyroidism [E03.9]  Yes   • GERD (gastroesophageal reflux disease) [K21.9]  Yes   • Type 2 diabetes mellitus (HCC) [E11.9]  Yes      Resolved Hospital Problems    Diagnosis Date Resolved POA   • Acute ischemic stroke (HCC) [I63.9] 02/17/2023 Yes     Coco Crockett is a 77 y.o. female with a history of CAD, non-insulin dependent T2DM, hypertension, hyperlipidemia, iron-deficiency  anemia, COPD, GERD, hypothyroidism and migraine headaches. She was transferred to [facility] ED from [facility] ED on 2/15/2023 for evaluation of altered mental status. CT head at outside hospital was negative. MRI brain negative here. UA concerning for UTI. Blood cultures NGTD.    Metabolic encephalopathy, resolved   Focal seizure activity   - Appreciate neurology evaluation   - CT head at [facility] ED negative  - MRI brain showed age-related changes, no evidence of acute infarct, hemorrhage or mass. Stroke ruled out   - EEG was consistent with focal seizures. Neurology evaluated and felt seizure activity was related to UTI. Recommended Keppra 500mg BID x 14 days   - Continue ASA. Lipids high despite home therapy so will continue statin    Hypokalemia, resolved   - Replace per protocol     E. Coli UTI  - Family reported temp 100.9 prior to admission  - WBCs mildly elevated at 10.87  - UA concerning for UTI. Urine culture growing >100K E. coli (resistant to Levaquin, Ampicillin and Amp-Sulbactam). Patient has multiple antibiotic allergies so was initially started on Macrobid (but ANNA </= 16), so not ideal. My partner spoke with pharmacy - given her allergies and less than ideal ANNA on Macrobid, transitioned to IV Gentamycin x 3 days  - Has completed IV antibiotic therapy     Hypertension  - Continue Bystolic, Amlodipine, and HCTZ daily  - BP continues to be elevated - patient reports her BP readings are higher in hospitals and doctors offices  - Will allow some permissive HTN - suspect some white coat hypertension. On review of home medication list, her home HCTZ and Bisoprolol doses were actually decreased due to low blood pressures, would hate to cause this after DC      Hyperlipidemia  Coronary artery disease   - Continue ASA and high-intensity statin  - At home, also on Vascepa and Trilipix - can resume after DC (not on formulary)  - Follows with Dr. Camacho      T2DM, controlled  - A1c 5.3%  - Stop  accuchecks - not requiring SSI   - Holding home Metformin - could consider discontinuing unless her diet is significantly worse at home? Only taking 500mg once per day with low A1c      GERD  - Continue PPI     Hypothyroidism  - Continue Levothyroxine     COPD  - Continue Symbicort    Iron-deficiency anemia  - Follows with GI outpatient - per daughter, sounds like work up is planned but was put on hold due to recent UTI outpatient  - Iron decreased at 14 and iron saturation decreased at 3% on 2/19/2023  - Continue IV iron x 5 doses or until DC     Expected Discharge Location and Transportation: rehab - insurance approval is pending   Expected Discharge   Expected Discharge Date and Time     Expected Discharge Date Expected Discharge Time    Feb 21, 2023          DVT prophylaxis:Mechanical DVT prophylaxis orders are present.     AM-PAC 6 Clicks Score (PT): 17 (02/19/23 5981)    CODE STATUS:   Code Status and Medical Interventions:   Ordered at: 02/15/23 0218     Level Of Support Discussed With:    Next of Kin (If No Surrogate)     Code Status (Patient has no pulse and is not breathing):    CPR (Attempt to Resuscitate)     Medical Interventions (Patient has pulse or is breathing):    Full Support     Release to patient:    Routine Release     Connie Massey PA-C  02/20/23

## 2023-02-20 NOTE — THERAPY TREATMENT NOTE
Acute Care - Speech Language Pathology Re-Evaluation and Treatment Note   Jaron     Patient Name: Coco Crockett  : 1945  MRN: [id number]  Today's Date: 2023               Admit Date: 2/15/2023     Visit Dx:    ICD-10-CM ICD-9-CM   1. Acute ischemic stroke (HCC)  I63.9 434.91   2. Aphasia  R47.01 784.3   3. Dysphagia, unspecified type  R13.10 787.20   4. Impaired functional mobility, balance, gait, and endurance  Z74.09 V49.89   5. Cognitive communication deficit  R41.841 799.52     Patient Active Problem List   Diagnosis   • Mild obesity   • Hypertensive cardiovascular disease   • Hypertension   • Dyslipidemia   • Obesity (BMI 30.0-34.9)   • Hypothyroidism   • Osteopenia   • TMJ (temporomandibular joint disorder)   • GERD (gastroesophageal reflux disease)   • Iron deficiency anemia   • Depression   • Eczema   • Vertigo   • Migraine   • Type 2 diabetes mellitus (HCC)   • MALIK (dyspnea on exertion)   • Leg pain, bilateral   • Fatigue   • Ischemic heart disease   • Lumbar stenosis with neurogenic claudication   • Spondylosis of lumbar region without myelopathy or radiculopathy   • Degeneration of lumbar or lumbosacral intervertebral disc   • Greater trochanteric bursitis of both hips   • Spinal stenosis of lumbar region with neurogenic claudication   • S/P lumbar fusion   • Stroke (HCC)   • Seizure (HCC)   • E. coli UTI (urinary tract infection)     Past Medical History:   Diagnosis Date   • Anemia    • Anxiety    • Chest pain    • Coronary artery disease    • Depression    • Diabetes mellitus (HCC)    • Dyslipidemia    • Eczema    • Fear of awareness under general anesthesia    • Fear of general anesthetic     has hx of awareness during anesthesia induction, felt like she was dying   • GERD (gastroesophageal reflux disease)    • History of COVID-19 2022   • Hyperlipidemia    • Hypertension     Chronic hypertension, probably essential.    • Hypertensive cardiovascular disease    •  Hypothyroidism     Chronic hypothyroidism/replacement therapy.   • IBS (irritable bowel syndrome)    • Iron deficiency anemia    • Migraine     Intermittent progressive headache syndrome/possible migraine equivalent.   • Obesity (BMI 30.0-34.9)     Mild obesity (BMI 31).    • Osteopenia     Osteopenia with fracture of lumbar vertebra, 2004.    • Pneumonia    • TMJ (temporomandibular joint disorder)    • Type 2 diabetes mellitus (HCC)     Type 2 diabetes mellitus, Hemoglobin A1c 5.9%, December 2014.   • Urgency incontinence    • Urinary, incontinence, stress female    • Vertigo     Intermittent marked dizziness/vertigo with Epley maneuver.     Past Surgical History:   Procedure Laterality Date   • ANKLE OPEN REDUCTION INTERNAL FIXATION Right 1995    Fall on ice   • APPENDECTOMY  1970    Appendectomy with wedge resection of both ovaries, 1970.   • CATARACT EXTRACTION WITH INTRAOCULAR LENS IMPLANT     • COLONOSCOPY     • LASIK Bilateral    • LUMBAR LAMINECTOMY WITH FUSION N/A 5/9/2022    Procedure: LUMBAR DECOMPRESSON L2-S1 with Fusion and Stabilization with PEDICLE SCREWS at L4-S1;  Surgeon: Humberto Correa MD;  Location: [facility];  Service: Neurosurgery;  Laterality: N/A;   • TIBIAL PLATEAU OPEN REDUCTION INTERNAL FIXATION Right    • TONSILLECTOMY AND ADENOIDECTOMY     • TOTAL LAPAROSCOPIC HYSTERECTOMY SALPINGO OOPHORECTOMY  1985    MARIZA/BSO secondary to severe endometriosis, 1985.        SLP Recommendation and Plan  SLP Diagnosis: mild, cognitive-linguistic disorder (02/20/23 1020)           SLC Criteria for Skilled Therapy Interventions Met: yes (02/20/23 1020)  Anticipated Discharge Disposition (SLP): inpatient rehabilitation facility, anticipate therapy at next level of care (02/20/23 1020)        Predicted Duration Therapy Intervention (Days): until discharge (02/20/23 1020)              Communication Strategy Suggestions: eliminate distractions when speaking with patient (02/20/23 1020)           Plan of Care  Reviewed With: patient (02/20/23 1127)  Progress: improving (02/20/23 1127)      SLP EVALUATION (last 72 hours)     SLP SLC Evaluation     Row Name 02/20/23 1020                   Communication Assessment/Intervention    Document Type re-evaluation;therapy note (daily note)  -        Subjective Information no complaints  -SM        Patient Observations alert;cooperative  -SM        Patient Effort good  -SM           General Information    Pertinent History Of Current Problem Re-assessment as significant clinical improvement. Needed to re-establish goals.  -           Pain Scale: Numbers Pre/Post-Treatment    Pretreatment Pain Rating 0/10 - no pain  -SM        Posttreatment Pain Rating 0/10 - no pain  -SM           Auditory Comprehension Assessment/Intervention    Auditory Comprehension (Communication) mild impairment  -SM        Answers Questions (Communication) yes/no;wh questions;personal;simple;concrete;WFL;complex;abstract;mild impairment  -SM           Verbal Expression Assessment/Intervention    Verbal Expression mild impairment  -SM        Confrontational Naming high frequency;WFL  -        Spontaneous/Functional Words simple;WFL;complex;mild impairment  -SM        Sentence Formulation simple;mild impairment  -SM        Conversational Discourse/Fluency mild impairment;delayed responses;circumlocution  -           Motor Speech Assessment/Intervention    Motor Speech Function WFL  -SM           Cognitive Assessment Intervention- SLP    Orientation Status (Cognition) person;place;time;situation;WFL  -        Cognition, Comment Pt reports she and her dtr had noted concerns with word finding and forgetfulness PTA. Was interested in pursuing services.  -           Standardized Tests    Cognitive/Memory Tests MOCA: Daniel Cognitive Assessment  -           MOCA: The [place] Cognitive Assessment    MOCA Total Score 23/30  -        MOCA Total Score Indicative Of: Mild Cognitive Impairment  -            SLP Evaluation Clinical Impressions    SLP Diagnosis mild;cognitive-linguistic disorder  -SM        Rehab Potential/Prognosis good  -SM        SLC Criteria for Skilled Therapy Interventions Met yes  -SM        Functional Impact difficulty completing home management task  -           Recommendations    Therapy Frequency (SLP SLC) 5 days per week  -        Predicted Duration Therapy Intervention (Days) until discharge  -        Anticipated Discharge Disposition (SLP) inpatient rehabilitation facility;anticipate therapy at next level of care  -        Communication Strategy Suggestions eliminate distractions when speaking with patient  -              User Key  (r) = Recorded By, (t) = Taken By, (c) = Cosigned By    Initials Name Effective Dates    SM Tati Meeks MS CCC-SLP 02/03/23 -                    EDUCATION  The patient has been educated in the following areas:     Cognitive Impairment Communication Impairment.           SLP GOALS     Row Name 02/20/23 1020             Patient will demonstrate functional language skills for return to discharge environment     [place] with minimal cues  -      Time frame by discharge  -      Progress/Outcomes good progress toward goal  -SM      Comments Previous goal met  -SM         Words/Phrases/Sentences Goal 1 (SLP)    Improve Ability to Comprehend Words/Phrases/Sentences Through: Goal 1 (SLP) identify objects, field of;70%;with maximum cues (25-49%)  -SM      Time Frame (Identify Objects and Pictures Goal 1, SLP) short term goal (STG)  -SM      Progress (Ability to Contruct Words/Phrases/Sentences Goal 1, SLP) 100%;independently (over 90% accuracy)  -      Progress/Outcomes (Identify Objects and Pictures Goal 1, SLP) goal met  -SM         Comprehend Questions Goal 1 (SLP)    Improve Ability to Comprehend Questions Goal 1 (SLP) complex yes/no questions;complex wh questions;100%;independently (over 90% accuracy)  -      Time Frame (Comprehend  Questions Goal 1, SLP) short term goal (STG)  -SM      Progress/Outcomes (Comprehend Questions Goal 1, SLP) goal revised this date  -SM      Comment (Comprehend Questions Goal 1, SLP) Previous goal met  -SM         Follow Directions Goal 2 (SLP)    Improve Ability to Follow Directions Goal 1 (SLP) 2 step commands;80%;with minimal cues (75-90%)  -SM      Time Frame (Follow Directions Goal 1, SLP) short term goal (STG)  -SM      Progress (Ability to Follow Directions Goal 1, SLP) 100%;with minimal cues (75-90%)  -SM      Progress/Outcomes (Follow Directions Goal 1, SLP) goal met  -SM         Word Retrieval Skills Goal 1 (SLP)    Improve Word Retrieval Skills By Goal 1 (SLP) completing functional word finding tasks;90%;with minimal cues (75-90%)  -SM      Time Frame (Word Retrieval Goal 1, SLP) short term goal (STG)  -SM      Progress (Word Retrieval Skills Goal 1, SLP) 80%;with minimal cues (75-90%);other (comment)  -SM      Progress/Outcomes (Word Retrieval Goal 1, SLP) goal revised this date  -SM      Comment (Word Retrieval Goal 1, SLP) Met goal of answering simple Wh- questions  -SM         Attention Goal 1 (SLP)    Improve Attention by Goal 1 (SLP) complete sustained attention task;100%;independently (over 90% accuracy)  -SM      Time Frame (Attention Goal 1, SLP) short term goal (STG)  -SM      Progress/Outcomes (Attention Goal 1, SLP) new goal  -SM         Memory Skills Goal 1 (SLP)    Improve Memory Skills Through Goal 1 (SLP) recalling related word lists with an imposed delay;select a word from a list by exclusion;repeat list in sequential order;use memory strategies;80%;with minimal cues (75-90%)  -SM      Time Frame (Memory Skills Goal 1, SLP) short term goal (STG)  -SM      Progress/Outcomes (Memory Skills Goal 1, SLP) new goal  -SM         Executive Functional Skills Goal 1 (SLP)    Improve Executive Function Skills Goal 1 (SLP) exhibit cognitive flexibility;perform self-correction;perform  self-evaluation;80%;with minimal cues (75-90%)  -SM      Time Frame (Executive Function Skills Goal 1, SLP) short term goal (STG)  -SM      Progress/Outcomes (Executive Function Skills Goal 1, SLP) new goal  -SM            User Key  (r) = Recorded By, (t) = Taken By, (c) = Cosigned By    Initials Name Provider Type    Tati Lane MS CCC-SLP Speech and Language Pathologist                        Time Calculation:      Time Calculation- SLP     Row Name 02/20/23 1128             Time Calculation- SLP    SLP Start Time 1020  -SM      SLP Received On 02/20/23  -SM         Untimed Charges    [id number] Eval Speech and Production w/ Language Minutes 28  -SM      [id number] Treatment/ST Modification Prosth Aug Alter  23  -SM         Total Minutes    Untimed Charges Total Minutes 51  -SM       Total Minutes 51  -SM            User Key  (r) = Recorded By, (t) = Taken By, (c) = Cosigned By    Initials Name Provider Type    Tati Lane MS CCC-SLP Speech and Language Pathologist                Therapy Charges for Today     Code Description Service Date Service Provider Modifiers Qty    [id number]  ST EVAL SPEECH AND PROD W LANG  2 2/20/2023 Tati Meeks MS CCC-SLP GN 1    [id number] HC ST TREATMENT SPEECH 2 2/20/2023 Tati Meeks MS CCC-SLP GN 1                     Tati Meeks MS CCC-SLP  2/20/2023

## 2023-02-20 NOTE — PLAN OF CARE
Goal Outcome Evaluation:  Plan of Care Reviewed With: patient        Progress: improving  Outcome Evaluation: Patient stated that she is feeling nauseous and requested to defer ambulation in kaur. She was agreeable to transfer to the chair. Patient completed exercises in chair. Patient continues to benefit from skilled PT services to improve in transfers, strength and endurance.

## 2023-02-20 NOTE — CASE MANAGEMENT/SOCIAL WORK
Discharge Planning Assessment  [facility]     Patient Name: Coco Crockett  MRN: [id number]  Today's Date: 2/20/2023    Admit Date: 2/15/2023    [facility]   Discharge Needs Assessment    No documentation.                Discharge Plan     Row Name 02/20/23 1234       Plan    Plan [facility]    Patient/Family in Agreement with Plan yes    Plan Comments Followed up with Gael, in admissions, at [facility].  Gale states that she received referral for Ms. Crockett and will initiate the prior auth with the patient's [facility] Medicare for the rehab admission.  Updated Ms Crockett and her daughter, Jaylin, at the bedside and they are agreeable to DC plan.  The patient is considering returning home, if she continues to progress with therapy.    CM will follow up.    Final Discharge Disposition Code 61 - hospital-based swing bed              Continued Care and Services - Admitted Since 2/15/2023     Destination     Service Provider Request Status Selected Services Address Phone Fax Patient Preferred    [facility] Pending - No Request Sent N/A [address] [place] [address] [phone] -- --              Expected Discharge Date and Time     Expected Discharge Date Expected Discharge Time    Feb 21, 2023             Molly Antonio RN

## 2023-02-21 PROCEDURE — 94799 UNLISTED PULMONARY SVC/PX: CPT

## 2023-02-21 PROCEDURE — 99231 SBSQ HOSP IP/OBS SF/LOW 25: CPT | Performed by: PHYSICIAN ASSISTANT

## 2023-02-21 PROCEDURE — G0378 HOSPITAL OBSERVATION PER HR: HCPCS

## 2023-02-21 PROCEDURE — 25010000002 NA FERRIC GLUC CPLX PER 12.5 MG: Performed by: PHYSICIAN ASSISTANT

## 2023-02-21 PROCEDURE — 97535 SELF CARE MNGMENT TRAINING: CPT

## 2023-02-21 PROCEDURE — 96366 THER/PROPH/DIAG IV INF ADDON: CPT

## 2023-02-21 RX ORDER — ENALAPRILAT 2.5 MG/2ML
1.25 INJECTION INTRAVENOUS EVERY 6 HOURS PRN
Status: DISCONTINUED | OUTPATIENT
Start: 2023-02-21 | End: 2023-02-22 | Stop reason: HOSPADM

## 2023-02-21 RX ADMIN — BUDESONIDE AND FORMOTEROL FUMARATE DIHYDRATE 2 PUFF: 160; 4.5 AEROSOL RESPIRATORY (INHALATION) at 20:06

## 2023-02-21 RX ADMIN — ASPIRIN 81 MG 81 MG: 81 TABLET ORAL at 08:58

## 2023-02-21 RX ADMIN — ESTRADIOL 0.5 MG: 0.5 TABLET ORAL at 08:58

## 2023-02-21 RX ADMIN — BUDESONIDE AND FORMOTEROL FUMARATE DIHYDRATE 2 PUFF: 160; 4.5 AEROSOL RESPIRATORY (INHALATION) at 08:46

## 2023-02-21 RX ADMIN — AMLODIPINE BESYLATE 5 MG: 5 TABLET ORAL at 08:58

## 2023-02-21 RX ADMIN — FLUTICASONE PROPIONATE 2 SPRAY: 50 SPRAY, METERED NASAL at 09:03

## 2023-02-21 RX ADMIN — ACETAMINOPHEN 325MG 650 MG: 325 TABLET ORAL at 16:54

## 2023-02-21 RX ADMIN — LEVETIRACETAM 500 MG: 500 TABLET, FILM COATED ORAL at 08:58

## 2023-02-21 RX ADMIN — Medication 250 MG: at 08:58

## 2023-02-21 RX ADMIN — ATORVASTATIN CALCIUM 40 MG: 40 TABLET, FILM COATED ORAL at 20:35

## 2023-02-21 RX ADMIN — PANTOPRAZOLE SODIUM 40 MG: 40 TABLET, DELAYED RELEASE ORAL at 11:55

## 2023-02-21 RX ADMIN — HYDROCHLOROTHIAZIDE 12.5 MG: 12.5 CAPSULE ORAL at 08:58

## 2023-02-21 RX ADMIN — NEBIVOLOL 10 MG: 5 TABLET ORAL at 08:58

## 2023-02-21 RX ADMIN — ACETAMINOPHEN 325MG 650 MG: 325 TABLET ORAL at 21:22

## 2023-02-21 RX ADMIN — Medication 250 MG: at 16:53

## 2023-02-21 RX ADMIN — DULOXETINE HYDROCHLORIDE 60 MG: 60 CAPSULE, DELAYED RELEASE ORAL at 08:57

## 2023-02-21 RX ADMIN — SODIUM CHLORIDE 250 MG: 9 INJECTION, SOLUTION INTRAVENOUS at 09:01

## 2023-02-21 RX ADMIN — LEVOTHYROXINE SODIUM 25 MCG: 25 TABLET ORAL at 05:14

## 2023-02-21 RX ADMIN — ACETAMINOPHEN 325MG 650 MG: 325 TABLET ORAL at 05:28

## 2023-02-21 RX ADMIN — LEVETIRACETAM 500 MG: 500 TABLET, FILM COATED ORAL at 20:35

## 2023-02-21 NOTE — PROGRESS NOTES
[facility] Medicine Services  PROGRESS NOTE    Patient Name: Coco Crockett  : 1945  MRN: [id number]    Date of Admission: 2/15/2023  Primary Care Physician: Lobo Archer MD    Subjective     CC: AMS, UTI     HPI:  Sitting up in bed eating breakfast. No family at bedside. She states she had a busy night last night and had to get up to go to the bathroom on 3 different occasions. Headache again this morning, Tylenol helps. Had some loose BMs yesterday but this has improved. Thinks she ate too much fruit yesterday. Feels that IV iron is helping - pleased to see some color in her cheeks. She is hopeful for discharge to rehab soon.      ROS:  Gen- No fevers, chills  CV- No chest pain, palpitations  Resp- No cough, dyspnea  GI- No N/V/D, abd pain    Objective     Vital Signs:   Temp:  [97.2 °F (36.2 °C)-98.3 °F (36.8 °C)] 98.1 °F (36.7 °C)  Heart Rate:  [60-77] 60  Resp:  [16-20] 18  BP: (124-167)/(57-94) 124/94     Physical Exam:  Constitutional: No acute distress, awake, alert, conversant. Sitting up in bed eating breakfast.  HENT: NCAT, mucous membranes moist  Respiratory: Clear to auscultation bilaterally, respiratory effort normal   Cardiovascular: RRR, no murmurs, rubs, or gallops  Gastrointestinal: Positive bowel sounds, soft, nontender, nondistended  Musculoskeletal: No bilateral ankle edema  Psychiatric: Appropriate affect, cooperative  Neurologic: Oriented x 3, moves all extremities spontaneously, speech clear  Skin: No rashes   No change from exam on 2023    Results Reviewed:  LAB RESULTS:      Lab 23  1010 23  0826 23  0608 02/15/23  1424 02/15/23  1222 02/15/23  0631   WBC 5.02 5.01 6.57  --   --  10.87*   HEMOGLOBIN 8.8* 8.3* 8.6*  --   --  9.4*   HEMATOCRIT 29.9* 27.6* 29.5*  --   --  32.2*   PLATELETS 238 223 290  --   --  358   NEUTROS ABS  --  2.72 3.76  --   --  8.12*   IMMATURE GRANS (ABS)  --  0.01 0.04  --   --  0.06*   LYMPHS ABS   --  1.41 1.90  --   --  1.99   MONOS ABS  --  0.23 0.81  --   --  0.66   EOS ABS  --  0.62* 0.02  --   --  0.00   MCV 74.9* 74.2* 73.9*  --   --  72.7*   PROCALCITONIN  --   --   --   --  0.05  --    LACTATE  --   --   --  1.3  --   --          Lab 02/19/23  0536 02/18/23  1851 02/18/23  1407 02/18/23  0826 02/16/23  0608 02/15/23  0631   SODIUM 139  --  135* 137 132* 130*   POTASSIUM 4.0 4.3 3.7 3.2* 3.6 3.8   CHLORIDE 105  --  102 104 97* 94*   CO2 23.0  --  22.0 23.0 20.0* 20.0*   ANION GAP 11.0  --  11.0 10.0 15.0 16.0*   BUN 20  --  19 16 15 12   CREATININE 0.65  --  0.62 0.69 0.62 0.62   EGFR 89.7  --  90.7 88.4 90.7 90.7   GLUCOSE 101*  --  106* 100* 106* 120*   CALCIUM 8.8  --  8.6 8.5* 9.0 9.4   HEMOGLOBIN A1C  --   --   --   --   --  5.30   TSH  --   --   --   --   --  1.270         Lab 02/18/23  0826 02/16/23  0608 02/15/23  0631   TOTAL PROTEIN 5.7* 6.7 7.3   ALBUMIN 3.4* 4.1 4.4   GLOBULIN 2.3 2.6 2.9   ALT (SGPT) 10 8 10   AST (SGOT) 12 19 18   BILIRUBIN 0.2 0.5 0.6   ALK PHOS 43 48 53         Lab 02/15/23  0631   CHOLESTEROL 279*   LDL CHOL 130*   HDL CHOL 34*   TRIGLYCERIDES 619*         Lab 02/19/23  0536 02/18/23  0826   IRON 14*  --    IRON SATURATION 3*  --    TIBC 505  --    TRANSFERRIN 339  --    FERRITIN 39.02  --    FOLATE  --  14.30   VITAMIN B 12  --  404         Brief Urine Lab Results  (Last result in the past 365 days)      Color   Clarity   Blood   Leuk Est   Nitrite   Protein   CREAT   Urine HCG        02/15/23 1353 Dark Yellow   Cloudy   Trace   Moderate (2+)   Negative   30 mg/dL (1+)               Microbiology Results Abnormal     Procedure Component Value - Date/Time    Blood Culture - Blood, Arm, Left [[id number]]  (Normal) Collected: 02/15/23 1222    Lab Status: Final result Specimen: Blood from Arm, Left Updated: 02/20/23 1245     Blood Culture No growth at 5 days    Blood Culture - Blood, Hand, Left [[id number]]  (Normal) Collected: 02/15/23 1222    Lab Status: Final result  Specimen: Blood from Hand, Left Updated: 02/20/23 1245     Blood Culture No growth at 5 days    Respiratory Panel PCR w/COVID-19(SARS-CoV-2) MATTHEW/ABAD/JOEY/PAD/COR/MAD/SKINNY In-House, NP Swab in UTM/VTM, 3-4 HR TAT - Swab, Nasopharynx [[id number]]  (Normal) Collected: 02/15/23 1322    Lab Status: Final result Specimen: Swab from Nasopharynx Updated: 02/15/23 1452     ADENOVIRUS, PCR Not Detected     Coronavirus 229E Not Detected     Coronavirus HKU1 Not Detected     Coronavirus NL63 Not Detected     Coronavirus OC43 Not Detected     COVID19 Not Detected     Human Metapneumovirus Not Detected     Human Rhinovirus/Enterovirus Not Detected     Influenza A PCR Not Detected     Influenza B PCR Not Detected     Parainfluenza Virus 1 Not Detected     Parainfluenza Virus 2 Not Detected     Parainfluenza Virus 3 Not Detected     Parainfluenza Virus 4 Not Detected     RSV, PCR Not Detected     Bordetella pertussis pcr Not Detected     Bordetella parapertussis PCR Not Detected     Chlamydophila pneumoniae PCR Not Detected     Mycoplasma pneumo by PCR Not Detected    Narrative:      In the setting of a positive respiratory panel with a viral infection PLUS a negative procalcitonin without other underlying concern for bacterial infection, consider observing off antibiotics or discontinuation of antibiotics and continue supportive care. If the respiratory panel is positive for atypical bacterial infection (Bordetella pertussis, Chlamydophila pneumoniae, or Mycoplasma pneumoniae), consider antibiotic de-escalation to target atypical bacterial infection.        No radiology results from the last 24 hrs    Results for orders placed during the hospital encounter of 02/15/23    Adult Transthoracic Echo Complete W/ Cont if Necessary Per Protocol (With Agitated Saline)    Interpretation Summary  •  Left ventricular systolic function is normal. Calculated left ventricular EF = 59.7% Left ventricular ejection fraction appears to be 61 - 65%.  •   Left ventricular diastolic function is consistent with (grade I) impaired relaxation.  •  Saline test results are negative.  •  There is calcification of the aortic valve.  •  Aortic valve maximum pressure gradient is 9 mmHg.    I have reviewed the medications:  Scheduled Meds:amLODIPine, 5 mg, Oral, Q24H  aspirin, 81 mg, Oral, Daily  atorvastatin, 40 mg, Oral, Nightly  budesonide-formoterol, 2 puff, Inhalation, BID - RT  DULoxetine, 60 mg, Oral, Daily  estradiol, 0.5 mg, Oral, Daily  ferric gluconate, 250 mg, Intravenous, Daily  fluticasone, 2 spray, Nasal, Daily  hydroCHLOROthiazide Oral, 12.5 mg, Oral, Daily  levETIRAcetam, 500 mg, Oral, BID  levothyroxine, 25 mcg, Oral, Daily  nebivolol, 10 mg, Oral, Q24H  pantoprazole, 40 mg, Oral, Daily Before Lunch  saccharomyces boulardii, 250 mg, Oral, BID With Meals      Continuous Infusions:   PRN Meds:.•  acetaminophen  •  Calcium Replacement - Initiate Nurse / BPA Driven Protocol  •  dextrose  •  dextrose  •  enalaprilat  •  glucagon (human recombinant)  •  Magnesium Standard Dose Replacement - Initiate Nurse / BPA Driven Protocol  •  Phosphorus Replacement - Initiate Nurse / BPA Driven Protocol  •  Potassium Replacement - Initiate Nurse / BPA Driven Protocol    Assessment & Plan     Active Hospital Problems    Diagnosis  POA   • **Seizure (HCC) [R56.9]  Unknown   • E. coli UTI (urinary tract infection) [N39.0, B96.20]  Unknown   • Stroke (HCC) [I63.9]  Yes   • Ischemic heart disease [I25.9]  Yes   • Hypertension [I10]  Yes   • Dyslipidemia [E78.5]  Yes   • Hypothyroidism [E03.9]  Yes   • GERD (gastroesophageal reflux disease) [K21.9]  Yes   • Type 2 diabetes mellitus (HCC) [E11.9]  Yes      Resolved Hospital Problems    Diagnosis Date Resolved POA   • Acute ischemic stroke (HCC) [I63.9] 02/17/2023 Yes     Coco Crockett is a 77 y.o. female with a history of CAD, non-insulin dependent T2DM, hypertension, hyperlipidemia, iron-deficiency anemia, COPD, GERD,  hypothyroidism and migraine headaches. She was transferred to [facility] ED from [facility] ED on 2/15/2023 for evaluation of altered mental status. CT head at outside hospital was negative. MRI brain negative here. UA concerning for UTI. Blood cultures NGTD.    Metabolic encephalopathy, resolved   Focal seizure activity   - Appreciate neurology evaluation   - CT head at [facility] ED negative  - MRI brain showed age-related changes, no evidence of acute infarct, hemorrhage or mass. Stroke ruled out   - EEG was consistent with focal seizures. Neurology evaluated and felt seizure activity was related to UTI. Recommended Keppra 500mg BID x 14 days   - Continue ASA. Lipids high despite home therapy so will continue statin    Hypokalemia, resolved   - Replace per protocol     E. Coli UTI  - Family reported temp 100.9 prior to admission  - WBCs mildly elevated at 10.87  - UA concerning for UTI. Urine culture growing >100K E. coli (resistant to Levaquin, Ampicillin and Amp-Sulbactam). Patient has multiple antibiotic allergies so was initially started on Macrobid (but ANNA </= 16), so not ideal. My partner spoke with pharmacy - given her allergies and less than ideal ANNA on Macrobid, transitioned to IV Gentamycin x 3 days  - Has completed IV antibiotic therapy     Hypertension  - Continue Bystolic, Amlodipine, and HCTZ daily  - BP continues to be elevated - Enalaprilat 1.25mg injection today   - Will allow some permissive HTN - suspect some white coat hypertension. On review of home medication list, her home HCTZ and Bisoprolol doses were actually decreased due to low blood pressures, would hate to cause this after DC   - PRN Enalapril for SBP > 160 while in hospital     Hyperlipidemia  Coronary artery disease   - Continue ASA and high-intensity statin  - At home, also on Vascepa and Trilipix - can resume after DC (not on formulary)  - Follows with Dr. Camacho      T2DM, controlled  - A1c 5.3%  - Stop accuchecks  - not requiring SSI   - Holding home Metformin - could consider discontinuing unless her diet is significantly worse at home? Only taking 500mg once per day with low A1c      GERD  - Continue PPI     Hypothyroidism  - Continue Levothyroxine     COPD  - Continue Symbicort    Iron-deficiency anemia  - Follows with GI outpatient - per daughter, sounds like work up is planned but was put on hold due to recent UTI outpatient  - Iron decreased at 14 and iron saturation decreased at 3% on 2/19/2023  - Continue IV iron x 5 doses or until DC     Expected Discharge Location and Transportation: rehab - insurance approval is pending   Expected Discharge   Expected Discharge Date and Time     Expected Discharge Date Expected Discharge Time    Feb 22, 2023          DVT prophylaxis:Mechanical DVT prophylaxis orders are present.     AM-PAC 6 Clicks Score (PT): 17 (02/21/23 0902)    CODE STATUS:   Code Status and Medical Interventions:   Ordered at: 02/15/23 0218     Level Of Support Discussed With:    Next of Kin (If No Surrogate)     Code Status (Patient has no pulse and is not breathing):    CPR (Attempt to Resuscitate)     Medical Interventions (Patient has pulse or is breathing):    Full Support     Release to patient:    Routine Release     Connie Massey PA-C  02/21/23

## 2023-02-21 NOTE — PLAN OF CARE
Goal Outcome Evaluation:  Plan of Care Reviewed With: patient        Progress: improving  Outcome Evaluation: Pt. progressing towards baseline with ADLs and functional mobility. Improvement noted in safety awareness and balance this session. Will continue to progress as able. Recommend home with home health services.

## 2023-02-21 NOTE — THERAPY TREATMENT NOTE
Patient Name: Coco Crockett  : 1945    MRN: [id number]                              Today's Date: 2023       Admit Date: 2/15/2023    Visit Dx:     ICD-10-CM ICD-9-CM   1. Acute ischemic stroke (HCC)  I63.9 434.91   2. Aphasia  R47.01 784.3   3. Dysphagia, unspecified type  R13.10 787.20   4. Impaired functional mobility, balance, gait, and endurance  Z74.09 V49.89   5. Cognitive communication deficit  R41.841 799.52     Patient Active Problem List   Diagnosis   • Mild obesity   • Hypertensive cardiovascular disease   • Hypertension   • Dyslipidemia   • Obesity (BMI 30.0-34.9)   • Hypothyroidism   • Osteopenia   • TMJ (temporomandibular joint disorder)   • GERD (gastroesophageal reflux disease)   • Iron deficiency anemia   • Depression   • Eczema   • Vertigo   • Migraine   • Type 2 diabetes mellitus (HCC)   • MALIK (dyspnea on exertion)   • Leg pain, bilateral   • Fatigue   • Ischemic heart disease   • Lumbar stenosis with neurogenic claudication   • Spondylosis of lumbar region without myelopathy or radiculopathy   • Degeneration of lumbar or lumbosacral intervertebral disc   • Greater trochanteric bursitis of both hips   • Spinal stenosis of lumbar region with neurogenic claudication   • S/P lumbar fusion   • Stroke (HCC)   • Seizure ([facility] within [facility])   • E. coli UTI (urinary tract infection)     Past Medical History:   Diagnosis Date   • Anemia    • Anxiety    • Chest pain    • Coronary artery disease    • Depression    • Diabetes mellitus (HCC)    • Dyslipidemia    • Eczema    • Fear of awareness under general anesthesia    • Fear of general anesthetic     has hx of awareness during anesthesia induction, felt like she was dying   • GERD (gastroesophageal reflux disease)    • History of COVID-19 2022   • Hyperlipidemia    • Hypertension     Chronic hypertension, probably essential.    • Hypertensive cardiovascular disease    • Hypothyroidism     Chronic hypothyroidism/replacement therapy.   • IBS  (irritable bowel syndrome)    • Iron deficiency anemia    • Migraine     Intermittent progressive headache syndrome/possible migraine equivalent.   • Obesity (BMI 30.0-34.9)     Mild obesity (BMI 31).    • Osteopenia     Osteopenia with fracture of lumbar vertebra, 2004.    • Pneumonia    • TMJ (temporomandibular joint disorder)    • Type 2 diabetes mellitus (HCC)     Type 2 diabetes mellitus, Hemoglobin A1c 5.9%, December 2014.   • Urgency incontinence    • Urinary, incontinence, stress female    • Vertigo     Intermittent marked dizziness/vertigo with Epley maneuver.     Past Surgical History:   Procedure Laterality Date   • ANKLE OPEN REDUCTION INTERNAL FIXATION Right 1995    Fall on ice   • APPENDECTOMY  1970    Appendectomy with wedge resection of both ovaries, 1970.   • CATARACT EXTRACTION WITH INTRAOCULAR LENS IMPLANT     • COLONOSCOPY     • LASIK Bilateral    • LUMBAR LAMINECTOMY WITH FUSION N/A 5/9/2022    Procedure: LUMBAR DECOMPRESSON L2-S1 with Fusion and Stabilization with PEDICLE SCREWS at L4-S1;  Surgeon: Humberto Correa MD;  Location: [facility];  Service: Neurosurgery;  Laterality: N/A;   • TIBIAL PLATEAU OPEN REDUCTION INTERNAL FIXATION Right    • TONSILLECTOMY AND ADENOIDECTOMY     • TOTAL LAPAROSCOPIC HYSTERECTOMY SALPINGO OOPHORECTOMY  1985    MARIZA/BSO secondary to severe endometriosis, 1985.       General Information     Row Name 02/21/23 1509          OT Time and Intention    Document Type therapy note (daily note)  -     Mode of Treatment occupational therapy  -     Row Name 02/21/23 1502          General Information    Patient Profile Reviewed yes  -LC     Prior Level of Function independent:;all household mobility;transfer;ADL's  -     Existing Precautions/Restrictions fall  -     Barriers to Rehab medically complex  -     Row Name 02/21/23 1506          Safety Issues, Functional Mobility    Safety Issues Affecting Function (Mobility) safety precaution awareness;safety precautions  follow-through/compliance  -     Impairments Affecting Function (Mobility) balance;endurance/activity tolerance;strength  -     Cognitive Impairments, Mobility Safety/Performance insight into deficits/self-awareness;safety precaution awareness  -           User Key  (r) = Recorded By, (t) = Taken By, (c) = Cosigned By    Initials Name Provider Type     Lisa Ramirez OT Occupational Therapist                 Mobility/ADL's     Row Name 02/21/23 1510          Bed Mobility    Bed Mobility scooting/bridging;supine-sit  -     Comment, (Bed Mobility) UIC on arrival  -     Row Name 02/21/23 1510          Transfers    Transfers sit-stand transfer;bed-chair transfer;toilet transfer  -     Comment, (Transfers) VC's for sequencing  -     Row Name 02/21/23 1510          Bed-Chair Transfer    Bed-Chair [place] (Transfers) standby assist  -     Row Name 02/21/23 1510          Sit-Stand Transfer    Sit-Stand [place] (Transfers) standby assist  -     Row Name 02/21/23 1510          Toilet Transfer    [place] Level (Toilet Transfer) standby assist  -     Assistive Device (Toilet Transfer) grab bars/safety frame;commode  -     Row Name 02/21/23 1510          Functional Mobility    Functional Mobility- Ind. Level contact guard assist  -     Functional Mobility- Device other (see comments)  UE support  -     Functional Mobility-Distance (Feet) 30  -     Functional Mobility- Comment No LOB  -     Row Name 02/21/23 1510          Activities of Daily Living    BADL Assessment/Intervention toileting;grooming  -     Row Name 02/21/23 1510          Toileting Assessment/Training    [place] Level (Toileting) adjust/manage clothing;perform perineal hygiene;supervision  -     Assistive Devices (Toileting) commode;grab bar/safety frame  -     Position (Toileting) unsupported sitting;supported standing  -     Row Name 02/21/23 1510          Grooming Assessment/Training    [place]  Level (Grooming) hair care, combing/brushing;wash face, hands;supervision  -     Position (Grooming) sink side;supported standing  -     Comment, (Grooming) Completed standing at sink with no LOB.  -           User Key  (r) = Recorded By, (t) = Taken By, (c) = Cosigned By    Initials Name Provider Type    Lisa Chan OT Occupational Therapist               Obj/Interventions     Row Name 02/21/23 1523          Balance    Balance Assessment sitting static balance;sitting dynamic balance;standing static balance;standing dynamic balance  -     Static Sitting Balance supervision  -     Dynamic Sitting Balance supervision  -     Position, Sitting Balance unsupported;sitting edge of bed  -     Static Standing Balance standby assist  -     Dynamic Standing Balance standby assist  -     Position/Device Used, Standing Balance supported  -     Balance Interventions sitting;standing;sit to stand;supported;occupation based/functional task;weight shifting activity  -     Comment, Balance No LOB.  -           User Key  (r) = Recorded By, (t) = Taken By, (c) = Cosigned By    Initials Name Provider Type    Lisa Chan OT Occupational Therapist               Goals/Plan    No documentation.                Clinical Impression     [facility] Name 02/21/23 1524          Pain Assessment    Pretreatment Pain Rating 0/10 - no pain  -     Posttreatment Pain Rating 0/10 - no pain  -     Additional Documentation Pain Scale: Word Pre/Post-Treatment (Group)  -     Row Name 02/21/23 1524          Plan of Care Review    Plan of Care Reviewed With patient  -     Progress improving  -     Outcome Evaluation Pt. progressing towards baseline with ADLs and functional mobility. Improvement noted in safety awareness and balance this session. Will continue to progress as able. Recommend home with home health services.  -     Row Name 02/21/23 1524          Therapy Plan Review/Discharge Plan (OT)    Anticipated  Discharge Disposition (OT) home with assist;home with 24/7 care  -LC     Row Name 02/21/23 1524          Vital Signs    Pre Systolic BP Rehab --  VSS  -LC     Pre Patient Position Sitting  -LC     Intra Patient Position Standing  -LC     Post Patient Position Sitting  -LC     Row Name 02/21/23 1524          Positioning and Restraints    Pre-Treatment Position sitting in chair/recliner  -LC     Post Treatment Position chair  -LC     In Chair notified nsg;reclined;call light within reach;encouraged to call for assist;exit alarm on;waffle cushion;legs elevated  -LC           User Key  (r) = Recorded By, (t) = Taken By, (c) = Cosigned By    Initials Name Provider Type     Lisa Ramirez OT Occupational Therapist               Outcome Measures     Row Name 02/21/23 1531          How much help from another is currently needed...    Putting on and taking off regular lower body clothing? 3  -LC     Bathing (including washing, rinsing, and drying) 3  -LC     Toileting (which includes using toilet bed pan or urinal) 3  -LC     Putting on and taking off regular upper body clothing 4  -LC     Taking care of personal grooming (such as brushing teeth) 3  -LC     Eating meals 4  -LC     AM-PAC 6 Clicks Score (OT) 20  -LC     Row Name 02/21/23 0902          How much help from another person do you currently need...    Turning from your back to your side while in flat bed without using bedrails? 3  -LM     Moving from lying on back to sitting on the side of a flat bed without bedrails? 3  -LM     Moving to and from a bed to a chair (including a wheelchair)? 3  -LM     Standing up from a chair using your arms (e.g., wheelchair, bedside chair)? 3  -LM     Climbing 3-5 steps with a railing? 2  -LM     To walk in hospital room? 3  -LM     AM-PAC 6 Clicks Score (PT) 17  -LM     Highest level of mobility 5 --> Static standing  -LM     Row Name 02/21/23 1531          Functional Assessment    Outcome Measure Options AM-PAC 6 Clicks Daily  Activity (OT)  -           User Key  (r) = Recorded By, (t) = Taken By, (c) = Cosigned By    Initials Name Provider Type    Stacia Wright, RN Registered Nurse    Lisa Cahn OT Occupational Therapist                Occupational Therapy Education     Title: PT OT SLP Therapies (In Progress)     Topic: Occupational Therapy (In Progress)     Point: ADL training (In Progress)     Description:   Instruct learner(s) on proper safety adaptation and remediation techniques during self care or transfers.   Instruct in proper use of assistive devices.              Learning Progress Summary           Patient Acceptance, E, NR by  at 2/21/2023 1427    Acceptance, E,D, VU,DU by  at 2/16/2023 1120    Acceptance, E,D, NR by  at 2/15/2023 0935   Family Acceptance, E,D, VU,DU by  at 2/16/2023 1120    Acceptance, E,D, NR by CS at 2/15/2023 0935                   Point: Home exercise program (Done)     Description:   Instruct learner(s) on appropriate technique for monitoring, assisting and/or progressing therapeutic exercises/activities.              Learning Progress Summary           Patient Acceptance, E,D, VU,DU by  at 2/16/2023 1120   Family Acceptance, E,D, VU,DU by  at 2/16/2023 1120                   Point: Precautions (In Progress)     Description:   Instruct learner(s) on prescribed precautions during self-care and functional transfers.              Learning Progress Summary           Patient Acceptance, E, NR by  at 2/21/2023 1427    Acceptance, E,D, VU,DU by  at 2/16/2023 1120    Acceptance, E,D, NR by  at 2/15/2023 0935   Family Acceptance, E,D, VU,DU by  at 2/16/2023 1120    Acceptance, E,D, NR by  at 2/15/2023 0935                   Point: Body mechanics (In Progress)     Description:   Instruct learner(s) on proper positioning and spine alignment during self-care, functional mobility activities and/or exercises.              Learning Progress Summary           Patient Acceptance, E,  NR by  at 2/21/2023 1427    Acceptance, E,D, VU,DU by  at 2/16/2023 1120    Acceptance, E,D, NR by  at 2/15/2023 0935   Family Acceptance, E,D, VU,DU by  at 2/16/2023 1120    Acceptance, E,D, NR by  at 2/15/2023 0935                               User Key     Initials Effective Dates Name Provider Type Discipline     06/16/21 -  Lisa Ramirez OT Occupational Therapist OT    ODELL 06/16/21 -  Duarte Fair OT Occupational Therapist OT              OT Recommendation and Plan     Plan of Care Review  Plan of Care Reviewed With: patient  Progress: improving  Outcome Evaluation: Pt. progressing towards baseline with ADLs and functional mobility. Improvement noted in safety awareness and balance this session. Will continue to progress as able. Recommend home with home health services.     Time Calculation:    Time Calculation- OT     Row Name 02/21/23 1532             Time Calculation- OT    OT Start Time 1427  -LC      OT Received On 02/21/23  -      OT Goal Re-Cert Due Date 02/25/23  -         Timed Charges    74805 - OT Self Care/Mgmt Minutes 18  -LC         Total Minutes    Timed Charges Total Minutes 18  -LC       Total Minutes 18  -LC            User Key  (r) = Recorded By, (t) = Taken By, (c) = Cosigned By    Initials Name Provider Type     Lisa Ramirez OT Occupational Therapist              Therapy Charges for Today     Code Description Service Date Service Provider Modifiers Qty    [id number] HC OT SELF CARE/MGMT/TRAIN EA 15 MIN 2/21/2023 Lisa Ramirez OT GO 1               Lisa Ramirez OT  2/21/2023

## 2023-02-21 NOTE — CASE MANAGEMENT/SOCIAL WORK
Discharge Planning Assessment  Psychiatric     Patient Name: Coco Crockett  MRN: [id number]  Today's Date: 2/21/2023    Admit Date: 2/15/2023    Plan: [facility], pending insurance auth                   Discharge Plan     Row Name 02/21/23 0928       Plan    Plan [facility], pending insurance auth    Plan Comments Received call from Gale, in admissions, at [facility].  Gale states that she initiated prior auth with Ms. Crockett's [facility] Medicare, yesterday, for the rehab admission.  The insurance auth is still pending.  Gale will contact CM when insurance gives approval.    CM will continue to follow.    Final Discharge Disposition Code 61 - hospital-based swing bed              Continued Care and Services - Admitted Since 2/15/2023     Destination     Service Provider Request Status Selected Services Address Phone Fax Patient Preferred    [facility] Pending - No Request Sent N/A [address] [facility] [facility] [id number] [phone] -- --              Expected Discharge Date and Time     Expected Discharge Date Expected Discharge Time    Feb 22, 2023             Molly Antonio RN

## 2023-02-22 ENCOUNTER — TELEPHONE (OUTPATIENT)
Dept: NEUROLOGY | Facility: CLINIC | Age: 78
End: 2023-02-22
Payer: MEDICARE

## 2023-02-22 VITALS
RESPIRATION RATE: 18 BRPM | HEART RATE: 66 BPM | SYSTOLIC BLOOD PRESSURE: 125 MMHG | TEMPERATURE: 97 F | OXYGEN SATURATION: 95 % | WEIGHT: 159.83 LBS | BODY MASS INDEX: 28.32 KG/M2 | DIASTOLIC BLOOD PRESSURE: 62 MMHG | HEIGHT: 63 IN

## 2023-02-22 PROBLEM — B96.20 E. COLI UTI (URINARY TRACT INFECTION): Status: RESOLVED | Noted: 2023-02-17 | Resolved: 2023-02-22

## 2023-02-22 PROBLEM — G93.41 METABOLIC ENCEPHALOPATHY: Status: ACTIVE | Noted: 2023-02-22

## 2023-02-22 PROBLEM — G93.41 METABOLIC ENCEPHALOPATHY: Status: RESOLVED | Noted: 2023-02-22 | Resolved: 2023-02-22

## 2023-02-22 PROBLEM — N39.0 E. COLI UTI (URINARY TRACT INFECTION): Status: RESOLVED | Noted: 2023-02-17 | Resolved: 2023-02-22

## 2023-02-22 PROBLEM — E78.2 MIXED HYPERLIPIDEMIA: Status: ACTIVE | Noted: 2023-02-22

## 2023-02-22 PROCEDURE — 25010000002 NA FERRIC GLUC CPLX PER 12.5 MG: Performed by: PHYSICIAN ASSISTANT

## 2023-02-22 PROCEDURE — G0378 HOSPITAL OBSERVATION PER HR: HCPCS

## 2023-02-22 PROCEDURE — 99239 HOSP IP/OBS DSCHRG MGMT >30: CPT | Performed by: PHYSICIAN ASSISTANT

## 2023-02-22 PROCEDURE — 94664 DEMO&/EVAL PT USE INHALER: CPT

## 2023-02-22 PROCEDURE — 94799 UNLISTED PULMONARY SVC/PX: CPT

## 2023-02-22 RX ORDER — ATORVASTATIN CALCIUM 40 MG/1
40 TABLET, FILM COATED ORAL NIGHTLY
Qty: 90 TABLET | Refills: 3 | Status: SHIPPED | OUTPATIENT
Start: 2023-02-22

## 2023-02-22 RX ORDER — HYDROCHLOROTHIAZIDE 12.5 MG/1
12.5 CAPSULE, GELATIN COATED ORAL EVERY MORNING
Qty: 180 CAPSULE | Refills: 3
Start: 2023-02-22

## 2023-02-22 RX ORDER — AMLODIPINE BESYLATE 5 MG/1
5 TABLET ORAL EVERY EVENING
Start: 2023-02-22

## 2023-02-22 RX ORDER — ACETAMINOPHEN 160 MG
2000 TABLET,DISINTEGRATING ORAL DAILY
Qty: 30 CAPSULE | Refills: 11 | Status: SHIPPED | OUTPATIENT
Start: 2023-02-22 | End: 2024-02-22

## 2023-02-22 RX ORDER — NEBIVOLOL 5 MG/1
5 TABLET ORAL EVERY EVENING
Start: 2023-02-22

## 2023-02-22 RX ORDER — IRON POLYSACCH/IRON HEME POLYP 28 MG
1 TABLET ORAL DAILY
Start: 2023-02-22

## 2023-02-22 RX ORDER — LEVETIRACETAM 500 MG/1
500 TABLET ORAL 2 TIMES DAILY
Qty: 16 TABLET | Refills: 0 | Status: SHIPPED | OUTPATIENT
Start: 2023-02-22 | End: 2023-03-02 | Stop reason: SDUPTHER

## 2023-02-22 RX ADMIN — AMLODIPINE BESYLATE 5 MG: 5 TABLET ORAL at 08:13

## 2023-02-22 RX ADMIN — FLUTICASONE PROPIONATE 2 SPRAY: 50 SPRAY, METERED NASAL at 08:13

## 2023-02-22 RX ADMIN — BUDESONIDE AND FORMOTEROL FUMARATE DIHYDRATE 2 PUFF: 160; 4.5 AEROSOL RESPIRATORY (INHALATION) at 11:18

## 2023-02-22 RX ADMIN — ASPIRIN 81 MG 81 MG: 81 TABLET ORAL at 08:13

## 2023-02-22 RX ADMIN — SODIUM CHLORIDE 250 MG: 9 INJECTION, SOLUTION INTRAVENOUS at 08:13

## 2023-02-22 RX ADMIN — ESTRADIOL 0.5 MG: 0.5 TABLET ORAL at 08:13

## 2023-02-22 RX ADMIN — NEBIVOLOL 10 MG: 5 TABLET ORAL at 08:13

## 2023-02-22 RX ADMIN — LEVETIRACETAM 500 MG: 500 TABLET, FILM COATED ORAL at 08:13

## 2023-02-22 RX ADMIN — Medication 250 MG: at 08:13

## 2023-02-22 RX ADMIN — DULOXETINE HYDROCHLORIDE 60 MG: 60 CAPSULE, DELAYED RELEASE ORAL at 08:13

## 2023-02-22 RX ADMIN — ACETAMINOPHEN 325MG 650 MG: 325 TABLET ORAL at 04:38

## 2023-02-22 RX ADMIN — HYDROCHLOROTHIAZIDE 12.5 MG: 12.5 CAPSULE ORAL at 08:13

## 2023-02-22 RX ADMIN — LEVOTHYROXINE SODIUM 25 MCG: 25 TABLET ORAL at 04:38

## 2023-02-22 RX ADMIN — PANTOPRAZOLE SODIUM 40 MG: 40 TABLET, DELAYED RELEASE ORAL at 11:13

## 2023-02-22 NOTE — CASE MANAGEMENT/SOCIAL WORK
Continued Stay Note  [facility]     Patient Name: Coco Crockett  MRN: [id number]  Today's Date: 2/22/2023    Admit Date: 2/15/2023    Plan: Home with daughter's assistance and [facility] for OP PT   Discharge Plan     Row Name 02/22/23 1139       Plan    Plan Home with daughter's assistance and [facility] for OP PT, SLP    Patient/Family in Agreement with Plan yes    Plan Comments CM spoke with patient at bedside regarding DC planning. Patient would like to return home at DC and has been improving with mobility. Referral to [facility] beds cancelled - left VM. Patient agreeable to OP PT/SLP at [facility], [phone]. Faxed information to Joann @ [phone]. They will reach out to patient and schedule first appointment. Daughter to provide transportation to home.    Final Discharge Disposition Code 01 - home or self-care               Discharge Codes    No documentation.               Expected Discharge Date and Time     Expected Discharge Date Expected Discharge Time    Feb 22, 2023             Claudia Abebe RN

## 2023-02-22 NOTE — PLAN OF CARE
Problem: Adult Inpatient Plan of Care  Goal: Plan of Care Review  Outcome: Ongoing, Progressing     Problem: Adult Inpatient Plan of Care  Goal: Optimal Comfort and Wellbeing  Outcome: Ongoing, Progressing  Intervention: Monitor Pain and Promote Comfort  Recent Flowsheet Documentation  Taken 2/22/2023 0400 by Kunal Mendez RN  Pain Management Interventions: see MAR     Problem: Adult Inpatient Plan of Care  Goal: Readiness for Transition of Care  Outcome: Ongoing, Progressing     Problem: Fall Injury Risk  Goal: Absence of Fall and Fall-Related Injury  Outcome: Ongoing, Progressing  Intervention: Identify and Manage Contributors  Recent Flowsheet Documentation  Taken 2/21/2023 2000 by Kunal Mendez, RN  Medication Review/Management: medications reviewed  Intervention: Promote Injury-Free Environment  Recent Flowsheet Documentation  Taken 2/22/2023 0400 by Kunal Mendez RN  Safety Promotion/Fall Prevention:   assistive device/personal items within reach   clutter free environment maintained   activity supervised   fall prevention program maintained   lighting adjusted   nonskid shoes/slippers when out of bed   room organization consistent   safety round/check completed   toileting scheduled  Taken 2/22/2023 0200 by Kunal Mendez, RN  Safety Promotion/Fall Prevention:   assistive device/personal items within reach   clutter free environment maintained   activity supervised   fall prevention program maintained   lighting adjusted   nonskid shoes/slippers when out of bed   room organization consistent   safety round/check completed   toileting scheduled  Taken 2/22/2023 0000 by Kunal Mendez, RN  Safety Promotion/Fall Prevention:   assistive device/personal items within reach   clutter free environment maintained   activity supervised   fall prevention program maintained   lighting adjusted   nonskid shoes/slippers when out of bed   room organization consistent   safety round/check completed   toileting  scheduled  Taken 2/21/2023 2200 by Kunal Mendez, RN  Safety Promotion/Fall Prevention:   assistive device/personal items within reach   clutter free environment maintained   activity supervised   fall prevention program maintained   lighting adjusted   nonskid shoes/slippers when out of bed   room organization consistent   safety round/check completed   toileting scheduled  Taken 2/21/2023 2000 by Kunal Mendez, RN  Safety Promotion/Fall Prevention:   assistive device/personal items within reach   clutter free environment maintained   activity supervised   fall prevention program maintained   lighting adjusted   nonskid shoes/slippers when out of bed   room organization consistent   safety round/check completed   toileting scheduled

## 2023-02-22 NOTE — DISCHARGE SUMMARY
[facility] Medicine Services  DISCHARGE SUMMARY    Patient Name: Coco Crockett  : 1945  MRN: [id number]    Date of Admission: 2/15/2023  1:13 AM  Date of Discharge: 2023  Primary Care Physician: Lobo Archer MD    Hospital Course     Presenting Problem:   Acute ischemic stroke (HCC) [I63.9]  Stroke (HCC) [I63.9]    Active Hospital Problems    Diagnosis  POA   • **Seizure (HCC) [R56.9]  Yes   • Mixed hyperlipidemia [E78.2]  Yes   • Ischemic heart disease [I25.9]  Yes   • Hypertension [I10]  Yes   • Dyslipidemia [E78.5]  Yes   • Hypothyroidism [E03.9]  Yes   • GERD (gastroesophageal reflux disease) [K21.9]  Yes   • Type 2 diabetes mellitus (HCC) [E11.9]  Yes   • Iron deficiency anemia [D50.9]  Yes      Resolved Hospital Problems    Diagnosis Date Resolved POA   • Metabolic encephalopathy [G93.41] 2023 Yes   • E. coli UTI (urinary tract infection) [N39.0, B96.20] 2023 Yes   • Acute ischemic stroke (HCC) [I63.9] 2023 Yes      Hospital Course:  Coco Crockett is a 77 y.o. female with past medical history significant for CAD, COPD, non-insulin dependent T2DM, hypertension, hyperlipidemia, iron-deficiency anemia, GERD, hypothyroidism, and migraine headaches. She was transferred to [facility] ED [facility] for altered mental status on 2/15/2023. She presented to the ED confused with slurred speech and difficulty remembering her birthrate and the president. CT head OSH was negative. MRI on 2/15 showed no evidence of acute infract, hemorrhage, or mass effect. ECHO showed normal EF of 59.7% and grade I diagnostic dysfunction with no PFO. EEG consistent with focal seizures. She was treated for a UTI 2 weeks prior with Macrobid for 10 days. She had leukocytosis upon admission with UA consistent with UTI - culture grew >100,000 E. coli (resistant to Levaquin, Ampicillin, and Amp-Sulbactam). Blood cultures were negative.  Respiratory panel was negative. CXR was unremarkable. Neurology evaluated and felt seizure activity, confusion and speech difficulties were secondary to hypertensive encephalopathy in setting of UTI. Symptoms improved with antibiotic therapy and patient to be discharged home with home health.     Discharge Follow Up Recommendations for outpatient labs/diagnostics:  - Follow up with PCP in 1-2 weeks  - Follow up with neurology in 1 month    Metabolic encephalopathy, resolved   Focal seizure activity   - Appreciate neurology evaluation   - CT head at OSH ED negative  - MRI brain showed age-related changes, no evidence of acute infarct, hemorrhage or mass. Stroke ruled out   - EEG was consistent with focal seizures. Neurology evaluated and felt seizure activity was related to UTI.   - Continue Keppra 500mg BID x 14 days   - Continue ASA. Lipids high despite home therapy so will continue statin at DC     Hypokalemia, resolved   - Replaced per protocol     E. Coli UTI  - Family reported temp 100.9 prior to admission  - WBCs mildly elevated at 10.87  - UA concerning for UTI. Urine culture growing >100K E. coli (resistant to Levaquin, Ampicillin and Amp-Sulbactam). Patient has multiple antibiotic allergies so was initially started on Macrobid (but ANNA </= 16), so not ideal. My partner spoke with pharmacy - given her allergies and less than ideal ANNA on Macrobid, transitioned to IV Gentamycin x 3 days  - Has completed IV antibiotic therapy      Hypertension  - Continue Bystolic, Amlodipine, and HCTZ daily  - Had some elevated BPs during her hospitalization, however I suspect some white coat hypertension.   - On review of home medication list, her home HCTZ and Bisoprolol doses were actually decreased due to low blood pressures. Patient reports normal BP is 120's/60's - continue home regimen at [place]    Hyperlipidemia  Coronary artery disease   - Continue ASA and high-intensity statin  - Resume Vascepa and Trilipix upon DC (not  on formulary)  - Follows with Dr. Camacho      T2DM, controlled  - A1c 5.3%  - Resume home Metformin 500mg daily at DC     GERD  - Continue PPI     Hypothyroidism  - Continue Levothyroxine     COPD  - Continue Symbicort     Iron-deficiency anemia  - Follows with GI outpatient - per daughter, sounds like work up is planned but was put on hold due to recent UTI outpatient  - Iron decreased at 14 and iron saturation decreased at 3% on 2/19/2023  - 4 IV iron infusions while in hospital  - Begin oral Fe supplementation upon DC    Day of Discharge     HPI:   Sitting up in bed eating breakfast. She reports she didn't get a lot of sleep last night because it was very loud on the unit. She woke up with a headache - Tylenol helped. She reports feeling stronger and was able to work with PT without assistance. She states she feels ready to go home with home health today and has opted to defer rehab placement. Spoke with daughter over phone - she is comfortable with patient returning home and I answered all questions to the best of my ability.      Review of Systems  Gen- No fevers, chills  CV- No chest pain, palpitations  Resp- No cough, dyspnea  GI- No N/V/D, abd pain    Vital Signs:   Temp:  [97.7 °F (36.5 °C)-98.2 °F (36.8 °C)] 97.7 °F (36.5 °C)  Heart Rate:  [63-68] 66  Resp:  [18] 18  BP: (120-179)/(56-94) 179/75    Physical Exam:  Constitutional: No acute distress, awake, alert, conversant. Sitting up in bed.  HENT: NCAT, mucous membranes moist  Respiratory: Clear to auscultation bilaterally, respiratory effort normal   Cardiovascular: RRR, no murmurs, rubs, or gallops  Gastrointestinal: Positive bowel sounds, soft, nontender, nondistended  Musculoskeletal: No bilateral ankle edema  Psychiatric: Appropriate affect, cooperative  Neurologic: Oriented x 3, moves all extremities spontaneously, speech clear  Skin: No rashes     Pertinent  and/or Most Recent Results     LAB RESULTS:      Lab 02/20/23  1010 02/18/23  6171  02/16/23  0608 02/15/23  1424 02/15/23  1222   WBC 5.02 5.01 6.57  --   --    HEMOGLOBIN 8.8* 8.3* 8.6*  --   --    HEMATOCRIT 29.9* 27.6* 29.5*  --   --    PLATELETS 238 223 290  --   --    NEUTROS ABS  --  2.72 3.76  --   --    IMMATURE GRANS (ABS)  --  0.01 0.04  --   --    LYMPHS ABS  --  1.41 1.90  --   --    MONOS ABS  --  0.23 0.81  --   --    EOS ABS  --  0.62* 0.02  --   --    MCV 74.9* 74.2* 73.9*  --   --    PROCALCITONIN  --   --   --   --  0.05   LACTATE  --   --   --  1.3  --          Lab 02/19/23  0536 02/18/23  1851 02/18/23  1407 02/18/23  0826 02/16/23  0608   SODIUM 139  --  135* 137 132*   POTASSIUM 4.0 4.3 3.7 3.2* 3.6   CHLORIDE 105  --  102 104 97*   CO2 23.0  --  22.0 23.0 20.0*   ANION GAP 11.0  --  11.0 10.0 15.0   BUN 20  --  19 16 15   CREATININE 0.65  --  0.62 0.69 0.62   EGFR 89.7  --  90.7 88.4 90.7   GLUCOSE 101*  --  106* 100* 106*   CALCIUM 8.8  --  8.6 8.5* 9.0         Lab 02/18/23  0826 02/16/23  0608   TOTAL PROTEIN 5.7* 6.7   ALBUMIN 3.4* 4.1   GLOBULIN 2.3 2.6   ALT (SGPT) 10 8   AST (SGOT) 12 19   BILIRUBIN 0.2 0.5   ALK PHOS 43 48         Lab 02/19/23  0536 02/18/23  0826   IRON 14*  --    IRON SATURATION 3*  --    TIBC 505  --    TRANSFERRIN 339  --    FERRITIN 39.02  --    FOLATE  --  14.30   VITAMIN B 12  --  404     Brief Urine Lab Results  (Last result in the past 365 days)      Color   Clarity   Blood   Leuk Est   Nitrite   Protein   CREAT   Urine HCG        02/15/23 1353 Dark Yellow   Cloudy   Trace   Moderate (2+)   Negative   30 mg/dL (1+)               Microbiology Results (last 10 days)     Procedure Component Value - Date/Time    Urine Culture - Urine, Urine, Clean Catch [[id number]]  (Abnormal)  (Susceptibility) Collected: 02/15/23 1353    Lab Status: Final result Specimen: Urine, Clean Catch Updated: 02/17/23 0554     Urine Culture >100,000 CFU/mL Escherichia coli    Narrative:      Colonization of the urinary tract without infection is common. Treatment is  discouraged unless the patient is symptomatic, pregnant, or undergoing an invasive urologic procedure.    Susceptibility      Escherichia coli      ANNA      Ampicillin Resistant     Ampicillin + Sulbactam Resistant     Cefazolin Susceptible      Cefepime Susceptible      Ceftazidime Susceptible      Ceftriaxone Susceptible      Gentamicin Susceptible      Levofloxacin Resistant     Nitrofurantoin Susceptible      Piperacillin + Tazobactam Susceptible      Trimethoprim + Sulfamethoxazole Susceptible                           Respiratory Panel PCR w/COVID-19(SARS-CoV-2) MATTHEW/ABAD/JOEY/PAD/COR/MAD/SKINNY In-House, NP Swab in UTM/VTM, 3-4 HR TAT - Swab, Nasopharynx [[id number]]  (Normal) Collected: 02/15/23 1322    Lab Status: Final result Specimen: Swab from Nasopharynx Updated: 02/15/23 1452     ADENOVIRUS, PCR Not Detected     Coronavirus 229E Not Detected     Coronavirus HKU1 Not Detected     Coronavirus NL63 Not Detected     Coronavirus OC43 Not Detected     COVID19 Not Detected     Human Metapneumovirus Not Detected     Human Rhinovirus/Enterovirus Not Detected     Influenza A PCR Not Detected     Influenza B PCR Not Detected     Parainfluenza Virus 1 Not Detected     Parainfluenza Virus 2 Not Detected     Parainfluenza Virus 3 Not Detected     Parainfluenza Virus 4 Not Detected     RSV, PCR Not Detected     Bordetella pertussis pcr Not Detected     Bordetella parapertussis PCR Not Detected     Chlamydophila pneumoniae PCR Not Detected     Mycoplasma pneumo by PCR Not Detected    Narrative:      In the setting of a positive respiratory panel with a viral infection PLUS a negative procalcitonin without other underlying concern for bacterial infection, consider observing off antibiotics or discontinuation of antibiotics and continue supportive care. If the respiratory panel is positive for atypical bacterial infection (Bordetella pertussis, Chlamydophila pneumoniae, or Mycoplasma pneumoniae), consider antibiotic  de-escalation to target atypical bacterial infection.    Blood Culture - Blood, Arm, Left [[id number]]  (Normal) Collected: 02/15/23 1222    Lab Status: Final result Specimen: Blood from Arm, Left Updated: 02/20/23 1245     Blood Culture No growth at 5 days    Blood Culture - Blood, Hand, Left [[id number]]  (Normal) Collected: 02/15/23 1222    Lab Status: Final result Specimen: Blood from Hand, Left Updated: 02/20/23 1245     Blood Culture No growth at 5 days        Adult Transthoracic Echo Complete W/ Cont if Necessary Per Protocol (With Agitated Saline)    Result Date: 2/15/2023  •  Left ventricular systolic function is normal. Calculated left ventricular EF = 59.7% Left ventricular ejection fraction appears to be 61 - 65%. •  Left ventricular diastolic function is consistent with (grade I) impaired relaxation. •  Saline test results are negative. •  There is calcification of the aortic valve. •  Aortic valve maximum pressure gradient is 9 mmHg.     EEG    Result Date: 2/16/2023  Reason for referral: 77 y.o.female with migraine headaches, altered mental status Technical Summary:  A 19 channel digital EEG was performed using the international 10-20 placement system, including eye leads and EKG leads. Duration: 22 minutes Findings: The patient is drowsy at the beginning of the study.  The background shows diffuse low to medium amplitude 6-7 Hz theta which is present symmetrically over both hemispheres.  As a study proceeds, light sleep is seen with further slowing of the background.  At times, there is a greater degree of slowing present over the right temporal leads when compared to the left.  In addition, several sharp waves are seen with phase reversal at T4 in the right temporal lead which do stand out from the background.  Toward the latter portion of the study when the patient is more awake, a background of intermixed theta and alpha activity is seen over both hemispheres.  Photic stimulation does not change the  tracing but hyperventilation is not performed. Video: On Technical quality: Superior EKG: Regular, 60 bpm SUMMARY: Intermittent right temporal slow, mild Occasional right temporal sharp waves     The combination of focal slowing and sharp waves can be seen in seizure disorders of the partial type Clinical correlation is as always suggested This report is transcribed using the Dragon dictation system.      MRI Brain Without Contrast    Result Date: 2/16/2023  MRI BRAIN WO CONTRAST Date of Exam: 2/15/2023 11:38 PM EST Indication: Stroke, follow up.  Comparison: Outside CT one day prior Technique:  Routine multiplanar/multisequence sequence images of the brain were obtained without contrast administration. Findings: No acute infarct is present on diffusion weighted sequences. Midline structures are normal and the craniocervical junction is satisfactory in appearance. There is mild generalized volume loss. There is otherwise no evidence of intracranial hemorrhage, mass or mass effect. There is mild ex vacuo prominence of the ventricles. The orbits are normal. The paranasal sinuses are grossly clear.     Impression: Mild age-related changes are present. There is no evidence of acute infarct, hemorrhage, mass or mass effect. Electronically Signed: Ramses Paul  2/16/2023 5:13 AM EST  Workstation ID: [id number]    XR Chest 1 View    Result Date: 2/15/2023  XR SKULL LATERAL AND AP, XR CHEST 1 VW, XR ABDOMEN KUB: 2/15/2023 2:43 AM COMPARISON: None available HISTORY: MRI clearance TECHNIQUE: 2 views of the skull, single view the abdomen, single view of the chest. FINDINGS: Bones: Multiple dental implants and fillings or crowns are present. Disc prosthesis and spinal fusion rods and pedicle screws from L4 to S1. No foreign body overlying the chest. Heart size is normal. No effusion or pneumothorax. Low lung volumes without focal consolidation. No retained radiopaque foreign body is identified. Multiple calcifications overlying  the spleen, likely related to remote granulomatous infection. Calcifications overlying the pelvis likely phleboliths.     1.  Dental implants and spinal fusion hardware. Otherwise, no retained radiopaque foreign body is identified. 2.  Low lung volumes without focal consolidation. 3.  No acute intra-abdominal findings. Electronically signed by:  Jayna Johnson D.O.  2/15/2023 2:09 AM Mountain Time    XR Outside Films    Result Date: 2/15/2023  This procedure was auto-finalized with no dictation required.    XR Abdomen KUB    Result Date: 2/15/2023  XR SKULL LATERAL AND AP, XR CHEST 1 VW, XR ABDOMEN KUB: 2/15/2023 2:43 AM COMPARISON: None available HISTORY: MRI clearance TECHNIQUE: 2 views of the skull, single view the abdomen, single view of the chest. FINDINGS: Bones: Multiple dental implants and fillings or crowns are present. Disc prosthesis and spinal fusion rods and pedicle screws from L4 to S1. No foreign body overlying the chest. Heart size is normal. No effusion or pneumothorax. Low lung volumes without focal consolidation. No retained radiopaque foreign body is identified. Multiple calcifications overlying the spleen, likely related to remote granulomatous infection. Calcifications overlying the pelvis likely phleboliths.     1.  Dental implants and spinal fusion hardware. Otherwise, no retained radiopaque foreign body is identified. 2.  Low lung volumes without focal consolidation. 3.  No acute intra-abdominal findings. Electronically signed by:  Jayna Johnson D.O.  2/15/2023 2:09 AM Mountain Time    XR Skull Lateral & Ap    Result Date: 2/15/2023  XR SKULL LATERAL AND AP, XR CHEST 1 VW, XR ABDOMEN KUB: 2/15/2023 2:43 AM COMPARISON: None available HISTORY: MRI clearance TECHNIQUE: 2 views of the skull, single view the abdomen, single view of the chest. FINDINGS: Bones: Multiple dental implants and fillings or crowns are present. Disc prosthesis and spinal fusion rods and pedicle screws from L4 to S1. No foreign  body overlying the chest. Heart size is normal. No effusion or pneumothorax. Low lung volumes without focal consolidation. No retained radiopaque foreign body is identified. Multiple calcifications overlying the spleen, likely related to remote granulomatous infection. Calcifications overlying the pelvis likely phleboliths.     1.  Dental implants and spinal fusion hardware. Otherwise, no retained radiopaque foreign body is identified. 2.  Low lung volumes without focal consolidation. 3.  No acute intra-abdominal findings. Electronically signed by:  Jayna Johnson D.O.  2/15/2023 2:09 AM Mountain Time    CT Outside Films    Result Date: 2/15/2023  This procedure was auto-finalized with no dictation required.    Results for orders placed during the hospital encounter of 06/29/18    Doppler Ankle Brachial Index Multi Level CAR    Interpretation Summary  · Right Conclusion: The right MAR is normal. The test is negative for exercise induced claudication.  · Left Conclusion: The left MAR is normal. The test is negative for exercise induced claudication.    Results for orders placed during the hospital encounter of 02/15/23    Adult Transthoracic Echo Complete W/ Cont if Necessary Per Protocol (With Agitated Saline)    Interpretation Summary  •  Left ventricular systolic function is normal. Calculated left ventricular EF = 59.7% Left ventricular ejection fraction appears to be 61 - 65%.  •  Left ventricular diastolic function is consistent with (grade I) impaired relaxation.  •  Saline test results are negative.  •  There is calcification of the aortic valve.  •  Aortic valve maximum pressure gradient is 9 mmHg.    Discharge Details        Discharge Medications      New Medications      Instructions Start Date   atorvastatin 40 MG tablet  Commonly known as: LIPITOR   40 mg, Oral, Nightly      Feosol Bifera 28 MG tablet   28 mg, Oral, Daily, (Iron supplement)      levETIRAcetam 500 MG tablet  Commonly known as: KEPPRA   500  mg, Oral, 2 Times Daily         Changes to Medications      Instructions Start Date   hydroCHLOROthiazide 12.5 MG capsule  Commonly known as: MICROZIDE  What changed: when to take this   12.5 mg, Oral, Every Morning      metFORMIN 500 MG tablet  Commonly known as: GLUCOPHAGE  What changed: when to take this   500 mg, Oral, Daily With Breakfast, Ordered as BID, but patient only takes once daily (prescribing MD is aware, per pt)      Vitamin D3 50 MCG (2000 UT) capsule  What changed:   medication strength  how much to take  when to take this   2,000 Units, Oral, Daily         Continue These Medications      Instructions Start Date   acetaminophen 500 MG tablet  Commonly known as: TYLENOL   500 mg, Oral, 2 Times Daily PRN      Alirocumab 75 MG/ML solution auto-injector   75 mg, Subcutaneous, Every 14 Days      amLODIPine 5 MG tablet  Commonly known as: NORVASC   5 mg, Oral, Every Evening      aspirin 81 MG EC tablet   81 mg, Oral, Daily      Breo Ellipta 200-25 MCG/ACT inhaler  Generic drug: Fluticasone Furoate-Vilanterol   Daily      cetirizine 10 MG tablet  Commonly known as: zyrTEC   10 mg, Oral, Nightly      diphenhydrAMINE 25 mg capsule  Commonly known as: BENADRYL   25 mg, Oral, Nightly      docusate sodium 250 MG capsule  Commonly known as: COLACE   250 mg, Oral, 2 Times Daily PRN, Hold for loose stools      DULoxetine 60 MG capsule  Commonly known as: CYMBALTA   60 mg, Oral, Daily      estradiol 0.5 MG tablet  Commonly known as: ESTRACE   0.5 mg, Oral, Daily      fenofibric acid 135 MG capsule delayed-release delayed release capsule  Commonly known as: TRILIPIX   135 mg, Oral, Daily      fluticasone 50 MCG/ACT nasal spray  Commonly known as: FLONASE   2 sprays, Nasal, Daily      icosapent ethyl 1 g capsule capsule  Commonly known as: Vascepa   2 g, Oral, 2 Times Daily With Meals      levothyroxine 25 MCG tablet  Commonly known as: SYNTHROID, LEVOTHROID   25 mcg, Oral, Daily      multivitamin tablet  tablet  Commonly known as: THERAGRAN   Oral, Daily      nebivolol 5 MG tablet  Commonly known as: BYSTOLIC   5 mg, Oral, Every Evening      nitroglycerin 0.4 MG/SPRAY spray  Commonly known as: NITROLINGUAL   1 spray, Sublingual, Every 5 Minutes PRN      omeprazole 20 MG capsule  Commonly known as: priLOSEC   20 mg, Oral, Daily      traMADol 50 MG tablet  Commonly known as: ULTRAM   50 mg, Oral, Every 8 Hours PRN      vitamin C 500 MG tablet  Commonly known as: ASCORBIC ACID   500 mg, Oral, Daily         Stop These Medications    brompheniramine-pseudoephedrine-DM 30-2-10 MG/5ML syrup     dicyclomine 10 MG capsule  Commonly known as: BENTYL     GaviLyte-G 236 g solution  Generic drug: polyethylene glycol     iron polysaccharides 150 MG capsule  Commonly known as: NIFEREX     methylPREDNISolone 4 MG dose pack  Commonly known as: Medrol     metoclopramide 5 MG tablet  Commonly known as: REGLAN          Allergies   Allergen Reactions   • Keflex [Cephalexin] Itching, Swelling and Angioedema          • Imdur [Isosorbide Dinitrate] Headache     Severe headache/fatigue       • Bactrim [Sulfamethoxazole-Trimethoprim] Rash   • Codeine Sulfate Itching and Rash   • Contrast Dye (Echo Or Unknown Ct/Mr) Itching   • Demerol [Meperidine] Itching and Rash   • Levaquin [Levofloxacin] Other (See Comments)     Numbness and ruptured oneida's tendon   • Morphine Sulfate Itching and Rash   • Penicillin V Potassium Itching and Rash   • Sulfa Antibiotics Rash and Other (See Comments)     severe rash/swelling   • Trimethoprim Hives     Discharge Disposition:  Home or Self Care    Diet:  Diet Instructions     Diet: Regular, Consistent Carbohydrate, Cardiac; Thin      Discharge Diet:  Regular  Consistent Carbohydrate  Cardiac       Fluid Consistency: Thin        Activity:  Activity Instructions     Activity as Tolerated          CODE STATUS:    Code Status and Medical Interventions:   Ordered at: 02/15/23 0219     Level Of Support Discussed  With:    Next of Kin (If No Surrogate)     Code Status (Patient has no pulse and is not breathing):    CPR (Attempt to Resuscitate)     Medical Interventions (Patient has pulse or is breathing):    Full Support     Release to patient:    Routine Release     Future Appointments   Date Time Provider Department Center   4/27/2023 11:30 AM [facility]   5/23/2023 11:30 AM Coco Ricardo PA-C [facility]   6/16/2023  2:30 PM Lobo Camacho MD [facility]     Additional Instructions for the Follow-ups that You Need to Schedule     Discharge Follow-up with PCP   As directed       Currently Documented PCP:    Lobo Archer MD    PCP Phone Number:    [phone]     Follow Up Details: Follow up in 1 week         Discharge Follow-up with Specified Provider: Stroke neurology APRN in 1 month (check for opening on 3/14/23)   As directed      To: Stroke neurology APRN in 1 month (check for opening on 3/14/23)             Connie Massey PA-C  02/22/23    Time Spent on Discharge:  I spent 40 minutes on this discharge activity which included: face-to-face encounter with the patient, reviewing the data in the system, coordination of the care with the nursing staff as well as consultants, documentation, and entering orders.

## 2023-02-22 NOTE — DISCHARGE PLACEMENT REQUEST
"Dee Crockett MARZENA (77 y.o. Female)     Claudia Abebe RN  P: [phone]    Date of Birth   1945    Social Security Number       Address   [address] [address]    Home Phone   [phone]    MRN   [id number]       [place]   Unknown    Marital Status                               Admission Date   2/15/23    Admission Type   Urgent    Admitting Provider   Cholo Minler MD    Attending Provider   Cholo Milner MD    Department, Room/Bed   [facility] 3F, S324/1       Discharge Date       Discharge Disposition   Home or Self Care    Discharge Destination                               Attending Provider: Cholo Milner MD    Allergies: Keflex [Cephalexin], Imdur [Isosorbide Dinitrate], Bactrim [Sulfamethoxazole-trimethoprim], Codeine Sulfate, Contrast Dye (Echo Or Unknown Ct/mr), Demerol [Meperidine], Levaquin [Levofloxacin], Morphine Sulfate, Penicillin V Potassium, Sulfa Antibiotics, Trimethoprim    Isolation: None   Infection: MRSA (05/06/22)   Code Status: CPR    Ht: 160 cm (62.99\")   Wt: 72.5 kg (159 lb 13.3 oz)    Admission Cmt: None   Principal Problem: Seizure (HCC) [R56.9]                 Active Insurance as of 2/15/2023     Primary Coverage     Payor Plan Insurance Group Employer/Plan Group    [employer] MEDICARE REPLACEMENT [employer] MED[employer]ARE REPLACEMENT [id number]     Payor Plan Address Payor Plan Phone Number Payor Plan Fax Number Effective Dates    [address] [address]   1/1/2016 - None Entered    [facility] [id number]       Subscriber Name Subscriber Birth Date Member ID       DEE CROCKETT 1945 [id number]                 Emergency Contacts      (Rel.) Home Phone Work Phone Mobile Phone    CalebJaylin (Daughter) -- -- [phone]    calebLv rocha (Son) [phone] -- [phone]            Insurance Information                [employer] MED[employer]ARE REPLACEMENT/[employer] MEDICARE REPLACEMENT Phone: " --    Subscriber: Coco Crockett Subscriber#: [id number]    Group#: [id number] Precert#: --             Physician Progress Notes (last 24 hours)      Connie Massey PA-C at 23 1222              [facility] Medicine Services  PROGRESS NOTE    Patient Name: Coco Crockett  : 1945  MRN: [id number]    Date of Admission: 2/15/2023  Primary Care Physician: Lobo Archer MD    Subjective     CC: AMS, UTI     HPI:  Sitting up in bed eating breakfast. No family at bedside. She states she had a busy night last night and had to get up to go to the bathroom on 3 different occasions. Headache again this morning, Tylenol helps. Had some loose BMs yesterday but this has improved. Thinks she ate too much fruit yesterday. Feels that IV iron is helping - pleased to see some color in her cheeks. She is hopeful for discharge to rehab soon.      ROS:  Gen- No fevers, chills  CV- No chest pain, palpitations  Resp- No cough, dyspnea  GI- No N/V/D, abd pain    Objective     Vital Signs:   Temp:  [97.2 °F (36.2 °C)-98.3 °F (36.8 °C)] 98.1 °F (36.7 °C)  Heart Rate:  [60-77] 60  Resp:  [16-20] 18  BP: (124-167)/(57-94) 124/94     Physical Exam:  Constitutional: No acute distress, awake, alert, conversant. Sitting up in bed eating breakfast.  HENT: NCAT, mucous membranes moist  Respiratory: Clear to auscultation bilaterally, respiratory effort normal   Cardiovascular: RRR, no murmurs, rubs, or gallops  Gastrointestinal: Positive bowel sounds, soft, nontender, nondistended  Musculoskeletal: No bilateral ankle edema  Psychiatric: Appropriate affect, cooperative  Neurologic: Oriented x 3, moves all extremities spontaneously, speech clear  Skin: No rashes   No change from exam on 2023    Results Reviewed:  LAB RESULTS:      Lab 23  1010 23  0826 23  0608 02/15/23  1424 02/15/23  1222 02/15/23  0631   WBC 5.02 5.01 6.57  --   --  10.87*   HEMOGLOBIN 8.8* 8.3* 8.6*  --   --   9.4*   HEMATOCRIT 29.9* 27.6* 29.5*  --   --  32.2*   PLATELETS 238 223 290  --   --  358   NEUTROS ABS  --  2.72 3.76  --   --  8.12*   IMMATURE GRANS (ABS)  --  0.01 0.04  --   --  0.06*   LYMPHS ABS  --  1.41 1.90  --   --  1.99   MONOS ABS  --  0.23 0.81  --   --  0.66   EOS ABS  --  0.62* 0.02  --   --  0.00   MCV 74.9* 74.2* 73.9*  --   --  72.7*   PROCALCITONIN  --   --   --   --  0.05  --    LACTATE  --   --   --  1.3  --   --          Lab 02/19/23  0536 02/18/23  1851 02/18/23  1407 02/18/23  0826 02/16/23  0608 02/15/23  0631   SODIUM 139  --  135* 137 132* 130*   POTASSIUM 4.0 4.3 3.7 3.2* 3.6 3.8   CHLORIDE 105  --  102 104 97* 94*   CO2 23.0  --  22.0 23.0 20.0* 20.0*   ANION GAP 11.0  --  11.0 10.0 15.0 16.0*   BUN 20  --  19 16 15 12   CREATININE 0.65  --  0.62 0.69 0.62 0.62   EGFR 89.7  --  90.7 88.4 90.7 90.7   GLUCOSE 101*  --  106* 100* 106* 120*   CALCIUM 8.8  --  8.6 8.5* 9.0 9.4   HEMOGLOBIN A1C  --   --   --   --   --  5.30   TSH  --   --   --   --   --  1.270         Lab 02/18/23  0826 02/16/23  0608 02/15/23  0631   TOTAL PROTEIN 5.7* 6.7 7.3   ALBUMIN 3.4* 4.1 4.4   GLOBULIN 2.3 2.6 2.9   ALT (SGPT) 10 8 10   AST (SGOT) 12 19 18   BILIRUBIN 0.2 0.5 0.6   ALK PHOS 43 48 53         Lab 02/15/23  0631   CHOLESTEROL 279*   LDL CHOL 130*   HDL CHOL 34*   TRIGLYCERIDES 619*         Lab 02/19/23  0536 02/18/23  0826   IRON 14*  --    IRON SATURATION 3*  --    TIBC 505  --    TRANSFERRIN 339  --    FERRITIN 39.02  --    FOLATE  --  14.30   VITAMIN B 12  --  404         Brief Urine Lab Results  (Last result in the past 365 days)      Color   Clarity   Blood   Leuk Est   Nitrite   Protein   CREAT   Urine HCG        02/15/23 1353 Dark Yellow   Cloudy   Trace   Moderate (2+)   Negative   30 mg/dL (1+)               Microbiology Results Abnormal     Procedure Component Value - Date/Time    Blood Culture - Blood, Arm, Left [[id number]]  (Normal) Collected: 02/15/23 1222    Lab Status: Final result  Specimen: Blood from Arm, Left Updated: 02/20/23 1245     Blood Culture No growth at 5 days    Blood Culture - Blood, Hand, Left [[id number]]  (Normal) Collected: 02/15/23 1222    Lab Status: Final result Specimen: Blood from Hand, Left Updated: 02/20/23 1245     Blood Culture No growth at 5 days    Respiratory Panel PCR w/COVID-19(SARS-CoV-2) MATTHEW/ABAD/JOEY/PAD/COR/MAD/SKINNY In-House, NP Swab in UTM/VTM, 3-4 HR TAT - Swab, Nasopharynx [[id number]]  (Normal) Collected: 02/15/23 1322    Lab Status: Final result Specimen: Swab from Nasopharynx Updated: 02/15/23 1452     ADENOVIRUS, PCR Not Detected     Coronavirus 229E Not Detected     Coronavirus HKU1 Not Detected     Coronavirus NL63 Not Detected     Coronavirus OC43 Not Detected     COVID19 Not Detected     Human Metapneumovirus Not Detected     Human Rhinovirus/Enterovirus Not Detected     Influenza A PCR Not Detected     Influenza B PCR Not Detected     Parainfluenza Virus 1 Not Detected     Parainfluenza Virus 2 Not Detected     Parainfluenza Virus 3 Not Detected     Parainfluenza Virus 4 Not Detected     RSV, PCR Not Detected     Bordetella pertussis pcr Not Detected     Bordetella parapertussis PCR Not Detected     Chlamydophila pneumoniae PCR Not Detected     Mycoplasma pneumo by PCR Not Detected    Narrative:      In the setting of a positive respiratory panel with a viral infection PLUS a negative procalcitonin without other underlying concern for bacterial infection, consider observing off antibiotics or discontinuation of antibiotics and continue supportive care. If the respiratory panel is positive for atypical bacterial infection (Bordetella pertussis, Chlamydophila pneumoniae, or Mycoplasma pneumoniae), consider antibiotic de-escalation to target atypical bacterial infection.        No radiology results from the last 24 hrs    Results for orders placed during the hospital encounter of 02/15/23    Adult Transthoracic Echo Complete W/ Cont if Necessary Per  Protocol (With Agitated Saline)    Interpretation Summary  •  Left ventricular systolic function is normal. Calculated left ventricular EF = 59.7% Left ventricular ejection fraction appears to be 61 - 65%.  •  Left ventricular diastolic function is consistent with (grade I) impaired relaxation.  •  Saline test results are negative.  •  There is calcification of the aortic valve.  •  Aortic valve maximum pressure gradient is 9 mmHg.    I have reviewed the medications:  Scheduled Meds:amLODIPine, 5 mg, Oral, Q24H  aspirin, 81 mg, Oral, Daily  atorvastatin, 40 mg, Oral, Nightly  budesonide-formoterol, 2 puff, Inhalation, BID - RT  DULoxetine, 60 mg, Oral, Daily  estradiol, 0.5 mg, Oral, Daily  ferric gluconate, 250 mg, Intravenous, Daily  fluticasone, 2 spray, Nasal, Daily  hydroCHLOROthiazide Oral, 12.5 mg, Oral, Daily  levETIRAcetam, 500 mg, Oral, BID  levothyroxine, 25 mcg, Oral, Daily  nebivolol, 10 mg, Oral, Q24H  pantoprazole, 40 mg, Oral, Daily Before Lunch  saccharomyces boulardii, 250 mg, Oral, BID With Meals      Continuous Infusions:   PRN Meds:.•  acetaminophen  •  Calcium Replacement - Initiate Nurse / BPA Driven Protocol  •  dextrose  •  dextrose  •  enalaprilat  •  glucagon (human recombinant)  •  Magnesium Standard Dose Replacement - Initiate Nurse / BPA Driven Protocol  •  Phosphorus Replacement - Initiate Nurse / BPA Driven Protocol  •  Potassium Replacement - Initiate Nurse / BPA Driven Protocol    Assessment & Plan     Active Hospital Problems    Diagnosis  POA   • **Seizure (HCC) [R56.9]  Unknown   • E. coli UTI (urinary tract infection) [N39.0, B96.20]  Unknown   • Stroke (HCC) [I63.9]  Yes   • Ischemic heart disease [I25.9]  Yes   • Hypertension [I10]  Yes   • Dyslipidemia [E78.5]  Yes   • Hypothyroidism [E03.9]  Yes   • GERD (gastroesophageal reflux disease) [K21.9]  Yes   • Type 2 diabetes mellitus (HCC) [E11.9]  Yes      Resolved Hospital Problems    Diagnosis Date Resolved POA   • Acute  ischemic stroke (HCC) [I63.9] 02/17/2023 Yes     Coco Crockett is a 77 y.o. female with a history of CAD, non-insulin dependent T2DM, hypertension, hyperlipidemia, iron-deficiency anemia, COPD, GERD, hypothyroidism and migraine headaches. She was transferred to [facility] ED from [facility] ED on 2/15/2023 for evaluation of altered mental status. CT head at outside hospital was negative. MRI brain negative here. UA concerning for UTI. Blood cultures NGTD.    Metabolic encephalopathy, resolved   Focal seizure activity   - Appreciate neurology evaluation   - CT head at [facility] ED negative  - MRI brain showed age-related changes, no evidence of acute infarct, hemorrhage or mass. Stroke ruled out   - EEG was consistent with focal seizures. Neurology evaluated and felt seizure activity was related to UTI. Recommended Keppra 500mg BID x 14 days   - Continue ASA. Lipids high despite home therapy so will continue statin    Hypokalemia, resolved   - Replace per protocol     E. Coli UTI  - Family reported temp 100.9 prior to admission  - WBCs mildly elevated at 10.87  - UA concerning for UTI. Urine culture growing >100K E. coli (resistant to Levaquin, Ampicillin and Amp-Sulbactam). Patient has multiple antibiotic allergies so was initially started on Macrobid (but ANNA </= 16), so not ideal. My partner spoke with pharmacy - given her allergies and less than ideal ANNA on Macrobid, transitioned to IV Gentamycin x 3 days  - Has completed IV antibiotic therapy     Hypertension  - Continue Bystolic, Amlodipine, and HCTZ daily  - BP continues to be elevated - Enalaprilat 1.25mg injection today   - Will allow some permissive HTN - suspect some white coat hypertension. On review of home medication list, her home HCTZ and Bisoprolol doses were actually decreased due to low blood pressures, would hate to cause this after DC   - PRN Enalapril for SBP > 160 while in hospital     Hyperlipidemia  Coronary artery disease    - Continue ASA and high-intensity statin  - At home, also on Vascepa and Trilipix - can resume after DC (not on formulary)  - Follows with Dr. Camacho      T2DM, controlled  - A1c 5.3%  - Stop accuchecks - not requiring SSI   - Holding home Metformin - could consider discontinuing unless her diet is significantly worse at home? Only taking 500mg once per day with low A1c      GERD  - Continue PPI     Hypothyroidism  - Continue Levothyroxine     COPD  - Continue Symbicort    Iron-deficiency anemia  - Follows with GI outpatient - per daughter, sounds like work up is planned but was put on hold due to recent UTI outpatient  - Iron decreased at 14 and iron saturation decreased at 3% on 2023  - Continue IV iron x 5 doses or until DC     Expected Discharge Location and Transportation: rehab - insurance approval is pending   Expected Discharge   Expected Discharge Date and Time     Expected Discharge Date Expected Discharge Time    2023          DVT prophylaxis:Mechanical DVT prophylaxis orders are present.     AM-PAC 6 Clicks Score (PT): 17 (23 0902)    CODE STATUS:   Code Status and Medical Interventions:   Ordered at: 02/15/23 0218     Level Of Support Discussed With:    Next of Kin (If No Surrogate)     Code Status (Patient has no pulse and is not breathing):    CPR (Attempt to Resuscitate)     Medical Interventions (Patient has pulse or is breathing):    Full Support     Release to patient:    Routine Release     Connie Massey PA-C  23              Electronically signed by Connie Massey PA-C at 23 1434       Physical Therapy Notes (last 24 hours)  Notes from 23 1138 through 23 1138   No notes exist for this encounter.            Occupational Therapy Notes (last 24 hours)      Lisa Ramirez, SENG at 23 1427          Patient Name: Coco Crockett  : 1945    MRN: [id number]                              Today's Date: 2023       Admit Date:  2/15/2023    Visit Dx:     ICD-10-CM ICD-9-CM   1. Acute ischemic stroke (HCC)  I63.9 434.91   2. Aphasia  R47.01 784.3   3. Dysphagia, unspecified type  R13.10 787.20   4. Impaired functional mobility, balance, gait, and endurance  Z74.09 V49.89   5. Cognitive communication deficit  R41.841 799.52     Patient Active Problem List   Diagnosis   • Mild obesity   • Hypertensive cardiovascular disease   • Hypertension   • Dyslipidemia   • Obesity (BMI 30.0-34.9)   • Hypothyroidism   • Osteopenia   • TMJ (temporomandibular joint disorder)   • GERD (gastroesophageal reflux disease)   • Iron deficiency anemia   • Depression   • Eczema   • Vertigo   • Migraine   • Type 2 diabetes mellitus (HCC)   • MALIK (dyspnea on exertion)   • Leg pain, bilateral   • Fatigue   • Ischemic heart disease   • Lumbar stenosis with neurogenic claudication   • Spondylosis of lumbar region without myelopathy or radiculopathy   • Degeneration of lumbar or lumbosacral intervertebral disc   • Greater trochanteric bursitis of both hips   • Spinal stenosis of lumbar region with neurogenic claudication   • S/P lumbar fusion   • Stroke ([facility])   • Seizure ([facility])   • E. coli UTI (urinary tract infection)     Past Medical History:   Diagnosis Date   • Anemia    • Anxiety    • Chest pain    • Coronary artery disease    • Depression    • Diabetes mellitus ([facility])    • Dyslipidemia    • Eczema    • Fear of awareness under general anesthesia    • Fear of general anesthetic     has hx of awareness during anesthesia induction, felt like she was dying   • GERD (gastroesophageal reflux disease)    • History of COVID-19 02/01/2022   • Hyperlipidemia    • Hypertension     Chronic hypertension, probably essential.    • Hypertensive cardiovascular disease    • Hypothyroidism     Chronic hypothyroidism/replacement therapy.   • IBS (irritable bowel syndrome)    • Iron deficiency anemia    • Migraine     Intermittent progressive headache syndrome/possible migraine equivalent.    • Obesity (BMI 30.0-34.9)     Mild obesity (BMI 31).    • Osteopenia     Osteopenia with fracture of lumbar vertebra, 2004.    • Pneumonia    • TMJ (temporomandibular joint disorder)    • Type 2 diabetes mellitus (HCC)     Type 2 diabetes mellitus, Hemoglobin A1c 5.9%, December 2014.   • Urgency incontinence    • Urinary, incontinence, stress female    • Vertigo     Intermittent marked dizziness/vertigo with Epley maneuver.     Past Surgical History:   Procedure Laterality Date   • ANKLE OPEN REDUCTION INTERNAL FIXATION Right 1995    Fall on ice   • APPENDECTOMY  1970    Appendectomy with wedge resection of both ovaries, 1970.   • CATARACT EXTRACTION WITH INTRAOCULAR LENS IMPLANT     • COLONOSCOPY     • LASIK Bilateral    • LUMBAR LAMINECTOMY WITH FUSION N/A 5/9/2022    Procedure: LUMBAR DECOMPRESSON L2-S1 with Fusion and Stabilization with PEDICLE SCREWS at L4-S1;  Surgeon: Humberto Correa MD;  Location: [facility];  Service: Neurosurgery;  Laterality: N/A;   • TIBIAL PLATEAU OPEN REDUCTION INTERNAL FIXATION Right    • TONSILLECTOMY AND ADENOIDECTOMY     • TOTAL LAPAROSCOPIC HYSTERECTOMY SALPINGO OOPHORECTOMY  1985    MARIZA/BSO secondary to severe endometriosis, 1985.       General Information     Row Name 02/21/23 1507          OT Time and Intention    Document Type therapy note (daily note)  -     Mode of Treatment occupational therapy  -     Row Name 02/21/23 1509          General Information    Patient Profile Reviewed yes  -LC     Prior Level of Function independent:;all household mobility;transfer;ADL's  -     Existing Precautions/Restrictions fall  -     Barriers to Rehab medically complex  -     Row Name 02/21/23 1506          Safety Issues, Functional Mobility    Safety Issues Affecting Function (Mobility) safety precaution awareness;safety precautions follow-through/compliance  -     Impairments Affecting Function (Mobility) balance;endurance/activity tolerance;strength  -LC     Cognitive  Impairments, Mobility Safety/Performance insight into deficits/self-awareness;safety precaution awareness  -           User Key  (r) = Recorded By, (t) = Taken By, (c) = Cosigned By    Initials Name Provider Type     Lisa Ramirez OT Occupational Therapist                 Mobility/ADL's     Row Name 02/21/23 1510          Bed Mobility    Bed Mobility scooting/bridging;supine-sit  -     Comment, (Bed Mobility) UIC on arrival  -     Row Name 02/21/23 1510          Transfers    Transfers sit-stand transfer;bed-chair transfer;toilet transfer  -     Comment, (Transfers) VC's for sequencing  -     Row Name 02/21/23 1510          Bed-Chair Transfer    Bed-Chair [place] (Transfers) standby assist  -     Row Name 02/21/23 1510          Sit-Stand Transfer    Sit-Stand [place] (Transfers) standby assist  -     Row Name 02/21/23 1510          Toilet Transfer    [place] Level (Toilet Transfer) standby assist  -     Assistive Device (Toilet Transfer) grab bars/safety frame;commode  -     Row Name 02/21/23 1510          Functional Mobility    Functional Mobility- Ind. Level contact guard assist  -     Functional Mobility- Device other (see comments)  UE support  -     Functional Mobility-Distance (Feet) 30  -     Functional Mobility- Comment No LOB  -     Row Name 02/21/23 1510          Activities of Daily Living    BADL Assessment/Intervention toileting;grooming  -[facility] Name 02/21/23 1510          Toileting Assessment/Training    [place] Level (Toileting) adjust/manage clothing;perform perineal hygiene;supervision  -     Assistive Devices (Toileting) commode;grab bar/safety frame  -     Position (Toileting) unsupported sitting;supported standing  -     Row Name 02/21/23 1510          Grooming Assessment/Training    [place] Level (Grooming) hair care, combing/brushing;wash face, hands;supervision  -     Position (Grooming) sink side;supported standing  -      Comment, (Grooming) Completed standing at sink with no LOB.  -           User Key  (r) = Recorded By, (t) = Taken By, (c) = Cosigned By    Initials Name Provider Type    Lisa Chan OT Occupational Therapist               Obj/Interventions     Row Name 02/21/23 1523          Balance    Balance Assessment sitting static balance;sitting dynamic balance;standing static balance;standing dynamic balance  -     Static Sitting Balance supervision  -     Dynamic Sitting Balance supervision  -     Position, Sitting Balance unsupported;sitting edge of bed  -     Static Standing Balance standby assist  -     Dynamic Standing Balance standby assist  -     Position/Device Used, Standing Balance supported  -     Balance Interventions sitting;standing;sit to stand;supported;occupation based/functional task;weight shifting activity  -     Comment, Balance No LOB.  -           User Key  (r) = Recorded By, (t) = Taken By, (c) = Cosigned By    Initials Name Provider Type     Lisa Ramirez OT Occupational Therapist               Goals/Plan    No documentation.                Clinical Impression     Row Name 02/21/23 1524          Pain Assessment    Pretreatment Pain Rating 0/10 - no pain  -     Posttreatment Pain Rating 0/10 - no pain  -     Additional Documentation Pain Scale: Word Pre/Post-Treatment (Group)  -     Row Name 02/21/23 1524          Plan of Care Review    Plan of Care Reviewed With patient  -     Progress improving  -     Outcome Evaluation Pt. progressing towards baseline with ADLs and functional mobility. Improvement noted in safety awareness and balance this session. Will continue to progress as able. Recommend home with home health services.  -     Row Name 02/21/23 1524          Therapy Plan Review/Discharge Plan (OT)    Anticipated Discharge Disposition (OT) home with assist;home with 24/7 care  -     Row Name 02/21/23 1524          Vital Signs    Pre Systolic BP Rehab  --  VSS  -LC     Pre Patient Position Sitting  -LC     Intra Patient Position Standing  -LC     Post Patient Position Sitting  -LC     Row Name 02/21/23 1524          Positioning and Restraints    Pre-Treatment Position sitting in chair/recliner  -LC     Post Treatment Position chair  -LC     In Chair notified nsg;reclined;call light within reach;encouraged to call for assist;exit alarm on;waffle cushion;legs elevated  -           User Key  (r) = Recorded By, (t) = Taken By, (c) = Cosigned By    Initials Name Provider Type    Lisa Chan, SENG Occupational Therapist               Outcome Measures     Row Name 02/21/23 1531          How much help from another is currently needed...    Putting on and taking off regular lower body clothing? 3  -LC     Bathing (including washing, rinsing, and drying) 3  -LC     Toileting (which includes using toilet bed pan or urinal) 3  -LC     Putting on and taking off regular upper body clothing 4  -LC     Taking care of personal grooming (such as brushing teeth) 3  -LC     Eating meals 4  -LC     AM-PAC 6 Clicks Score (OT) 20  -LC     Row Name 02/21/23 0902          How much help from another person do you currently need...    Turning from your back to your side while in flat bed without using bedrails? 3  -LM     Moving from lying on back to sitting on the side of a flat bed without bedrails? 3  -LM     Moving to and from a bed to a chair (including a wheelchair)? 3  -LM     Standing up from a chair using your arms (e.g., wheelchair, bedside chair)? 3  -LM     Climbing 3-5 steps with a railing? 2  -LM     To walk in hospital room? 3  -LM     AM-PAC 6 Clicks Score (PT) 17  -LM     Highest level of mobility 5 --> Static standing  -LM     Row Name 02/21/23 1531          Functional Assessment    Outcome Measure Options AM-PAC 6 Clicks Daily Activity (OT)  -           User Key  (r) = Recorded By, (t) = Taken By, (c) = Cosigned By    Initials Name Provider Type    JAMES Clarke  Stacia IVAN, RN Registered Nurse    Lisa Chan OT Occupational Therapist                Occupational Therapy Education     Title: PT OT SLP Therapies (In Progress)     Topic: Occupational Therapy (In Progress)     Point: ADL training (In Progress)     Description:   Instruct learner(s) on proper safety adaptation and remediation techniques during self care or transfers.   Instruct in proper use of assistive devices.              Learning Progress Summary           Patient Acceptance, E, NR by  at 2/21/2023 1427    Acceptance, E,D, VU,DU by CS at 2/16/2023 1120    Acceptance, E,D, NR by CS at 2/15/2023 0935   Family Acceptance, E,D, VU,DU by CS at 2/16/2023 1120    Acceptance, E,D, NR by CS at 2/15/2023 0935                   Point: Home exercise program (Done)     Description:   Instruct learner(s) on appropriate technique for monitoring, assisting and/or progressing therapeutic exercises/activities.              Learning Progress Summary           Patient Acceptance, E,D, VU,DU by  at 2/16/2023 1120   Family Acceptance, E,D, VU,DU by  at 2/16/2023 1120                   Point: Precautions (In Progress)     Description:   Instruct learner(s) on prescribed precautions during self-care and functional transfers.              Learning Progress Summary           Patient Acceptance, E, NR by  at 2/21/2023 1427    Acceptance, E,D, VU,DU by  at 2/16/2023 1120    Acceptance, E,D, NR by CS at 2/15/2023 0935   Family Acceptance, E,D, VU,DU by  at 2/16/2023 1120    Acceptance, E,D, NR by  at 2/15/2023 0935                   Point: Body mechanics (In Progress)     Description:   Instruct learner(s) on proper positioning and spine alignment during self-care, functional mobility activities and/or exercises.              Learning Progress Summary           Patient Acceptance, E, NR by YUVAL at 2/21/2023 1427    Acceptance, E,D, VU,DU by CS at 2/16/2023 1120    Acceptance, E,D, NR by CS at 2/15/2023 0935   Family  Acceptance, E,D, VU,DU by  at 2/16/2023 1120    Acceptance, E,D, NR by  at 2/15/2023 0935                               User Key     Initials Effective Dates Name Provider Type Discipline    LC 06/16/21 -  Lisa Ramirez OT Occupational Therapist OT    ODELL 06/16/21 -  Duarte Fair OT Occupational Therapist OT              OT Recommendation and Plan     Plan of Care Review  Plan of Care Reviewed With: patient  Progress: improving  Outcome Evaluation: Pt. progressing towards baseline with ADLs and functional mobility. Improvement noted in safety awareness and balance this session. Will continue to progress as able. Recommend home with home health services.     Time Calculation:    Time Calculation- OT     Row Name 02/21/23 1532             Time Calculation- OT    OT Start Time 1427  -LC      OT Received On 02/21/23  -      OT Goal Re-Cert Due Date 02/25/23  -         Timed Charges    68060 - OT Self Care/Mgmt Minutes 18  -LC         Total Minutes    Timed Charges Total Minutes 18  -LC       Total Minutes 18  -LC            User Key  (r) = Recorded By, (t) = Taken By, (c) = Cosigned By    Initials Name Provider Type    LC Lisa Ramirez, SENG Occupational Therapist              Therapy Charges for Today     Code Description Service Date Service Provider Modifiers Qty    67943850721 HC OT SELF CARE/MGMT/TRAIN EA 15 MIN 2/21/2023 Lisa Ramirez OT GO 1               Lisa Ramirez OT  2/21/2023    Electronically signed by Lisa Ramirez OT at 02/21/23 1533     Lisa Ramirez OT at 02/21/23 1427        Goal Outcome Evaluation:  Plan of Care Reviewed With: patient        Progress: improving  Outcome Evaluation: Pt. progressing towards baseline with ADLs and functional mobility. Improvement noted in safety awareness and balance this session. Will continue to progress as able. Recommend home with home health services.    Electronically signed by Lisa Ramirez OT at 02/21/23 1532       Speech Language  Pathology Notes (last 24 hours)  Notes from 23 1138 through 23 1138   No notes exist for this encounter.       [facility]  [address] [address]  Phone:  [phone]  Fax:  [phone] Date: 2023      Ambulatory Referral to Physical Therapy     Patient:  Coco Crockett MRN:  [id number]   [address] [address] :  1945  SSN:    Phone: [phone] Sex:  F      INSURANCE PAYOR PLAN GROUP # SUBSCRIBER ID   Primary:    [employer]ARE REPLACEMENT [id number]      Referring Provider Information:  CHOLO ACUNA Phone: [phone] Fax: [phone]       Referral Information:   # Visits:  1 Referral Type: Physical Therapy [AE1]   Urgency:  Routine Referral Reason: Specialty Services Required   Start Date: 2023 End Date:  To be determined by Insurer   Diagnosis: Acute ischemic stroke (HCC) (I63.9 [ICD-10-CM] 434.91 [ICD-9-CM])      Refer to Dept:   Refer to Provider:   Refer to Provider Phone:   Refer to Facility:       Follow-up needed: Yes     This document serves as a request of services and does not constitute Insurance authorization or approval of services.  To determine eligibility, please contact the members Insurance carrier to verify and review coverage.     If you have medical questions regarding this request for services. Please contact [address] at [phone] during normal business hours.        Verbal Order Mode: Verbal with readback   Authorizing Provider: Cholo Acuna MD  Authorizing Provider's NPI: [id number]     Order Entered By: Claudia Abebe RN 2023 11:37 AM

## 2023-02-22 NOTE — PLAN OF CARE
Problem: Adult Inpatient Plan of Care  Goal: Plan of Care Review  Outcome: Ongoing, Progressing  Flowsheets  Taken 2/22/2023 1131 by Lizy Colon RN  Outcome Evaluation: Pt. being Discharged  Taken 2/21/2023 1524 by Lisa Ramirez OT  Progress: improving  Plan of Care Reviewed With: patient  Goal: Patient-Specific Goal (Individualized)  Outcome: Ongoing, Progressing  Goal: Absence of Hospital-Acquired Illness or Injury  Outcome: Ongoing, Progressing  Intervention: Identify and Manage Fall Risk  Recent Flowsheet Documentation  Taken 2/22/2023 1000 by Lizy Colon RN  Safety Promotion/Fall Prevention: safety round/check completed  Taken 2/22/2023 0800 by Lizy Colon RN  Safety Promotion/Fall Prevention:   activity supervised   assistive device/personal items within reach   clutter free environment maintained   fall prevention program maintained   mobility aid in reach   muscle strengthening facilitated   nonskid shoes/slippers when out of bed   room organization consistent   safety round/check completed   toileting scheduled  Intervention: Prevent Skin Injury  Recent Flowsheet Documentation  Taken 2/22/2023 0800 by Lizy Colon RN  Body Position: position changed independently  Intervention: Prevent and Manage VTE (Venous Thromboembolism) Risk  Recent Flowsheet Documentation  Taken 2/22/2023 0800 by Lizy Colon RN  Activity Management: activity adjusted per tolerance  Goal: Optimal Comfort and Wellbeing  Outcome: Ongoing, Progressing  Intervention: Provide Person-Centered Care  Recent Flowsheet Documentation  Taken 2/22/2023 0800 by Lizy Colon RN  Trust Relationship/Rapport:   care explained   choices provided  Goal: Readiness for Transition of Care  Outcome: Ongoing, Progressing     Problem: Skin Injury Risk Increased  Goal: Skin Health and Integrity  Outcome: Ongoing, Progressing     Problem: Fall Injury Risk  Goal: Absence of Fall and Fall-Related Injury  Outcome: Ongoing,  Progressing  Intervention: Identify and Manage Contributors  Recent Flowsheet Documentation  Taken 2/22/2023 0800 by Lizy Colon, RN  Medication Review/Management: medications reviewed  Intervention: Promote Injury-Free Environment  Recent Flowsheet Documentation  Taken 2/22/2023 1000 by Lizy Colon, RN  Safety Promotion/Fall Prevention: safety round/check completed  Taken 2/22/2023 0800 by Lizy Colon, RN  Safety Promotion/Fall Prevention:   activity supervised   assistive device/personal items within reach   clutter free environment maintained   fall prevention program maintained   mobility aid in reach   muscle strengthening facilitated   nonskid shoes/slippers when out of bed   room organization consistent   safety round/check completed   toileting scheduled     Problem: Adjustment to Illness (Stroke, Ischemic/Transient Ischemic Attack)  Goal: Optimal Coping  Outcome: Ongoing, Progressing     Problem: Bowel Elimination Impaired (Stroke, Ischemic/Transient Ischemic Attack)  Goal: Effective Bowel Elimination  Outcome: Ongoing, Progressing     Problem: Cerebral Tissue Perfusion (Stroke, Ischemic/Transient Ischemic Attack)  Goal: Optimal Cerebral Tissue Perfusion  Outcome: Ongoing, Progressing     Problem: Cognitive Impairment (Stroke, Ischemic/Transient Ischemic Attack)  Goal: Optimal Cognitive Function  Outcome: Ongoing, Progressing     Problem: Communication Impairment (Stroke, Ischemic/Transient Ischemic Attack)  Goal: Improved Communication Skills  Outcome: Ongoing, Progressing     Problem: Functional Ability Impaired (Stroke, Ischemic/Transient Ischemic Attack)  Goal: Optimal Functional Ability  Outcome: Ongoing, Progressing  Intervention: Optimize Functional Ability  Recent Flowsheet Documentation  Taken 2/22/2023 0800 by Lizy Colon, RN  Activity Management: activity adjusted per tolerance     Problem: Respiratory Compromise (Stroke, Ischemic/Transient Ischemic Attack)  Goal: Effective Oxygenation  and Ventilation  Outcome: Ongoing, Progressing     Problem: Sensorimotor Impairment (Stroke, Ischemic/Transient Ischemic Attack)  Goal: Improved Sensorimotor Function  Outcome: Ongoing, Progressing     Problem: Swallowing Impairment (Stroke, Ischemic/Transient Ischemic Attack)  Goal: Optimal Eating and Swallowing without Aspiration  Outcome: Ongoing, Progressing     Problem: Urinary Elimination Impaired (Stroke, Ischemic/Transient Ischemic Attack)  Goal: Effective Urinary Elimination  Outcome: Ongoing, Progressing   Goal Outcome Evaluation:              Outcome Evaluation: Pt. being Discharged

## 2023-02-23 ENCOUNTER — TELEPHONE (OUTPATIENT)
Dept: CARDIOLOGY | Facility: CLINIC | Age: 78
End: 2023-02-23
Payer: MEDICARE

## 2023-02-23 ENCOUNTER — READMISSION MANAGEMENT (OUTPATIENT)
Dept: CALL CENTER | Facility: HOSPITAL | Age: 78
End: 2023-02-23
Payer: MEDICARE

## 2023-02-23 LAB
QT INTERVAL: 456 MS
QTC INTERVAL: 532 MS

## 2023-02-23 NOTE — TELEPHONE ENCOUNTER
Lab Results   Component Value Date    CHOL 279 (H) 02/15/2023    TRIG 619 (H) 02/15/2023    HDL 34 (L) 02/15/2023     (H) 02/15/2023        Lab Results   Component Value Date    GLUCOSE 101 (H) 02/19/2023    BUN 20 02/19/2023    CREATININE 0.65 02/19/2023    EGFR 89.7 02/19/2023    BCR 30.8 (H) 02/19/2023    K 4.0 02/19/2023    CO2 23.0 02/19/2023    CALCIUM 8.8 02/19/2023    ALBUMIN 3.4 (L) 02/18/2023    AST 12 02/18/2023    ALT 10 02/18/2023     Lab Results   Component Value Date    WBC 5.02 02/20/2023    HGB 8.8 (L) 02/20/2023    HCT 29.9 (L) 02/20/2023    MCV 74.9 (L) 02/20/2023     02/20/2023      On Praluent previously however jass money ran out. Recently restarted atorvastatin after hospitalization for stroke.  On fenofibrate and Vascepa for elevated triglycerides (previously > 4,000)  CAD and recent stroke    Tried and failed - rosuvastatin 10, tricor 145, atorvastatin    Will attempt approval of repatha as pt does not wish to take atorvastatin (myalgias in the past)    MED D medication coverage  ID: [id number]  BIN: [id number]  PCN: [id number]  GRP: [id number]

## 2023-02-23 NOTE — OUTREACH NOTE
Prep Survey    Flowsheet Row Responses   [facility] facility patient discharged from? [facility]   Is LACE score < 7 ? No   Eligibility Readm Mgmt   Discharge diagnosis Metabolic encephalopathy   Does the patient have one of the following disease processes/diagnoses(primary or secondary)? Other   Does the patient have Home health ordered? No   Is there a DME ordered? No   Medication alerts for this patient Keppra    Prep survey completed? Yes          Ondina EARLY - Registered Nurse

## 2023-02-27 ENCOUNTER — READMISSION MANAGEMENT (OUTPATIENT)
Dept: CALL CENTER | Facility: HOSPITAL | Age: 78
End: 2023-02-27
Payer: MEDICARE

## 2023-02-27 NOTE — OUTREACH NOTE
Medical Week 1 Survey    Flowsheet Row Responses   [facility] patient discharged from? [place]   Does the patient have one of the following disease processes/diagnoses(primary or secondary)? Other   Week 1 attempt successful? Yes   Call start time 1432   Call end time 1436   Discharge diagnosis Metabolic encephalopathy   Is patient permission given to speak with other caregiver? Yes   Person spoke with today (if not patient) and relationship Daughter- Jaylin   Medication alerts for this patient Keppra    Meds reviewed with patient/caregiver? Yes   Is the patient taking all medications as directed (includes completed medication regime)? Yes   What is the Home health agency?  Unable to do HOME HEALTH @ this time. PCP is working with a different company.   Has home health visited the patient within 72 hours of discharge? N/A   Home health comments Outpatient- [facility] - Going to start on Friday.   Psychosocial issues? No   Did the patient receive a copy of their discharge instructions? Yes   Nursing interventions Reviewed instructions with patient   What is the patient's perception of their health status since discharge? Improving   Is the patient/caregiver able to teach back signs and symptoms related to disease process for when to call PCP? Yes   Is the patient/caregiver able to teach back signs and symptoms related to disease process for when to call 911? Yes   Is the patient/caregiver able to teach back the hierarchy of who to call/visit for symptoms/problems? PCP, Specialist, Home health nurse, Urgent Care, ED, 911 Yes   Week 1 call completed? Yes   Is the patient interested in additional calls from an ambulatory ?  NOTE:  applies to high risk patients requiring additional follow-up. No   Wrap up additional comments When first coming home, Patient was very Fatigued and would turn pale. However she went outside to walk yesterday and she is improving. IV IRON INFUSION outpatient.  Called pcp-  and did blood work orders. And will follow up in person in 6 weeks. Still Hallucinating @ times- advised to call Neurologist and make them aware @ [phone]           Janice HAMEED - Registered Nurse

## 2023-03-02 ENCOUNTER — TELEPHONE (OUTPATIENT)
Dept: NEUROLOGY | Facility: CLINIC | Age: 78
End: 2023-03-02
Payer: MEDICARE

## 2023-03-02 DIAGNOSIS — R56.9 FOCAL SEIZURE: Primary | ICD-10-CM

## 2023-03-02 RX ORDER — LEVETIRACETAM 500 MG/1
500 TABLET ORAL 2 TIMES DAILY
Qty: 60 TABLET | Refills: 2 | Status: SHIPPED | OUTPATIENT
Start: 2023-03-02

## 2023-03-02 NOTE — TELEPHONE ENCOUNTER
Julissa said that if she can;t be worked in for a sooner appt it will be fine, but we should call to make sure she has enough Keppra until she comes in.    Can you call and refill as appropriate? Thank you

## 2023-03-02 NOTE — TELEPHONE ENCOUNTER
PT DAUGHTER CALLING TO ADVISE, PT HAD HULLUCINATIONS IN HOSPITAL, COLORS AND FLASHING LIGHTS.     STILL HAVING COLORS AND FLASHING LIGHTS WHEN THE LIGHTS ARE ON.   NOT HAVING ANY MIGRAINE. THIS IS THE LAST DAY OF ANTI-SEIZURE   MEDICATION HOSPITAL PUT HER ON.    IS THIS NORMAL?    APPT IS SCHEDULED FOR 4/27/23 AND PT IS ON WAIT LIST.    SHOULD SHE BE SEEN SOONER?      PLEASE ADVISE DAUGHTER.    THANK YOU

## 2023-03-09 ENCOUNTER — READMISSION MANAGEMENT (OUTPATIENT)
Dept: CALL CENTER | Facility: HOSPITAL | Age: 78
End: 2023-03-09
Payer: MEDICARE

## 2023-03-09 NOTE — OUTREACH NOTE
Medical Week 2 Survey    Flowsheet Row Responses   [facility] patient discharged from? [place]   Does the patient have one of the following disease processes/diagnoses(primary or secondary)? Other   Week 2 attempt successful? No   Unsuccessful attempts Attempt 1          Gagan EARLY - Registered Nurse

## 2023-03-14 ENCOUNTER — READMISSION MANAGEMENT (OUTPATIENT)
Dept: CALL CENTER | Facility: HOSPITAL | Age: 78
End: 2023-03-14
Payer: MEDICARE

## 2023-03-14 NOTE — OUTREACH NOTE
Medical Week 2 Survey    Flowsheet Row Responses   [facility] patient discharged from? [place]   Does the patient have one of the following disease processes/diagnoses(primary or secondary)? Other   Week 2 attempt successful? No   Unsuccessful attempts Attempt 2          Jacquelyn MUNOZ - Registered Nurse

## 2023-03-22 ENCOUNTER — READMISSION MANAGEMENT (OUTPATIENT)
Dept: CALL CENTER | Facility: HOSPITAL | Age: 78
End: 2023-03-22
Payer: MEDICARE

## 2023-03-22 NOTE — OUTREACH NOTE
Medical Week 3 Survey    Flowsheet Row Responses   [facility] patient discharged from? [place]   Does the patient have one of the following disease processes/diagnoses(primary or secondary)? Other   Week 3 attempt successful? No   Unsuccessful attempts Attempt 1          ISSA STANTON - Registered Nurse

## 2023-04-18 ENCOUNTER — TELEPHONE (OUTPATIENT)
Dept: CARDIOLOGY | Facility: CLINIC | Age: 78
End: 2023-04-18
Payer: MEDICARE

## 2023-04-18 NOTE — TELEPHONE ENCOUNTER
Pt called and continues to have concerns about random elevation of SBP up to 180 with associated headaches.    Takes nebivolol 5, HCTZ 12.5, and amlodipine 5 mg daily.   She is not tracking home BP, weight consistently.   She will keep log and she has been worked in for sooner appt on 4/21 (daughter has appt same day).    Recent hospitalization for mental status changes/seizure/CVA/anemia in the setting of UTI.   PCP notes in [place]    She has not started Repatha prescribed in 2/2023.

## 2023-04-20 ENCOUNTER — TELEPHONE (OUTPATIENT)
Dept: NEUROLOGY | Facility: CLINIC | Age: 78
End: 2023-04-20
Payer: MEDICARE

## 2023-04-20 NOTE — TELEPHONE ENCOUNTER
Spoke to Jaylin Crockett and relayed the message to stop taking Keppra.  Her Mom has an appointment on 4/25/23 at 1PM with on of our providers.  She verbalized understanding and had no further questions.

## 2023-04-20 NOTE — TELEPHONE ENCOUNTER
Caller: HOA CAREY    Relationship: DAUGHTER    Best call back number: [phone]    What is the best time to reach you: ANYTIME    Who are you requesting to speak with (clinical staff, provider,  specific staff member): LAINE MCGRAW    What was the call regarding: THE PT'S DAUGHTER CALLED TO ASKING ABOUT THE MEDICATION THE PT IS ON AND HER COGNATIVE STATE SINCE LEAVING THE HOSPITAL. THE PT HAS NOT SEEN A NEURO SINCE BEING DISCHARGED AND THE FAMILY IS CONCERNED.     Do you require a callback: YES

## 2023-04-21 ENCOUNTER — OFFICE VISIT (OUTPATIENT)
Dept: CARDIOLOGY | Facility: CLINIC | Age: 78
End: 2023-04-21
Payer: MEDICARE

## 2023-04-21 VITALS
DIASTOLIC BLOOD PRESSURE: 60 MMHG | SYSTOLIC BLOOD PRESSURE: 132 MMHG | WEIGHT: 159 LBS | HEART RATE: 75 BPM | HEIGHT: 63 IN | OXYGEN SATURATION: 96 % | BODY MASS INDEX: 28.17 KG/M2

## 2023-04-21 DIAGNOSIS — I10 ESSENTIAL HYPERTENSION: Primary | ICD-10-CM

## 2023-04-21 DIAGNOSIS — I25.9 ISCHEMIC HEART DISEASE: ICD-10-CM

## 2023-04-21 DIAGNOSIS — D50.9 IRON DEFICIENCY ANEMIA, UNSPECIFIED IRON DEFICIENCY ANEMIA TYPE: ICD-10-CM

## 2023-04-21 DIAGNOSIS — E78.5 DYSLIPIDEMIA: ICD-10-CM

## 2023-04-21 PROCEDURE — 1160F RVW MEDS BY RX/DR IN RCRD: CPT | Performed by: INTERNAL MEDICINE

## 2023-04-21 PROCEDURE — 99214 OFFICE O/P EST MOD 30 MIN: CPT | Performed by: INTERNAL MEDICINE

## 2023-04-21 PROCEDURE — 3078F DIAST BP <80 MM HG: CPT | Performed by: INTERNAL MEDICINE

## 2023-04-21 PROCEDURE — 1159F MED LIST DOCD IN RCRD: CPT | Performed by: INTERNAL MEDICINE

## 2023-04-21 PROCEDURE — 3075F SYST BP GE 130 - 139MM HG: CPT | Performed by: INTERNAL MEDICINE

## 2023-04-21 RX ORDER — ONDANSETRON 4 MG/1
TABLET, ORALLY DISINTEGRATING ORAL
COMMUNITY
Start: 2023-03-05

## 2023-04-21 RX ORDER — NITROFURANTOIN MACROCRYSTALS 50 MG/1
50 CAPSULE ORAL
COMMUNITY
Start: 2023-04-14

## 2023-04-21 RX ORDER — DIPHENOXYLATE HYDROCHLORIDE AND ATROPINE SULFATE 2.5; .025 MG/1; MG/1
TABLET ORAL
COMMUNITY
Start: 2023-02-06

## 2023-04-21 RX ORDER — NITROFURANTOIN 25; 75 MG/1; MG/1
1 CAPSULE ORAL EVERY 12 HOURS SCHEDULED
COMMUNITY
Start: 2023-04-14

## 2023-04-21 RX ORDER — AMLODIPINE BESYLATE 5 MG/1
5 TABLET ORAL EVERY MORNING
Start: 2023-04-21

## 2023-04-21 RX ORDER — HYDROCHLOROTHIAZIDE 12.5 MG/1
12.5 CAPSULE, GELATIN COATED ORAL 2 TIMES DAILY
Qty: 180 CAPSULE | Refills: 3
Start: 2023-04-21

## 2023-04-21 NOTE — PROGRESS NOTES
[facility] Cardiology  Office Progress Note  Coco Crockett  1945  [address] [id number]       Visit Date: 04/21/23    PCP: Lobo Archer MD  [id number] [facility] [address] [place] [id number]    IDENTIFICATION: A 77 y.o. 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 Combined hypertensive and ischemic cardiovascular disease:  a. 2013  coronary angiography demonstrating nonobstructive single-vessel LAD disease with associated 75% to 80% stenosis first diagonal branch LAD not felt to be amenable to catheter based intervention LVEF (0.70)   b. 2016 Stress test:  Normal LVEF (0.63); findings consistent with a normal ECG stress test; myocardial perfusion imaging indicates a small-sized infarct located in the lateral wall with no significant ischemia noted.  c. 7/2017  echo LVEF (0.70) without PE/pulmonary arterial hypertension and mild aortic valve sclerosis with mild LVH  d. Echo 2/15/2023: EF 61-65%, LV grade I diastolic dysfunction, negative saline test, aortic valve calcification with max pressure gradient 9 mmHg  2. Chronic hypertension, probably essential.   1. 7/2018 24-hour BP monitor: Average blood pressure 136/69, 135/68 during the day and 141/71 at night.  3. Dyslipidemia.   1. 11/19 triglycerides >4425, unable to calculate other measures  2. 2/23 279/619/34/130  4. Hypothyroidism  5. Type 2 diabetes  1. 2/23 A1c 5.3  6. Osteopenia  7. Iron deficiency anemia.   8. Positional vertigo - data deficit.  9. Chronic daytime hypersomnolence and fatigue with apparent acceptable polysomnogram - data deficit, [employer]   10. Severe spinal stenosis noted on MRI lumbar spine, February 2021.  11. Severe anemia (hemoglobin 8) with iron replacement and colonoscopy, December 2021; data deficit,  summer 2021.  12. L2-S1 laminectomies with bilateral discectomies, capstone cage placement, and fusion at L4-5 and L5-S1 May 2022.  13.  Remote operations:   a. Tonsillectomy and adenoidectomy, 1950.   b. Appendectomy with wedge resection of both ovaries, 1970.  c. MARIZA/BSO secondary to severe endometriosis, 1985.   d. Fall on ice with subsequent ORIF of right ankle/tibia, 1995.   e. Cataracts, OU with bifocal implant, 2008.        CC:   Chief Complaint   Patient presents with   • Coronary Artery Disease   • IHD   • Hypertension   • Hyperlipidemia       Allergies  Allergies   Allergen Reactions   • Ciprofloxacin Anaphylaxis     Tongue swelling   • Keflex [Cephalexin] Itching, Swelling and Angioedema          • Imdur [Isosorbide Dinitrate] Headache     Severe headache/fatigue       • Pseudoephedrine Hcl Other (See Comments)     Heart racing   • Bactrim [Sulfamethoxazole-Trimethoprim] Rash   • Codeine Sulfate Itching and Rash   • Contrast Dye (Echo Or Unknown Ct/Mr) Itching   • Demerol [Meperidine] Itching and Rash   • Epinephrine GI Intolerance   • Levaquin [Levofloxacin] Other (See Comments)     Numbness and ruptured oneida's tendon   • Morphine Sulfate Itching and Rash   • Penicillin V Potassium Itching and Rash   • Sulfa Antibiotics Rash and Other (See Comments)     severe rash/swelling   • Trimethoprim Hives       Current Medications    Current Outpatient Medications:   •  acetaminophen (TYLENOL) 500 MG tablet, Take 1 tablet by mouth 2 (Two) Times a Day As Needed for Mild Pain., Disp: , Rfl:   •  amLODIPine (NORVASC) 5 MG tablet, Take 1 tablet by mouth Every Morning. PRN for SBP >160 despite taking other antihypertensive meds, Disp: , Rfl:   •  aspirin 81 MG EC tablet, Take 1 tablet by mouth Daily., Disp: , Rfl:   •  atorvastatin (LIPITOR) 40 MG tablet, Take 1 tablet by mouth Every Night., Disp: 90 tablet, Rfl: 3  •  Breo Ellipta 200-25 MCG/INH inhaler, Daily., Disp: , Rfl:   •  cetirizine (zyrTEC) 10 MG tablet,  Take 1 tablet by mouth Every Night., Disp: , Rfl:   •  Cholecalciferol (Vitamin D3) 50 MCG (2000 UT) capsule, Take 1 capsule by mouth Daily., Disp: 30 capsule, Rfl: 11  •  diphenhydrAMINE (BENADRYL) 25 mg capsule, Take 1 capsule by mouth Every Night., Disp: , Rfl:   •  diphenoxylate-atropine (LOMOTIL) 2.5-0.025 MG per tablet, , Disp: , Rfl:   •  docusate sodium (COLACE) 250 MG capsule, Take 1 capsule by mouth 2 (Two) Times a Day As Needed for Constipation. Hold for loose stools, Disp: 60 capsule, Rfl: 1  •  DULoxetine (CYMBALTA) 60 MG capsule, Take 1 capsule by mouth Daily., Disp: , Rfl:   •  estradiol (ESTRACE) 0.5 MG tablet, Take 1 tablet by mouth Daily., Disp: , Rfl:   •  Evolocumab (REPATHA) solution auto-injector SureClick injection, Inject 1 mL under the skin into the appropriate area as directed Every 14 (Fourteen) Days., Disp: 2 mL, Rfl: 6  •  fenofibric acid (TRILIPIX) 135 MG capsule delayed-release delayed release capsule, Take 1 capsule by mouth Daily., Disp: 90 capsule, Rfl: 3  •  fluticasone (FLONASE) 50 MCG/ACT nasal spray, 2 sprays into the nostril(s) as directed by provider Daily., Disp: , Rfl:   •  hydroCHLOROthiazide (MICROZIDE) 12.5 MG capsule, Take 1 capsule by mouth 2 (Two) Times a Day., Disp: 180 capsule, Rfl: 3  •  icosapent ethyl (Vascepa) 1 g capsule capsule, Take 2 g by mouth 2 (Two) Times a Day With Meals., Disp: 120 capsule, Rfl: 11  •  levothyroxine (SYNTHROID, LEVOTHROID) 25 MCG tablet, TAKE 1 TABLET BY MOUTH DAILY., Disp: 90 tablet, Rfl: 0  •  metFORMIN (GLUCOPHAGE) 500 MG tablet, Take 1 tablet by mouth Daily With Breakfast. Ordered as BID, but patient only takes once daily (prescribing MD is aware, per pt), Disp: , Rfl:   •  Multiple Vitamin (MULTI VITAMIN DAILY PO), Take  by mouth daily., Disp: , Rfl:   •  nebivolol (BYSTOLIC) 5 MG tablet, Take 1 tablet by mouth Every Evening., Disp: , Rfl:   •  nitrofurantoin (MACRODANTIN) 50 MG capsule, Take 1 capsule by mouth every night at  bedtime., Disp: , Rfl:   •  nitrofurantoin, macrocrystal-monohydrate, (MACROBID) 100 MG capsule, Take 1 capsule by mouth Every 12 (Twelve) Hours., Disp: , Rfl:   •  nitroglycerin (NITROLINGUAL) 0.4 MG/SPRAY spray, Place 1 spray under the tongue Every 5 (Five) Minutes As Needed for Chest Pain., Disp: 4.9 g, Rfl: 3  •  omeprazole (PriLOSEC) 20 MG capsule, Take 1 capsule by mouth Daily., Disp: , Rfl:   •  ondansetron ODT (ZOFRAN-ODT) 4 MG disintegrating tablet, TAKE 1 TABLET BY MOUTH EVERY 6 TO 8 HOURS AS NEEDED FOR NAUSEA AND VOMITING, Disp: , Rfl:   •  Polysacch Fe Cmp-Fe Heme Poly (Feosol Bifera) 28 MG tablet, Take 1 tablet by mouth Daily. (Iron supplement), Disp: , Rfl:   •  traMADol (ULTRAM) 50 MG tablet, Take 1 tablet by mouth Every 8 (Eight) Hours As Needed for Moderate Pain., Disp: 45 tablet, Rfl: 0  •  vitamin C (ASCORBIC ACID) 500 MG tablet, Take 1 tablet by mouth Daily., Disp: , Rfl:   •  levETIRAcetam (KEPPRA) 500 MG tablet, Take 1 tablet by mouth 2 (Two) Times a Day. (Patient not taking: Reported on 4/21/2023), Disp: 60 tablet, Rfl: 2      History of Present Illness   Coco Crockett is a 77 y.o. year old female here for follow up on nonobstructive CAD, uncontrolled hypertension, dyslipidemia and anemia.  She was admitted to [facility] in February as a transfer for altered mental status.  MRI showed no evidence of acute infarct hemorrhage or mass effect.  Echo unremarkable with no PFO.  EEG C/W focal seizures.  She was treated for UTI 2 weeks prior with Macrobid but presented to [facility] with leukocytosis and continued UTI on urinalysis.  Altered mental status was also attributed to hypertensive encephalopathy in the setting of UTI.  Her anemia is being followed by her PCP.  She has a cystoscopy coming up for recurrent UTIs. She has been doing well from a cardiovascular standpoint with all things considered. No chest pain, dyspnea on exertion or lower extremity edema. She would like some advice regarding some SBPs  "as high as 180 late in the evening several times a month.    Pt denies any chest pain, dyspnea, dyspnea on exertion, orthopnea, PND, palpitations, lower extremity edema, or claudication.      OBJECTIVE:  Vitals:    04/21/23 1340   BP: 132/60   BP Location: Right arm   Patient Position: Sitting   Pulse: 75   SpO2: 96%   Weight: 72.1 kg (159 lb)   Height: 160 cm (62.99\")     Body mass index is 28.17 kg/m².    Cardiovascular:      PMI at left midclavicular line. Normal rate. Regular rhythm. Normal S1. Normal S2.      Murmurs: There is no murmur.      No gallop. No click. No rub.   Pulses:     Intact distal pulses.   Edema:     Peripheral edema absent.         Diagnostic Data:  Procedures      ASSESSMENT:   Diagnosis Plan   1. Essential hypertension  hydroCHLOROthiazide (MICROZIDE) 12.5 MG capsule      2. Dyslipidemia        3. Ischemic heart disease        4. Iron deficiency anemia, unspecified iron deficiency anemia type            PLAN:  Ischemic Heart Disease- [facility] in 2013 with moderate nonobstructive heart disease. Patient denies angina or anginal equivalents.  Continue aspirin statin beta-blocker.    Hypertension-patient reports elevated blood pressures intermittently in the evening even following her Bystolic dose.  Continue HCTZ 12.5 mg twice daily, amlodipine 5 in the AM, and Bystolic.  If SBP remains >161-hour following nebivolol, patient instructed to take an extra amlodipine.    Dyslipidemia-patient with uncontrolled lipid panels historically.  She did not tolerate atorvastatin.  She is starting Repatha next week.    BELINDA-continue follow-up appointments with Dr. Archer who is managing.    Scribed by ROSHAN Beyer MD, FACC  "

## 2023-04-25 ENCOUNTER — OFFICE VISIT (OUTPATIENT)
Dept: NEUROLOGY | Facility: CLINIC | Age: 78
End: 2023-04-25
Payer: MEDICARE

## 2023-04-25 VITALS — WEIGHT: 159 LBS | HEIGHT: 63 IN | BODY MASS INDEX: 28.17 KG/M2

## 2023-04-25 DIAGNOSIS — Z87.898 HISTORY OF SEIZURE: Primary | ICD-10-CM

## 2023-04-25 DIAGNOSIS — E78.5 HYPERLIPIDEMIA LDL GOAL <70: ICD-10-CM

## 2023-04-25 DIAGNOSIS — G43.809 OTHER MIGRAINE WITHOUT STATUS MIGRAINOSUS, NOT INTRACTABLE: ICD-10-CM

## 2023-04-25 PROCEDURE — 1159F MED LIST DOCD IN RCRD: CPT | Performed by: NURSE PRACTITIONER

## 2023-04-25 PROCEDURE — 1160F RVW MEDS BY RX/DR IN RCRD: CPT | Performed by: NURSE PRACTITIONER

## 2023-04-25 PROCEDURE — 99212 OFFICE O/P EST SF 10 MIN: CPT | Performed by: NURSE PRACTITIONER

## 2023-04-25 NOTE — PATIENT INSTRUCTIONS
Stop Keppra  Call with any further seizure activity  Continue aspirin 81 mg  Continue Repatha  Follow up with General Neurology. They will call with appt.

## 2023-04-25 NOTE — PROGRESS NOTES
New Patient Office Visit      Encounter Date: 2023   Patient Name: Coco Crockett  : 1945   MRN: [id number]   PCP: Lobo Archer MD    Referring Provider: No ref. provider found     Chief Complaint:    Chief Complaint   Patient presents with   • Follow-up       History of Present Illness: Coco Crockett is a 77 y.o. female with known medical diagnoses of     Hospitalization on 2023: Coco Crockett is a 77-year-old  female with a past medical history significant for anemia, anxiety, CAD s/p stent, T2DM, hyperlipidemia, migraine with aura, IBS, hypertension, thyroid disease, vertigo, TMJ, spinal stenosis, and obesity.  She presented to outside hospital this afternoon with complaints of hypertension as well as migraine with aura.  Daughter who is at the bedside reports approximately 1130 last night she had her typical migraine with aura and had continued complaints at 1 AM.  At that time she took half a tramadol.  This morning she was confused and noted to be hypertensive.  Of note her daughter reports that she has not had any significant amount of sleep over the last 24 hours.  She had continued complaints and her daughter took her to the emergency department per her PCP recommendation approximately 1 PM.  Around 4:30 PM while at outside hospital she was given hydralazine for treatment of her elevated blood pressure.  Daughter reports that her blood pressure dropped from 190s systolic to 150s systolic within approximately 30 minutes.  Acutely around 5 PM following blood pressure treatment she had an acute onset of dysarthria, word salad.  CT head completed at outside hospital was negative for any acute infarct or hemorrhage.  CTA and CT perfusion were both also completed and normal.  She was transferred to [facility] for further neurologic work-up and evaluation.  AMS was suspected to be due to hypertensive encephalopathy in the setting of UTI per   Ros.  She was noted to have a focal seizure with impairment of awareness in the setting of UTI.  EEG revealed intermittent slowing in the right temporal lobe with occasional sharp waves in the right temporal lobe consistent with partial seizures.  She was started on Keppra 500 mg twice daily with plans to take for 14 days and then stop.  She was also discharged home on aspirin 81 mg as well as atorvastatin 40 mg.    Clinic visit 4/25/2023: After discharge, patient had continued to take Keppra for longer than recommended.  Her daughter called the clinic last week concerned regarding increased agitation and migraine with aura symptoms.  Discussed with the patient's daughter that Keppra can increase agitation. Instructed to stop Keppra.Patient followed up with Dr. Camacho on 4/21/2023.  She reported intolerance to atorvastatin at that time.  She will start Repatha this week per review of EMR.  She is accompanied to clinic today by her daughter.  She reports that her symptoms have improved since discontinuation of the Keppra.  No neurodeficits are noted on exam.  She denies any further seizure-like activity.  Patient requested to start driving.  Discussed with the patient that by [place] state law she cannot drive for 90 days after seizure activity.  Patient verbalized understanding.  She continues to complain of migraine auras but has not had any breakthrough headaches.  She has an appointment scheduled with Dr. Vazquez in July of this year.  Patient continues with baseline left lower leg numbness and left foot numbness secondary to previous back surgery.  She does walk with a walker or cane as needed.  She is undergoing PT and SLP.  She reports compliance with her aspirin 81 mg.      Stroke Risk Factors: hyperlipidemia and hypertension      Subjective      Review of Systems:   Review of Systems   Constitutional: Positive for fatigue. Negative for chills.   HENT: Negative for congestion and trouble swallowing.     Eyes: Positive for visual disturbance. Negative for photophobia.   Respiratory: Negative for cough and shortness of breath.    Cardiovascular: Negative for chest pain and palpitations.   Gastrointestinal: Negative for constipation, diarrhea and vomiting.   Musculoskeletal: Positive for gait problem. Negative for back pain.   Neurological: Positive for weakness. Negative for speech difficulty, headache and confusion.   Psychiatric/Behavioral: Negative for agitation and depressed mood.       Past Medical History:   Past Medical History:   Diagnosis Date   • Anemia    • Anxiety    • Chest pain    • Coronary artery disease    • Depression    • Diabetes mellitus    • Dyslipidemia    • Eczema    • Fear of awareness under general anesthesia    • Fear of general anesthetic     has hx of awareness during anesthesia induction, felt like she was dying   • GERD (gastroesophageal reflux disease)    • History of COVID-19 02/01/2022   • Hyperlipidemia    • Hypertension     Chronic hypertension, probably essential.    • Hypertensive cardiovascular disease    • Hypothyroidism     Chronic hypothyroidism/replacement therapy.   • IBS (irritable bowel syndrome)    • Iron deficiency anemia    • Migraine     Intermittent progressive headache syndrome/possible migraine equivalent.   • Obesity (BMI 30.0-34.9)     Mild obesity (BMI 31).    • Osteopenia     Osteopenia with fracture of lumbar vertebra, 2004.    • Pneumonia    • TMJ (temporomandibular joint disorder)    • Type 2 diabetes mellitus     Type 2 diabetes mellitus, Hemoglobin A1c 5.9%, December 2014.   • Urgency incontinence    • Urinary, incontinence, stress female    • Vertigo     Intermittent marked dizziness/vertigo with Epley maneuver.       Past Surgical History:   Past Surgical History:   Procedure Laterality Date   • ANKLE OPEN REDUCTION INTERNAL FIXATION Right 1995    Fall on ice   • APPENDECTOMY  1970    Appendectomy with wedge resection of both ovaries, 1970.   •  CATARACT EXTRACTION WITH INTRAOCULAR LENS IMPLANT     • COLONOSCOPY     • LASIK Bilateral    • LUMBAR LAMINECTOMY WITH FUSION N/A 5/9/2022    Procedure: LUMBAR DECOMPRESSON L2-S1 with Fusion and Stabilization with PEDICLE SCREWS at L4-S1;  Surgeon: Humberto Correa MD;  Location: [facility];  Service: Neurosurgery;  Laterality: N/A;   • TIBIAL PLATEAU OPEN REDUCTION INTERNAL FIXATION Right    • TONSILLECTOMY AND ADENOIDECTOMY     • TOTAL LAPAROSCOPIC HYSTERECTOMY SALPINGO OOPHORECTOMY  1985    MARIZA/BSO secondary to severe endometriosis, 1985.        Family History:   Family History   Problem Relation Age of Onset   • Heart disease Father        Social History:   Social History     Socioeconomic History   • Marital status:    Tobacco Use   • Smoking status: Never     Passive exposure: Never   • Smokeless tobacco: Never   Vaping Use   • Vaping Use: Never used   Substance and Sexual Activity   • Alcohol use: Yes     Comment: occasional (1 glass of wine per month at most)   • Drug use: No   • Sexual activity: Defer       Medications:     Current Outpatient Medications:   •  acetaminophen (TYLENOL) 500 MG tablet, Take 1 tablet by mouth 2 (Two) Times a Day As Needed for Mild Pain., Disp: , Rfl:   •  amLODIPine (NORVASC) 5 MG tablet, Take 1 tablet by mouth Every Morning. PRN for SBP >160 despite taking other antihypertensive meds, Disp: , Rfl:   •  aspirin 81 MG EC tablet, Take 1 tablet by mouth Daily., Disp: , Rfl:   •  Breo Ellipta 200-25 MCG/INH inhaler, Daily., Disp: , Rfl:   •  cetirizine (zyrTEC) 10 MG tablet, Take 1 tablet by mouth Every Night., Disp: , Rfl:   •  Cholecalciferol (Vitamin D3) 50 MCG (2000 UT) capsule, Take 1 capsule by mouth Daily., Disp: 30 capsule, Rfl: 11  •  diphenoxylate-atropine (LOMOTIL) 2.5-0.025 MG per tablet, , Disp: , Rfl:   •  DULoxetine (CYMBALTA) 60 MG capsule, Take 1 capsule by mouth Daily., Disp: , Rfl:   •  estradiol (ESTRACE) 0.5 MG tablet, Take 1 tablet by mouth Daily., Disp:  , Rfl:   •  Evolocumab (REPATHA) solution auto-injector SureClick injection, Inject 1 mL under the skin into the appropriate area as directed Every 14 (Fourteen) Days., Disp: 2 mL, Rfl: 6  •  fenofibric acid (TRILIPIX) 135 MG capsule delayed-release delayed release capsule, Take 1 capsule by mouth Daily., Disp: 90 capsule, Rfl: 3  •  fluticasone (FLONASE) 50 MCG/ACT nasal spray, 2 sprays into the nostril(s) as directed by provider Daily., Disp: , Rfl:   •  hydroCHLOROthiazide (MICROZIDE) 12.5 MG capsule, Take 1 capsule by mouth 2 (Two) Times a Day., Disp: 180 capsule, Rfl: 3  •  icosapent ethyl (Vascepa) 1 g capsule capsule, Take 2 g by mouth 2 (Two) Times a Day With Meals., Disp: 120 capsule, Rfl: 11  •  levothyroxine (SYNTHROID, LEVOTHROID) 25 MCG tablet, TAKE 1 TABLET BY MOUTH DAILY., Disp: 90 tablet, Rfl: 0  •  metFORMIN (GLUCOPHAGE) 500 MG tablet, Take 1 tablet by mouth Daily With Breakfast. Ordered as BID, but patient only takes once daily (prescribing MD is aware, per pt), Disp: , Rfl:   •  Multiple Vitamin (MULTI VITAMIN DAILY PO), Take  by mouth daily., Disp: , Rfl:   •  nebivolol (BYSTOLIC) 5 MG tablet, Take 1 tablet by mouth Every Evening., Disp: , Rfl:   •  nitrofurantoin (MACRODANTIN) 50 MG capsule, Take 1 capsule by mouth every night at bedtime., Disp: , Rfl:   •  nitroglycerin (NITROLINGUAL) 0.4 MG/SPRAY spray, Place 1 spray under the tongue Every 5 (Five) Minutes As Needed for Chest Pain., Disp: 4.9 g, Rfl: 3  •  omeprazole (PriLOSEC) 20 MG capsule, Take 1 capsule by mouth Daily., Disp: , Rfl:   •  ondansetron ODT (ZOFRAN-ODT) 4 MG disintegrating tablet, TAKE 1 TABLET BY MOUTH EVERY 6 TO 8 HOURS AS NEEDED FOR NAUSEA AND VOMITING, Disp: , Rfl:   •  Polysacch Fe Cmp-Fe Heme Poly (Feosol Bifera) 28 MG tablet, Take 1 tablet by mouth Daily. (Iron supplement), Disp: , Rfl:   •  traMADol (ULTRAM) 50 MG tablet, Take 1 tablet by mouth Every 8 (Eight) Hours As Needed for Moderate Pain., Disp: 45 tablet, Rfl:  "0  •  vitamin C (ASCORBIC ACID) 500 MG tablet, Take 1 tablet by mouth Daily., Disp: , Rfl:     Allergies:   Allergies   Allergen Reactions   • Ciprofloxacin Anaphylaxis     Tongue swelling   • Keflex [Cephalexin] Itching, Swelling and Angioedema          • Imdur [Isosorbide Dinitrate] Headache     Severe headache/fatigue       • Pseudoephedrine Hcl Other (See Comments)     Heart racing   • Bactrim [Sulfamethoxazole-Trimethoprim] Rash   • Codeine Sulfate Itching and Rash   • Contrast Dye (Echo Or Unknown Ct/Mr) Itching   • Demerol [Meperidine] Itching and Rash   • Epinephrine GI Intolerance   • Levaquin [Levofloxacin] Other (See Comments)     Numbness and ruptured oneida's tendon   • Morphine Sulfate Itching and Rash   • Penicillin V Potassium Itching and Rash   • Sulfa Antibiotics Rash and Other (See Comments)     severe rash/swelling   • Trimethoprim Hives       Objective     Physical Exam:  Vital Signs:   Vitals:    04/25/23 1323   Weight: 72.1 kg (159 lb)   Height: 160 cm (62.99\")     Body mass index is 28.17 kg/m².     Physical Exam  Constitutional:       Appearance: Normal appearance.   HENT:      Head: Normocephalic and atraumatic.   Eyes:      Extraocular Movements: Extraocular movements intact.      Pupils: Pupils are equal, round, and reactive to light.   Cardiovascular:      Rate and Rhythm: Normal rate and regular rhythm.   Pulmonary:      Effort: Pulmonary effort is normal. No respiratory distress.   Abdominal:      General: There is no distension.      Palpations: Abdomen is soft.   Musculoskeletal:         General: Normal range of motion.      Cervical back: Normal range of motion and neck supple.   Skin:     General: Skin is warm and dry.      Capillary Refill: Capillary refill takes less than 2 seconds.   Neurological:      General: No focal deficit present.      Mental Status: She is alert and oriented to person, place, and time. Mental status is at baseline.      Motor: Motor strength is normal. "   Psychiatric:         Mood and Affect: Mood normal.         Speech: Speech normal.         Behavior: Behavior normal.       Neurological Exam  Mental Status  Alert. Oriented to person, place, time and situation. Oriented to person, place, and time. Speech is normal. Language is fluent with no aphasia.    Cranial Nerves  CN II: Right visual acuity: finger movement. Left visual acuity: finger movement.  CN III, IV, VI: Extraocular movements intact bilaterally. Pupils equal round and reactive to light bilaterally.  CN V: Facial sensation is normal.  CN VII: Full and symmetric facial movement.  CN IX, X: Palate elevates symmetrically  CN XI: Shoulder shrug strength is normal.  CN XII: Tongue midline without atrophy or fasciculations.    Motor   Strength is 5/5 throughout all four extremities.    Sensory  Light touch abnormality: Left lower leg.  Baseline for patient.     Reflexes                                            Right                      Left  Plantar                           Downgoing                Downgoing    Coordination  Right: Finger-to-nose normal.Left: Finger-to-nose normal.    Gait    No gait instability noted.         Imaging Reviewed:   EEG    Result Date: 2/16/2023  The combination of focal slowing and sharp waves can be seen in seizure disorders of the partial type Clinical correlation is as always suggested This report is transcribed using the Dragon dictation system.      MRI Brain Without Contrast    Result Date: 2/16/2023  Impression: Mild age-related changes are present. There is no evidence of acute infarct, hemorrhage, mass or mass effect. Electronically Signed: Ramses Paul  2/16/2023 5:13 AM EST  Workstation ID: [id number]    XR Chest 1 View    Result Date: 2/15/2023  1.  Dental implants and spinal fusion hardware. Otherwise, no retained radiopaque foreign body is identified. 2.  Low lung volumes without focal consolidation. 3.  No acute intra-abdominal findings. Electronically signed  by:  Jayna Johnson D.O.  2/15/2023 2:09 AM Mountain Time    XR Abdomen KUB    Result Date: 2/15/2023  1.  Dental implants and spinal fusion hardware. Otherwise, no retained radiopaque foreign body is identified. 2.  Low lung volumes without focal consolidation. 3.  No acute intra-abdominal findings. Electronically signed by:  Jayna Johnson D.O.  2/15/2023 2:09 AM Mountain Time    XR Skull Lateral & Ap    Result Date: 2/15/2023  1.  Dental implants and spinal fusion hardware. Otherwise, no retained radiopaque foreign body is identified. 2.  Low lung volumes without focal consolidation. 3.  No acute intra-abdominal findings. Electronically signed by:  Jayna Johnson D.O.  2/15/2023 2:09 AM Mountain Time      Laboratory Results:   Lipid Panel        2/15/2023    06:31   Lipid Panel   Total Cholesterol 279     Triglycerides 619     HDL Cholesterol 34     VLDL Cholesterol 115     LDL Cholesterol  130     LDL/HDL Ratio 3.56             Assessment / Plan      Assessment/Plan:   Diagnoses and all orders for this visit:    1. History of seizure (Primary)  -History focal seizure in February with impairment of awareness in the setting of UTI.   -EEG revealed intermittent slowing in the right temporal lobe with occasional sharp waves in the right temporal lobe consistent with partial seizures.  -She was started on Keppra 500 mg twice daily with plans to take for 14 days and then stop.   -Daughter called office reporting increased agitation last week.    -Keppra stopped last week with improvement of symptoms.  No further seizure activity noted.  -Referral to general neurology for further management.  Appointment with Dr. Vazquez in July/2023  -No driving until 90 days post seizure activity    2. Other migraine without status migrainosus, not intractable  - Referral to general neurology for further management    3. Hyperlipidemia   -  during previous admission  - Able to tolerate atorvastatin  - Plan to start Repatha this week      Follow Up:   Further follow-up per general neurology.  Patient and daughter instructed to call the office with any questions or concerns in the meantime.    Jessica [facility] Neuro Stroke

## 2023-05-03 RX ORDER — ICOSAPENT ETHYL 1000 MG/1
CAPSULE ORAL
Qty: 360 CAPSULE | Refills: 3 | Status: SHIPPED | OUTPATIENT
Start: 2023-05-03

## 2023-05-03 RX ORDER — FENOFIBRIC ACID 135 MG/1
CAPSULE, DELAYED RELEASE ORAL
Qty: 90 CAPSULE | Refills: 3 | Status: SHIPPED | OUTPATIENT
Start: 2023-05-03

## 2023-05-10 ENCOUNTER — TELEPHONE (OUTPATIENT)
Dept: NEUROSURGERY | Facility: CLINIC | Age: 78
End: 2023-05-10
Payer: MEDICARE

## 2023-05-10 NOTE — TELEPHONE ENCOUNTER
Pt called and wanted to know if she could have an MRI with the hardware in her back.     Pt aware that the hardware is MRI compatible.

## 2023-07-05 PROBLEM — Z00.00 ENCOUNTER FOR GENERAL ADULT MEDICAL EXAMINATION WITHOUT ABNORMAL FINDINGS: Status: ACTIVE | Noted: 2023-01-30

## 2023-07-05 PROBLEM — R47.01 APHASIA: Status: ACTIVE | Noted: 2023-07-05

## 2023-07-05 PROBLEM — Z23 NEED FOR IMMUNIZATION AGAINST INFLUENZA: Status: ACTIVE | Noted: 2021-10-21

## 2023-07-05 PROBLEM — K57.32 DIVERTICULITIS OF COLON: Status: ACTIVE | Noted: 2021-03-24

## 2023-07-05 PROBLEM — A15.9 RESPIRATORY TUBERCULOSIS UNSPECIFIED: Status: ACTIVE | Noted: 2020-10-30

## 2023-07-05 PROBLEM — S01.512A: Status: ACTIVE | Noted: 2023-07-05

## 2023-07-05 PROBLEM — N32.89 BLADDER WALL THICKENING: Status: ACTIVE | Noted: 2023-07-05

## 2023-07-05 PROBLEM — F41.9 ANXIETY: Status: ACTIVE | Noted: 2020-10-30

## 2023-07-05 PROBLEM — M79.643 TENDERNESS OF ANATOMICAL SNUFFBOX: Status: ACTIVE | Noted: 2023-07-05

## 2023-07-05 PROBLEM — R32 URINARY INCONTINENCE: Status: ACTIVE | Noted: 2021-09-17

## 2023-07-05 PROBLEM — R51.9 HEADACHE: Status: ACTIVE | Noted: 2021-03-24

## 2023-07-05 PROBLEM — J32.0 MAXILLARY SINUSITIS: Status: ACTIVE | Noted: 2021-03-24

## 2023-07-05 PROBLEM — E55.9 VITAMIN D DEFICIENCY: Status: ACTIVE | Noted: 2021-03-24

## 2023-07-05 PROBLEM — R39.89 PNEUMATURIA: Status: ACTIVE | Noted: 2023-02-03

## 2023-07-05 PROBLEM — J30.9 ALLERGIC RHINITIS: Status: ACTIVE | Noted: 2020-10-30

## 2023-07-05 PROBLEM — J02.9 SORE THROAT: Status: ACTIVE | Noted: 2021-03-24

## 2023-07-05 PROBLEM — J44.9 COPD (CHRONIC OBSTRUCTIVE PULMONARY DISEASE): Status: ACTIVE | Noted: 2023-07-05

## 2023-07-05 PROBLEM — I00 RHEUMATIC FEVER: Status: ACTIVE | Noted: 2020-10-30

## 2023-07-05 PROBLEM — Z79.890 HORMONE REPLACEMENT THERAPY: Status: ACTIVE | Noted: 2021-05-10

## 2023-07-05 PROBLEM — D64.9 ANEMIA: Status: ACTIVE | Noted: 2022-02-17

## 2023-07-05 PROBLEM — J10.1 INFLUENZA A: Status: ACTIVE | Noted: 2023-07-05

## 2023-07-05 PROBLEM — Z87.19 HISTORY OF GASTROESOPHAGEAL REFLUX (GERD): Status: ACTIVE | Noted: 2020-10-30

## 2023-07-05 PROBLEM — R29.6 FALLING: Status: ACTIVE | Noted: 2021-03-24

## 2023-07-05 PROBLEM — R94.4 RENAL FUNCTION TEST ABNORMAL: Status: ACTIVE | Noted: 2021-03-24

## 2023-07-05 PROBLEM — K58.9 IRRITABLE BOWEL SYNDROME: Status: ACTIVE | Noted: 2020-10-30

## 2023-07-05 PROBLEM — D12.6 TUBULAR ADENOMA OF COLON: Status: ACTIVE | Noted: 2020-10-30

## 2023-07-05 PROBLEM — M81.0 OSTEOPOROSIS: Status: ACTIVE | Noted: 2021-03-24

## 2023-07-05 PROBLEM — R11.2 NAUSEA & VOMITING: Status: ACTIVE | Noted: 2023-07-05

## 2023-07-05 PROBLEM — M54.50 LOW BACK PAIN: Status: ACTIVE | Noted: 2021-10-21

## 2023-07-05 PROBLEM — B37.31 CANDIDAL VULVOVAGINITIS: Status: ACTIVE | Noted: 2023-02-03

## 2023-08-23 RX ORDER — ICOSAPENT ETHYL 1000 MG/1
2 CAPSULE ORAL 2 TIMES DAILY WITH MEALS
Qty: 120 CAPSULE | Refills: 11 | Status: SHIPPED | OUTPATIENT
Start: 2023-08-23

## 2023-08-29 NOTE — TELEPHONE ENCOUNTER
"Cost of Vascepa/generic is $181/90 day supply - Insurance will not allow a 30 day fill only. She is asking to fill as 30 day supply due to fixed income/cost -  wishes to try alternate medication if unable to get 30 day supply only    MED D medication coverage    ID: [id number]  BIN: [id number]  PCN: [id number]  GRP: [id number]  Ph: [phone] -      Spoke with Valentina at [facility] - 90 day supply is pt's plan \"approved quantity\". Not able to prior auth or override from provider standpoint. Will have pt contact insurance company.  pt to call insurance - [phone] -    On Repatha  On fenofibrate and Vascepa for elevated triglycerides (previously > 4,000)  CAD and recent stroke     Tried and failed - rosuvastatin 10, tricor 145, atorvastatin    LM for pt to contact insurance company regarding quanitity adjustment. Samples can be sent to [place] 9/12 to help offset cost.    "

## 2023-08-30 ENCOUNTER — TELEPHONE (OUTPATIENT)
Dept: CARDIOLOGY | Facility: CLINIC | Age: 78
End: 2023-08-30
Payer: MEDICARE

## 2023-08-30 NOTE — TELEPHONE ENCOUNTER
"  Caller: Coco Crockett \"RAE\"    Relationship: Self    Best call back number: [phone]    What is the best time to reach you: ANY    What was the call regarding: PATIENT WANTS TO  SAMPLES OF VASCEPA ON 9.12.2023 IN [place]. CALL IF NECESSARY    Is it okay if the provider responds through MyChart: NO        "

## 2023-11-22 RX ORDER — EVOLOCUMAB 140 MG/ML
INJECTION, SOLUTION SUBCUTANEOUS
Qty: 2 ML | Refills: 11 | Status: SHIPPED | OUTPATIENT
Start: 2023-11-22

## 2023-12-04 ENCOUNTER — TELEPHONE (OUTPATIENT)
Dept: NEUROLOGY | Facility: CLINIC | Age: 78
End: 2023-12-04

## 2023-12-05 ENCOUNTER — OFFICE VISIT (OUTPATIENT)
Dept: NEUROLOGY | Facility: CLINIC | Age: 78
End: 2023-12-05
Payer: MEDICARE

## 2023-12-05 VITALS
SYSTOLIC BLOOD PRESSURE: 118 MMHG | HEART RATE: 70 BPM | OXYGEN SATURATION: 96 % | WEIGHT: 171 LBS | HEIGHT: 63 IN | BODY MASS INDEX: 30.3 KG/M2 | DIASTOLIC BLOOD PRESSURE: 76 MMHG

## 2023-12-05 DIAGNOSIS — G43.109 MIGRAINE WITH AURA AND WITHOUT STATUS MIGRAINOSUS, NOT INTRACTABLE: ICD-10-CM

## 2023-12-05 DIAGNOSIS — R56.9 FOCAL SEIZURE: Primary | ICD-10-CM

## 2023-12-05 PROCEDURE — 3078F DIAST BP <80 MM HG: CPT | Performed by: PSYCHIATRY & NEUROLOGY

## 2023-12-05 PROCEDURE — 99214 OFFICE O/P EST MOD 30 MIN: CPT | Performed by: PSYCHIATRY & NEUROLOGY

## 2023-12-05 PROCEDURE — 1160F RVW MEDS BY RX/DR IN RCRD: CPT | Performed by: PSYCHIATRY & NEUROLOGY

## 2023-12-05 PROCEDURE — 3074F SYST BP LT 130 MM HG: CPT | Performed by: PSYCHIATRY & NEUROLOGY

## 2023-12-05 PROCEDURE — 1159F MED LIST DOCD IN RCRD: CPT | Performed by: PSYCHIATRY & NEUROLOGY

## 2023-12-05 RX ORDER — ICOSAPENT ETHYL 500 MG/1
2 CAPSULE ORAL 2 TIMES DAILY
COMMUNITY

## 2023-12-05 RX ORDER — NORTRIPTYLINE HYDROCHLORIDE 10 MG/1
CAPSULE ORAL
Qty: 60 CAPSULE | Refills: 5 | Status: SHIPPED | OUTPATIENT
Start: 2023-12-05

## 2023-12-05 NOTE — PROGRESS NOTES
Subjective:    CC: Coco Crockett is seen today for FOCAL SEIZURE and headaches       Current visit-patient denies having any episodes of zoning out or shaking since her last visit however she did have an episode of confusion on Thanksgiving when she was making cranberry salad (something that she has done several times before) and did not know how to finish making it.  She states this was preceded by feeling anxious.  She attributes this to multitasking as she also gets a severe headache when she has to multitask.  Denies having any of her typical left-sided migraine headaches with visual auras but has had daily headaches on both sides of her head ranging between 5-7/10 in severity for which she takes Tylenol every day.  Snores occasionally.  Also wakes up frequently at night to to go to the bathroom and does not get enough rest.  She has also been using Flonase every day.  Her blood pressure has been staying within normal limits.  Has had recurrent UTIs for which she is once again on antibiotics.    Initial cpunt-25-ib[age]-old female with a past medical history of hypertension, hyperlipidemia, hypothyroidism, anxiety, depression, anemia, CAD status post stent, arthritis, diabetes mellitus type 2, migraine headaches presents with a seizures/headaches.  Patient states she has been having migraine headaches for several years but after she had a hysterectomy few years ago her headache frequency improved.  She only has about 1 headache a year now that is preceded by visual disturbances like seeing flickering lights.  At times she only has her visual aura especially when she has a UTI without having a headache.  This last for a few minutes.  She had one of her typical migraines with aura in February for which she took half a dose of tramadol.  The next morning she became very confused.  Her daughter took her to an outside hospital where she had a CT head, CT perfusion and CT angiogram which was unremarkable.  Her blood  pressure was extremely high and she was given hydralazine which dropped her blood pressure from 190-1 50 within 30 minutes.  Soon after that she started to have speech changes where she was talking gibberish.  Was subsequently moved to to [facility] where was found to have a UTI she had a MRI brain scan that showed chronic minimal ischemic changes but no acute intracranial abnormalities as well as an EEG that showed right temporal slowing/sharp waves.  Also had an echocardiogram that showed a normal EF with mild left atrial dilatation, grade 1 diastolic dysfunction but no PFO.  She was started on Keppra 500 mg twice daily in addition to aspirin 81 mg daily and Lipitor which was subsequently changed to Repatha as she was unable to tolerate it.  Also takes Vascepa and Trilipix.  Her last lipid panel was as follows-, , , HDL 34, A1c was 5.3, hemoglobin of 8.8, potassium of 3.2 and B12 of 404.  She took the Keppra for about 2 months but then stopped due to mood side effects.  Denies having any more seizure-like episodes with confusion, speech changes or shaking since then.  In fact she currently has a UTI for which she is on doxycycline.  Prior to that she was on Macrobid 50 mg daily for prophylaxis.  She states since her hospitalization she has also been having some word finding difficulties.  Lives at home with her daughter who is currently traveling.  Is able to carry out all of her ADLs including driving.  Of note-I personally reviewed her MRI brain and her hospital notes    The following portions of the patient's history were reviewed today and updated as of 07/13/2023  : allergies, current medications, past family history, past medical history, past social history, past surgical history, and problem list  These document will be scanned to patient's chart.      Current Outpatient Medications:     acetaminophen (TYLENOL) 500 MG tablet, Take 1 tablet by mouth 2 (Two) Times a Day As Needed for  Mild Pain., Disp: , Rfl:     amLODIPine (NORVASC) 5 MG tablet, Take 1 tablet by mouth Every Morning. PRN for SBP >160 despite taking other antihypertensive meds, Disp: , Rfl:     aspirin 81 MG EC tablet, Take 1 tablet by mouth Daily., Disp: , Rfl:     Breo Ellipta 200-25 MCG/INH inhaler, Daily., Disp: , Rfl:     cetirizine (zyrTEC) 10 MG tablet, Take 1 tablet by mouth Every Night., Disp: , Rfl:     Cholecalciferol (Vitamin D3) 50 MCG (2000 UT) capsule, Take 1 capsule by mouth Daily., Disp: 30 capsule, Rfl: 11    diphenoxylate-atropine (LOMOTIL) 2.5-0.025 MG per tablet, , Disp: , Rfl:     doxycycline (VIBRAMYICN) 100 MG tablet, , Disp: , Rfl:     DULoxetine (CYMBALTA) 60 MG capsule, Take 1 capsule by mouth Daily., Disp: , Rfl:     estradiol (ESTRACE) 0.5 MG tablet, Take 1 tablet by mouth Daily., Disp: , Rfl:     Evolocumab (Repatha SureClick) solution auto-injector SureClick injection, INJECT 1 ML UNDER THE SKIN INTO THE APPROPRIATE AREA AS DIRECTED EVERY 14 (FOURTEEN) DAYS., Disp: 2 mL, Rfl: 11    fenofibric acid (TRILIPIX) 135 MG capsule delayed-release delayed release capsule, TAKE 1 CAPSULE BY MOUTH EVERY DAY, Disp: 90 capsule, Rfl: 3    fluticasone (FLONASE) 50 MCG/ACT nasal spray, 2 sprays into the nostril(s) as directed by provider Daily., Disp: , Rfl:     hydroCHLOROthiazide (MICROZIDE) 12.5 MG capsule, Take 1 capsule by mouth 2 (Two) Times a Day., Disp: 180 capsule, Rfl: 3    levothyroxine (SYNTHROID, LEVOTHROID) 25 MCG tablet, TAKE 1 TABLET BY MOUTH DAILY., Disp: 90 tablet, Rfl: 0    metFORMIN (GLUCOPHAGE) 500 MG tablet, Take 1 tablet by mouth Daily With Breakfast. Ordered as BID, but patient only takes once daily (prescribing MD is aware, per pt), Disp: , Rfl:     Multiple Vitamin (MULTI VITAMIN DAILY PO), Take  by mouth daily., Disp: , Rfl:     nebivolol (BYSTOLIC) 5 MG tablet, Take 1 tablet by mouth Every Evening., Disp: , Rfl:     nitrofurantoin (MACRODANTIN) 50 MG capsule, Take 1 capsule by mouth every  night at bedtime., Disp: , Rfl:     nitroglycerin (NITROLINGUAL) 0.4 MG/SPRAY spray, Place 1 spray under the tongue Every 5 (Five) Minutes As Needed for Chest Pain., Disp: 4.9 g, Rfl: 3    omeprazole (PriLOSEC) 20 MG capsule, Take 1 capsule by mouth Daily., Disp: , Rfl:     ondansetron ODT (ZOFRAN-ODT) 4 MG disintegrating tablet, TAKE 1 TABLET BY MOUTH EVERY 6 TO 8 HOURS AS NEEDED FOR NAUSEA AND VOMITING, Disp: , Rfl:     Phenazopyridine HCl (PYRIDIUM PO), Take  by mouth., Disp: , Rfl:     Polysacch Fe Cmp-Fe Heme Poly (Feosol Bifera) 28 MG tablet, Take 1 tablet by mouth Daily. (Iron supplement), Disp: , Rfl:     traMADol (ULTRAM) 50 MG tablet, Take 1 tablet by mouth Every 8 (Eight) Hours As Needed for Moderate Pain., Disp: 45 tablet, Rfl: 0    Vascepa 1 g capsule capsule, Take 2 g by mouth 2 (Two) Times a Day With Meals., Disp: 120 capsule, Rfl: 6    vitamin C (ASCORBIC ACID) 500 MG tablet, Take 1 tablet by mouth Daily., Disp: , Rfl:     Icosapent Ethyl 0.5 g capsule, Take 2 capsules by mouth 2 (Two) Times a Day., Disp: , Rfl:     nortriptyline (Pamelor) 10 MG capsule, Take 1 capsule at night for 2 weeks but if no improvement in headaches/sleep increase to 2 capsules at night thereafter, Disp: 60 capsule, Rfl: 5   Past Medical History:   Diagnosis Date    Anemia     Anxiety     Chest pain     Coronary artery disease     Depression     Diabetes mellitus     Dyslipidemia     Eczema     Fear of awareness under general anesthesia     Fear of general anesthetic     has hx of awareness during anesthesia induction, felt like she was dying    GERD (gastroesophageal reflux disease)     History of COVID-19 02/01/2022    Hyperlipidemia     Hypertension     Chronic hypertension, probably essential.     Hypertensive cardiovascular disease     Hypothyroidism     Chronic hypothyroidism/replacement therapy.    IBS (irritable bowel syndrome)     Iron deficiency anemia     Migraine     Intermittent progressive headache  "syndrome/possible migraine equivalent.    Nausea & vomiting 07/05/2023    Obesity (BMI 30.0-34.9)     Mild obesity (BMI 31).     Osteopenia     Osteopenia with fracture of lumbar vertebra, 2004.     Pneumonia     TMJ (temporomandibular joint disorder)     Type 2 diabetes mellitus     Type 2 diabetes mellitus, Hemoglobin A1c 5.9%, December 2014.    Urgency incontinence     Urinary, incontinence, stress female     Vertigo     Intermittent marked dizziness/vertigo with Epley maneuver.      Past Surgical History:   Procedure Laterality Date    ANKLE OPEN REDUCTION INTERNAL FIXATION Right 1995    Fall on ice    APPENDECTOMY  1970    Appendectomy with wedge resection of both ovaries, 1970.    CATARACT EXTRACTION WITH INTRAOCULAR LENS IMPLANT      COLONOSCOPY      LASIK Bilateral     LUMBAR LAMINECTOMY WITH FUSION N/A 5/9/2022    Procedure: LUMBAR DECOMPRESSON L2-S1 with Fusion and Stabilization with PEDICLE SCREWS at L4-S1;  Surgeon: Humberto Correa MD;  Location: [facility];  Service: Neurosurgery;  Laterality: N/A;    TIBIAL PLATEAU OPEN REDUCTION INTERNAL FIXATION Right     TONSILLECTOMY AND ADENOIDECTOMY      TOTAL LAPAROSCOPIC HYSTERECTOMY SALPINGO OOPHORECTOMY  1985    MARIZA/BSO secondary to severe endometriosis, 1985.       Family History   Problem Relation Age of Onset    Heart disease Father       Social History     Socioeconomic History    Marital status:    Tobacco Use    Smoking status: Never     Passive exposure: Never    Smokeless tobacco: Never   Vaping Use    Vaping Use: Never used   Substance and Sexual Activity    Alcohol use: Yes     Comment: occasional (1 glass of wine per month at most)    Drug use: No    Sexual activity: Defer     Review of Systems   Neurological:  Positive for headache and confusion.   All other systems reviewed and are negative.      Objective:    /76   Pulse 70   Ht 160 cm (63\")   Wt 77.6 kg (171 lb)   SpO2 96%   BMI 30.29 kg/m²     Neurology Exam:    General " apperance: NAD.     Mental status: Alert, awake and oriented to time place and person.  Could tell me the month, year, her full address and daughter's date of birth    Recent and Remote memory: Intact.    Attention span and Concentration: Normal.     Language and Speech: Intact- No dysarthria.    Fluency, Naming , Repitition and Comprehension:  Intact    Cranial Nerves:   CN II: Visual fields are full. Intact. Fundi - Normal, No papillederma, Pupils - BJORN  CN III, IV and VI: Extraocular movements are intact. Normal saccades.   CN V: Facial sensation is intact.   CN VII: Muscles of facial expression reveal no asymmetry. Intact.   CN VIII: Hearing is intact. Whispered voice intact.   CN IX and X: Palate elevates symmetrically. Intact  CN XI: Shoulder shrug is intact.   CN XII: Tongue is midline without evidence of atrophy or fasciculation.     Ophthalmoscopic exam of optic disc-normal    Motor:  Right UE muscle strength 5/5. Normal tone.     Left UE muscle strength 5/5. Normal tone.      Right LE muscle strength5/5. Normal tone.     Left LE muscle strength 5/5. Normal tone.      Sensory: Normal light touch, vibration and pinprick sensation bilaterally.    DTRs: 2+ bilaterally in upper and lower extremities.    Babinski: Negative bilaterally.    Co-ordination: Normal finger-to-nose, heel to shin B/L.    Rhomberg: Negative.    Gait: Normal.    Cardiovascular: Regular rate and rhythm without murmur, gallop or rub.    Assessment and Plan:  1. Focal seizure  Patient's episode of confusion that she had in February could have been a focal seizure triggered by UTI in addition to hypertensive encephalopathy.  Previously tried Keppra that caused mood side effects therefore she stopped it  EEG at that time showed some right temporal epileptiform activity  The episode of confusion that she had could have been a focal seizure.  I discussed starting her on seizure medications however she wants to wait to see how she does on  nortriptyline first  Counseled on seizure precautions.  Is back on antibiotics for UTI    2. Other migraine without status migrainosus, not intractable  She is currently having daily tension type and possibly rebound headaches.  I have told her to stop using Tylenol  I will start her on nortriptyline for headache prophylaxis/sleep gradually increasing to 20 mg nightly  For abortive treatment of her headaches I have given her samples of Ubrelvy to try.  Triptans should not be tried due to her history of CAD       Return in about 3 months (around 3/5/2024).     I spent over 25 minutes with the patient face to face out of which over 50% (20 minutes) was spent in management, instructions and education.     Noemi Vazquez MD

## 2024-05-15 ENCOUNTER — OFFICE VISIT (OUTPATIENT)
Dept: NEUROLOGY | Facility: CLINIC | Age: 79
End: 2024-05-15
Payer: MEDICARE

## 2024-05-15 VITALS
SYSTOLIC BLOOD PRESSURE: 136 MMHG | HEIGHT: 63 IN | DIASTOLIC BLOOD PRESSURE: 82 MMHG | WEIGHT: 171 LBS | OXYGEN SATURATION: 95 % | BODY MASS INDEX: 30.3 KG/M2 | HEART RATE: 81 BPM

## 2024-05-15 DIAGNOSIS — G43.109 MIGRAINE WITH AURA AND WITHOUT STATUS MIGRAINOSUS, NOT INTRACTABLE: Primary | ICD-10-CM

## 2024-05-15 DIAGNOSIS — R56.9 FOCAL SEIZURE: ICD-10-CM

## 2024-05-15 PROCEDURE — 1160F RVW MEDS BY RX/DR IN RCRD: CPT | Performed by: PSYCHIATRY & NEUROLOGY

## 2024-05-15 PROCEDURE — 3075F SYST BP GE 130 - 139MM HG: CPT | Performed by: PSYCHIATRY & NEUROLOGY

## 2024-05-15 PROCEDURE — 99213 OFFICE O/P EST LOW 20 MIN: CPT | Performed by: PSYCHIATRY & NEUROLOGY

## 2024-05-15 PROCEDURE — 3079F DIAST BP 80-89 MM HG: CPT | Performed by: PSYCHIATRY & NEUROLOGY

## 2024-05-15 PROCEDURE — 1159F MED LIST DOCD IN RCRD: CPT | Performed by: PSYCHIATRY & NEUROLOGY

## 2024-05-15 RX ORDER — MULTIVITAMIN/IRON/FOLIC ACID 18MG-0.4MG
TABLET ORAL DAILY
COMMUNITY

## 2024-05-15 RX ORDER — NORTRIPTYLINE HYDROCHLORIDE 10 MG/1
10 CAPSULE ORAL NIGHTLY
Qty: 90 CAPSULE | Refills: 2 | Status: SHIPPED | OUTPATIENT
Start: 2024-05-15

## 2024-05-15 RX ORDER — METHENAMINE HIPPURATE 1000 MG/1
1 TABLET ORAL
COMMUNITY
Start: 2024-04-16

## 2024-05-15 RX ORDER — ESTRADIOL 0.1 MG/G
1 CREAM VAGINAL
COMMUNITY

## 2024-05-15 RX ORDER — DIPHENHYDRAMINE HCL 25 MG
12.5 CAPSULE ORAL
COMMUNITY

## 2024-05-15 NOTE — PROGRESS NOTES
Subjective:    CC: Coco Crockett is seen today for FOCAL SEIZURE and headaches       Current visit-patient states she has been feeling and doing well in terms of her headaches.  Only had 1 headache last month for which she took her Ubrelvy sample.  She attributes this to starting nortriptyline 10 mg nightly and also starting nitrofurantoin prophylactically for her frequent UTIs which makes her feel at ease.  Has been going to  and is getting further testing to determine if she has a fistula that could be causing these UTIs.  She denies having any episodes of confusion like the one she had around [place].  Occasionally does have word finding difficulty specially when she is overstimulated for example while playing cards with her friends.    Last visit-patient denies having any episodes of zoning out or shaking since her last visit however she did have an episode of confusion on Thanksgiving when she was making cranberry salad (something that she has done several times before) and did not know how to finish making it.  She states this was preceded by feeling anxious.  She attributes this to multitasking as she also gets a severe headache when she has to multitask.  Denies having any of her typical left-sided migraine headaches with visual auras but has had daily headaches on both sides of her head ranging between 5-7/10 in severity for which she takes Tylenol every day.  Snores occasionally.  Also wakes up frequently at night to to go to the bathroom and does not get enough rest.  She has also been using Flonase every day.  Her blood pressure has been staying within normal limits.  Has had recurrent UTIs for which she is once again on antibiotics.    Initial [id number]-old female with a past medical history of hypertension, hyperlipidemia, hypothyroidism, anxiety, depression, anemia, CAD status post stent, arthritis, diabetes mellitus type 2, migraine headaches presents with a seizures/headaches.   Patient states she has been having migraine headaches for several years but after she had a hysterectomy few years ago her headache frequency improved.  She only has about 1 headache a year now that is preceded by visual disturbances like seeing flickering lights.  At times she only has her visual aura especially when she has a UTI without having a headache.  This last for a few minutes.  She had one of her typical migraines with aura in February for which she took half a dose of tramadol.  The next morning she became very confused.  Her daughter took her to an outside hospital where she had a CT head, CT perfusion and CT angiogram which was unremarkable.  Her blood pressure was extremely high and she was given hydralazine which dropped her blood pressure from 190-1 50 within 30 minutes.  Soon after that she started to have speech changes where she was talking gibberish.  Was subsequently moved to to [facility] where was found to have a UTI she had a MRI brain scan that showed chronic minimal ischemic changes but no acute intracranial abnormalities as well as an EEG that showed right temporal slowing/sharp waves.  Also had an echocardiogram that showed a normal EF with mild left atrial dilatation, grade 1 diastolic dysfunction but no PFO.  She was started on Keppra 500 mg twice daily in addition to aspirin 81 mg daily and Lipitor which was subsequently changed to Repatha as she was unable to tolerate it.  Also takes Vascepa and Trilipix.  Her last lipid panel was as follows-, , , HDL 34, A1c was 5.3, hemoglobin of 8.8, potassium of 3.2 and B12 of 404.  She took the Keppra for about 2 months but then stopped due to mood side effects.  Denies having any more seizure-like episodes with confusion, speech changes or shaking since then.  In fact she currently has a UTI for which she is on doxycycline.  Prior to that she was on Macrobid 50 mg daily for prophylaxis.  She states since her  hospitalization she has also been having some word finding difficulties.  Lives at home with her daughter who is currently traveling.  Is able to carry out all of her ADLs including driving.  Of note-I personally reviewed her MRI brain and her hospital notes    The following portions of the patient's history were reviewed today and updated as of 07/13/2023  : allergies, current medications, past family history, past medical history, past social history, past surgical history, and problem list  These document will be scanned to patient's chart.      Current Outpatient Medications:     methenamine (HIPREX) 1 g tablet, Take 1 tablet by mouth., Disp: , Rfl:     nortriptyline (Pamelor) 10 MG capsule, Take 1 capsule by mouth Every Night. Take 1 capsule at night for 2 weeks but if no improvement in headaches/sleep increase to 2 capsules at night thereafter, Disp: 90 capsule, Rfl: 2    acetaminophen (TYLENOL) 500 MG tablet, Take 1 tablet by mouth 2 (Two) Times a Day As Needed for Mild Pain., Disp: , Rfl:     amLODIPine (NORVASC) 5 MG tablet, Take 1 tablet by mouth Every Morning. PRN for SBP >160 despite taking other antihypertensive meds, Disp: , Rfl:     aspirin 81 MG EC tablet, Take 1 tablet by mouth Daily., Disp: , Rfl:     Breo Ellipta 200-25 MCG/INH inhaler, Daily., Disp: , Rfl:     cetirizine (zyrTEC) 10 MG tablet, Take 1 tablet by mouth Every Night., Disp: , Rfl:     D-MANNOSE PO, Take  by mouth., Disp: , Rfl:     diphenhydrAMINE (BENADRYL) 25 mg capsule, Take 12.5 mg by mouth., Disp: , Rfl:     diphenoxylate-atropine (LOMOTIL) 2.5-0.025 MG per tablet, , Disp: , Rfl:     doxycycline (VIBRAMYICN) 100 MG tablet, , Disp: , Rfl:     DULoxetine (CYMBALTA) 60 MG capsule, Take 1 capsule by mouth Daily., Disp: , Rfl:     estradiol (ESTRACE) 0.1 MG/GM vaginal cream, Insert 1 g into the vagina., Disp: , Rfl:     Evolocumab (Repatha SureClick) solution auto-injector SureClick injection, INJECT 1 ML UNDER THE SKIN INTO THE  APPROPRIATE AREA AS DIRECTED EVERY 14 (FOURTEEN) DAYS., Disp: 2 mL, Rfl: 11    fenofibric acid (TRILIPIX) 135 MG capsule delayed-release delayed release capsule, TAKE 1 CAPSULE BY MOUTH EVERY DAY, Disp: 90 capsule, Rfl: 3    fluticasone (FLONASE) 50 MCG/ACT nasal spray, 2 sprays into the nostril(s) as directed by provider Daily., Disp: , Rfl:     hydroCHLOROthiazide (MICROZIDE) 12.5 MG capsule, Take 1 capsule by mouth 2 (Two) Times a Day., Disp: 180 capsule, Rfl: 3    Icosapent Ethyl 0.5 g capsule, Take 2 capsules by mouth 2 (Two) Times a Day., Disp: , Rfl:     levothyroxine (SYNTHROID, LEVOTHROID) 25 MCG tablet, TAKE 1 TABLET BY MOUTH DAILY., Disp: 90 tablet, Rfl: 0    metFORMIN (GLUCOPHAGE) 500 MG tablet, Take 1 tablet by mouth Daily With Breakfast. Ordered as BID, but patient only takes once daily (prescribing MD is aware, per pt), Disp: , Rfl:     Multiple Vitamin (MULTI VITAMIN DAILY PO), Take  by mouth daily., Disp: , Rfl:     multivitamin with minerals (CENTROVITE) tablet tablet, Take  by mouth Daily., Disp: , Rfl:     nebivolol (BYSTOLIC) 5 MG tablet, Take 1 tablet by mouth Every Evening., Disp: , Rfl:     nitrofurantoin (MACRODANTIN) 50 MG capsule, Take 1 capsule by mouth every night at bedtime., Disp: , Rfl:     nitroglycerin (NITROLINGUAL) 0.4 MG/SPRAY spray, Place 1 spray under the tongue Every 5 (Five) Minutes As Needed for Chest Pain., Disp: 4.9 g, Rfl: 3    omeprazole (PriLOSEC) 20 MG capsule, Take 1 capsule by mouth Daily., Disp: , Rfl:     ondansetron ODT (ZOFRAN-ODT) 4 MG disintegrating tablet, TAKE 1 TABLET BY MOUTH EVERY 6 TO 8 HOURS AS NEEDED FOR NAUSEA AND VOMITING, Disp: , Rfl:     Phenazopyridine HCl (PYRIDIUM PO), Take  by mouth., Disp: , Rfl:     Polysacch Fe Cmp-Fe Heme Poly (Feosol Bifera) 28 MG tablet, Take 1 tablet by mouth Daily. (Iron supplement), Disp: , Rfl:     traMADol (ULTRAM) 50 MG tablet, Take 1 tablet by mouth Every 8 (Eight) Hours As Needed for Moderate Pain., Disp: 45  tablet, Rfl: 0    Vascepa 1 g capsule capsule, Take 2 g by mouth 2 (Two) Times a Day With Meals., Disp: 120 capsule, Rfl: 6    vitamin C (ASCORBIC ACID) 500 MG tablet, Take 1 tablet by mouth Daily., Disp: , Rfl:    Past Medical History:   Diagnosis Date    Anemia     Anxiety     Chest pain     Coronary artery disease     Depression     Diabetes mellitus     Dyslipidemia     Eczema     Fear of awareness under general anesthesia     Fear of general anesthetic     has hx of awareness during anesthesia induction, felt like she was dying    GERD (gastroesophageal reflux disease)     History of COVID-19 02/01/2022    Hyperlipidemia     Hypertension     Chronic hypertension, probably essential.     Hypertensive cardiovascular disease     Hypothyroidism     Chronic hypothyroidism/replacement therapy.    IBS (irritable bowel syndrome)     Iron deficiency anemia     Migraine     Intermittent progressive headache syndrome/possible migraine equivalent.    Nausea & vomiting 07/05/2023    Obesity (BMI 30.0-34.9)     Mild obesity (BMI 31).     Osteopenia     Osteopenia with fracture of lumbar vertebra, 2004.     Pneumonia     TMJ (temporomandibular joint disorder)     Type 2 diabetes mellitus     Type 2 diabetes mellitus, Hemoglobin A1c 5.9%, December 2014.    Urgency incontinence     Urinary, incontinence, stress female     Vertigo     Intermittent marked dizziness/vertigo with Epley maneuver.      Past Surgical History:   Procedure Laterality Date    ANKLE OPEN REDUCTION INTERNAL FIXATION Right 1995    Fall on ice    APPENDECTOMY  1970    Appendectomy with wedge resection of both ovaries, 1970.    CATARACT EXTRACTION WITH INTRAOCULAR LENS IMPLANT      COLONOSCOPY      LASIK Bilateral     LUMBAR LAMINECTOMY WITH FUSION N/A 5/9/2022    Procedure: LUMBAR DECOMPRESSON L2-S1 with Fusion and Stabilization with PEDICLE SCREWS at L4-S1;  Surgeon: Humberto Correa MD;  [facility];  Service: Neurosurgery;  Laterality: N/A;     "TIBIAL PLATEAU OPEN REDUCTION INTERNAL FIXATION Right     TONSILLECTOMY AND ADENOIDECTOMY      TOTAL LAPAROSCOPIC HYSTERECTOMY SALPINGO OOPHORECTOMY  1985    MARIZA/BSO secondary to severe endometriosis, 1985.       Family History   Problem Relation Age of Onset    Heart disease Father       Social History     Socioeconomic History    Marital status:    Tobacco Use    Smoking status: Never     Passive exposure: Never    Smokeless tobacco: Never   Vaping Use    Vaping status: Never Used   Substance and Sexual Activity    Alcohol use: Yes     Comment: occasional (1 glass of wine per month at most)    Drug use: No    Sexual activity: Defer     Review of Systems   Neurological:  Positive for headache and confusion.   All other systems reviewed and are negative.      Objective:    /82   Pulse 81   Ht 160 cm (63\")   Wt 77.6 kg (171 lb)   SpO2 95%   BMI 30.29 kg/m²     Neurology Exam:    General apperance: NAD.     Mental status: Alert, awake and oriented to time place and person.  Could tell me the month, year, her full address and daughter's date of birth    Recent and Remote memory: Intact.  Delayed recall of 3/3    Attention span and Concentration: Normal.     Language and Speech: Intact- No dysarthria.    Fluency, Naming , Repitition and Comprehension:  Intact    Cranial Nerves:   CN II: Visual fields are full. Intact. Fundi - Normal, No papillederma, Pupils - BJORN  CN III, IV and VI: Extraocular movements are intact. Normal saccades.   CN V: Facial sensation is intact.   CN VII: Muscles of facial expression reveal no asymmetry. Intact.   CN VIII: Hearing is intact. Whispered voice intact.   CN IX and X: Palate elevates symmetrically. Intact  CN XI: Shoulder shrug is intact.   CN XII: Tongue is midline without evidence of atrophy or fasciculation.     Ophthalmoscopic exam of optic disc-normal    Motor:  Right UE muscle strength 5/5. Normal tone.     Left UE muscle strength 5/5. Normal tone.      Right " LE muscle strength5/5. Normal tone.     Left LE muscle strength 5/5. Normal tone.      Sensory: Normal light touch, vibration and pinprick sensation bilaterally.    DTRs: 2+ bilaterally in upper and lower extremities.    Babinski: Negative bilaterally.    Co-ordination: Normal finger-to-nose, heel to shin B/L.    Rhomberg: Negative.    Gait: Normal.    Cardiovascular: Regular rate and rhythm without murmur, gallop or rub.    Assessment and Plan:  1. Focal seizure  Patient's episode of confusion previously could have been a focal seizure triggered by UTI in addition to hypertensive encephalopathy.  EEG at that time showed right temporal epileptiform activity  Was on Keppra but stopped it due to mood side effects  EEG at that time showed some right temporal epileptiform activity  After her last episode around Thanksgiving I had discussed starting her on a seizure medication however she did not want to do so.  Denies any more spells  Counseled on seizure precautions.  Is back on antibiotics for UTI    2. Other migraine without status migrainosus, not intractable  I have asked her to continue nortriptyline 10 mg nightly  For abortive treatment of her headaches I have given her samples of Ubrelvy to try.  Triptans should not be tried due to her history of CAD       Return in about 6 months (around 11/15/2024).       Noemi Vazquez MD

## 2024-05-22 NOTE — TELEPHONE ENCOUNTER
Rx Refill Note  Requested Prescriptions     Pending Prescriptions Disp Refills   • levETIRAcetam (KEPPRA) 500 MG tablet 16 tablet 0     Sig: Take 1 tablet by mouth 2 (Two) Times a Day for 8 days.      Last office visit with prescribing clinician: Visit date not found   Last telemedicine visit with prescribing clinician: 4/27/2023   Next office visit with prescribing clinician: Visit date not found   Dana Espinoza MA  03/02/23, 12:02 EST     Per provider: filling until patient can be seen in office.    no

## 2024-05-31 ENCOUNTER — OFFICE VISIT (OUTPATIENT)
Dept: CARDIOLOGY | Facility: CLINIC | Age: 79
End: 2024-05-31
Payer: MEDICARE

## 2024-05-31 VITALS
WEIGHT: 174 LBS | HEART RATE: 71 BPM | BODY MASS INDEX: 30.83 KG/M2 | HEIGHT: 63 IN | SYSTOLIC BLOOD PRESSURE: 152 MMHG | OXYGEN SATURATION: 97 % | DIASTOLIC BLOOD PRESSURE: 78 MMHG

## 2024-05-31 DIAGNOSIS — E78.2 MIXED HYPERLIPIDEMIA: ICD-10-CM

## 2024-05-31 DIAGNOSIS — I10 PRIMARY HYPERTENSION: ICD-10-CM

## 2024-05-31 DIAGNOSIS — I25.10 CORONARY ARTERY DISEASE INVOLVING NATIVE CORONARY ARTERY OF NATIVE HEART WITHOUT ANGINA PECTORIS: Primary | ICD-10-CM

## 2024-05-31 PROCEDURE — 1160F RVW MEDS BY RX/DR IN RCRD: CPT | Performed by: INTERNAL MEDICINE

## 2024-05-31 PROCEDURE — 1159F MED LIST DOCD IN RCRD: CPT | Performed by: INTERNAL MEDICINE

## 2024-05-31 PROCEDURE — 3077F SYST BP >= 140 MM HG: CPT | Performed by: INTERNAL MEDICINE

## 2024-05-31 PROCEDURE — 3078F DIAST BP <80 MM HG: CPT | Performed by: INTERNAL MEDICINE

## 2024-05-31 PROCEDURE — 99214 OFFICE O/P EST MOD 30 MIN: CPT | Performed by: INTERNAL MEDICINE

## 2024-05-31 RX ORDER — GRANULES FOR ORAL 3 G/1
3 POWDER ORAL AS NEEDED
COMMUNITY
Start: 2024-05-30

## 2024-05-31 NOTE — PROGRESS NOTES
[facility] Cardiology  Office Progress Note  Coco Crockett  1945  [address] [id number]       Visit Date: 05/31/24    PCP: Lobo Archer MD  [address] [address]    IDENTIFICATION: A 78 y.o. 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  7/2017  echo LVEF (0.70) without PE/pulmonary arterial hypertension and mild aortic valve sclerosis with mild LVH  Echo 2/15/2023: EF 61-65%, LV grade I diastolic dysfunction, negative saline test, aortic valve calcification with max pressure gradient 9 mmHg  Chronic hypertension, probably essential.   7/2018 24-hour BP monitor: Average blood pressure 136/69, 135/68 during the day and 141/71 at night.  Dyslipidemia.   11/19 triglycerides >4425, unable to calculate other measures  2/23 279/619/34/130  Hypothyroidism  Type 2 diabetes  2/23 A1c 5.3  Osteopenia  Iron deficiency anemia.   Positional vertigo - data deficit.  Chronic daytime hypersomnolence and fatigue with apparent acceptable polysomnogram - data deficit, [employer] [facility] [employer]   Severe spinal stenosis noted on MRI lumbar spine, February 2021.  Severe anemia (hemoglobin 8) with iron replacement and colonoscopy, December 2021; data deficit, summer 2021.  L2-S1 laminectomies with bilateral discectomies,  capstone cage placement, and fusion at L4-5 and L5-S1 May 2022.   Remote operations:   Tonsillectomy and adenoidectomy, 1950.   Appendectomy with wedge resection of both ovaries, 1970.  MARIZA/BSO secondary to severe endometriosis, 1985.   Fall on ice with subsequent ORIF of right ankle/tibia, 1995.   Cataracts, OU with bifocal implant, 2008.        CC:   Chief Complaint   Patient presents with    Coronary Artery Disease    Essential hypertension    Ischemic heart disease    Edema       Allergies  Allergies   Allergen Reactions    Ciprofloxacin Anaphylaxis     Tongue swelling    Keflex [Cephalexin] Itching, Swelling and Angioedema           Contrast Dye (Echo Or Unknown Ct/Mr) Itching    Imdur [Isosorbide Dinitrate] Headache     Severe headache/fatigue        Isosorbide Nitrate Headache     Severe headache/fatigue      Other reaction(s): Headache Severe headache/fatigue    Meperidine Itching and Rash    Penicillin V Itching and Rash    Sulfa Antibiotics Other (See Comments), Rash and Itching     severe rash/swelling    Pseudoephedrine Hcl Other (See Comments)     Heart racing    Bactrim [Sulfamethoxazole-Trimethoprim] Rash    Codeine Sulfate Itching and Rash    Epinephrine GI Intolerance and Other (See Comments)    Levofloxacin Other (See Comments) and Unknown (See Comments)     Numbness and ruptured oneida's tendon    Morphine Sulfate Itching and Rash    Penicillin G Other (See Comments)    Penicillin V Potassium Itching and Rash    Pseudoephedrine Other (See Comments)     Heart racing    Trimethoprim Hives       Current Medications  Current Outpatient Medications   Medication Instructions    acetaminophen (TYLENOL) 500 mg, Oral, 2 Times Daily PRN    amLODIPine (NORVASC) 5 mg, Oral, Every Morning, PRN for SBP >160 despite taking other antihypertensive meds    aspirin 81 mg, Oral, Daily    Breo Ellipta 200-25 MCG/INH inhaler Daily    cetirizine (ZYRTEC) 10 mg, Oral, Nightly    D-MANNOSE PO Take  by mouth.     diphenhydrAMINE (BENADRYL) 12.5 mg    diphenoxylate-atropine (LOMOTIL) 2.5-0.025 MG per tablet No dose, route, or frequency recorded.    doxycycline (VIBRAMYICN) 100 MG tablet     DULoxetine (CYMBALTA) 60 mg, Oral, Daily    estradiol (ESTRACE) 1 g, Vaginal    Evolocumab (Repatha SureClick) solution auto-injector SureClick injection INJECT 1 ML UNDER THE SKIN INTO THE APPROPRIATE AREA AS DIRECTED EVERY 14 (FOURTEEN) DAYS.    fenofibric acid (TRILIPIX) 135 MG capsule delayed-release delayed release capsule TAKE 1 CAPSULE BY MOUTH EVERY DAY    fluticasone (FLONASE) 50 MCG/ACT nasal spray 2 sprays, Nasal, Daily    fosfomycin (MONUROL) 3 g, As Needed    hydroCHLOROthiazide (MICROZIDE) 12.5 mg, Oral, 2 Times Daily    Icosapent Ethyl 0.5 g capsule 2 capsules, Oral, 2 Times Daily    levothyroxine (SYNTHROID, LEVOTHROID) 25 mcg, Oral, Daily    metFORMIN (GLUCOPHAGE) 500 mg, Oral, Daily With Breakfast, Ordered as BID, but patient only takes once daily (prescribing MD is aware, per pt)    methenamine (HIPREX) 1 g    Multiple Vitamin (MULTI VITAMIN DAILY PO) Oral, Daily    multivitamin with minerals (CENTROVITE) tablet tablet Oral, Daily    nebivolol (BYSTOLIC) 5 mg, Oral, Every Evening    nitrofurantoin (MACRODANTIN) 50 mg, Every Night at Bedtime    nitroglycerin (NITROLINGUAL) 0.4 MG/SPRAY spray 1 spray, Sublingual, Every 5 Minutes PRN    nortriptyline (PAMELOR) 10 mg, Oral, Nightly, Take 1 capsule at night for 2 weeks but if no improvement in headaches/sleep increase to 2 capsules at night thereafter    omeprazole (PRILOSEC) 20 mg, Oral, Daily    ondansetron ODT (ZOFRAN-ODT) 4 MG disintegrating tablet TAKE 1 TABLET BY MOUTH EVERY 6 TO 8 HOURS AS NEEDED FOR NAUSEA AND VOMITING    Phenazopyridine HCl (PYRIDIUM PO) Oral, As Needed    Polysacch Fe Cmp-Fe Heme Poly (Feosol Bifera) 28 MG tablet 28 mg, Oral, Daily, (Iron supplement)    traMADol (ULTRAM) 50 mg, Oral, Every 8 Hours PRN    Vascepa 2 g, Oral, 2 Times Daily With Meals  "   vitamin C (ASCORBIC ACID) 500 mg, Oral, Daily        History of Present Illness   Coco Crockett is a 78 y.o. year old female here for follow up.  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  Recurrent bladder infections over the last 1 year  No new cardiac issues however she has not been able to exercise due to her recurrent bouts of UTIs      OBJECTIVE:  Vitals:    05/31/24 1547   BP: 152/78   BP Location: Right arm   Patient Position: Sitting   Pulse: 71   SpO2: 97%   Weight: 78.9 kg (174 lb)   Height: 160 cm (63\")     Body mass index is 30.82 kg/m².    Constitutional:       Appearance: Healthy appearance. Not in distress.   Neck:      Vascular: No JVR. JVD normal.   Pulmonary:      Effort: Pulmonary effort is normal.      Breath sounds: Normal breath sounds. No wheezing. No rhonchi. No rales.   Chest:      Chest wall: Not tender to palpatation.   Cardiovascular:      PMI at left midclavicular line. Normal rate. Regular rhythm. Normal S1. Normal S2.       Murmurs: There is a systolic murmur.      No gallop.  No click. No rub.   Pulses:     Intact distal pulses.   Edema:     Peripheral edema absent.   Abdominal:      General: Bowel sounds are normal.      Palpations: Abdomen is soft.      Tenderness: There is no abdominal tenderness.   Musculoskeletal: Normal range of motion.         General: No tenderness. Skin:     General: Skin is warm and dry.   Neurological:      General: No focal deficit present.      Mental Status: Alert and oriented to person, place and time.         Diagnostic Data:  Procedures    Advance Care Planning   ACP discussion was held with the patient during this visit. Patient has an advance directive (not in EMR), copy requested.         ASSESSMENT:   Diagnosis Plan   1. Coronary artery disease involving native coronary artery of native heart without angina pectoris        2. Primary hypertension        3. Mixed hyperlipidemia            PLAN:  CAD post remote ischemic " evaluation with no actionable disease found medical management agreement continue GDMT    Hypertension controlled on current HCTZ amlodipine nebivolol    Mixed dyslipidemia on Repatha        Lobo Camacho MD, FACC

## 2024-10-07 RX ORDER — ICOSAPENT ETHYL 1 G/1
2 CAPSULE ORAL 2 TIMES DAILY WITH MEALS
Qty: 360 CAPSULE | Refills: 3 | Status: SHIPPED | OUTPATIENT
Start: 2024-10-07

## 2024-10-30 ENCOUNTER — TELEPHONE (OUTPATIENT)
Dept: PAIN MEDICINE | Facility: CLINIC | Age: 79
End: 2024-10-30
Payer: MEDICARE

## 2024-10-30 NOTE — TELEPHONE ENCOUNTER
Patient called and stated she needed to be scheduled for an appointment. Patient has been scheduled for 1/22/24 with KECIA Ansari.

## 2024-11-27 RX ORDER — EVOLOCUMAB 140 MG/ML
INJECTION, SOLUTION SUBCUTANEOUS
Qty: 1 ML | Refills: 11 | Status: SHIPPED | OUTPATIENT
Start: 2024-11-27

## 2025-04-24 NOTE — PLAN OF CARE
Goal Outcome Evaluation:  Plan of Care Reviewed With: patient, daughter, son        Progress: no change   SLP evaluation completed. Will address aphasia in dx tx and f/u for further swallow assessment. Please see note for further details and recommendations.     oriented to person, place and time

## 2025-05-09 ENCOUNTER — LAB (OUTPATIENT)
Dept: LAB | Facility: HOSPITAL | Age: 80
End: 2025-05-09
Payer: MEDICARE

## 2025-05-09 ENCOUNTER — OFFICE VISIT (OUTPATIENT)
Dept: CARDIOLOGY | Facility: CLINIC | Age: 80
End: 2025-05-09
Payer: MEDICARE

## 2025-05-09 VITALS
SYSTOLIC BLOOD PRESSURE: 122 MMHG | WEIGHT: 176 LBS | HEART RATE: 88 BPM | HEIGHT: 63 IN | DIASTOLIC BLOOD PRESSURE: 68 MMHG | OXYGEN SATURATION: 95 % | BODY MASS INDEX: 31.18 KG/M2

## 2025-05-09 DIAGNOSIS — R53.82 CHRONIC FATIGUE: ICD-10-CM

## 2025-05-09 DIAGNOSIS — R53.82 CHRONIC FATIGUE: Primary | ICD-10-CM

## 2025-05-09 DIAGNOSIS — I10 PRIMARY HYPERTENSION: ICD-10-CM

## 2025-05-09 DIAGNOSIS — I20.9 ANGINA PECTORIS: ICD-10-CM

## 2025-05-09 DIAGNOSIS — E78.2 MIXED HYPERLIPIDEMIA: ICD-10-CM

## 2025-05-09 LAB
ALBUMIN SERPL-MCNC: 4.4 G/DL (ref 3.5–5.2)
ALBUMIN/GLOB SERPL: 1.6 G/DL
ALP SERPL-CCNC: 35 U/L (ref 39–117)
ALT SERPL W P-5'-P-CCNC: 22 U/L (ref 1–33)
ANION GAP SERPL CALCULATED.3IONS-SCNC: 14 MMOL/L (ref 5–15)
AST SERPL-CCNC: 25 U/L (ref 1–32)
BILIRUB SERPL-MCNC: 0.3 MG/DL (ref 0–1.2)
BUN SERPL-MCNC: 23 MG/DL (ref 8–23)
BUN/CREAT SERPL: 28 (ref 7–25)
CALCIUM SPEC-SCNC: 9.7 MG/DL (ref 8.6–10.5)
CHLORIDE SERPL-SCNC: 103 MMOL/L (ref 98–107)
CO2 SERPL-SCNC: 25 MMOL/L (ref 22–29)
CREAT SERPL-MCNC: 0.82 MG/DL (ref 0.57–1)
EGFRCR SERPLBLD CKD-EPI 2021: 72 ML/MIN/1.73
GLOBULIN UR ELPH-MCNC: 2.7 GM/DL
GLUCOSE SERPL-MCNC: 101 MG/DL (ref 65–99)
POTASSIUM SERPL-SCNC: 4.8 MMOL/L (ref 3.5–5.2)
PROT SERPL-MCNC: 7.1 G/DL (ref 6–8.5)
SODIUM SERPL-SCNC: 142 MMOL/L (ref 136–145)
T-UPTAKE NFR SERPL: 1.15 TBI (ref 0.8–1.3)
T4 SERPL-MCNC: 9.83 MCG/DL (ref 4.5–11.7)
TSH SERPL DL<=0.05 MIU/L-ACNC: 2.37 UIU/ML (ref 0.27–4.2)

## 2025-05-09 PROCEDURE — 99214 OFFICE O/P EST MOD 30 MIN: CPT | Performed by: INTERNAL MEDICINE

## 2025-05-09 PROCEDURE — 84436 ASSAY OF TOTAL THYROXINE: CPT

## 2025-05-09 PROCEDURE — 80053 COMPREHEN METABOLIC PANEL: CPT

## 2025-05-09 PROCEDURE — 84443 ASSAY THYROID STIM HORMONE: CPT

## 2025-05-09 PROCEDURE — 36415 COLL VENOUS BLD VENIPUNCTURE: CPT

## 2025-05-09 PROCEDURE — 85027 COMPLETE CBC AUTOMATED: CPT

## 2025-05-09 PROCEDURE — 3074F SYST BP LT 130 MM HG: CPT | Performed by: INTERNAL MEDICINE

## 2025-05-09 PROCEDURE — 3078F DIAST BP <80 MM HG: CPT | Performed by: INTERNAL MEDICINE

## 2025-05-09 PROCEDURE — 84479 ASSAY OF THYROID (T3 OR T4): CPT

## 2025-05-09 RX ORDER — LANOLIN ALCOHOL/MO/W.PET/CERES
1000 CREAM (GRAM) TOPICAL DAILY
COMMUNITY

## 2025-05-09 RX ORDER — SEMAGLUTIDE 1.34 MG/ML
0.25 INJECTION, SOLUTION SUBCUTANEOUS
COMMUNITY
Start: 2025-02-06

## 2025-05-09 RX ORDER — GABAPENTIN 100 MG/1
200 CAPSULE ORAL NIGHTLY
COMMUNITY
Start: 2025-02-06

## 2025-05-09 NOTE — PROGRESS NOTES
[facility]  Office Progress Note  Coco Crockett  1945  [address] [place] [id number]       Visit Date: 05/09/25    PCP: Lobo Archer MD  [address] [address]    IDENTIFICATION: A 79 y.o.  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  7/2017  echo LVEF (0.70) without PE/pulmonary arterial hypertension and mild aortic valve sclerosis with mild LVH  Echo 2/15/2023: EF 61-65%, LV grade I diastolic dysfunction, negative saline test, aortic valve calcification with max pressure gradient 9 mmHg  Chronic hypertension, probably essential.   7/2018 24-hour BP monitor: Average blood pressure 136/69, 135/68 during the day and 141/71 at night.  Dyslipidemia.   11/19 triglycerides >4425, unable to calculate other measures  2/23 279/619/34/130  8/24 TG 1606, LDL 21  Hypothyroidism  Type 2 diabetes  2/23 A1c 5.3  Iron deficiency anemia.   Positional vertigo - data deficit.  Chronic daytime hypersomnolence and fatigue with apparent acceptable polysomnogram - data deficit, [employer] [facility]   Severe spinal stenosis noted on MRI lumbar spine, February 2021.  Severe anemia (hemoglobin 8) with iron replacement and colonoscopy, December 2021; data deficit, summer 2021.  Shingles right flank 2024 , epidural for pain  relief  Recurrent UTI- [facility]-, ID Mar[place]uisa resolved 2024   Remote operations:   Tonsillectomy and adenoidectomy, 1950.   Appendectomy with wedge resection of both ovaries, 1970.  MARIZA/BSO secondary to severe endometriosis, 1985.   Fall on ice with subsequent ORIF of right ankle/tibia, 1995.   Cataracts, OU with bifocal implant, 2008.  L2-S1 laminectomies with bilateral discectomies, capstone cage placement, and fusion at L4-5 and L5-S1 May 2022.      CC:   Chief Complaint   Patient presents with    Coronary Artery Disease       Allergies  Allergies   Allergen Reactions    Ciprofloxacin Anaphylaxis     Tongue swelling    Keflex [Cephalexin] Itching, Swelling and Angioedema           Contrast Dye (Echo Or Unknown Ct/Mr) Itching    Imdur [Isosorbide Dinitrate] Headache     Severe headache/fatigue        Isosorbide Nitrate Headache     Severe headache/fatigue      Other reaction(s): Headache Severe headache/fatigue    Meperidine Itching and Rash    Penicillin V Itching and Rash    Sulfa Antibiotics Other (See Comments), Rash and Itching     severe rash/swelling    Pseudoephedrine Hcl Other (See Comments)     Heart racing    Bactrim [Sulfamethoxazole-Trimethoprim] Rash    Codeine Sulfate Itching and Rash    Epinephrine GI Intolerance and Other (See Comments)    Levofloxacin Other (See Comments) and Unknown (See Comments)     Numbness and ruptured oneida's tendon    Morphine Sulfate Itching and Rash    Penicillin G Other (See Comments)    Penicillin V Potassium Itching and Rash    Pseudoephedrine Other (See Comments)     Heart racing    Trimethoprim Hives       Current Medications  Current Outpatient Medications   Medication Instructions    acetaminophen (TYLENOL) 500 mg, 2 Times Daily PRN    amLODIPine (NORVASC) 5 mg, Oral, Every Morning, PRN for SBP >160 despite taking other antihypertensive meds    aspirin 81 mg, Daily    cetirizine (ZYRTEC) 10 mg, Nightly    D-MANNOSE PO Take  by mouth.    diphenhydrAMINE (BENADRYL)  12.5 mg    DULoxetine (CYMBALTA) 60 mg, Daily    estradiol (ESTRACE) 1 g    Evolocumab (Repatha SureClick) solution auto-injector SureClick injection INJECT 1 ML UNDER THE SKIN INTO THE APPROPRIATE AREA AS DIRECTED EVERY 14 (FOURTEEN) DAYS.    fenofibric acid (TRILIPIX) 135 MG capsule delayed-release delayed release capsule TAKE 1 CAPSULE BY MOUTH EVERY DAY    fluticasone (FLONASE) 50 MCG/ACT nasal spray 2 sprays, Daily    fosfomycin (MONUROL) 3 g, As Needed    gabapentin (NEURONTIN) 200 mg, Nightly    hydroCHLOROthiazide (MICROZIDE) 12.5 mg, Oral, 2 Times Daily    icosapent ethyl (VASCEPA) 2 g, Oral, 2 Times Daily With Meals    Icosapent Ethyl 0.5 g capsule 2 capsules, 2 Times Daily    levothyroxine (SYNTHROID, LEVOTHROID) 25 mcg, Oral, Daily    metFORMIN (GLUCOPHAGE) 500 mg, Oral, Daily With Breakfast, Ordered as BID, but patient only takes once daily (prescribing MD is aware, per pt)    Multiple Vitamin (MULTI VITAMIN DAILY PO) Daily    nebivolol (BYSTOLIC) 5 mg, Oral, Every Evening    nitroglycerin (NITROLINGUAL) 0.4 MG/SPRAY spray 1 spray, Sublingual, Every 5 Minutes PRN    omeprazole (PRILOSEC) 20 mg, Daily    Ozempic (0.25 or 0.5 MG/DOSE) 0.25 mg, Every 7 Days    Phenazopyridine HCl (PYRIDIUM PO) As Needed    Polysacch Fe Cmp-Fe Heme Poly (Feosol Bifera) 28 MG tablet 28 mg, Oral, Daily, (Iron supplement)    traMADol (ULTRAM) 50 mg, Oral, Every 8 Hours PRN    vitamin B-12 (CYANOCOBALAMIN) 1,000 mcg, Daily    vitamin D3 5,000 Units, Daily        History of Present Illness   Coco Crockett is a 79 y.o. year old female here for follow up.  Recurrence of anginal equivalent shortness of breath with doing water aerobics and that radiates discomfort to her left scapula that was her prior angina.  She has had no rest pain that she can state.  She does not note any palpitations with this.    OBJECTIVE:  Vitals:    05/09/25 1316   BP: 122/68   BP Location: Left arm   Patient Position: Sitting   Pulse: 88   SpO2: 95%  "  Weight: 79.8 kg (176 lb)   Height: 160 cm (63\")       Body mass index is 31.18 kg/m².    Constitutional:       Appearance: Healthy appearance. Not in distress.   Neck:      Vascular: No JVR. JVD normal.   Pulmonary:      Effort: Pulmonary effort is normal.      Breath sounds: Normal breath sounds. No wheezing. No rhonchi. No rales.   Chest:      Chest wall: Not tender to palpatation.   Cardiovascular:      PMI at left midclavicular line. Normal rate. Regular rhythm. Normal S1. Normal S2.       Murmurs: There is a systolic murmur.      No gallop.  No click. No rub.   Pulses:     Intact distal pulses.   Edema:     Peripheral edema absent.   Abdominal:      General: Bowel sounds are normal.      Palpations: Abdomen is soft.      Tenderness: There is no abdominal tenderness.   Musculoskeletal: Normal range of motion.         General: No tenderness. Skin:     General: Skin is warm and dry.   Neurological:      General: No focal deficit present.      Mental Status: Alert and oriented to person, place and time.         Diagnostic Data:  Procedures    Advance Care Planning   ACP discussion was held with the patient during this visit. Patient has an advance directive (not in EMR), copy requested.         ASSESSMENT:   Diagnosis Plan   1. Chronic fatigue  CBC (No Diff)    Comprehensive Metabolic Panel    Thyroid Panel With TSH      2. Angina pectoris        3. Primary hypertension        4. Mixed hyperlipidemia              PLAN:  CAD post remote ischemic evaluation recurrent anginal equivalent risk stratify with Lexiscan Cardiolite    Hypertension controlled on current HCTZ amlodipine nebivolol    Mixed dyslipidemia on Repatha        Lobo Camacho MD, FACC  "

## 2025-05-10 LAB
DEPRECATED RDW RBC AUTO: 41 FL (ref 37–54)
ERYTHROCYTE [DISTWIDTH] IN BLOOD BY AUTOMATED COUNT: 12.5 % (ref 12.3–15.4)
HCT VFR BLD AUTO: 41.8 % (ref 34–46.6)
HGB BLD-MCNC: 13.6 G/DL (ref 12–15.9)
MCH RBC QN AUTO: 29.6 PG (ref 26.6–33)
MCHC RBC AUTO-ENTMCNC: 32.5 G/DL (ref 31.5–35.7)
MCV RBC AUTO: 90.9 FL (ref 79–97)
PLATELET # BLD AUTO: 213 10*3/MM3 (ref 140–450)
PMV BLD AUTO: 11.2 FL (ref 6–12)
RBC # BLD AUTO: 4.6 10*6/MM3 (ref 3.77–5.28)
WBC NRBC COR # BLD AUTO: 6.36 10*3/MM3 (ref 3.4–10.8)

## 2025-05-13 ENCOUNTER — RESULTS FOLLOW-UP (OUTPATIENT)
Dept: CARDIOLOGY | Facility: CLINIC | Age: 80
End: 2025-05-13
Payer: MEDICARE

## 2025-05-13 NOTE — TELEPHONE ENCOUNTER
"----- Message from Lobo Camacho sent at 5/10/2025  1:51 PM EDT -----  Labs wnl .  CBC back to normal  No \"chemical\" reason for fatigue  Fu w stress test ordered  ----- Message -----  From: Lab, Background User  Sent: 5/9/2025   6:41 PM EDT  To: Lobo Camacho MD      Pt called informed of the above results, verbalized understanding.    " Patient discharged per MD orders. Instructions given on medications, activity, when to call MD, and follow up appointments. Pt verbalized understanding.  Patient AAOx4, VSS, no c/o pain or discomfort at this time. Friend at bedside. Telemetry and PIV removed. Patient transported off unit via wheelchair.

## 2025-05-29 ENCOUNTER — TELEPHONE (OUTPATIENT)
Dept: CARDIOLOGY | Facility: CLINIC | Age: 80
End: 2025-05-29

## 2025-05-29 ENCOUNTER — HOSPITAL ENCOUNTER (OUTPATIENT)
Dept: CARDIOLOGY | Facility: HOSPITAL | Age: 80
Discharge: HOME | End: 2025-05-29
Payer: MEDICARE

## 2025-05-29 VITALS
SYSTOLIC BLOOD PRESSURE: 153 MMHG | BODY MASS INDEX: 31.17 KG/M2 | DIASTOLIC BLOOD PRESSURE: 72 MMHG | HEIGHT: 63 IN | WEIGHT: 175.93 LBS | HEART RATE: 69 BPM

## 2025-05-29 DIAGNOSIS — I20.9 ANGINA PECTORIS: ICD-10-CM

## 2025-05-29 LAB
BH CV REST NUCLEAR ISOTOPE DOSE: 9.9 MCI
BH CV STRESS BP STAGE 2: NORMAL
BH CV STRESS BP STAGE 4: NORMAL
BH CV STRESS COMMENTS STAGE 1: NORMAL
BH CV STRESS DOSE REGADENOSON STAGE 1: 0.4
BH CV STRESS DURATION MIN STAGE 1: 1
BH CV STRESS DURATION MIN STAGE 2: 1
BH CV STRESS DURATION MIN STAGE 3: 1
BH CV STRESS DURATION MIN STAGE 4: 1
BH CV STRESS DURATION SEC STAGE 1: 0
BH CV STRESS DURATION SEC STAGE 2: 0
BH CV STRESS DURATION SEC STAGE 3: 0
BH CV STRESS DURATION SEC STAGE 4: 0
BH CV STRESS HR STAGE 1: 71
BH CV STRESS HR STAGE 2: 85
BH CV STRESS HR STAGE 3: 81
BH CV STRESS HR STAGE 4: 83
BH CV STRESS NUCLEAR ISOTOPE DOSE: 33 MCI
BH CV STRESS O2 STAGE 1: 97
BH CV STRESS O2 STAGE 2: 98
BH CV STRESS O2 STAGE 3: 99
BH CV STRESS O2 STAGE 4: 98
BH CV STRESS PROTOCOL 1: NORMAL
BH CV STRESS RECOVERY BP: NORMAL MMHG
BH CV STRESS RECOVERY HR: 80 BPM
BH CV STRESS RECOVERY O2: 98 %
BH CV STRESS STAGE 1: 1
BH CV STRESS STAGE 2: 2
BH CV STRESS STAGE 3: 3
BH CV STRESS STAGE 4: 4
MAXIMAL PREDICTED HEART RATE: 139 BPM
PERCENT MAX PREDICTED HR: 61.15 %
SPECT HRT GATED+EF W RNC IV: 87 %
STRESS BASELINE BP: NORMAL MMHG
STRESS BASELINE HR: 68 BPM
STRESS O2 SAT REST: 97 %
STRESS PERCENT HR: 72 %
STRESS POST ESTIMATED WORKLOAD: 1 METS
STRESS POST EXERCISE DUR MIN: 4 MIN
STRESS POST EXERCISE DUR SEC: 0 SEC
STRESS POST O2 SAT PEAK: 99 %
STRESS POST PEAK BP: NORMAL MMHG
STRESS POST PEAK HR: 85 BPM
STRESS TARGET HR: 118 BPM

## 2025-05-29 PROCEDURE — 93017 CV STRESS TEST TRACING ONLY: CPT

## 2025-05-29 PROCEDURE — 78452 HT MUSCLE IMAGE SPECT MULT: CPT

## 2025-05-29 PROCEDURE — A9500 TC99M SESTAMIBI: HCPCS | Performed by: INTERNAL MEDICINE

## 2025-05-29 PROCEDURE — 25010000002 REGADENOSON 0.4 MG/5ML SOLUTION: Performed by: INTERNAL MEDICINE

## 2025-05-29 PROCEDURE — 34310000005 TECHNETIUM SESTAMIBI: Performed by: INTERNAL MEDICINE

## 2025-05-29 RX ORDER — REGADENOSON 0.08 MG/ML
0.4 INJECTION, SOLUTION INTRAVENOUS ONCE
Status: COMPLETED | OUTPATIENT
Start: 2025-05-29 | End: 2025-05-29

## 2025-05-29 RX ADMIN — TECHNETIUM TC 99M SESTAMIBI 1 DOSE: 1 INJECTION INTRAVENOUS at 07:32

## 2025-05-29 RX ADMIN — TECHNETIUM TC 99M SESTAMIBI 1 DOSE: 1 INJECTION INTRAVENOUS at 09:15

## 2025-05-29 RX ADMIN — REGADENOSON 0.4 MG: 0.08 INJECTION, SOLUTION INTRAVENOUS at 09:14

## 2025-05-29 NOTE — TELEPHONE ENCOUNTER
"Per Dr. Camacho, \"Stress test not high risk at current   Continue current Rx.\". Contacted patient about results. Patient verbalizes understanding.   "
